# Patient Record
Sex: FEMALE | Race: BLACK OR AFRICAN AMERICAN | NOT HISPANIC OR LATINO | Employment: UNEMPLOYED | URBAN - METROPOLITAN AREA
[De-identification: names, ages, dates, MRNs, and addresses within clinical notes are randomized per-mention and may not be internally consistent; named-entity substitution may affect disease eponyms.]

---

## 2018-07-14 ENCOUNTER — HOSPITAL ENCOUNTER (EMERGENCY)
Facility: HOSPITAL | Age: 58
Discharge: HOME | End: 2018-07-14
Attending: EMERGENCY MEDICINE
Payer: COMMERCIAL

## 2018-07-14 VITALS
SYSTOLIC BLOOD PRESSURE: 108 MMHG | BODY MASS INDEX: 36.8 KG/M2 | RESPIRATION RATE: 18 BRPM | HEART RATE: 61 BPM | OXYGEN SATURATION: 100 % | WEIGHT: 200 LBS | DIASTOLIC BLOOD PRESSURE: 55 MMHG | HEIGHT: 62 IN | TEMPERATURE: 99 F

## 2018-07-14 DIAGNOSIS — B35.4 TINEA CORPORIS: Primary | ICD-10-CM

## 2018-07-14 PROCEDURE — 99283 EMERGENCY DEPT VISIT LOW MDM: CPT | Mod: 25

## 2018-07-14 PROCEDURE — 63600175 PHARM REV CODE 636 W HCPCS: Performed by: NURSE PRACTITIONER

## 2018-07-14 PROCEDURE — 96372 THER/PROPH/DIAG INJ SC/IM: CPT

## 2018-07-14 RX ORDER — DEXAMETHASONE SODIUM PHOSPHATE 4 MG/ML
8 INJECTION, SOLUTION INTRA-ARTICULAR; INTRALESIONAL; INTRAMUSCULAR; INTRAVENOUS; SOFT TISSUE
Status: COMPLETED | OUTPATIENT
Start: 2018-07-14 | End: 2018-07-14

## 2018-07-14 RX ORDER — CLOTRIMAZOLE 1 %
CREAM (GRAM) TOPICAL
Qty: 30 G | Refills: 0 | Status: SHIPPED | OUTPATIENT
Start: 2018-07-14 | End: 2022-08-01 | Stop reason: ALTCHOICE

## 2018-07-14 RX ADMIN — DEXAMETHASONE SODIUM PHOSPHATE 8 MG: 4 INJECTION, SOLUTION INTRAMUSCULAR; INTRAVENOUS at 08:07

## 2018-07-15 NOTE — ED PROVIDER NOTES
Encounter Date: 7/14/2018    SCRIBE #1 NOTE: I, Oren Guzman, am scribing for, and in the presence of,  Dr. Rai. I have scribed the entire note.       History     Chief Complaint   Patient presents with    Rash     pt c/o rash to upper torso believes to be spider bites x 1 week      Time seen by provider: 8:19 PM    This is a 57 y.o. female who presents due to rashes x 3 weeks. She reports working in the yard about 3 weeks ago and has developed rashes since then. She went to see her doctor who gave her steroids and medicine for the itching. Pt states the itching medicine works for about 2 hours and then stops working. The steroids however do not work at all. No other complaints at this time.      The history is provided by the patient.     Review of patient's allergies indicates:  No Known Allergies  History reviewed. No pertinent past medical history.  History reviewed. No pertinent surgical history.  History reviewed. No pertinent family history.  Social History   Substance Use Topics    Smoking status: Never Smoker    Smokeless tobacco: Never Used    Alcohol use Not on file     Review of Systems   Constitutional: Negative for chills, fatigue and fever.   HENT: Negative for congestion, rhinorrhea and sore throat.    Respiratory: Negative for shortness of breath and wheezing.    Cardiovascular: Negative for chest pain.   Gastrointestinal: Negative for abdominal pain, diarrhea, nausea and vomiting.   Genitourinary: Negative for dysuria and hematuria.   Musculoskeletal: Negative for back pain and neck pain.   Skin: Positive for rash.        Rashes to right side of face, neck, and right arm.   Neurological: Negative for dizziness, weakness and headaches.   All other systems reviewed and are negative.      Physical Exam     Initial Vitals [07/14/18 1814]   BP Pulse Resp Temp SpO2   129/61 68 17 98.6 °F (37 °C) 97 %      MAP       --         Physical Exam    Nursing note and vitals reviewed.  Constitutional: She  appears well-developed and well-nourished. She is not diaphoretic. No distress.   HENT:   Head: Normocephalic and atraumatic.   Mouth/Throat: Oropharynx is clear and moist.   Eyes: Conjunctivae and EOM are normal. Pupils are equal, round, and reactive to light.   Neck: Normal range of motion. Neck supple.   Cardiovascular: Normal rate.   Pulmonary/Chest: No respiratory distress.   Abdominal: She exhibits no distension.   Musculoskeletal: Normal range of motion. She exhibits no edema or tenderness.   Neurological: She is alert and oriented to person, place, and time.   Skin: Skin is warm and dry. Capillary refill takes less than 2 seconds.   Mutiple sites of papular ring shaped and circular lesions with central clearing to her bilateral forearms, right antecubital fossa, right neck, and right side of her face.   Psychiatric: Thought content normal.         ED Course   Procedures  Labs Reviewed - No data to display       Imaging Results    None          Medical Decision Making:   ED Management:  Rash is most consistent with tinea corporis and the patient will be discharged with a Decadron injection in the emergency department and a prescription for clotrimazole.                      Clinical Impression:     1. Tinea corporis                  I, Jami Rai, personally performed the services described in this documentation. All medical record entries made by the scribe were at my direction and in my presence.  I have reviewed the chart and agree that the record reflects my personal performance and is accurate and complete. Jami Rai M.D. 9:52 PM07/14/2018                 aJmi Rai MD  07/14/18 2157

## 2018-07-15 NOTE — ED TRIAGE NOTES
Pt presents to ED with C/O rash that started to RUE to crease area and has spread to opposite arm, torso, back neck and face. Pt suspects its spider bites. Some are noted as pustules and some has scabbed over, some swelling noted with redness   Pt states she did go  to urgent care this tuesday and was given a steroid injection and cortisone cream with no relief. Comfort and safety measures provided, will continue to monitor.

## 2018-07-15 NOTE — ED PROVIDER NOTES
"Encounter Date: 7/14/2018       History     Chief Complaint   Patient presents with    Rash     pt c/o rash to upper torso believes to be spider bites x 1 week      Pt presents for rash on arms, back, abd and groin x 2 weeks. Rash is intensly itching, pt went to urgent care Tuesday where they gave her a steriod shot, some cream and "itchy" medicine, pt has gotten no relief from meds. Pt has also tried OTC calamine lotion with no relief. Pt reports day before rash started she was cutting branches and ricky to clear way for a picnic at her resident. Rash started to show up the next day on arms, then has spread everywhere else since.       The history is provided by the patient.     Review of patient's allergies indicates:  No Known Allergies  History reviewed. No pertinent past medical history.  History reviewed. No pertinent surgical history.  History reviewed. No pertinent family history.  Social History   Substance Use Topics    Smoking status: Never Smoker    Smokeless tobacco: Never Used    Alcohol use Not on file     Review of Systems   Constitutional: Negative for activity change, chills and fever.   HENT: Negative for congestion, sore throat and trouble swallowing.    Respiratory: Negative for cough, shortness of breath and wheezing.    Cardiovascular: Negative for chest pain.   Gastrointestinal: Negative for constipation, diarrhea, nausea and vomiting.   Genitourinary: Negative for difficulty urinating.   Musculoskeletal: Negative for back pain and neck pain.   Skin: Positive for rash and wound.   Neurological: Negative for weakness and headaches.   Hematological: Does not bruise/bleed easily.   Psychiatric/Behavioral: Negative for confusion.       Physical Exam     Initial Vitals [07/14/18 1814]   BP Pulse Resp Temp SpO2   129/61 68 17 98.6 °F (37 °C) 97 %      MAP       --         Physical Exam    Nursing note and vitals reviewed.  Constitutional: Vital signs are normal. She appears well-developed.  " Non-toxic appearance. She does not appear ill.   HENT:   Head: Normocephalic and atraumatic.   Eyes: Conjunctivae, EOM and lids are normal.   Neck: Trachea normal and full passive range of motion without pain.   Cardiovascular: Normal rate, regular rhythm and normal heart sounds.   Pulmonary/Chest: Effort normal and breath sounds normal.   Abdominal: Soft. Normal appearance and bowel sounds are normal. There is no tenderness.   Musculoskeletal: Normal range of motion.   Neurological: She is alert and oriented to person, place, and time. She has normal strength. No cranial nerve deficit or sensory deficit.   Skin: Skin is warm and dry. Capillary refill takes less than 2 seconds. Abrasion (R arm from scratching) and rash noted. No abscess noted. Rash is papular and vesicular (on L forearm rash).   Psychiatric: She has a normal mood and affect. Her speech is normal and behavior is normal.         ED Course   Procedures  Labs Reviewed - No data to display       Imaging Results    None          Medical Decision Making:   Differential Diagnosis:   Tinea, cellullitis                      Clinical Impression:   {Add your Clinical Impression here. If you haven't documented one yet, please pend the note, finalize a Clinical Impression, and refresh your note before signing.:[id number]}

## 2022-08-01 ENCOUNTER — LAB VISIT (OUTPATIENT)
Dept: LAB | Facility: HOSPITAL | Age: 62
End: 2022-08-01
Attending: FAMILY MEDICINE
Payer: MEDICARE

## 2022-08-01 ENCOUNTER — TELEPHONE (OUTPATIENT)
Dept: PRIMARY CARE CLINIC | Facility: CLINIC | Age: 62
End: 2022-08-01

## 2022-08-01 ENCOUNTER — OFFICE VISIT (OUTPATIENT)
Dept: PRIMARY CARE CLINIC | Facility: CLINIC | Age: 62
End: 2022-08-01
Payer: MEDICARE

## 2022-08-01 VITALS
BODY MASS INDEX: 36.73 KG/M2 | TEMPERATURE: 98 F | SYSTOLIC BLOOD PRESSURE: 124 MMHG | DIASTOLIC BLOOD PRESSURE: 78 MMHG | WEIGHT: 200.81 LBS | HEART RATE: 60 BPM

## 2022-08-01 DIAGNOSIS — G89.29 OTHER CHRONIC PAIN: ICD-10-CM

## 2022-08-01 DIAGNOSIS — Z79.899 LONG-TERM USE OF HIGH-RISK MEDICATION: ICD-10-CM

## 2022-08-01 DIAGNOSIS — M19.90 OSTEOARTHRITIS, UNSPECIFIED OSTEOARTHRITIS TYPE, UNSPECIFIED SITE: ICD-10-CM

## 2022-08-01 DIAGNOSIS — E11.69 TYPE 2 DIABETES MELLITUS WITH OTHER SPECIFIED COMPLICATION, WITHOUT LONG-TERM CURRENT USE OF INSULIN: ICD-10-CM

## 2022-08-01 DIAGNOSIS — E66.01 SEVERE OBESITY (BMI 35.0-39.9) WITH COMORBIDITY: ICD-10-CM

## 2022-08-01 DIAGNOSIS — E78.5 HYPERLIPIDEMIA, UNSPECIFIED HYPERLIPIDEMIA TYPE: ICD-10-CM

## 2022-08-01 DIAGNOSIS — M06.9 RHEUMATOID ARTHRITIS, INVOLVING UNSPECIFIED SITE, UNSPECIFIED WHETHER RHEUMATOID FACTOR PRESENT: ICD-10-CM

## 2022-08-01 DIAGNOSIS — Z76.89 ENCOUNTER TO ESTABLISH CARE: ICD-10-CM

## 2022-08-01 DIAGNOSIS — Z76.89 ENCOUNTER TO ESTABLISH CARE: Primary | ICD-10-CM

## 2022-08-01 LAB
ESTIMATED AVG GLUCOSE: 166 MG/DL (ref 68–131)
HBA1C MFR BLD: 7.4 % (ref 4–5.6)

## 2022-08-01 PROCEDURE — 84443 ASSAY THYROID STIM HORMONE: CPT | Performed by: FAMILY MEDICINE

## 2022-08-01 PROCEDURE — 80053 COMPREHEN METABOLIC PANEL: CPT | Performed by: FAMILY MEDICINE

## 2022-08-01 PROCEDURE — 96372 THER/PROPH/DIAG INJ SC/IM: CPT | Mod: S$GLB,,, | Performed by: FAMILY MEDICINE

## 2022-08-01 PROCEDURE — 83036 HEMOGLOBIN GLYCOSYLATED A1C: CPT | Performed by: FAMILY MEDICINE

## 2022-08-01 PROCEDURE — 3078F DIAST BP <80 MM HG: CPT | Mod: CPTII,S$GLB,, | Performed by: FAMILY MEDICINE

## 2022-08-01 PROCEDURE — 96372 PR INJECTION,THERAP/PROPH/DIAG2ST, IM OR SUBCUT: ICD-10-PCS | Mod: S$GLB,,, | Performed by: FAMILY MEDICINE

## 2022-08-01 PROCEDURE — 3074F PR MOST RECENT SYSTOLIC BLOOD PRESSURE < 130 MM HG: ICD-10-PCS | Mod: CPTII,S$GLB,, | Performed by: FAMILY MEDICINE

## 2022-08-01 PROCEDURE — 99999 PR PBB SHADOW E&M-NEW PATIENT-LVL IV: CPT | Mod: PBBFAC,,, | Performed by: FAMILY MEDICINE

## 2022-08-01 PROCEDURE — 1159F PR MEDICATION LIST DOCUMENTED IN MEDICAL RECORD: ICD-10-PCS | Mod: CPTII,S$GLB,, | Performed by: FAMILY MEDICINE

## 2022-08-01 PROCEDURE — 1159F MED LIST DOCD IN RCRD: CPT | Mod: CPTII,S$GLB,, | Performed by: FAMILY MEDICINE

## 2022-08-01 PROCEDURE — 3008F BODY MASS INDEX DOCD: CPT | Mod: CPTII,S$GLB,, | Performed by: FAMILY MEDICINE

## 2022-08-01 PROCEDURE — 99999 PR PBB SHADOW E&M-NEW PATIENT-LVL IV: ICD-10-PCS | Mod: PBBFAC,,, | Performed by: FAMILY MEDICINE

## 2022-08-01 PROCEDURE — 1160F RVW MEDS BY RX/DR IN RCRD: CPT | Mod: CPTII,S$GLB,, | Performed by: FAMILY MEDICINE

## 2022-08-01 PROCEDURE — 3008F PR BODY MASS INDEX (BMI) DOCUMENTED: ICD-10-PCS | Mod: CPTII,S$GLB,, | Performed by: FAMILY MEDICINE

## 2022-08-01 PROCEDURE — 36415 COLL VENOUS BLD VENIPUNCTURE: CPT | Mod: PN | Performed by: FAMILY MEDICINE

## 2022-08-01 PROCEDURE — 1160F PR REVIEW ALL MEDS BY PRESCRIBER/CLIN PHARMACIST DOCUMENTED: ICD-10-PCS | Mod: CPTII,S$GLB,, | Performed by: FAMILY MEDICINE

## 2022-08-01 PROCEDURE — 80061 LIPID PANEL: CPT | Performed by: FAMILY MEDICINE

## 2022-08-01 PROCEDURE — 85025 COMPLETE CBC W/AUTO DIFF WBC: CPT | Performed by: FAMILY MEDICINE

## 2022-08-01 PROCEDURE — 3074F SYST BP LT 130 MM HG: CPT | Mod: CPTII,S$GLB,, | Performed by: FAMILY MEDICINE

## 2022-08-01 PROCEDURE — 99204 OFFICE O/P NEW MOD 45 MIN: CPT | Mod: 25,S$GLB,, | Performed by: FAMILY MEDICINE

## 2022-08-01 PROCEDURE — 3078F PR MOST RECENT DIASTOLIC BLOOD PRESSURE < 80 MM HG: ICD-10-PCS | Mod: CPTII,S$GLB,, | Performed by: FAMILY MEDICINE

## 2022-08-01 PROCEDURE — 99204 PR OFFICE/OUTPT VISIT, NEW, LEVL IV, 45-59 MIN: ICD-10-PCS | Mod: 25,S$GLB,, | Performed by: FAMILY MEDICINE

## 2022-08-01 RX ORDER — HYDROXYCHLOROQUINE SULFATE 200 MG/1
400 TABLET, FILM COATED ORAL
COMMUNITY
Start: 2022-04-18 | End: 2022-09-27 | Stop reason: SDUPTHER

## 2022-08-01 RX ORDER — KETOROLAC TROMETHAMINE 30 MG/ML
60 INJECTION, SOLUTION INTRAMUSCULAR; INTRAVENOUS
Status: COMPLETED | OUTPATIENT
Start: 2022-08-01 | End: 2022-08-01

## 2022-08-01 RX ORDER — ERGOCALCIFEROL 1.25 MG/1
50000 CAPSULE ORAL
COMMUNITY
Start: 2021-10-06 | End: 2022-08-01 | Stop reason: SDUPTHER

## 2022-08-01 RX ORDER — ATORVASTATIN CALCIUM 80 MG/1
80 TABLET, FILM COATED ORAL DAILY
COMMUNITY
Start: 2022-03-31 | End: 2022-08-01 | Stop reason: SDUPTHER

## 2022-08-01 RX ORDER — METFORMIN HYDROCHLORIDE 500 MG/1
1000 TABLET, EXTENDED RELEASE ORAL
Qty: 180 TABLET | Refills: 3 | Status: SHIPPED | OUTPATIENT
Start: 2022-08-01 | End: 2022-11-18 | Stop reason: SDUPTHER

## 2022-08-01 RX ORDER — GABAPENTIN 400 MG/1
800 CAPSULE ORAL
COMMUNITY
Start: 2021-10-06 | End: 2022-08-01 | Stop reason: SDUPTHER

## 2022-08-01 RX ORDER — ERGOCALCIFEROL 1.25 MG/1
50000 CAPSULE ORAL
Qty: 13 CAPSULE | Refills: 3 | Status: SHIPPED | OUTPATIENT
Start: 2022-08-01 | End: 2022-09-26 | Stop reason: SDUPTHER

## 2022-08-01 RX ORDER — GABAPENTIN 100 MG/1
100 CAPSULE ORAL
COMMUNITY
Start: 2021-10-06 | End: 2022-08-01 | Stop reason: SDUPTHER

## 2022-08-01 RX ORDER — NAPROXEN SODIUM 220 MG/1
81 TABLET, FILM COATED ORAL
COMMUNITY
Start: 2021-10-06

## 2022-08-01 RX ORDER — ATORVASTATIN CALCIUM 80 MG/1
80 TABLET, FILM COATED ORAL DAILY
Qty: 90 TABLET | Refills: 3 | Status: SHIPPED | OUTPATIENT
Start: 2022-08-01 | End: 2023-09-01

## 2022-08-01 RX ORDER — GABAPENTIN 100 MG/1
100 CAPSULE ORAL EVERY MORNING
Qty: 90 CAPSULE | Refills: 3 | Status: SHIPPED | OUTPATIENT
Start: 2022-08-01 | End: 2022-09-06

## 2022-08-01 RX ORDER — GABAPENTIN 400 MG/1
800 CAPSULE ORAL NIGHTLY
Qty: 180 CAPSULE | Refills: 3 | Status: SHIPPED | OUTPATIENT
Start: 2022-08-01 | End: 2022-09-06

## 2022-08-01 RX ORDER — METFORMIN HYDROCHLORIDE 500 MG/1
1000 TABLET, EXTENDED RELEASE ORAL
COMMUNITY
Start: 2021-10-06 | End: 2022-08-01 | Stop reason: SDUPTHER

## 2022-08-01 RX ADMIN — KETOROLAC TROMETHAMINE 60 MG: 30 INJECTION, SOLUTION INTRAMUSCULAR; INTRAVENOUS at 03:08

## 2022-08-01 NOTE — PROGRESS NOTES
After obtaining consent, and per orders of Dr. Huerta , Toradol 60 mg injection given by Park Choi LPN. Patient instructed to remain in room for 15 minutes afterwards, and to report any adverse reactions to me immediately.          Park Choi LPN

## 2022-08-01 NOTE — Clinical Note
Hey there!  Ms. Moraes was seeing neuro in [facility]. She's having a right sided cervial radiculopathy and they ordered an EMG to look into it. I thought that if she sees you, you could decide if that's the best test for her and, if so, have it done here instead? She also has a bunch of chronic back, and other joint pains you could address.  She's complicated by a history of RA on top of this, so I've already referred her to rheum.  Thanks! Pito

## 2022-08-01 NOTE — PROGRESS NOTES
Patient, Luciana Moraes (MRN #[id number]), presented with a recorded BMI of 36.73 kg/m^2 and a documented comorbidity(s):  - Diabetes Mellitus Type 2  - Hyperlipidemia  to which the severe obesity is a contributing factor. This is consistent with the definition of severe obesity (BMI 35.0-39.9) with comorbidity (ICD-10 E66.01, Z68.35). The patient's severe obesity was monitored, evaluated, addressed and/or treated. This addendum to the medical record is made on 08/01/2022.

## 2022-08-01 NOTE — PROGRESS NOTES
"Cedric Huerta MD    [facility]      [address] [place] [address]      Phone: [phone]      Fax: [phone]                  Office Visit  08/01/2022        Subjective      HPI:  Luciana Moraes is a 61 y.o. female presents today in clinic for "Establish Care  ."     61-year-old female presents today to establish care, checkup.    Patient states she moved here from [facility] about 8 months ago and is tired of driving back and forth for doctor's appointments.  Would like to get established locally.    Patient states she has a history of diabetes.  Last A1c on record was 7.4%, back in February, 2022. States that she takes metformin daily for this.  No issues with the medication.  Feels like she has gained some weight over the last few years.  Would also like to be able to lose some weight.  Gets regular eye exams in [place].  Would like to get established with Ophthalmology here.    Also has history of rheumatoid arthritis.  Was following with Rheumatology in [place].  From notes, it appears to be seropositive RA.  Has been taking Plaquenil 400 mg daily.  States she has been out of this medication for several weeks.  Thinks her rheumatologist was in the process of either discontinuing it or changing it to something else, she is not quite sure.    Additionally, has history of osteoarthritis and other joint pains.  Recently saw a neurologist in [place] for some right neck/shoulder pain.  The pain radiates down her right arm.  Feels numb, tingly.  Has to move it to make it feel better.  Has decreased strength in the arm.  She has an EMG scheduled for 9/16.  Is inquiring about having that done locally, rather than back in [place].  Currently, she takes gabapentin for her pains.    PMH:  Dm, HLD, RA, OA, chronic pains.  PSH:  Left shoulder.  Left hip labrum.  Hysterectomy.  One ovary.  FNH:  DM, CVA  Allergies:  NKDA  Social:  On disability.  Previously worked for " [employer].  T:  Denies.  Quit   A:  Denies  D:  Denies    Exercise:  Does chair exercises/stretches.    Pap:  Had hysterectomy.  MM2022    Colon:  In [place], approximately 2016.  Repeat 10 years ()      The following were updated and reviewed by myself in the chart: medications, past medical history, past surgical history, family history, social history, and allergies.     Medications:  Current Outpatient Medications on File Prior to Visit   Medication Sig Dispense Refill    aspirin 81 MG Chew Take 81 mg by mouth.      hydrOXYchloroQUINE (PLAQUENIL) 200 mg tablet Take 400 mg by mouth.      [DISCONTINUED] atorvastatin (LIPITOR) 80 MG tablet Take 80 mg by mouth once daily.      [DISCONTINUED] clotrimazole (LOTRIMIN) 1 % cream Apply to affected area 2 times daily 30 g 0    [DISCONTINUED] ergocalciferol (ERGOCALCIFEROL) 50,000 unit Cap Take 50,000 Units by mouth.      [DISCONTINUED] gabapentin (NEURONTIN) 100 MG capsule Take 100 mg by mouth.      [DISCONTINUED] gabapentin (NEURONTIN) 400 MG capsule Take 800 mg by mouth.      [DISCONTINUED] metFORMIN (GLUCOPHAGE-XR) 500 MG ER 24hr tablet Take 1,000 mg by mouth.       No current facility-administered medications on file prior to visit.        PMHx:  Past Medical History:   Diagnosis Date    Diabetes mellitus, type 2     Hyperlipidemia     Osteoarthritis     Polymyositis     Rheumatoid arthritis       There are no problems to display for this patient.       PSHx:  Past Surgical History:   Procedure Laterality Date    CARPAL TUNNEL RELEASE Bilateral     HIP SURGERY      HYSTERECTOMY          FHx:  History reviewed. No pertinent family history.     Social:  Social History     Socioeconomic History    Marital status:    Tobacco Use    Smoking status: Never Smoker    Smokeless tobacco: Never Used        Allergies:  Review of patient's allergies indicates:  No Known Allergies     ROS:  Review of Systems   Constitutional:  "Negative for activity change, appetite change, chills and fever.   HENT: Negative for congestion, postnasal drip, rhinorrhea, sore throat and trouble swallowing.    Respiratory: Negative for cough, shortness of breath and wheezing.    Cardiovascular: Negative for chest pain and palpitations.   Gastrointestinal: Negative for abdominal pain, constipation, diarrhea, nausea and vomiting.   Genitourinary: Negative for difficulty urinating.   Musculoskeletal: Positive for arthralgias, myalgias and neck pain.   Skin: Negative for color change and rash.   Neurological: Negative for speech difficulty and headaches.   All other systems reviewed and are negative.         Objective      /78   Pulse 60   Temp 97.8 °F (36.6 °C)   Wt 91.1 kg (200 lb 13.4 oz)   BMI 36.73 kg/m²   Ht Readings from Last 3 Encounters:   07/14/18 5' 2" (1.575 m)     Wt Readings from Last 3 Encounters:   08/01/22 91.1 kg (200 lb 13.4 oz)   07/14/18 90.7 kg (200 lb)       PHYSICAL EXAM:  Physical Exam  Vitals and nursing note reviewed.   Constitutional:       General: She is not in acute distress.     Appearance: Normal appearance.   HENT:      Head: Normocephalic and atraumatic.      Right Ear: Tympanic membrane, ear canal and external ear normal.      Left Ear: Tympanic membrane, ear canal and external ear normal.      Nose: Nose normal. No congestion or rhinorrhea.      Mouth/Throat:      Mouth: Mucous membranes are moist.      Pharynx: Oropharynx is clear. No oropharyngeal exudate or posterior oropharyngeal erythema.   Eyes:      Extraocular Movements: Extraocular movements intact.      Conjunctiva/sclera: Conjunctivae normal.      Pupils: Pupils are equal, round, and reactive to light.   Cardiovascular:      Rate and Rhythm: Normal rate and regular rhythm.   Pulmonary:      Effort: Pulmonary effort is normal. No respiratory distress.      Breath sounds: No wheezing, rhonchi or rales.   Musculoskeletal:         General: Normal range of " motion.      Cervical back: Normal range of motion.   Lymphadenopathy:      Cervical: No cervical adenopathy.   Skin:     General: Skin is warm and dry.      Findings: No rash.   Neurological:      Mental Status: She is alert.              LABS / IMAGING:  Recent Results (from the past 4368 hour(s))   HEMOGLOBIN A1C    Collection Time: 02/03/22  9:41 AM   Result Value Ref Range    Hemoglobin A1C 7.4 (H) 4.7 - 5.6 %    Estimated Average Glucose 166 (H) <115 mg/dL         Assessment    1. Encounter to establish care    2. Type 2 diabetes mellitus with other specified complication, without long-term current use of insulin    3. Hyperlipidemia, unspecified hyperlipidemia type    4. Rheumatoid arthritis, involving unspecified site, unspecified whether rheumatoid factor present    5. Osteoarthritis, unspecified osteoarthritis type, unspecified site    6. Other chronic pain    7. Long-term use of high-risk medication          Plan    Luciana was seen today for establish care.    Diagnoses and all orders for this visit:    Encounter to establish care  -     Ambulatory referral/consult to Obstetrics / Gynecology; Future  -     CBC Auto Differential; Future  -     Comprehensive Metabolic Panel; Future  -     TSH; Future  -     Lipid Panel; Future  -     Hemoglobin A1C; Future  -     Microalbumin/creatinine urine ratio; Future    Type 2 diabetes mellitus with other specified complication, without long-term current use of insulin  -     Ambulatory referral/consult to Ophthalmology; Future  -     metFORMIN (GLUCOPHAGE-XR) 500 MG ER 24hr tablet; Take 2 tablets (1,000 mg total) by mouth daily with breakfast.  -     Hemoglobin A1C; Future  -     Microalbumin/creatinine urine ratio; Future    Hyperlipidemia, unspecified hyperlipidemia type  -     atorvastatin (LIPITOR) 80 MG tablet; Take 1 tablet (80 mg total) by mouth once daily.  -     Lipid Panel; Future    Rheumatoid arthritis, involving unspecified site, unspecified whether  rheumatoid factor present  -     Ambulatory referral/consult to Pain Clinic; Future  -     Ambulatory referral/consult to Rheumatology; Future    Osteoarthritis, unspecified osteoarthritis type, unspecified site  -     Ambulatory referral/consult to Pain Clinic; Future  -     ketorolac injection 60 mg  -     gabapentin (NEURONTIN) 100 MG capsule; Take 1 capsule (100 mg total) by mouth every morning.  -     gabapentin (NEURONTIN) 400 MG capsule; Take 2 capsules (800 mg total) by mouth every evening.    Other chronic pain  -     Ambulatory referral/consult to Pain Clinic; Future  -     ketorolac injection 60 mg  -     gabapentin (NEURONTIN) 100 MG capsule; Take 1 capsule (100 mg total) by mouth every morning.  -     gabapentin (NEURONTIN) 400 MG capsule; Take 2 capsules (800 mg total) by mouth every evening.  -     CBC Auto Differential; Future  -     Comprehensive Metabolic Panel; Future  -     TSH; Future    Long-term use of high-risk medication  -     CBC Auto Differential; Future  -     Comprehensive Metabolic Panel; Future  -     TSH; Future  -     Lipid Panel; Future  -     Hemoglobin A1C; Future  -     Microalbumin/creatinine urine ratio; Future    Other orders  -     ergocalciferol (ERGOCALCIFEROL) 50,000 unit Cap; Take 1 capsule (50,000 Units total) by mouth every 7 days.    Physically, everything looks fine today.    Screening labs, as above.    Continue current medications, for now.  Once we get the results from the lab work back, can make adjustments to diabetes medications.    For her chronic pains, will give Toradol injection today.  Will have her see our pain specialist tomorrow.  He can decide if ordering an EMG is appropriate.  He may be able to offer 2nd opinion on other possible workup.    From her chart notes, it appears she does have seropositive rheumatoid arthritis.  Will also have her see Rheumatology.    Overdue for well-woman exam.  Referral placed to gyn.      FOLLOW-UP:  Follow up in about  2 weeks (around 8/15/2022) for check up, discuss labs, medication recs, f/u on referrals..    I spent a total of 45 minutes face to face and non-face to face on the date of this visit.This includes time preparing to see the patient (eg, review of tests, notes), obtaining and/or reviewing additional history from an independent historian and/or outside medical records, documenting clinical information in the electronic health record, independently interpreting results and/or communicating results to the patient/family/caregiver, or care coordinator.    Signed by:  Cedric Huerta MD

## 2022-08-01 NOTE — PATIENT INSTRUCTIONS
Physically, everything looks pretty good today.    Let us get some blood work today to see where things stand on the inside.  Results will be posted my chart is his hair available.    Once we get those results, we can discuss your diabetes, weight loss, other treatments.    Toradol injection today to help with pain.  This should give you some temporary relief until you can see Dr. Pereyra tomorrow.    Keep your follow-up appointment with him tomorrow.  He can discuss ordering the EMG study and can talk with you about results.    Referral placed today to have you see Rheumatology.  Our rheumatologist is located in our [place] clinic.    Referrals also placed for ophthalmology and Gynecology.  Our gynecologist is located here, in this building.    For Ophthalmology, recommend that you see the [facility], next door, at [address] [place] [address].  Feel free to call them at [phone] to schedule an appointment.    Continue to eat a healthy diet.  Be careful with portion sizes.  Includes lots of fresh fruits, vegetables, whole grains, lean proteins.  See info below.    Keep hydrated.  Be sure to drink at least 8-10, 8 oz, glasses of water every day.    Stay active.  Try to do some sort of physical activity every day.  Nothing outrageous, just try walking for 10-15 minutes each day.

## 2022-08-02 ENCOUNTER — OFFICE VISIT (OUTPATIENT)
Dept: PAIN MEDICINE | Facility: CLINIC | Age: 62
End: 2022-08-02
Payer: MEDICARE

## 2022-08-02 ENCOUNTER — HOSPITAL ENCOUNTER (OUTPATIENT)
Dept: RADIOLOGY | Facility: HOSPITAL | Age: 62
Discharge: HOME | End: 2022-08-02
Attending: ANESTHESIOLOGY
Payer: MEDICARE

## 2022-08-02 VITALS
WEIGHT: 201.06 LBS | SYSTOLIC BLOOD PRESSURE: 134 MMHG | HEIGHT: 62 IN | HEART RATE: 60 BPM | DIASTOLIC BLOOD PRESSURE: 77 MMHG | BODY MASS INDEX: 37 KG/M2

## 2022-08-02 DIAGNOSIS — M54.12 CERVICAL RADICULOPATHY: Primary | ICD-10-CM

## 2022-08-02 DIAGNOSIS — M47.816 LUMBAR SPONDYLOSIS: ICD-10-CM

## 2022-08-02 DIAGNOSIS — M50.30 DDD (DEGENERATIVE DISC DISEASE), CERVICAL: ICD-10-CM

## 2022-08-02 DIAGNOSIS — M54.12 CERVICAL RADICULOPATHY: ICD-10-CM

## 2022-08-02 DIAGNOSIS — M06.9 RHEUMATOID ARTHRITIS, INVOLVING UNSPECIFIED SITE, UNSPECIFIED WHETHER RHEUMATOID FACTOR PRESENT: ICD-10-CM

## 2022-08-02 DIAGNOSIS — M47.812 CERVICAL SPONDYLOSIS: ICD-10-CM

## 2022-08-02 DIAGNOSIS — G89.29 OTHER CHRONIC PAIN: ICD-10-CM

## 2022-08-02 DIAGNOSIS — M19.90 OSTEOARTHRITIS, UNSPECIFIED OSTEOARTHRITIS TYPE, UNSPECIFIED SITE: ICD-10-CM

## 2022-08-02 LAB
ALBUMIN SERPL BCP-MCNC: 3.5 G/DL (ref 3.5–5.2)
ALP SERPL-CCNC: 98 U/L (ref 55–135)
ALT SERPL W/O P-5'-P-CCNC: 21 U/L (ref 10–44)
ANION GAP SERPL CALC-SCNC: 11 MMOL/L (ref 8–16)
AST SERPL-CCNC: 15 U/L (ref 10–40)
BASOPHILS # BLD AUTO: 0.03 K/UL (ref 0–0.2)
BASOPHILS NFR BLD: 0.4 % (ref 0–1.9)
BILIRUB SERPL-MCNC: 0.4 MG/DL (ref 0.1–1)
BUN SERPL-MCNC: 15 MG/DL (ref 6–20)
CALCIUM SERPL-MCNC: 9.1 MG/DL (ref 8.7–10.5)
CHLORIDE SERPL-SCNC: 105 MMOL/L (ref 95–110)
CHOLEST SERPL-MCNC: 218 MG/DL (ref 120–199)
CHOLEST/HDLC SERPL: 4.8 {RATIO} (ref 2–5)
CO2 SERPL-SCNC: 23 MMOL/L (ref 23–29)
CREAT SERPL-MCNC: 0.9 MG/DL (ref 0.5–1.4)
DIFFERENTIAL METHOD: ABNORMAL
EOSINOPHIL # BLD AUTO: 0.3 K/UL (ref 0–0.5)
EOSINOPHIL NFR BLD: 3.4 % (ref 0–8)
ERYTHROCYTE [DISTWIDTH] IN BLOOD BY AUTOMATED COUNT: 15.1 % (ref 11.5–14.5)
EST. GFR  (NO RACE VARIABLE): >60 ML/MIN/1.73 M^2
GLUCOSE SERPL-MCNC: 162 MG/DL (ref 70–110)
HCT VFR BLD AUTO: 42.5 % (ref 37–48.5)
HDLC SERPL-MCNC: 45 MG/DL (ref 40–75)
HDLC SERPL: 20.6 % (ref 20–50)
HGB BLD-MCNC: 12.8 G/DL (ref 12–16)
IMM GRANULOCYTES # BLD AUTO: 0.02 K/UL (ref 0–0.04)
IMM GRANULOCYTES NFR BLD AUTO: 0.2 % (ref 0–0.5)
LDLC SERPL CALC-MCNC: 126.2 MG/DL (ref 63–159)
LYMPHOCYTES # BLD AUTO: 3.5 K/UL (ref 1–4.8)
LYMPHOCYTES NFR BLD: 42.8 % (ref 18–48)
MCH RBC QN AUTO: 26.9 PG (ref 27–31)
MCHC RBC AUTO-ENTMCNC: 30.1 G/DL (ref 32–36)
MCV RBC AUTO: 90 FL (ref 82–98)
MONOCYTES # BLD AUTO: 0.7 K/UL (ref 0.3–1)
MONOCYTES NFR BLD: 8.8 % (ref 4–15)
NEUTROPHILS # BLD AUTO: 3.7 K/UL (ref 1.8–7.7)
NEUTROPHILS NFR BLD: 44.4 % (ref 38–73)
NONHDLC SERPL-MCNC: 173 MG/DL
NRBC BLD-RTO: 0 /100 WBC
PLATELET # BLD AUTO: 231 K/UL (ref 150–450)
PMV BLD AUTO: 11.7 FL (ref 9.2–12.9)
POTASSIUM SERPL-SCNC: 3.8 MMOL/L (ref 3.5–5.1)
PROT SERPL-MCNC: 7.1 G/DL (ref 6–8.4)
RBC # BLD AUTO: 4.75 M/UL (ref 4–5.4)
SODIUM SERPL-SCNC: 139 MMOL/L (ref 136–145)
TRIGL SERPL-MCNC: 234 MG/DL (ref 30–150)
TSH SERPL DL<=0.005 MIU/L-ACNC: 0.79 UIU/ML (ref 0.4–4)
WBC # BLD AUTO: 8.25 K/UL (ref 3.9–12.7)

## 2022-08-02 PROCEDURE — 3061F NEG MICROALBUMINURIA REV: CPT | Mod: CPTII,S$GLB,, | Performed by: ANESTHESIOLOGY

## 2022-08-02 PROCEDURE — 3066F PR DOCUMENTATION OF TREATMENT FOR NEPHROPATHY: ICD-10-PCS | Mod: CPTII,S$GLB,, | Performed by: ANESTHESIOLOGY

## 2022-08-02 PROCEDURE — 99203 OFFICE O/P NEW LOW 30 MIN: CPT | Mod: S$GLB,,, | Performed by: ANESTHESIOLOGY

## 2022-08-02 PROCEDURE — 72040 X-RAY EXAM NECK SPINE 2-3 VW: CPT | Mod: TC,PN

## 2022-08-02 PROCEDURE — 1159F PR MEDICATION LIST DOCUMENTED IN MEDICAL RECORD: ICD-10-PCS | Mod: CPTII,S$GLB,, | Performed by: ANESTHESIOLOGY

## 2022-08-02 PROCEDURE — 1160F RVW MEDS BY RX/DR IN RCRD: CPT | Mod: CPTII,S$GLB,, | Performed by: ANESTHESIOLOGY

## 2022-08-02 PROCEDURE — 1160F PR REVIEW ALL MEDS BY PRESCRIBER/CLIN PHARMACIST DOCUMENTED: ICD-10-PCS | Mod: CPTII,S$GLB,, | Performed by: ANESTHESIOLOGY

## 2022-08-02 PROCEDURE — 3051F PR MOST RECENT HEMOGLOBIN A1C LEVEL 7.0 - < 8.0%: ICD-10-PCS | Mod: CPTII,S$GLB,, | Performed by: ANESTHESIOLOGY

## 2022-08-02 PROCEDURE — 3078F DIAST BP <80 MM HG: CPT | Mod: CPTII,S$GLB,, | Performed by: ANESTHESIOLOGY

## 2022-08-02 PROCEDURE — 3051F HG A1C>EQUAL 7.0%<8.0%: CPT | Mod: CPTII,S$GLB,, | Performed by: ANESTHESIOLOGY

## 2022-08-02 PROCEDURE — 3061F PR NEG MICROALBUMINURIA RESULT DOCUMENTED/REVIEW: ICD-10-PCS | Mod: CPTII,S$GLB,, | Performed by: ANESTHESIOLOGY

## 2022-08-02 PROCEDURE — 3075F PR MOST RECENT SYSTOLIC BLOOD PRESS GE 130-139MM HG: ICD-10-PCS | Mod: CPTII,S$GLB,, | Performed by: ANESTHESIOLOGY

## 2022-08-02 PROCEDURE — 99203 PR OFFICE/OUTPT VISIT, NEW, LEVL III, 30-44 MIN: ICD-10-PCS | Mod: S$GLB,,, | Performed by: ANESTHESIOLOGY

## 2022-08-02 PROCEDURE — 3075F SYST BP GE 130 - 139MM HG: CPT | Mod: CPTII,S$GLB,, | Performed by: ANESTHESIOLOGY

## 2022-08-02 PROCEDURE — 1159F MED LIST DOCD IN RCRD: CPT | Mod: CPTII,S$GLB,, | Performed by: ANESTHESIOLOGY

## 2022-08-02 PROCEDURE — 99999 PR PBB SHADOW E&M-EST. PATIENT-LVL V: CPT | Mod: PBBFAC,,, | Performed by: ANESTHESIOLOGY

## 2022-08-02 PROCEDURE — 99999 PR PBB SHADOW E&M-EST. PATIENT-LVL V: ICD-10-PCS | Mod: PBBFAC,,, | Performed by: ANESTHESIOLOGY

## 2022-08-02 PROCEDURE — 3008F PR BODY MASS INDEX (BMI) DOCUMENTED: ICD-10-PCS | Mod: CPTII,S$GLB,, | Performed by: ANESTHESIOLOGY

## 2022-08-02 PROCEDURE — 3078F PR MOST RECENT DIASTOLIC BLOOD PRESSURE < 80 MM HG: ICD-10-PCS | Mod: CPTII,S$GLB,, | Performed by: ANESTHESIOLOGY

## 2022-08-02 PROCEDURE — 3066F NEPHROPATHY DOC TX: CPT | Mod: CPTII,S$GLB,, | Performed by: ANESTHESIOLOGY

## 2022-08-02 PROCEDURE — 3008F BODY MASS INDEX DOCD: CPT | Mod: CPTII,S$GLB,, | Performed by: ANESTHESIOLOGY

## 2022-08-02 RX ORDER — METHYLPREDNISOLONE 4 MG/1
TABLET ORAL
Qty: 21 EACH | Refills: 0 | Status: SHIPPED | OUTPATIENT
Start: 2022-08-02 | End: 2022-08-23

## 2022-08-02 RX ORDER — TIZANIDINE 4 MG/1
4 TABLET ORAL 2 TIMES DAILY PRN
Qty: 60 TABLET | Refills: 1 | Status: SHIPPED | OUTPATIENT
Start: 2022-08-02 | End: 2022-09-06 | Stop reason: SDUPTHER

## 2022-08-02 RX ORDER — NABUMETONE 750 MG/1
750 TABLET, FILM COATED ORAL 2 TIMES DAILY PRN
Qty: 60 TABLET | Refills: 1 | Status: SHIPPED | OUTPATIENT
Start: 2022-08-02 | End: 2022-09-06 | Stop reason: SDUPTHER

## 2022-08-02 NOTE — PROGRESS NOTES
MsRudolph Moraes,    Attached are the results of your recent blood work.  You should be able to see them in MyChart.    Blood counts all look fine.  Electrolytes, kidney function, liver function, and thyroid function are all normal.    Sugar was a little elevated, but your A1c level held steady at 7.4%.  This is not horrible, but still above our goal.  Would like to get you below 7%, if possible.    Also, some of your cholesterol numbers were slightly elevated.  Were you fasting when we did the blood work?  If not, that would certainly account for some of the numbers.    We will discuss all of this in more detail at your visit in 2 weeks.

## 2022-08-02 NOTE — PROGRESS NOTES
New Patient Interventional Pain Note (Initial Visit)    Referring Physician: Cedric Huerta MD    PCP: Cedric Huerta MD    Chief Complaint:   Chief Complaint   Patient presents with    Neck Pain     Radiating to right shoulder        SUBJECTIVE:    Luciana Moraes is a 61 y.o. female who presents to the clinic for the evaluation of neck and lower pain. The pain started over 10 years ago and symptoms have been worsening.  Neck pain is described as a throbbing pain that starts diffusely over her neck.  She reports that in the last 2 months she has had increased in right upper extremity radicular pain that goes to her fingertips.  She denies any traumas to her neck or any previous surgeries on her spine her major joints.  Pain is worse with neck rotation and lifting objects, better with flexion and heat.  Back pain is described as a throbbing axial pain in a band across her lower back.  She reports minimal radiation to her bilateral lower extremities.  Pain is worse with extension, better with flexion.  The pain is described as numbing, sharp and tingling and is rated as  10 out of 10.   The pain is rated with a score of  6/10 on the BEST day and a score of 10/10 on the WORST day.  Symptoms interfere with daily activity and work. The pain is exacerbated by Walking, Extension, Lifting and Getting out of bed/chair.  The pain is mitigated by nothing.     Patient denies night fever/night sweats, urinary incontinence, bowel incontinence, significant weight loss, significant motor weakness and loss of sensations.      Non-Pharmacologic Treatments:  Physical Therapy/Home Exercise: yes  Ice/Heat:yes  TENS: no  Acupuncture: no  Massage: yes  Chiropractic: no        Previous Pain Medications:  Tylenol, NSAIDs, gabapentin, muscle relaxers, opioids, topicals     report:  Reviewed and consistent with medication use as prescribed.    Pain Procedures:   none      Imaging:   X-Ray Cervical Spine 2 or 3  Views  Narrative: EXAMINATION:  XR CERVICAL SPINE 2 OR 3 VIEWS    CLINICAL HISTORY:  Radiculopathy, cervical region    COMPARISON:  No comparison studies are available.    FINDINGS:  There is normal alignment of the 7 cervical vertebra. Multilevel marginal spondylosis and anterior annular calcifications especially from C4/C5 through C6/C7. The vertebral body heights and intervertebral disc heights are   well maintained. The posterior elements are intact and the prevertebral soft tissues are normal thickness. The orientation of the dens and the lateral masses are normal.    The lung apices are clear. Mildly tortuous aorta.  Impression: 1.  Negative for acute process involving the cervical spine.    2.  Multilevel degenerative disc changes manifesting mainly is marginal spondylosis and anterior annular calcifications in the mid to lower cervical region.    Electronically signed by: Keith Mann MD  Date:    08/02/2022  Time:    10:15      MRI Lumbar Spine w/o Contast 7/8/19  There is lumbar facet arthropathy at L5-S1 and L4-L5. No stenosis or disc herniation is evident. Patient appears obese. Patient may be a candidate for an image guided steroid injection treatment trial.   .       Past Medical History:   Diagnosis Date    Diabetes mellitus, type 2     Hyperlipidemia     Osteoarthritis     Polymyositis     Rheumatoid arthritis      Past Surgical History:   Procedure Laterality Date    CARPAL TUNNEL RELEASE Bilateral     HIP SURGERY      HYSTERECTOMY       Social History     Socioeconomic History    Marital status:    Tobacco Use    Smoking status: Never Smoker    Smokeless tobacco: Never Used     History reviewed. No pertinent family history.    Review of patient's allergies indicates:  No Known Allergies    Current Outpatient Medications   Medication Sig    aspirin 81 MG Chew Take 81 mg by mouth.    atorvastatin (LIPITOR) 80 MG tablet Take 1 tablet (80 mg total) by mouth once daily.     ergocalciferol (ERGOCALCIFEROL) 50,000 unit Cap Take 1 capsule (50,000 Units total) by mouth every 7 days.    gabapentin (NEURONTIN) 100 MG capsule Take 1 capsule (100 mg total) by mouth every morning.    gabapentin (NEURONTIN) 400 MG capsule Take 2 capsules (800 mg total) by mouth every evening.    hydrOXYchloroQUINE (PLAQUENIL) 200 mg tablet Take 400 mg by mouth.    metFORMIN (GLUCOPHAGE-XR) 500 MG ER 24hr tablet Take 2 tablets (1,000 mg total) by mouth daily with breakfast.    methylPREDNISolone (MEDROL DOSEPACK) 4 mg tablet use as directed    nabumetone (RELAFEN) 750 MG tablet Take 1 tablet (750 mg total) by mouth 2 (two) times daily as needed for Pain.    tiZANidine (ZANAFLEX) 4 MG tablet Take 1 tablet (4 mg total) by mouth 2 (two) times daily as needed.     No current facility-administered medications for this visit.         ROS  Review of Systems   Constitutional: Negative for chills, diaphoresis, fatigue and fever.   HENT: Negative for ear discharge, ear pain, rhinorrhea, trouble swallowing and voice change.    Eyes: Negative for pain and redness.   Respiratory: Negative for chest tightness, shortness of breath, wheezing and stridor.    Cardiovascular: Negative for chest pain and leg swelling.   Gastrointestinal: Negative for blood in stool, diarrhea, nausea and vomiting.   Endocrine: Negative for cold intolerance and heat intolerance.   Genitourinary: Positive for urgency. Negative for dysuria and hematuria.   Musculoskeletal: Positive for arthralgias, back pain, gait problem, joint swelling, myalgias, neck pain and neck stiffness.   Skin: Negative for rash.   Neurological: Positive for weakness and numbness. Negative for tremors, seizures, speech difficulty, light-headedness and headaches.   Hematological: Does not bruise/bleed easily.   Psychiatric/Behavioral: Negative for agitation, confusion and suicidal ideas. The patient is not nervous/anxious.             OBJECTIVE:  /77   Pulse 60    "Ht 5' 2" (1.575 m)   Wt 91.2 kg (201 lb 1 oz)   BMI 36.77 kg/m²         Physical Exam  Constitutional:       General: She is not in acute distress.     Appearance: Normal appearance. She is not ill-appearing.   HENT:      Head: Normocephalic and atraumatic.      Nose: No congestion or rhinorrhea.      Mouth/Throat:      Mouth: Mucous membranes are moist.   Eyes:      Extraocular Movements: Extraocular movements intact.      Pupils: Pupils are equal, round, and reactive to light.   Cardiovascular:      Pulses: Normal pulses.   Pulmonary:      Effort: Pulmonary effort is normal.   Abdominal:      General: Abdomen is flat.      Palpations: Abdomen is soft.   Skin:     General: Skin is warm and dry.      Capillary Refill: Capillary refill takes less than 2 seconds.   Neurological:      Mental Status: She is alert and oriented to person, place, and time.      Cranial Nerves: No cranial nerve deficit.      Sensory: Sensory deficit present.      Motor: Weakness present. No abnormal muscle tone.      Gait: Gait abnormal.      Deep Tendon Reflexes:      Reflex Scores:       Tricep reflexes are 2+ on the right side and 2+ on the left side.       Bicep reflexes are 2+ on the right side and 2+ on the left side.       Brachioradialis reflexes are 1+ on the right side and 1+ on the left side.       Patellar reflexes are 1+ on the right side and 1+ on the left side.       Achilles reflexes are 1+ on the right side and 1+ on the left side.     Comments: Antalgic gait  Decreased right-handed  as well as 4/5 strength in wrist extension, wrist flexion, and shoulder abduction.  Muscle groups of the left upper extremity are 5/5  Decreased light touch sensation over the extensor surfaces of the right upper extremity   Psychiatric:         Mood and Affect: Mood normal.         Behavior: Behavior normal.         Thought Content: Thought content normal.           Musculoskeletal:    Cervical Exam  Incision: no  Pain with Flexion: " no  Pain with Extension: yes  Paraspinous TTP:  Most tender over the right trapezius area  Facet TTP:  C5-6, right greater than left  Spurling:  Positive on the right  ROM:  Decreased secondary to pain      Lumbar Exam  Incision: no  Pain with Flexion: no  Pain with Extension: yes  ROM:  Decreased secondary to pain  Paraspinous TTP:  Left greater than right  Facet TTP:  L4-5  Facet Loading:  Positive bilaterally        LABS:  Lab Results   Component Value Date    WBC 8.25 08/01/2022    HGB 12.8 08/01/2022    HCT 42.5 08/01/2022    MCV 90 08/01/2022     08/01/2022       CMP  Sodium   Date Value Ref Range Status   08/01/2022 139 136 - 145 mmol/L Final     Potassium   Date Value Ref Range Status   08/01/2022 3.8 3.5 - 5.1 mmol/L Final     Chloride   Date Value Ref Range Status   08/01/2022 105 95 - 110 mmol/L Final     CO2   Date Value Ref Range Status   08/01/2022 23 23 - 29 mmol/L Final     Glucose   Date Value Ref Range Status   08/01/2022 162 (H) 70 - 110 mg/dL Final     BUN   Date Value Ref Range Status   08/01/2022 15 6 - 20 mg/dL Final     Creatinine   Date Value Ref Range Status   08/01/2022 0.9 0.5 - 1.4 mg/dL Final     Calcium   Date Value Ref Range Status   08/01/2022 9.1 8.7 - 10.5 mg/dL Final     Total Protein   Date Value Ref Range Status   08/01/2022 7.1 6.0 - 8.4 g/dL Final     Albumin   Date Value Ref Range Status   08/01/2022 3.5 3.5 - 5.2 g/dL Final     Total Bilirubin   Date Value Ref Range Status   08/01/2022 0.4 0.1 - 1.0 mg/dL Final     Comment:     For infants and newborns, interpretation of results should be based  on gestational age, weight and in agreement with clinical  observations.    Premature Infant recommended reference ranges:  Up to 24 hours.............<8.0 mg/dL  Up to 48 hours............<12.0 mg/dL  3-5 days..................<15.0 mg/dL  6-29 days.................<15.0 mg/dL       Alkaline Phosphatase   Date Value Ref Range Status   08/01/2022 98 55 - 135 U/L Final     AST    Date Value Ref Range Status   08/01/2022 15 10 - 40 U/L Final     ALT   Date Value Ref Range Status   08/01/2022 21 10 - 44 U/L Final     Anion Gap   Date Value Ref Range Status   08/01/2022 11 8 - 16 mmol/L Final       Lab Results   Component Value Date    HGBA1C 7.4 (H) 08/01/2022             ASSESSMENT:       61 y.o. year old female with neck and lower back pain, consistent with     1. Cervical radiculopathy  Ambulatory referral/consult to Physical/Occupational Therapy    X-Ray Cervical Spine 2 or 3 Views    MRI Cervical Spine Without Contrast    nabumetone (RELAFEN) 750 MG tablet    tiZANidine (ZANAFLEX) 4 MG tablet    methylPREDNISolone (MEDROL DOSEPACK) 4 mg tablet   2. Rheumatoid arthritis, involving unspecified site, unspecified whether rheumatoid factor present  Ambulatory referral/consult to Pain Clinic   3. Osteoarthritis, unspecified osteoarthritis type, unspecified site  Ambulatory referral/consult to Pain Clinic   4. Other chronic pain  Ambulatory referral/consult to Pain Clinic   5. DDD (degenerative disc disease), cervical     6. Cervical spondylosis     7. Lumbar spondylosis       Cervical radiculopathy  -     Ambulatory referral/consult to Physical/Occupational Therapy; Future; Expected date: 08/09/2022  -     X-Ray Cervical Spine 2 or 3 Views; Future; Expected date: 08/02/2022  -     MRI Cervical Spine Without Contrast; Future; Expected date: 08/02/2022  -     nabumetone (RELAFEN) 750 MG tablet; Take 1 tablet (750 mg total) by mouth 2 (two) times daily as needed for Pain.  Dispense: 60 tablet; Refill: 1  -     tiZANidine (ZANAFLEX) 4 MG tablet; Take 1 tablet (4 mg total) by mouth 2 (two) times daily as needed.  Dispense: 60 tablet; Refill: 1  -     methylPREDNISolone (MEDROL DOSEPACK) 4 mg tablet; use as directed  Dispense: 21 each; Refill: 0    Rheumatoid arthritis, involving unspecified site, unspecified whether rheumatoid factor present  -     Ambulatory referral/consult to Pain  Clinic    Osteoarthritis, unspecified osteoarthritis type, unspecified site  -     Ambulatory referral/consult to Pain Clinic    Other chronic pain  -     Ambulatory referral/consult to Pain Clinic    DDD (degenerative disc disease), cervical    Cervical spondylosis    Lumbar spondylosis             PLAN:   - Interventions:   None at this time.  Can consider cervical [facility] depending on results  - Anticoagulation use:   yes aspirin    - Medications:   Start Relafen 750 mg twice a day as needed   Start tizanidine 4 mg twice a day as needed   Start Medrol Dosepak for inflammation   Continue Plaquenil as prescribed by rheumatology    - Therapy:    Patient was performing physical therapy in [place] with moderate relief.  Will send new referral for physical therapy in [place] for cervical radiculopathy.    - Imaging:   X-rays of cervical spine obtained today reviewed.  Significant for spondylosis and anterior annular calcifications noted at C4-5, C5-6, and C6-C7    - Consults:   Continue follow-up with Rheumatology    - Counseled patient regarding the importance of activity modification and physical therapy    - Patient Questions: Answered all of the patient's questions regarding diagnosis, therapy, and treatment    - Follow up visit: return to clinic in 4 to 5 weeks to review MRI        The above plan and management options were discussed at length with patient. Patient is in agreement with the above and verbalized understanding.    I discussed the goals of interventional chronic pain management with the patient on today's visit.  I explained the utility of injections for diagnostic and therapeutic purposes.  We discussed a multimodal approach to pain including treating the patient's given worst pain at any given time.  We will use a systematic approach to addressing pain.  We will also adopt a multimodal approach that includes injections, adjuvant medications, physical therapy, at times psychiatry.  There may be a  limited role for opioid use intermittently in the treatment of pain, more particularly for acute pain although no one approach can be used as a sole treatment modality.    I emphasized the importance of regular exercise, core strengthening and stretching, diet and weight loss as a cornerstone of long-term pain management.      Luis M Pereyra MD  Interventional Pain Management  [facility]    Disclaimer:  This note was prepared using voice recognition system and is likely to have sound alike errors that may have been overlooked even after proof reading.  Please call me with any questions

## 2022-08-03 ENCOUNTER — CLINICAL SUPPORT (OUTPATIENT)
Dept: REHABILITATION | Facility: HOSPITAL | Age: 62
End: 2022-08-03
Attending: ANESTHESIOLOGY
Payer: MEDICARE

## 2022-08-03 DIAGNOSIS — M62.838 MUSCLE SPASMS OF NECK: ICD-10-CM

## 2022-08-03 DIAGNOSIS — M54.2 NECK PAIN: ICD-10-CM

## 2022-08-03 DIAGNOSIS — M54.12 CERVICAL RADICULOPATHY: ICD-10-CM

## 2022-08-03 PROCEDURE — 97161 PT EVAL LOW COMPLEX 20 MIN: CPT | Mod: PN

## 2022-08-03 NOTE — PLAN OF CARE
"[facility]  Physical Therapy Initial Evaluation    Date: 8/3/2022   Name: Luciana Moraes  Clinic Number: [id number]    Therapy Diagnosis:   Encounter Diagnoses   Name Primary?    Cervical radiculopathy     Neck pain     Muscle spasms of neck      Physician: Luis M Pereyra MD    Physician Orders: PT Eval and Treat   Medical Diagnosis from Referral: M54.12 (ICD-10-CM) - Cervical radiculopathy     Evaluation Date: 8/3/2022  Authorization Period Expiration: 8/31/2022  Plan of Care Expiration: 11/3/2022  Visit # / Visits authorized: 1/ 1    PN DUE: 9/3/2022    Time In: 8:00  Time Out: 8:45  Total Appointment Time (timed & untimed codes): 45 minutes    Precautions: Standard    Subjective   Date of onset: one month ago  History of current condition - Luciana reports: Has c/o right sided neck pain with pain into right arm, with a sensation of pressure with numbness and tingling into arm forearm and hand. Started a month ago with some pain in right shoulder with gradual increase in Sx's. Pain in right side of neck described as a "squeezing sensation right side of c-spine. Agg by looking up and turning head to the right, eased with looking down.     Medical History:   Past Medical History:   Diagnosis Date    Diabetes mellitus, type 2     Hyperlipidemia     Osteoarthritis     Polymyositis     Rheumatoid arthritis        Surgical History:   Luciana Moraes  has a past surgical history that includes Hysterectomy; Carpal tunnel release (Bilateral); and Hip surgery.    Medications:   Luciana has a current medication list which includes the following prescription(s): aspirin, atorvastatin, ergocalciferol, gabapentin, gabapentin, hydroxychloroquine, metformin, methylprednisolone, nabumetone, and tizanidine.    Allergies:   Review of patient's allergies indicates:  No Known Allergies     Imaging, x-rays: There is normal alignment of the 7 cervical vertebra. Multilevel marginal " spondylosis and anterior annular calcifications especially from C4/C5 through C6/C7. The vertebral body heights and intervertebral disc heights are   well maintained. The posterior elements are intact and the prevertebral soft tissues are normal thickness. The orientation of the dens and the lateral masses are normal.       Prior Therapy: no  Social History:  lives alone  Occupation: disabled  Prior Level of Function: unrestricted in ADL's or position of neck  Current Level of Function: Painfull with ADL's, turning head, looking up    Pain:  Current 10/10, worst 10/10, best 7/10   Location: right neck  right arm(s)  Description: Tight, Tingling and Numb  Aggravating Factors: Extension and Lifting  Easing Factors: rest    Patients goals: alleviate pain    Objective       Observation: pt pleasant and appropriate throughout evaluation; A&O x 3       Posture: forward head    Cervical Range of Motion:    Degrees Pain   Flexion (50) 40 Yes mild     Extension (75) 20 Yes into arm     Right Rotation (75) 45 yes     Left Rotation (75) 55 Tight on right     Right Side Bending (45) 15 yes   Left Side Bending (45) 30 Tight on right      Increased symptoms with cervical retraction    Shoulder Range of Motion:   Shoulder Left Right   Flexion 135 125   Abduction 140 130   ER WNL WNL   IR WNL WNL       Upper Extremity Strength  (R) UE  (L) UE    Shoulder flexion: 4+/5 Shoulder flexion: 5/5   Shoulder Abduction: 4+/5 Shoulder abduction: 5/5   Shoulder ER 4-/5 Shoulder ER 5/5   Shoulder IR 4+/5 Shoulder IR 5/5   Elbow flexion: 4+/5 Elbow flexion: 5/5   Elbow extension: 4+/5 Elbow extension: 5/5   Thumb extension 3+/5 Thumb extesnion  5/5   Intrinsics adduction 4-/5 Intrinsics adduction 4/5         Special Tests:  Distraction neg   Compression neg   Spurlings Positive right   Sharp-Trevor neg   VA test neg   Lateral Flexion Alar Ligament neg   DNF test neg       Joint Mobility: hypomobile C-spine marcie right C5,6,7,        Palpation:  Tight and TTP cervical paraspinals,   Right UT    Sensation: intact    Flexibility: tight UT        Limitation/Restriction for FOTO neck Survey    Therapist reviewed FOTO scores for Luciana Moraes on 8/3/2022.   FOTO documents entered into [employer] - see Media section.    Limitation Score: 78%         TREATMENT   Treatment Time In: 8:40  Treatment Time Out: 8:45  Total Treatment time (time-based codes) separate from Evaluation: 5 minutes    Luciana received therapeutic exercises to develop ROM, flexibility and posture for 2 minutes including:    UT stretch right only  LS stretch Right only      Luciana received the following manual therapy techniques: Joint mobilizations, Myofacial release and Soft tissue Mobilization were applied to the: right neck for 5 minutes, including:    UPa's and transverse glides C4,5,6,7  STM cervical parapsinals right      Home Exercises and Patient Education Provided    Education provided:   - - PT POC  - HEP  - PT prognosis and diagnosis  - all questions and concerns answered       Written Home Exercises Provided: yes.  Exercises were reviewed and Luciana was able to demonstrate them prior to the end of the session.  Luciana demonstrated good  understanding of the education provided.     See EMR under Patient Instructions for exercises provided today.    Assessment   Luciana is a 61 y.o. female referred to outpatient Physical Therapy with a medical diagnosis of M54.12 (ICD-10-CM) - Cervical radiculopathy   .  The patient presents with impairments which include decreased ROM, decreased strength, decreased joint mobility, decreased muscle length and postural abnormalities.  These impairments are limiting patient's ability to perform ADL's painfree, turn head. Pt prognosis is Good due to personal factors and co-morbidities listed below. Pt will benefit from skilled outpatient Physical Therapy to address the deficits stated above and in the chart below, provide pt/family education, and to  maximize pt's level of independence.   Pt presents with SIGNS AND SYMPTOMS of right C6,7 facet impingement with nerve root irritation  Patient prognosis is Good.   Patientt will benefit from skilled outpatient Physical Therapy to address the deficits stated above and in the chart below, provide patient /family education, and to maximize patientt's level of independence.     Plan of care discussed with patient: Yes  Patient's spiritual, cultural and educational needs considered and patient is agreeable to the plan of care and goals as stated below:     Anticipated Barriers for therapy: age    Medical Necessity is demonstrated by the following  History  Co-morbidities and personal factors that may impact the plan of care Co-morbidities:   See above    Personal Factors:   age     low   Examination  Body Structures and Functions, activity limitations and participation restrictions that may impact the plan of care Body Regions:   neck  back  lower extremities  upper extremities    Body Systems:    ROM  strength    Participation Restrictions:   none    Activity limitations:   Learning and applying knowledge  no deficits    General Tasks and Commands  no deficits    Communication  no deficits    Mobility  lifting and carrying objects    Self care  no deficits    Domestic Life  doing house work (cleaning house, washing dishes, laundry)    Interactions/Relationships  no deficits    Life Areas  no deficits    Community and Social Life  no deficits         low   Clinical Presentation stable and uncomplicated low   Decision Making/ Complexity Score: low     Goals:  Short Term Goals: 5 weeks   1.Pt will be independent with HEP performance in order to continue PT progress at home.   2. Pt will improve Cervical ROM motion  at least 10%   3. Pt will report pain at worst of 5/10 or less  4. Pt will improve B shoulder ROM to 140 deg flex, abd    Long Term Goals: 10 weeks   1. Pt will improve FOTO disability score to 51% disability or  less in order to improve overall QOL and return to PLOF.   2. Pt will report pain at worst of 2/10 or less  3 .Pt will improve Cervical ROM motion  To WNL  4. Pt will improve B shoulder ROM to 160 deg flex, abd    Plan   Plan of care Certification: 8/3/2022 to 11/3/2022.    Outpatient Physical Therapy 2 times weekly for 10 weeks to include the following interventions: Electrical Stimulation IFC, Manual Therapy, Moist Heat/ Ice, Patient Education, Self Care and Therapeutic Exercise. And any other therapies and modalities as clinically indicated and appropriate, including but not limited to FDN and telehealth. Pt may be seen by PTA as a part of pt's care team.       Taylor Jama, PT, DPT  8/3/2022

## 2022-08-08 ENCOUNTER — HOSPITAL ENCOUNTER (OUTPATIENT)
Dept: RADIOLOGY | Facility: HOSPITAL | Age: 62
Discharge: HOME | End: 2022-08-08
Attending: ANESTHESIOLOGY
Payer: MEDICARE

## 2022-08-08 ENCOUNTER — PATIENT MESSAGE (OUTPATIENT)
Dept: ADMINISTRATIVE | Facility: HOSPITAL | Age: 62
End: 2022-08-08
Payer: MEDICARE

## 2022-08-08 DIAGNOSIS — M54.12 CERVICAL RADICULOPATHY: ICD-10-CM

## 2022-08-08 PROCEDURE — 72141 MRI NECK SPINE W/O DYE: CPT | Mod: TC,PN

## 2022-08-09 NOTE — PROGRESS NOTES
[facility]   Physical Therapy Treatment Note     Name: Luciana Moraes  Clinic Number: [id number]    Therapy Diagnosis:   Encounter Diagnoses   Name Primary?    Neck pain Yes    Muscle spasms of neck      Physician: Luis M Pereyra MD    Visit Date: 8/16/2022    Physician Orders: PT Eval and Treat   Medical Diagnosis from Referral: M54.12 (ICD-10-CM) - Cervical radiculopathy      Evaluation Date: 8/3/2022  Authorization Period Expiration: 8/31/2022  Plan of Care Expiration: 11/3/2022  Visit # / Visits authorized: 1/ 11 (+ eval)     PN DUE: 9/3/2022       PTA Visit #: 0/5     Time In: 9:45  Time Out: 10:30  Total Billable Time: 45 minutes    Imaging, x-rays: There is normal alignment of the 7 cervical vertebra. Multilevel marginal spondylosis and anterior annular calcifications especially from C4/C5 through C6/C7. The vertebral body heights and intervertebral disc heights are   well maintained.     MRI  C1-C2: Atlanto odontoid osteophytosis noted without significant dorsal pannus formation.  No significant spinal canal stenosis.     C2-C3: No significant spinal canal or neural foraminal stenosis.     C3-C4: Right greater than left uncovertebral hypertrophy and mild bilateral facet arthropathy.  No significant spinal canal or neural foraminal stenosis.     C4-C5: Bilateral uncovertebral hypertrophy/disc osteophyte complex and mild bilateral facet arthropathy.  Minimal spinal canal stenosis.  Mild-to-moderate right and mild left neural foraminal stenosis.     C5-C6: Right greater than left uncovertebral hypertrophy/disc osteophyte complex and mild bilateral facet arthropathy.  Minimal spinal canal stenosis with particular narrowing of the right spinal canal.  Moderate to severe right and mild left neural foraminal stenosis.     C6-C7: Bilateral uncovertebral hypertrophy/disc osteophyte complex and mild bilateral facet arthropathy.  No significant spinal canal stenosis.  Mild right  "neural foraminal stenosis.  No significant left neural foraminal stenosis.     C7-T1: Mild bilateral facet arthropathy.  No significant spinal canal stenosis.  Mild bilateral neural foraminal stenosis.    SUBJECTIVE   History of current condition - Luciana reports: Has c/o right sided neck pain with pain into right arm, with a sensation of pressure with numbness and tingling into arm forearm and hand. Started a month ago with some pain in right shoulder with gradual increase in Sx's. Pain in right side of neck described as a "squeezing sensation right side of c-spine. Agg by looking up and turning head to the right, eased with looking down.     Pt reports: status quo. Still intermittent pain in right arm    She was compliant with home exercise program.  Response to previous treatment: eval only  Functional change: none reported.     Pain: 9/10  Location: right neck and primarily right arm      OBJECTIVE     Objective Measures updated at progress report unless specified.   AROM R Rotation. 50 deg     Treatment     Luciana received the treatments listed below:    (Exercises and Techniques performed today are bolded)    Luciana received therapeutic exercises to develop ROM, flexibility and posture for 35 minutes including:     UBE 2 min fw/bw ea  Seated rows 13# 3 X 15  Tricep rope pulls   Bicep curls 4# 3 X 15  Hamnmer curls 2 X 15 4#  UT stretch right only 30 sec X 2  LS stretch Right only 30 sec X 2   Cervical retractions   SNAGS to right 3 levels X 15 ea     Luciana received the following manual therapy techniques: Joint mobilizations, Myofacial release and Soft tissue Mobilization were applied to the: right neck for 10 minutes, including:     UPa's and transverse glides C4,5,6,7 Right  STM cervical parapsinals right  Right UT TP release  Consider FDN  Cupping Right UT, Rhomboids            Patient Education and Home Exercises     Home Exercises Provided and Patient Education Provided     Education provided:   - " HEP    Written Home Exercises Provided: Patient instructed to cont prior HEP. Exercises were reviewed and Luciana was able to demonstrate them prior to the end of the session.  Adinas demonstrated good  understanding of the education provided. See EMR under Patient Instructions for exercises provided during therapy sessions    ASSESSMENT   Pt presents with SIGNS AND SYMPTOMS of right C6,7 facet impingement with nerve root irritation . Severe and irritable.        Adinas Is progressing well towards her goals.   Pt prognosis is Excellent.     Pt will continue to benefit from skilled outpatient physical therapy to address the deficits listed in the problem list box on initial evaluation, provide pt/family education and to maximize pt's level of independence in the home and community environment.     Pt's spiritual, cultural and educational needs considered and pt agreeable to plan of care and goals.     Anticipated barriers to physical therapy: none    Goals:  Short Term Goals: 5 weeks   1.Pt will be independent with HEP performance in order to continue PT progress at home.   2. Pt will improve Cervical ROM motion  at least 10%   3. Pt will report pain at worst of 5/10 or less  4. Pt will improve B shoulder ROM to 140 deg flex, abd     Long Term Goals: 10 weeks   1. Pt will improve FOTO disability score to 51% disability or less in order to improve overall QOL and return to PLOF.   2. Pt will report pain at worst of 2/10 or less  3 .Pt will improve Cervical ROM motion  To WNL  4. Pt will improve B shoulder ROM to 160 deg flex, abd         PLAN   Plan of care Certification: 8/3/2022 to 11/3/2022.     Continue with PT per POC there ex for posture corrective exercises, UE strengthening, soft tissue and joint mobilization. Assess affects of FDN    Taylor Jama, PT

## 2022-08-11 ENCOUNTER — HOSPITAL ENCOUNTER (OUTPATIENT)
Dept: RADIOLOGY | Facility: HOSPITAL | Age: 62
Discharge: HOME | End: 2022-08-11
Attending: NURSE PRACTITIONER
Payer: MEDICARE

## 2022-08-11 ENCOUNTER — OFFICE VISIT (OUTPATIENT)
Dept: OBSTETRICS AND GYNECOLOGY | Facility: CLINIC | Age: 62
End: 2022-08-11
Payer: MEDICARE

## 2022-08-11 VITALS
BODY MASS INDEX: 36.44 KG/M2 | WEIGHT: 198 LBS | DIASTOLIC BLOOD PRESSURE: 82 MMHG | HEIGHT: 62 IN | SYSTOLIC BLOOD PRESSURE: 128 MMHG

## 2022-08-11 DIAGNOSIS — R10.2 PELVIC PAIN IN FEMALE: ICD-10-CM

## 2022-08-11 DIAGNOSIS — Z01.419 ENCOUNTER FOR GYNECOLOGICAL EXAMINATION WITHOUT ABNORMAL FINDING: Primary | ICD-10-CM

## 2022-08-11 DIAGNOSIS — Z76.89 ENCOUNTER TO ESTABLISH CARE: ICD-10-CM

## 2022-08-11 DIAGNOSIS — K59.00 CONSTIPATION, UNSPECIFIED CONSTIPATION TYPE: ICD-10-CM

## 2022-08-11 DIAGNOSIS — Z12.31 ENCOUNTER FOR SCREENING MAMMOGRAM FOR BREAST CANCER: ICD-10-CM

## 2022-08-11 PROCEDURE — 3008F PR BODY MASS INDEX (BMI) DOCUMENTED: ICD-10-PCS | Mod: CPTII,S$GLB,, | Performed by: NURSE PRACTITIONER

## 2022-08-11 PROCEDURE — 76830 TRANSVAGINAL US NON-OB: CPT | Mod: TC,PN

## 2022-08-11 PROCEDURE — 3074F SYST BP LT 130 MM HG: CPT | Mod: CPTII,S$GLB,, | Performed by: NURSE PRACTITIONER

## 2022-08-11 PROCEDURE — 99999 PR PBB SHADOW E&M-EST. PATIENT-LVL V: ICD-10-PCS | Mod: PBBFAC,,, | Performed by: NURSE PRACTITIONER

## 2022-08-11 PROCEDURE — 3061F NEG MICROALBUMINURIA REV: CPT | Mod: CPTII,S$GLB,, | Performed by: NURSE PRACTITIONER

## 2022-08-11 PROCEDURE — 1159F MED LIST DOCD IN RCRD: CPT | Mod: CPTII,S$GLB,, | Performed by: NURSE PRACTITIONER

## 2022-08-11 PROCEDURE — 3051F HG A1C>EQUAL 7.0%<8.0%: CPT | Mod: CPTII,S$GLB,, | Performed by: NURSE PRACTITIONER

## 2022-08-11 PROCEDURE — 3061F PR NEG MICROALBUMINURIA RESULT DOCUMENTED/REVIEW: ICD-10-PCS | Mod: CPTII,S$GLB,, | Performed by: NURSE PRACTITIONER

## 2022-08-11 PROCEDURE — 3066F PR DOCUMENTATION OF TREATMENT FOR NEPHROPATHY: ICD-10-PCS | Mod: CPTII,S$GLB,, | Performed by: NURSE PRACTITIONER

## 2022-08-11 PROCEDURE — 1159F PR MEDICATION LIST DOCUMENTED IN MEDICAL RECORD: ICD-10-PCS | Mod: CPTII,S$GLB,, | Performed by: NURSE PRACTITIONER

## 2022-08-11 PROCEDURE — 3079F DIAST BP 80-89 MM HG: CPT | Mod: CPTII,S$GLB,, | Performed by: NURSE PRACTITIONER

## 2022-08-11 PROCEDURE — 3079F PR MOST RECENT DIASTOLIC BLOOD PRESSURE 80-89 MM HG: ICD-10-PCS | Mod: CPTII,S$GLB,, | Performed by: NURSE PRACTITIONER

## 2022-08-11 PROCEDURE — 3066F NEPHROPATHY DOC TX: CPT | Mod: CPTII,S$GLB,, | Performed by: NURSE PRACTITIONER

## 2022-08-11 PROCEDURE — 1160F PR REVIEW ALL MEDS BY PRESCRIBER/CLIN PHARMACIST DOCUMENTED: ICD-10-PCS | Mod: CPTII,S$GLB,, | Performed by: NURSE PRACTITIONER

## 2022-08-11 PROCEDURE — 3008F BODY MASS INDEX DOCD: CPT | Mod: CPTII,S$GLB,, | Performed by: NURSE PRACTITIONER

## 2022-08-11 PROCEDURE — 1160F RVW MEDS BY RX/DR IN RCRD: CPT | Mod: CPTII,S$GLB,, | Performed by: NURSE PRACTITIONER

## 2022-08-11 PROCEDURE — G0101 PR CA SCREEN;PELVIC/BREAST EXAM: ICD-10-PCS | Mod: S$GLB,,, | Performed by: NURSE PRACTITIONER

## 2022-08-11 PROCEDURE — 3051F PR MOST RECENT HEMOGLOBIN A1C LEVEL 7.0 - < 8.0%: ICD-10-PCS | Mod: CPTII,S$GLB,, | Performed by: NURSE PRACTITIONER

## 2022-08-11 PROCEDURE — G0101 CA SCREEN;PELVIC/BREAST EXAM: HCPCS | Mod: S$GLB,,, | Performed by: NURSE PRACTITIONER

## 2022-08-11 PROCEDURE — 99999 PR PBB SHADOW E&M-EST. PATIENT-LVL V: CPT | Mod: PBBFAC,,, | Performed by: NURSE PRACTITIONER

## 2022-08-11 PROCEDURE — 3074F PR MOST RECENT SYSTOLIC BLOOD PRESSURE < 130 MM HG: ICD-10-PCS | Mod: CPTII,S$GLB,, | Performed by: NURSE PRACTITIONER

## 2022-08-11 NOTE — PROGRESS NOTES
"CC: Well woman exam    Lucaina Moraes is a 61 y.o. female  presents for a well woman exam.    Chronic pain to right side, states has had since before her hysterectomy.  Pt states retains on ovary not sure which one but is concerned about the pain that occurs on right side of pelvis.  Has hx of constipation - pt states does cause similar pelvic pain with BM  States constipation has improved with water, fruit and took liness in past but off at present.    Past Medical History:   Diagnosis Date    Diabetes mellitus, type 2     Hyperlipidemia     Osteoarthritis     Polymyositis     Rheumatoid arthritis      Past Surgical History:   Procedure Laterality Date    CARPAL TUNNEL RELEASE Bilateral     HIP SURGERY      HYSTERECTOMY      due to bleeding, pain from fibroids, retains one ovary     History reviewed. No pertinent family history.  Social History     Tobacco Use    Smoking status: Never Smoker    Smokeless tobacco: Never Used   Substance Use Topics    Alcohol use: Not Currently    Drug use: Never     OB History        3    Para        Term                AB   1    Living   2       SAB        IAB        Ectopic        Multiple        Live Births                     /82   Ht 5' 2" (1.575 m)   Wt 89.8 kg (197 lb 15.6 oz)   BMI 36.21 kg/m²     ROS:  GENERAL: Denies weight gain or weight loss. Feeling well overall.   SKIN: Denies rash or lesions.   HEAD: Denies head injury or headache.   NODES: Denies enlarged lymph nodes.   CHEST: Denies chest pain or shortness of breath.   CARDIOVASCULAR: Denies palpitations or left sided chest pain.   ABDOMEN: No abdominal pain, constipation, diarrhea, nausea, vomiting or rectal bleeding.   URINARY: No frequency, dysuria, hematuria, or burning on urination.  REPRODUCTIVE: See HPI.   BREASTS: The patient performs breast self-examination and denies pain, lumps, or nipple discharge.   HEMATOLOGIC: No easy bruisability or excessive " bleeding.   MUSCULOSKELETAL: Denies joint pain or swelling.   NEUROLOGIC: Denies syncope or weakness.   PSYCHIATRIC: Denies depression, anxiety or mood swings.    PE:   APPEARANCE: Well nourished, well developed, in no acute distress.  AFFECT: WNL, alert and oriented x 3.  SKIN: No acne or hirsutism.  NECK: Neck symmetric without masses or thyromegaly.  NODES: No inguinal, cervical, axillary or femoral lymph node enlargement.  CHEST: Good respiratory effort.   ABDOMEN: Soft. No tenderness or masses. No hepatosplenomegaly. No hernias.  BREASTS: Symmetrical, no skin changes or visible lesions. No palpable masses, nipple discharge bilaterally.  PELVIC: Normal external female genitalia without lesions. Normal hair distribution. Adequate perineal body, normal urethral meatus. Vagina atrophic without lesions or discharge. Grade 1 cystocele or rectocele. Bimanual exam shows uterus and cervix to be surgically absent. Adnexa without masses tender to both lower quadrants right greater than left       1. Encounter for gynecological examination without abnormal finding     2. Encounter to establish care  Ambulatory referral/consult to Obstetrics / Gynecology   3. Encounter for screening mammogram for breast cancer  Mammo Digital Screening Bilat w/ [facility]   4. Pelvic pain in female  US Pelvis Comp with Transvag NON-OB (xpd    and PLAN:    Patient was counseled today on A.C.S. Pap guidelines and recommendations for yearly pelvic exams, mammograms and monthly self breast exams; to see her PCP for other health maintenance.     Check u/s due to pain- pending may need to see GI  Otherwise will refer to gyn MD

## 2022-08-15 ENCOUNTER — OFFICE VISIT (OUTPATIENT)
Dept: PRIMARY CARE CLINIC | Facility: CLINIC | Age: 62
End: 2022-08-15
Payer: MEDICARE

## 2022-08-15 ENCOUNTER — OFFICE VISIT (OUTPATIENT)
Dept: OPHTHALMOLOGY | Facility: CLINIC | Age: 62
End: 2022-08-15
Payer: MEDICARE

## 2022-08-15 VITALS
SYSTOLIC BLOOD PRESSURE: 122 MMHG | HEART RATE: 57 BPM | DIASTOLIC BLOOD PRESSURE: 78 MMHG | TEMPERATURE: 98 F | WEIGHT: 198.19 LBS | HEIGHT: 62 IN | BODY MASS INDEX: 36.47 KG/M2

## 2022-08-15 DIAGNOSIS — E11.69 TYPE 2 DIABETES MELLITUS WITH OTHER SPECIFIED COMPLICATION, WITHOUT LONG-TERM CURRENT USE OF INSULIN: Primary | ICD-10-CM

## 2022-08-15 DIAGNOSIS — E78.5 HYPERLIPIDEMIA, UNSPECIFIED HYPERLIPIDEMIA TYPE: ICD-10-CM

## 2022-08-15 DIAGNOSIS — H25.11 NUCLEAR SCLEROSIS OF RIGHT EYE: ICD-10-CM

## 2022-08-15 DIAGNOSIS — H25.12 NUCLEAR SCLEROSIS OF LEFT EYE: ICD-10-CM

## 2022-08-15 DIAGNOSIS — H52.7 REFRACTIVE DISORDER: ICD-10-CM

## 2022-08-15 DIAGNOSIS — G89.29 OTHER CHRONIC PAIN: ICD-10-CM

## 2022-08-15 DIAGNOSIS — H40.013 OPEN ANGLE WITH BORDERLINE FINDINGS AND LOW GLAUCOMA RISK IN BOTH EYES: ICD-10-CM

## 2022-08-15 DIAGNOSIS — M06.9 RHEUMATOID ARTHRITIS, INVOLVING UNSPECIFIED SITE, UNSPECIFIED WHETHER RHEUMATOID FACTOR PRESENT: ICD-10-CM

## 2022-08-15 PROCEDURE — 99204 PR OFFICE/OUTPT VISIT, NEW, LEVL IV, 45-59 MIN: ICD-10-PCS | Mod: S$GLB,,, | Performed by: STUDENT IN AN ORGANIZED HEALTH CARE EDUCATION/TRAINING PROGRAM

## 2022-08-15 PROCEDURE — 92015 DETERMINE REFRACTIVE STATE: CPT | Mod: S$GLB,,, | Performed by: STUDENT IN AN ORGANIZED HEALTH CARE EDUCATION/TRAINING PROGRAM

## 2022-08-15 PROCEDURE — 1160F PR REVIEW ALL MEDS BY PRESCRIBER/CLIN PHARMACIST DOCUMENTED: ICD-10-PCS | Mod: CPTII,S$GLB,, | Performed by: FAMILY MEDICINE

## 2022-08-15 PROCEDURE — 99999 PR PBB SHADOW E&M-EST. PATIENT-LVL IV: ICD-10-PCS | Mod: PBBFAC,,, | Performed by: FAMILY MEDICINE

## 2022-08-15 PROCEDURE — 3051F PR MOST RECENT HEMOGLOBIN A1C LEVEL 7.0 - < 8.0%: ICD-10-PCS | Mod: CPTII,S$GLB,, | Performed by: FAMILY MEDICINE

## 2022-08-15 PROCEDURE — 99204 OFFICE O/P NEW MOD 45 MIN: CPT | Mod: S$GLB,,, | Performed by: STUDENT IN AN ORGANIZED HEALTH CARE EDUCATION/TRAINING PROGRAM

## 2022-08-15 PROCEDURE — 92133 CPTRZD OPH DX IMG PST SGM ON: CPT | Mod: S$GLB,,, | Performed by: STUDENT IN AN ORGANIZED HEALTH CARE EDUCATION/TRAINING PROGRAM

## 2022-08-15 PROCEDURE — 3061F NEG MICROALBUMINURIA REV: CPT | Mod: CPTII,S$GLB,, | Performed by: STUDENT IN AN ORGANIZED HEALTH CARE EDUCATION/TRAINING PROGRAM

## 2022-08-15 PROCEDURE — 3051F HG A1C>EQUAL 7.0%<8.0%: CPT | Mod: CPTII,S$GLB,, | Performed by: FAMILY MEDICINE

## 2022-08-15 PROCEDURE — 3008F PR BODY MASS INDEX (BMI) DOCUMENTED: ICD-10-PCS | Mod: CPTII,S$GLB,, | Performed by: FAMILY MEDICINE

## 2022-08-15 PROCEDURE — 3051F PR MOST RECENT HEMOGLOBIN A1C LEVEL 7.0 - < 8.0%: ICD-10-PCS | Mod: CPTII,S$GLB,, | Performed by: STUDENT IN AN ORGANIZED HEALTH CARE EDUCATION/TRAINING PROGRAM

## 2022-08-15 PROCEDURE — 1159F MED LIST DOCD IN RCRD: CPT | Mod: CPTII,S$GLB,, | Performed by: FAMILY MEDICINE

## 2022-08-15 PROCEDURE — 92133 POSTERIOR SEGMENT OCT OPTIC NERVE(OCULAR COHERENCE TOMOGRAPHY) - OU - BOTH EYES: ICD-10-PCS | Mod: S$GLB,,, | Performed by: STUDENT IN AN ORGANIZED HEALTH CARE EDUCATION/TRAINING PROGRAM

## 2022-08-15 PROCEDURE — 92015 PR REFRACTION: ICD-10-PCS | Mod: S$GLB,,, | Performed by: STUDENT IN AN ORGANIZED HEALTH CARE EDUCATION/TRAINING PROGRAM

## 2022-08-15 PROCEDURE — 3066F NEPHROPATHY DOC TX: CPT | Mod: CPTII,S$GLB,, | Performed by: STUDENT IN AN ORGANIZED HEALTH CARE EDUCATION/TRAINING PROGRAM

## 2022-08-15 PROCEDURE — 99999 PR PBB SHADOW E&M-EST. PATIENT-LVL IV: CPT | Mod: PBBFAC,,, | Performed by: FAMILY MEDICINE

## 2022-08-15 PROCEDURE — 3066F PR DOCUMENTATION OF TREATMENT FOR NEPHROPATHY: ICD-10-PCS | Mod: CPTII,S$GLB,, | Performed by: FAMILY MEDICINE

## 2022-08-15 PROCEDURE — 3061F NEG MICROALBUMINURIA REV: CPT | Mod: CPTII,S$GLB,, | Performed by: FAMILY MEDICINE

## 2022-08-15 PROCEDURE — 3061F PR NEG MICROALBUMINURIA RESULT DOCUMENTED/REVIEW: ICD-10-PCS | Mod: CPTII,S$GLB,, | Performed by: FAMILY MEDICINE

## 2022-08-15 PROCEDURE — 99215 PR OFFICE/OUTPT VISIT, EST, LEVL V, 40-54 MIN: ICD-10-PCS | Mod: S$GLB,,, | Performed by: FAMILY MEDICINE

## 2022-08-15 PROCEDURE — 1160F PR REVIEW ALL MEDS BY PRESCRIBER/CLIN PHARMACIST DOCUMENTED: ICD-10-PCS | Mod: CPTII,S$GLB,, | Performed by: STUDENT IN AN ORGANIZED HEALTH CARE EDUCATION/TRAINING PROGRAM

## 2022-08-15 PROCEDURE — 1159F MED LIST DOCD IN RCRD: CPT | Mod: CPTII,S$GLB,, | Performed by: STUDENT IN AN ORGANIZED HEALTH CARE EDUCATION/TRAINING PROGRAM

## 2022-08-15 PROCEDURE — 3066F NEPHROPATHY DOC TX: CPT | Mod: CPTII,S$GLB,, | Performed by: FAMILY MEDICINE

## 2022-08-15 PROCEDURE — 99999 PR PBB SHADOW E&M-EST. PATIENT-LVL III: CPT | Mod: PBBFAC,,, | Performed by: STUDENT IN AN ORGANIZED HEALTH CARE EDUCATION/TRAINING PROGRAM

## 2022-08-15 PROCEDURE — 3066F PR DOCUMENTATION OF TREATMENT FOR NEPHROPATHY: ICD-10-PCS | Mod: CPTII,S$GLB,, | Performed by: STUDENT IN AN ORGANIZED HEALTH CARE EDUCATION/TRAINING PROGRAM

## 2022-08-15 PROCEDURE — 3078F DIAST BP <80 MM HG: CPT | Mod: CPTII,S$GLB,, | Performed by: FAMILY MEDICINE

## 2022-08-15 PROCEDURE — 99215 OFFICE O/P EST HI 40 MIN: CPT | Mod: S$GLB,,, | Performed by: FAMILY MEDICINE

## 2022-08-15 PROCEDURE — 1159F PR MEDICATION LIST DOCUMENTED IN MEDICAL RECORD: ICD-10-PCS | Mod: CPTII,S$GLB,, | Performed by: FAMILY MEDICINE

## 2022-08-15 PROCEDURE — 3008F BODY MASS INDEX DOCD: CPT | Mod: CPTII,S$GLB,, | Performed by: FAMILY MEDICINE

## 2022-08-15 PROCEDURE — 3061F PR NEG MICROALBUMINURIA RESULT DOCUMENTED/REVIEW: ICD-10-PCS | Mod: CPTII,S$GLB,, | Performed by: STUDENT IN AN ORGANIZED HEALTH CARE EDUCATION/TRAINING PROGRAM

## 2022-08-15 PROCEDURE — 3078F PR MOST RECENT DIASTOLIC BLOOD PRESSURE < 80 MM HG: ICD-10-PCS | Mod: CPTII,S$GLB,, | Performed by: FAMILY MEDICINE

## 2022-08-15 PROCEDURE — 3051F HG A1C>EQUAL 7.0%<8.0%: CPT | Mod: CPTII,S$GLB,, | Performed by: STUDENT IN AN ORGANIZED HEALTH CARE EDUCATION/TRAINING PROGRAM

## 2022-08-15 PROCEDURE — 1159F PR MEDICATION LIST DOCUMENTED IN MEDICAL RECORD: ICD-10-PCS | Mod: CPTII,S$GLB,, | Performed by: STUDENT IN AN ORGANIZED HEALTH CARE EDUCATION/TRAINING PROGRAM

## 2022-08-15 PROCEDURE — 1160F RVW MEDS BY RX/DR IN RCRD: CPT | Mod: CPTII,S$GLB,, | Performed by: STUDENT IN AN ORGANIZED HEALTH CARE EDUCATION/TRAINING PROGRAM

## 2022-08-15 PROCEDURE — 3074F PR MOST RECENT SYSTOLIC BLOOD PRESSURE < 130 MM HG: ICD-10-PCS | Mod: CPTII,S$GLB,, | Performed by: FAMILY MEDICINE

## 2022-08-15 PROCEDURE — 1160F RVW MEDS BY RX/DR IN RCRD: CPT | Mod: CPTII,S$GLB,, | Performed by: FAMILY MEDICINE

## 2022-08-15 PROCEDURE — 99999 PR PBB SHADOW E&M-EST. PATIENT-LVL III: ICD-10-PCS | Mod: PBBFAC,,, | Performed by: STUDENT IN AN ORGANIZED HEALTH CARE EDUCATION/TRAINING PROGRAM

## 2022-08-15 PROCEDURE — 3074F SYST BP LT 130 MM HG: CPT | Mod: CPTII,S$GLB,, | Performed by: FAMILY MEDICINE

## 2022-08-15 RX ORDER — SEMAGLUTIDE 1.34 MG/ML
INJECTION, SOLUTION SUBCUTANEOUS
Qty: 1 PEN | Refills: 0 | Status: SHIPPED | OUTPATIENT
Start: 2022-08-15 | End: 2022-09-19 | Stop reason: DRUGHIGH

## 2022-08-15 NOTE — PROGRESS NOTES
HPI     Diabetic Eye Exam      Additional comments:   Lab Results       Component                Value               Date                       HGBA1C                   7.4 (H)             08/01/2022                                 Comments     Pt in today for a diabetic eye exam. Pt states her vision has been stable   over the last year. Pt denies any ocular pain or discomfort. Pt states she   does use otc readers for small print.    1. Dm          Last edited by Bina De La Vega on 8/15/2022 12:39 PM. (History)            Assessment /Plan     For exam results, see Encounter Report.    Type 2 diabetes mellitus with other specified complication, without long-term current use of insulin  -   last A1c 7.4   Strict BG control, f/u w/ PCP, and annual DFE  Stressed importance of DM control to preserve vision      Nuclear sclerosis of right eye- - NVS, monitor  Nuclear sclerosis of left eye    Refractive disorder- MRx Dispensed     Glaucoma Suspect, low risk, Bilateral- Risk factors: Enlarged C/D Ratio, AA race and Borderline IOP  GOCT WNL, Will monitor annually    Explained that the diagnosis is uncertain and further testing is indicated. Discussed importance of follow up and completion of indicated studies as well as the risk of blindness from untreated/undiagnosed glaucoma.      Return to clinic in 1 year w/ GOCT or PRN

## 2022-08-15 NOTE — PATIENT INSTRUCTIONS
Recent blood work all looked pretty good.      Last A1c was 7.4%.  This is higher than our goal of 7.0%.      Let us see if we can help your blood sugar and help lose weight at the same time.      New prescription for Ozempic sent to pharmacy today.    For the 1st four weeks, inject 0.25 mg, once a week.  Then, increase to 0.5 mg, once a week.    Continue to eat a healthy diet.  Be careful with portion sizes.  Includes lots of fresh fruits, vegetables, whole grains, lean proteins.  See info below.    Keep hydrated.  Be sure to drink at least 8-10, 8 oz, glasses of water every day.    Stay active.  Try to do some sort of physical activity every day.  Nothing outrageous, just try walking for 10-15 minutes each day.     Keep all of your follow-up appointments, as scheduled.  Remember, you have an appointment with the ophthalmologist today, at noon, at our [place] location.

## 2022-08-15 NOTE — PROGRESS NOTES
"    [facility]       [address] [place] [address]      Phone: [phone]      Fax: [phone]    Cedric Huerta MD                Office Visit  08/15/2022        Subjective      HPI:  Luciana Moraes is a 61 y.o. female presents today in clinic for "Follow-up  ."     61-year-old female presents today to follow-up on labs, diabetes, referrals.    Patient has a history of diabetes.  Last A1c on record was 7.4%, back in February, 2022. States that she takes metformin (two, 500 mg XR tablets) daily for this.  Occasionally gets some diarrhea, but nothing too bothersome.  States that she has gained some weight over the last few years.  Would also like to be able to lose some weight.  Was getting regular eye exams in [place].  Has an appointment this afternoon to get established with Ophthalmology here.    She states that her weight is not related to her eating.  Her stomach feels larger now, than it was before her hysterectomy.  Tries to eat fruits, veggies, grilled chicken/pork.  Occasionally will cheat in have chips or cold drinks.  She also likes homemade French fries, but only consumes these maybe twice a month.  She feels her weight is contributing to her joint pains.  She used to be skinny when she was younger.  She has tried Ozempic in the past.  Completed the for six weeks, but never went back for refill.  She does remember that at some point during the Ozempic, she developed some low back pains.  Was not sure if this was from the medication, or from her other chronic back issues.  She is willing to try it again.    Also has history of rheumatoid arthritis.  Was following with Rheumatology in [place].  From notes, it appears to be seropositive RA.  Has been taking Plaquenil 400 mg daily.  States she has been out of this medication for several weeks.  Thinks her rheumatologist was in the process of either discontinuing it or changing it to " something else, she is not quite sure.  Has an appointment with Rheumatology on 2022 to get established.    Additionally, has history of osteoarthritis and other joint pains.  Was able to see Dr. Pereyra for this.  He has her in physical therapy.  Will follow-up with him on .      At last visit, she was having some right flank pain.  She was referred to gyn for well-woman exam, also to see if this could be coming from her right ovary.  She had pelvic ultrasound done.  This showed right ovary present, hysterectomy, but otherwise WNL.  They referred her to GI to discuss the ongoing pains.    PMH:  Dm, HLD, RA, OA, chronic pains.  PSH:  Left shoulder.  Left hip labrum.  Hysterectomy.  One ovary.  FNH:  DM, CVA  Allergies:  NKDA  Social:  On disability.  Previously worked for [employer].  T:  Denies.  Quit   A:  Denies  D:  Denies    Exercise:  Does chair exercises/stretches.    Pap:  Had hysterectomy.  MM2022    Colon:  In [place], approximately 2016.  Repeat 10 years ()      The following were updated and reviewed by myself in the chart: medications, past medical history, past surgical history, family history, social history, and allergies.     Medications:  Current Outpatient Medications on File Prior to Visit   Medication Sig Dispense Refill    aspirin 81 MG Chew Take 81 mg by mouth.      atorvastatin (LIPITOR) 80 MG tablet Take 1 tablet (80 mg total) by mouth once daily. 90 tablet 3    ergocalciferol (ERGOCALCIFEROL) 50,000 unit Cap Take 1 capsule (50,000 Units total) by mouth every 7 days. 13 capsule 3    gabapentin (NEURONTIN) 100 MG capsule Take 1 capsule (100 mg total) by mouth every morning. 90 capsule 3    gabapentin (NEURONTIN) 400 MG capsule Take 2 capsules (800 mg total) by mouth every evening. 180 capsule 3    hydrOXYchloroQUINE (PLAQUENIL) 200 mg tablet Take 400 mg by mouth.      metFORMIN (GLUCOPHAGE-XR) 500 MG ER 24hr tablet Take 2 tablets (1,000 mg total)  by mouth daily with breakfast. 180 tablet 3    methylPREDNISolone (MEDROL DOSEPACK) 4 mg tablet use as directed 21 each 0    nabumetone (RELAFEN) 750 MG tablet Take 1 tablet (750 mg total) by mouth 2 (two) times daily as needed for Pain. 60 tablet 1    tiZANidine (ZANAFLEX) 4 MG tablet Take 1 tablet (4 mg total) by mouth 2 (two) times daily as needed. 60 tablet 1     No current facility-administered medications on file prior to visit.        PMHx:  Past Medical History:   Diagnosis Date    Diabetes mellitus, type 2     Hyperlipidemia     Osteoarthritis     Polymyositis     Rheumatoid arthritis       Patient Active Problem List    Diagnosis Date Noted    Neck pain 08/03/2022    Muscle spasms of neck 08/03/2022    Severe obesity (BMI 35.0-39.9) with comorbidity 08/01/2022        PSHx:  Past Surgical History:   Procedure Laterality Date    CARPAL TUNNEL RELEASE Bilateral     HIP SURGERY      HYSTERECTOMY  2008    due to bleeding, pain from fibroids, retains one ovary        FHx:  History reviewed. No pertinent family history.     Social:  Social History     Socioeconomic History    Marital status:    Tobacco Use    Smoking status: Never Smoker    Smokeless tobacco: Never Used   Substance and Sexual Activity    Alcohol use: Not Currently    Drug use: Never    Sexual activity: Yes     Partners: Male        Allergies:  Review of patient's allergies indicates:  No Known Allergies     ROS:  Review of Systems   Constitutional: Negative for activity change, appetite change, chills and fever.   HENT: Negative for congestion, postnasal drip, rhinorrhea, sore throat and trouble swallowing.    Respiratory: Negative for cough, shortness of breath and wheezing.    Cardiovascular: Negative for chest pain and palpitations.   Gastrointestinal: Negative for abdominal pain, constipation, diarrhea, nausea and vomiting.   Genitourinary: Negative for difficulty urinating.   Musculoskeletal: Positive for  "arthralgias and myalgias.   Skin: Negative for color change and rash.   Neurological: Negative for speech difficulty and headaches.   All other systems reviewed and are negative.         Objective      /78   Pulse (!) 57   Temp 97.7 °F (36.5 °C)   Ht 5' 2" (1.575 m)   Wt 89.9 kg (198 lb 3.1 oz)   BMI 36.25 kg/m²   Ht Readings from Last 3 Encounters:   08/15/22 5' 2" (1.575 m)   08/11/22 5' 2" (1.575 m)   08/02/22 5' 2" (1.575 m)     Wt Readings from Last 3 Encounters:   08/15/22 89.9 kg (198 lb 3.1 oz)   08/11/22 89.8 kg (197 lb 15.6 oz)   08/02/22 91.2 kg (201 lb 1 oz)       PHYSICAL EXAM:  Physical Exam  Vitals and nursing note reviewed.   Constitutional:       General: She is not in acute distress.     Appearance: Normal appearance.   HENT:      Head: Normocephalic and atraumatic.      Right Ear: Tympanic membrane, ear canal and external ear normal.      Left Ear: Tympanic membrane, ear canal and external ear normal.      Nose: Nose normal. No congestion or rhinorrhea.      Mouth/Throat:      Mouth: Mucous membranes are moist.      Pharynx: Oropharynx is clear. No oropharyngeal exudate or posterior oropharyngeal erythema.   Eyes:      Extraocular Movements: Extraocular movements intact.      Conjunctiva/sclera: Conjunctivae normal.      Pupils: Pupils are equal, round, and reactive to light.   Cardiovascular:      Rate and Rhythm: Normal rate and regular rhythm.   Pulmonary:      Effort: Pulmonary effort is normal. No respiratory distress.      Breath sounds: No wheezing, rhonchi or rales.   Musculoskeletal:         General: Normal range of motion.      Cervical back: Normal range of motion.   Lymphadenopathy:      Cervical: No cervical adenopathy.   Skin:     General: Skin is warm and dry.      Findings: No rash.   Neurological:      Mental Status: She is alert.            Diabetic foot exam:   Left: Filament test present  Right: Filament test present      LABS / IMAGING:  Recent Results (from the " past 4368 hour(s))   CBC Auto Differential    Collection Time: 08/01/22  2:55 PM   Result Value Ref Range    WBC 8.25 3.90 - 12.70 K/uL    RBC 4.75 4.00 - 5.40 M/uL    Hemoglobin 12.8 12.0 - 16.0 g/dL    Hematocrit 42.5 37.0 - 48.5 %    MCV 90 82 - 98 fL    MCH 26.9 (L) 27.0 - 31.0 pg    MCHC 30.1 (L) 32.0 - 36.0 g/dL    RDW 15.1 (H) 11.5 - 14.5 %    Platelets 231 150 - 450 K/uL    MPV 11.7 9.2 - 12.9 fL    Immature Granulocytes 0.2 0.0 - 0.5 %    Gran # (ANC) 3.7 1.8 - 7.7 K/uL    Immature Grans (Abs) 0.02 0.00 - 0.04 K/uL    Lymph # 3.5 1.0 - 4.8 K/uL    Mono # 0.7 0.3 - 1.0 K/uL    Eos # 0.3 0.0 - 0.5 K/uL    Baso # 0.03 0.00 - 0.20 K/uL    nRBC 0 0 /100 WBC    Gran % 44.4 38.0 - 73.0 %    Lymph % 42.8 18.0 - 48.0 %    Mono % 8.8 4.0 - 15.0 %    Eosinophil % 3.4 0.0 - 8.0 %    Basophil % 0.4 0.0 - 1.9 %    Differential Method Automated    Comprehensive Metabolic Panel    Collection Time: 08/01/22  2:55 PM   Result Value Ref Range    Sodium 139 136 - 145 mmol/L    Potassium 3.8 3.5 - 5.1 mmol/L    Chloride 105 95 - 110 mmol/L    CO2 23 23 - 29 mmol/L    Glucose 162 (H) 70 - 110 mg/dL    BUN 15 6 - 20 mg/dL    Creatinine 0.9 0.5 - 1.4 mg/dL    Calcium 9.1 8.7 - 10.5 mg/dL    Total Protein 7.1 6.0 - 8.4 g/dL    Albumin 3.5 3.5 - 5.2 g/dL    Total Bilirubin 0.4 0.1 - 1.0 mg/dL    Alkaline Phosphatase 98 55 - 135 U/L    AST 15 10 - 40 U/L    ALT 21 10 - 44 U/L    Anion Gap 11 8 - 16 mmol/L    eGFR >60.0 >60 mL/min/1.73 m^2   TSH    Collection Time: 08/01/22  2:55 PM   Result Value Ref Range    TSH 0.793 0.400 - 4.000 uIU/mL   Lipid Panel    Collection Time: 08/01/22  2:55 PM   Result Value Ref Range    Cholesterol 218 (H) 120 - 199 mg/dL    Triglycerides 234 (H) 30 - 150 mg/dL    HDL 45 40 - 75 mg/dL    LDL Cholesterol 126.2 63.0 - 159.0 mg/dL    HDL/Cholesterol Ratio 20.6 20.0 - 50.0 %    Total Cholesterol/HDL Ratio 4.8 2.0 - 5.0    Non-HDL Cholesterol 173 mg/dL   Hemoglobin A1C    Collection Time: 08/01/22  2:55 PM    Result Value Ref Range    Hemoglobin A1C 7.4 (H) 4.0 - 5.6 %    Estimated Avg Glucose 166 (H) 68 - 131 mg/dL   Microalbumin/creatinine urine ratio    Collection Time: 08/01/22  3:01 PM   Result Value Ref Range    Microalbumin, Urine 11.0 ug/mL    Creatinine, Urine 310.0 15.0 - 325.0 mg/dL    Microalb/Creat Ratio 3.5 0.0 - 30.0 ug/mg         Assessment    1. Type 2 diabetes mellitus with other specified complication, without long-term current use of insulin    2. Hyperlipidemia, unspecified hyperlipidemia type    3. Rheumatoid arthritis, involving unspecified site, unspecified whether rheumatoid factor present    4. Other chronic pain          Plan    Luciana was seen today for follow-up.    Diagnoses and all orders for this visit:    Type 2 diabetes mellitus with other specified complication, without long-term current use of insulin  -     semaglutide (OZEMPIC) 0.25 mg or 0.5 mg(2 mg/1.5 mL) pen injector; Inject 0.25 mg into the skin every 7 days for 28 days, THEN 0.5 mg every 7 days for 14 days.    A1c holding steady at 7.4%.  Continue the metformin.  Will restart Ozempic.  0.25 mg for four weeks, then increase to 0.5 mg.  Will see her back at that time.  If tolerating, will continue 0.5 mg weekly, for now.    Hyperlipidemia, unspecified hyperlipidemia type  Reviewed recent labs with patient.  Continue Lipitor 80 mg daily.      Rheumatoid arthritis, involving unspecified site, unspecified whether rheumatoid factor present  Continue Plaquenil for now.  Keep follow-up appointment with Rheumatology, as scheduled.      Other chronic pain  Continue to follow with pain management, physical therapy.        FOLLOW-UP:  Follow up in about 5 weeks (around 9/19/2022) for check up, med refill.    I spent a total of 45 minutes face to face and non-face to face on the date of this visit.This includes time preparing to see the patient (eg, review of tests, notes), obtaining and/or reviewing additional history from an independent  historian and/or outside medical records, documenting clinical information in the electronic health record, independently interpreting results and/or communicating results to the patient/family/caregiver, or care coordinator.    Signed by:  Cedric Huerta MD

## 2022-08-16 ENCOUNTER — CLINICAL SUPPORT (OUTPATIENT)
Dept: REHABILITATION | Facility: HOSPITAL | Age: 62
End: 2022-08-16
Payer: MEDICARE

## 2022-08-16 DIAGNOSIS — M62.838 MUSCLE SPASMS OF NECK: ICD-10-CM

## 2022-08-16 DIAGNOSIS — M54.2 NECK PAIN: Primary | ICD-10-CM

## 2022-08-16 PROCEDURE — 97110 THERAPEUTIC EXERCISES: CPT | Mod: PN

## 2022-08-16 PROCEDURE — 97140 MANUAL THERAPY 1/> REGIONS: CPT | Mod: PN

## 2022-08-19 NOTE — PROGRESS NOTES
[facility]   Physical Therapy Treatment Note     Name: Luciana Moraes  Clinic Number: [id number]    Therapy Diagnosis:   Encounter Diagnoses   Name Primary?    Neck pain Yes    Muscle spasms of neck      Physician: Luis M Pereyra MD    Visit Date: 8/23/2022    Physician Orders: PT Eval and Treat   Medical Diagnosis from Referral: M54.12 (ICD-10-CM) - Cervical radiculopathy      Evaluation Date: 8/3/2022  Authorization Period Expiration: 8/31/2022  Plan of Care Expiration: 11/3/2022  Visit # / Visits authorized: 1/ 11 (+ eval)     PN DUE: 9/3/2022       PTA Visit #: 0/5     Time In: 7:30  Time Out: 8:15  Total Billable Time: 45 minutes    Imaging, x-rays: There is normal alignment of the 7 cervical vertebra. Multilevel marginal spondylosis and anterior annular calcifications especially from C4/C5 through C6/C7. The vertebral body heights and intervertebral disc heights are   well maintained.     MRI  C1-C2: Atlanto odontoid osteophytosis noted without significant dorsal pannus formation.  No significant spinal canal stenosis.     C2-C3: No significant spinal canal or neural foraminal stenosis.     C3-C4: Right greater than left uncovertebral hypertrophy and mild bilateral facet arthropathy.  No significant spinal canal or neural foraminal stenosis.     C4-C5: Bilateral uncovertebral hypertrophy/disc osteophyte complex and mild bilateral facet arthropathy.  Minimal spinal canal stenosis.  Mild-to-moderate right and mild left neural foraminal stenosis.     C5-C6: Right greater than left uncovertebral hypertrophy/disc osteophyte complex and mild bilateral facet arthropathy.  Minimal spinal canal stenosis with particular narrowing of the right spinal canal.  Moderate to severe right and mild left neural foraminal stenosis.     C6-C7: Bilateral uncovertebral hypertrophy/disc osteophyte complex and mild bilateral facet arthropathy.  No significant spinal canal stenosis.  Mild right  "neural foraminal stenosis.  No significant left neural foraminal stenosis.     C7-T1: Mild bilateral facet arthropathy.  No significant spinal canal stenosis.  Mild bilateral neural foraminal stenosis.    SUBJECTIVE   History of current condition - Luciana reports: Has c/o right sided neck pain with pain into right arm, with a sensation of pressure with numbness and tingling into arm forearm and hand. Started a month ago with some pain in right shoulder with gradual increase in Sx's. Pain in right side of neck described as a "squeezing sensation right side of c-spine. Agg by looking up and turning head to the right, eased with looking down.     Pt reports: status quo. Still intermittent pain in right arm. Feels heaviness in using her right arm. Is positional.    She was compliant with home exercise program.  Response to previous treatment: did ok, no change in "balloon affect"  Functional change: none reported.     Pain: 9/10  Location: right neck and primarily right arm      OBJECTIVE     Objective Measures updated at progress report unless specified.   AROM R Rotation. 50 deg     Treatment     Luciana received the treatments listed below:    (Exercises and Techniques performed today are bolded)    Luciana received therapeutic exercises to develop ROM, flexibility and posture for 35 minutes including:     UBE 3 min fw/bw ea  pullies 2 min fw, scaption  Seated rows 10# 3 X 15   Tricep rope pulls 10# 3 X 15  ER with scap pinches red band 2 X 15  Bicep curls 4# 3 X 15  Hamnmer curls 2 X 15 4#  UT stretch right only 30 sec X 2  LS stretch Right only 30 sec X 2   Cervical retractions X10 X 2  SNAGS to right 3 levels X 15 ea     Luciana received the following manual therapy techniques: Joint mobilizations, Myofacial release and Soft tissue Mobilization were applied to the: right neck for 10 minutes, including:     UPa's and transverse glides C5,6,7 Right  STM cervical parapsinals right  Right UT TP release   FDN Right UT, " Multifidi C6, Right (poor tolerance by Pt)              Patient Education and Home Exercises     Home Exercises Provided and Patient Education Provided     Education provided:   - HEP    Written Home Exercises Provided: Patient instructed to cont prior HEP. Exercises were reviewed and Luciana was able to demonstrate them prior to the end of the session.  Margaritas demonstrated good  understanding of the education provided. See EMR under Patient Instructions for exercises provided during therapy sessions    ASSESSMENT   Pt presents with SIGNS AND SYMPTOMS of right C6,7 facet impingement with nerve root irritation . Severe and irritable. Good release of Right C6 facet with UPa's following FDN        Adinas Is progressing well towards her goals.   Pt prognosis is Excellent.     Pt will continue to benefit from skilled outpatient physical therapy to address the deficits listed in the problem list box on initial evaluation, provide pt/family education and to maximize pt's level of independence in the home and community environment.     Pt's spiritual, cultural and educational needs considered and pt agreeable to plan of care and goals.     Anticipated barriers to physical therapy: none    Goals:  Short Term Goals: 5 weeks   1.Pt will be independent with HEP performance in order to continue PT progress at home.   2. Pt will improve Cervical ROM motion  at least 10%   3. Pt will report pain at worst of 5/10 or less  4. Pt will improve B shoulder ROM to 140 deg flex, abd     Long Term Goals: 10 weeks   1. Pt will improve FOTO disability score to 51% disability or less in order to improve overall QOL and return to PLOF.   2. Pt will report pain at worst of 2/10 or less  3 .Pt will improve Cervical ROM motion  To WNL  4. Pt will improve B shoulder ROM to 160 deg flex, abd         PLAN   Plan of care Certification: 8/3/2022 to 11/3/2022.     Continue with PT per POC there ex for posture corrective exercises, UE strengthening, soft  tissue and joint mobilization. Assess affects of FDN    Taylor Jama, PT

## 2022-08-23 ENCOUNTER — CLINICAL SUPPORT (OUTPATIENT)
Dept: REHABILITATION | Facility: HOSPITAL | Age: 62
End: 2022-08-23
Payer: MEDICARE

## 2022-08-23 DIAGNOSIS — M54.2 NECK PAIN: Primary | ICD-10-CM

## 2022-08-23 DIAGNOSIS — M62.838 MUSCLE SPASMS OF NECK: ICD-10-CM

## 2022-08-23 PROCEDURE — 97110 THERAPEUTIC EXERCISES: CPT | Mod: PN

## 2022-08-23 PROCEDURE — 97140 MANUAL THERAPY 1/> REGIONS: CPT | Mod: PN

## 2022-08-23 NOTE — PROGRESS NOTES
[facility]   Physical Therapy Treatment Note     Name: Luciana Moraes  Clinic Number: [id number]    Therapy Diagnosis:   Encounter Diagnoses   Name Primary?    Neck pain Yes    Muscle spasms of neck      Physician: Luis M Pereyra MD    Visit Date: 8/25/2022    Physician Orders: PT Eval and Treat   Medical Diagnosis from Referral: M54.12 (ICD-10-CM) - Cervical radiculopathy      Evaluation Date: 8/3/2022  Authorization Period Expiration: 8/31/2022  Plan of Care Expiration: 11/3/2022  Visit # / Visits authorized: 1/ 11 (+ eval)     PN DUE: 9/3/2022       PTA Visit #: 0/5     Time In: 7:30  Time Out: 8:05  Total Billable Time: 35 minutes    Imaging, x-rays: There is normal alignment of the 7 cervical vertebra. Multilevel marginal spondylosis and anterior annular calcifications especially from C4/C5 through C6/C7. The vertebral body heights and intervertebral disc heights are   well maintained.     MRI  C1-C2: Atlanto odontoid osteophytosis noted without significant dorsal pannus formation.  No significant spinal canal stenosis.     C2-C3: No significant spinal canal or neural foraminal stenosis.     C3-C4: Right greater than left uncovertebral hypertrophy and mild bilateral facet arthropathy.  No significant spinal canal or neural foraminal stenosis.     C4-C5: Bilateral uncovertebral hypertrophy/disc osteophyte complex and mild bilateral facet arthropathy.  Minimal spinal canal stenosis.  Mild-to-moderate right and mild left neural foraminal stenosis.     C5-C6: Right greater than left uncovertebral hypertrophy/disc osteophyte complex and mild bilateral facet arthropathy.  Minimal spinal canal stenosis with particular narrowing of the right spinal canal.  Moderate to severe right and mild left neural foraminal stenosis.     C6-C7: Bilateral uncovertebral hypertrophy/disc osteophyte complex and mild bilateral facet arthropathy.  No significant spinal canal stenosis.  Mild right  "neural foraminal stenosis.  No significant left neural foraminal stenosis.     C7-T1: Mild bilateral facet arthropathy.  No significant spinal canal stenosis.  Mild bilateral neural foraminal stenosis.    SUBJECTIVE   History of current condition - Luciana reports: Has c/o right sided neck pain with pain into right arm, with a sensation of pressure with numbness and tingling into arm forearm and hand. Started a month ago with some pain in right shoulder with gradual increase in Sx's. Pain in right side of neck described as a "squeezing sensation right side of c-spine. Agg by looking up and turning head to the right, eased with looking down.     Pt reports: status quo. Still intermittent pain in right arm. Feels heaviness in using her right arm. Is positional. Has to leave early today. Feels FDN helps release muscle but no change in right arm Sx's    She was compliant with home exercise program.  Response to previous treatment: did ok, no change in "balloon affect"  Functional change: none reported.     Pain: 9/10  Location: right neck and primarily right arm      OBJECTIVE     Objective Measures updated at progress report unless specified.   AROM R Rotation. 50 deg     Treatment     Luciana received the treatments listed below:    (Exercises and Techniques performed today are bolded)    Luciana received therapeutic exercises to develop ROM, flexibility and posture for 20 minutes including:     UBE 3 min fw/bw ea  pullies 2 min fw, scaption  Seated rows 10# 3 X 15   Tricep rope pulls 10# 3 X 15  ER with scap pinches red band 2 X 15  Bicep curls 4# 3 X 15  Hamnmer curls 2 X 15 4#  UT stretch right only 30 sec X 2  LS stretch Right only 30 sec X 2   Cervical retractions X10 X 2  SNAGS to right 3 levels X 15 ea     Luciana received the following manual therapy techniques: Joint mobilizations, Myofacial release and Soft tissue Mobilization were applied to the: right neck for 15 minutes, including:     UPa's and transverse " glides C5,6,7 Right  STM cervical parapsinals right  Right UT TP release   FDN Right UT, Multifidi C6, Right               Patient Education and Home Exercises     Home Exercises Provided and Patient Education Provided     Education provided:   - HEP    Written Home Exercises Provided: Patient instructed to cont prior HEP. Exercises were reviewed and Luciana was able to demonstrate them prior to the end of the session.  Adinas demonstrated good  understanding of the education provided. See EMR under Patient Instructions for exercises provided during therapy sessions    ASSESSMENT   Pt presents with SIGNS AND SYMPTOMS of right C6,7 facet impingement with nerve root irritation . Severe and irritable. Good release of Right C6 facet with UPa's following FDN        Adinas Is progressing well towards her goals.   Pt prognosis is Excellent.     Pt will continue to benefit from skilled outpatient physical therapy to address the deficits listed in the problem list box on initial evaluation, provide pt/family education and to maximize pt's level of independence in the home and community environment.     Pt's spiritual, cultural and educational needs considered and pt agreeable to plan of care and goals.     Anticipated barriers to physical therapy: none    Goals:  Short Term Goals: 5 weeks   1.Pt will be independent with HEP performance in order to continue PT progress at home.   2. Pt will improve Cervical ROM motion  at least 10%   3. Pt will report pain at worst of 5/10 or less  4. Pt will improve B shoulder ROM to 140 deg flex, abd     Long Term Goals: 10 weeks   1. Pt will improve FOTO disability score to 51% disability or less in order to improve overall QOL and return to PLOF.   2. Pt will report pain at worst of 2/10 or less  3 .Pt will improve Cervical ROM motion  To WNL  4. Pt will improve B shoulder ROM to 160 deg flex, abd         PLAN   Plan of care Certification: 8/3/2022 to 11/3/2022.     Continue with PT per  POC there ex for posture corrective exercises, UE strengthening, soft tissue and joint mobilization. Assess affects of FDN    Taylor Jama, PT

## 2022-08-25 ENCOUNTER — CLINICAL SUPPORT (OUTPATIENT)
Dept: REHABILITATION | Facility: HOSPITAL | Age: 62
End: 2022-08-25
Payer: MEDICARE

## 2022-08-25 DIAGNOSIS — M54.2 NECK PAIN: Primary | ICD-10-CM

## 2022-08-25 DIAGNOSIS — M62.838 MUSCLE SPASMS OF NECK: ICD-10-CM

## 2022-08-25 PROCEDURE — 97110 THERAPEUTIC EXERCISES: CPT | Mod: PN

## 2022-08-25 PROCEDURE — 97140 MANUAL THERAPY 1/> REGIONS: CPT | Mod: PN

## 2022-08-25 NOTE — PROGRESS NOTES
[facility]   Physical Therapy Treatment Note     Name: Luciana Moraes  Clinic Number: [id number]    Therapy Diagnosis:   Encounter Diagnoses   Name Primary?    Neck pain Yes    Muscle spasms of neck      Physician: Luis M Pereyra MD    Visit Date: 8/30/2022    Physician Orders: PT Eval and Treat   Medical Diagnosis from Referral: M54.12 (ICD-10-CM) - Cervical radiculopathy      Evaluation Date: 8/3/2022  Authorization Period Expiration: 8/31/2022  Plan of Care Expiration: 11/3/2022  Visit # / Visits authorized: 5/ 11 (+ eval)     PN DUE: 9/3/2022       PTA Visit #: 0/5     Time In: 7:30  Time Out: 8:15  Total Billable Time: 45 minutes    Imaging, x-rays: There is normal alignment of the 7 cervical vertebra. Multilevel marginal spondylosis and anterior annular calcifications especially from C4/C5 through C6/C7. The vertebral body heights and intervertebral disc heights are   well maintained.     MRI  C1-C2: Atlanto odontoid osteophytosis noted without significant dorsal pannus formation.  No significant spinal canal stenosis.     C2-C3: No significant spinal canal or neural foraminal stenosis.     C3-C4: Right greater than left uncovertebral hypertrophy and mild bilateral facet arthropathy.  No significant spinal canal or neural foraminal stenosis.     C4-C5: Bilateral uncovertebral hypertrophy/disc osteophyte complex and mild bilateral facet arthropathy.  Minimal spinal canal stenosis.  Mild-to-moderate right and mild left neural foraminal stenosis.     C5-C6: Right greater than left uncovertebral hypertrophy/disc osteophyte complex and mild bilateral facet arthropathy.  Minimal spinal canal stenosis with particular narrowing of the right spinal canal.  Moderate to severe right and mild left neural foraminal stenosis.     C6-C7: Bilateral uncovertebral hypertrophy/disc osteophyte complex and mild bilateral facet arthropathy.  No significant spinal canal stenosis.  Mild right  "neural foraminal stenosis.  No significant left neural foraminal stenosis.     C7-T1: Mild bilateral facet arthropathy.  No significant spinal canal stenosis.  Mild bilateral neural foraminal stenosis.    SUBJECTIVE   History of current condition - Luciana reports: Has c/o right sided neck pain with pain into right arm, with a sensation of pressure with numbness and tingling into arm forearm and hand. Started a month ago with some pain in right shoulder with gradual increase in Sx's. Pain in right side of neck described as a "squeezing sensation right side of c-spine. Agg by looking up and turning head to the right, eased with looking down.     Pt reports: status quo. Still intermittent pain in right arm. Feels heaviness in using her right arm. Is positional. . Feels FDN helps release muscle but no change in right arm Sx's. Cant use Right arm to wipe down counter tops hurts arm.     She was compliant with home exercise program.  Response to previous treatment: did ok, no change in "balloon affect"  Functional change: none reported.     Pain: 9/10  Location: right neck and primarily right arm      OBJECTIVE     Objective Measures updated at progress report unless specified.   AROM R Rotation. 50 deg     Treatment     Luciana received the treatments listed below:    (Exercises and Techniques performed today are bolded)    Luciana received therapeutic exercises to develop ROM, flexibility and posture for 20 minutes including:     UBE 3 min fw/bw ea  pullies 2 min fw, scaption  Seated rows 10# 3 X 15   Tricep rope pulls 10# 3 X 15  ER with scap pinches red band 2 X 15  Bicep curls 4# 3 X 15  Hamnmer curls 2 X 15 4#  UT stretch right only 30 sec X 2  Cervical retractions X10 X 2  SNAGS to right 3 levels X 15 ea     Luciana received the following manual therapy techniques: Joint mobilizations, Myofacial release and Soft tissue Mobilization were applied to the: right neck for 15 minutes, including:     UPa's and transverse " glides C5,6,7 Right  1st rib mob  UT stretch with 1st rib mob prone and prone with right cervical rotation  STM cervical parapsinals right  Right UT TP release  FDN Right UT, Multifidi C6, Right (declined today)        Patient Education and Home Exercises     Home Exercises Provided and Patient Education Provided     Education provided:   - HEP    Written Home Exercises Provided: Patient instructed to cont prior HEP. Exercises were reviewed and Luciana was able to demonstrate them prior to the end of the session.  Adinas demonstrated good  understanding of the education provided. See EMR under Patient Instructions for exercises provided during therapy sessions    ASSESSMENT   Pt presents with SIGNS AND SYMPTOMS of right C6,7 facet impingement with nerve root irritation . Severe and irritable. Good release of Right C6 facet with UPa's following FDN        Adinas Is progressing well towards her goals.   Pt prognosis is Excellent.     Pt will continue to benefit from skilled outpatient physical therapy to address the deficits listed in the problem list box on initial evaluation, provide pt/family education and to maximize pt's level of independence in the home and community environment.     Pt's spiritual, cultural and educational needs considered and pt agreeable to plan of care and goals.     Anticipated barriers to physical therapy: none    Goals:  Short Term Goals: 5 weeks   1.Pt will be independent with HEP performance in order to continue PT progress at home.   2. Pt will improve Cervical ROM motion  at least 10%   3. Pt will report pain at worst of 5/10 or less  4. Pt will improve B shoulder ROM to 140 deg flex, abd     Long Term Goals: 10 weeks   1. Pt will improve FOTO disability score to 51% disability or less in order to improve overall QOL and return to PLOF.   2. Pt will report pain at worst of 2/10 or less  3 .Pt will improve Cervical ROM motion  To WNL  4. Pt will improve B shoulder ROM to 160 deg flex,  abd         PLAN   Plan of care Certification: 8/3/2022 to 11/3/2022.     Continue with PT per POC there ex for posture corrective exercises, UE strengthening, soft tissue and joint mobilization. Assess affects of FDN    Taylor Jama, PT

## 2022-08-30 ENCOUNTER — CLINICAL SUPPORT (OUTPATIENT)
Dept: REHABILITATION | Facility: HOSPITAL | Age: 62
End: 2022-08-30
Payer: MEDICARE

## 2022-08-30 DIAGNOSIS — M62.838 MUSCLE SPASMS OF NECK: ICD-10-CM

## 2022-08-30 DIAGNOSIS — M54.2 NECK PAIN: Primary | ICD-10-CM

## 2022-08-30 PROCEDURE — 97140 MANUAL THERAPY 1/> REGIONS: CPT | Mod: PN

## 2022-08-30 PROCEDURE — 97110 THERAPEUTIC EXERCISES: CPT | Mod: PN

## 2022-09-01 NOTE — PROGRESS NOTES
[facility]   Physical Therapy Treatment Note     Name: Luciana Moraes  Clinic Number: [id number]    Therapy Diagnosis:   Encounter Diagnoses   Name Primary?    Neck pain Yes    Muscle spasms of neck        Physician: Salome Bansal MD    Visit Date: 9/2/2022    Physician Orders: PT Eval and Treat   Medical Diagnosis from Referral: M54.12 (ICD-10-CM) - Cervical radiculopathy      Evaluation Date: 8/3/2022  Authorization Period Expiration: 8/31/2022  Plan of Care Expiration: 11/3/2022  Visit # / Visits authorized: 6/ 11 (+ eval)   PN DUE: 9/3/2022    PTA Visit #: 0/5     Time In: 10:30 AM  Time Out: 11:15 AM  Total Billable Time: 45 minutes    Imaging, x-rays: There is normal alignment of the 7 cervical vertebra. Multilevel marginal spondylosis and anterior annular calcifications especially from C4/C5 through C6/C7. The vertebral body heights and intervertebral disc heights are   well maintained.     MRI  C1-C2: Atlanto odontoid osteophytosis noted without significant dorsal pannus formation.  No significant spinal canal stenosis.     C2-C3: No significant spinal canal or neural foraminal stenosis.     C3-C4: Right greater than left uncovertebral hypertrophy and mild bilateral facet arthropathy.  No significant spinal canal or neural foraminal stenosis.     C4-C5: Bilateral uncovertebral hypertrophy/disc osteophyte complex and mild bilateral facet arthropathy.  Minimal spinal canal stenosis.  Mild-to-moderate right and mild left neural foraminal stenosis.     C5-C6: Right greater than left uncovertebral hypertrophy/disc osteophyte complex and mild bilateral facet arthropathy.  Minimal spinal canal stenosis with particular narrowing of the right spinal canal.  Moderate to severe right and mild left neural foraminal stenosis.     C6-C7: Bilateral uncovertebral hypertrophy/disc osteophyte complex and mild bilateral facet arthropathy.  No significant spinal canal stenosis.  Mild right  "neural foraminal stenosis.  No significant left neural foraminal stenosis.     C7-T1: Mild bilateral facet arthropathy.  No significant spinal canal stenosis.  Mild bilateral neural foraminal stenosis.    SUBJECTIVE   History of current condition - Luciana reports: Has c/o right sided neck pain with pain into right arm, with a sensation of pressure with numbness and tingling into arm forearm and hand. Started a month ago with some pain in right shoulder with gradual increase in Sx's. Pain in right side of neck described as a "squeezing sensation right side of c-spine. Agg by looking up and turning head to the right, eased with looking down.     Pt reports: no change, continued right arm pain that feel like a "balloon". Reports that she is continuing to have lots of trouble sleeping due to the pain.     She was compliant with home exercise program.  Response to previous treatment: did ok, no change in "balloon affect"  Functional change: none reported.     Pain: 9/10  Location: right neck and primarily right arm      OBJECTIVE     Objective Measures updated at progress report unless specified.     Treatment     Luciana received the treatments listed below:    (Exercises and Techniques performed today are bolded)    Luciana received therapeutic exercises to develop ROM, flexibility and posture for 35 minutes including:  UBE 3 min fw/bw ea  pullies 2 min fw, scaption  Seated rows 10# 3 X 15   Tricep rope pulls 10# 3 X 15  ER with scap pinches red band 2 X 15 (back against wall)  Bicep curls 4# 3 X 15  Hamnmer curls 2 X 15 4#  UT stretch right only 30 sec X 2  Cervical retractions X10 X 3"  SNAGS to right 3 levels X 15 ea  +Cervical isometrics 10" x 5  +posterior shoulder rolls 2 x 10  +seated table wipes with shoulder at 90 degrees x 1min     Luciana received the following manual therapy techniques: Joint mobilizations, Myofacial release and Soft tissue Mobilization were applied to the: right neck for 15 minutes, " "including:  UPa's and transverse glides C5,6,7 Right  1st rib mob  UT stretch with 1st rib mob (supine) prone and prone with right cervical rotation  STM cervical parapsinals right  Right UT TP release with upper trap stretch   FDN Right UT, Multifidi C6, Right (declined today)  + Suboccipital release 3 x 30"  +Shoulder distraction with oscillation in scaption plane        Patient Education and Home Exercises     Home Exercises Provided and Patient Education Provided     Education provided:   - HEP    Written Home Exercises Provided: Patient instructed to cont prior HEP. Exercises were reviewed and Luciana was able to demonstrate them prior to the end of the session.  Luciana demonstrated good  understanding of the education provided. See EMR under Patient Instructions for exercises provided during therapy sessions    ASSESSMENT   Pt tolerated therapy session well today. She continues to present with sign and symptoms that are consistent with right C6-7 facet impingement with nerve root irritation. She requires constant rest breaks with shaking of right arm to alleviate symptoms. Placed small ball behind lumbar back in sitting to help increase postural awareness and decreased slouched position, with good understanding and slight relief in symptoms. Performed table wipe activity to increase ability to clean counters at home, pt exhibits fatigue and increase in radicular symptoms at around 45 seconds of the 1 minute activity. Ended session with manual therapy with patient reporting relief of symptoms during shoulder distraction with oscillations, suboccipital release and UPA's to cervical spine.     Luciana Is progressing well towards her goals.   Pt prognosis is Excellent.     Pt will continue to benefit from skilled outpatient physical therapy to address the deficits listed in the problem list box on initial evaluation, provide pt/family education and to maximize pt's level of independence in the home and community " environment.     Pt's spiritual, cultural and educational needs considered and pt agreeable to plan of care and goals.     Anticipated barriers to physical therapy: none    Goals:  Short Term Goals: 5 weeks   1.Pt will be independent with HEP performance in order to continue PT progress at home.   2. Pt will improve Cervical ROM motion  at least 10%   3. Pt will report pain at worst of 5/10 or less  4. Pt will improve B shoulder ROM to 140 deg flex, abd     Long Term Goals: 10 weeks   1. Pt will improve FOTO disability score to 51% disability or less in order to improve overall QOL and return to PLOF.   2. Pt will report pain at worst of 2/10 or less  3 .Pt will improve Cervical ROM motion  To WNL  4. Pt will improve B shoulder ROM to 160 deg flex, abd         PLAN   Plan of care Certification: 8/3/2022 to 11/3/2022.     Continue with PT per POC there ex for posture corrective exercises, UE strengthening, soft tissue and joint mobilization. Assess affects of FDN    Ana Alonzo, PT, DPT

## 2022-09-02 ENCOUNTER — CLINICAL SUPPORT (OUTPATIENT)
Dept: REHABILITATION | Facility: HOSPITAL | Age: 62
End: 2022-09-02
Payer: MEDICARE

## 2022-09-02 DIAGNOSIS — M62.838 MUSCLE SPASMS OF NECK: ICD-10-CM

## 2022-09-02 DIAGNOSIS — M54.2 NECK PAIN: Primary | ICD-10-CM

## 2022-09-02 PROCEDURE — 97140 MANUAL THERAPY 1/> REGIONS: CPT | Mod: PN

## 2022-09-02 PROCEDURE — 97110 THERAPEUTIC EXERCISES: CPT | Mod: PN

## 2022-09-06 ENCOUNTER — OFFICE VISIT (OUTPATIENT)
Dept: PAIN MEDICINE | Facility: CLINIC | Age: 62
End: 2022-09-06
Payer: MEDICARE

## 2022-09-06 ENCOUNTER — TELEPHONE (OUTPATIENT)
Dept: NEUROSURGERY | Facility: CLINIC | Age: 62
End: 2022-09-06
Payer: MEDICARE

## 2022-09-06 VITALS
DIASTOLIC BLOOD PRESSURE: 65 MMHG | WEIGHT: 196.19 LBS | SYSTOLIC BLOOD PRESSURE: 124 MMHG | HEART RATE: 57 BPM | BODY MASS INDEX: 35.89 KG/M2

## 2022-09-06 DIAGNOSIS — M48.02 CERVICAL STENOSIS OF SPINAL CANAL: ICD-10-CM

## 2022-09-06 DIAGNOSIS — M47.812 CERVICAL SPONDYLOSIS: ICD-10-CM

## 2022-09-06 DIAGNOSIS — M50.30 DDD (DEGENERATIVE DISC DISEASE), CERVICAL: ICD-10-CM

## 2022-09-06 DIAGNOSIS — M54.12 CERVICAL RADICULOPATHY: Primary | ICD-10-CM

## 2022-09-06 PROCEDURE — 99213 OFFICE O/P EST LOW 20 MIN: CPT | Mod: S$GLB,,, | Performed by: ANESTHESIOLOGY

## 2022-09-06 PROCEDURE — 1159F MED LIST DOCD IN RCRD: CPT | Mod: CPTII,S$GLB,, | Performed by: ANESTHESIOLOGY

## 2022-09-06 PROCEDURE — 1159F PR MEDICATION LIST DOCUMENTED IN MEDICAL RECORD: ICD-10-PCS | Mod: CPTII,S$GLB,, | Performed by: ANESTHESIOLOGY

## 2022-09-06 PROCEDURE — 1160F PR REVIEW ALL MEDS BY PRESCRIBER/CLIN PHARMACIST DOCUMENTED: ICD-10-PCS | Mod: CPTII,S$GLB,, | Performed by: ANESTHESIOLOGY

## 2022-09-06 PROCEDURE — 3008F PR BODY MASS INDEX (BMI) DOCUMENTED: ICD-10-PCS | Mod: CPTII,S$GLB,, | Performed by: ANESTHESIOLOGY

## 2022-09-06 PROCEDURE — 3008F BODY MASS INDEX DOCD: CPT | Mod: CPTII,S$GLB,, | Performed by: ANESTHESIOLOGY

## 2022-09-06 PROCEDURE — 99213 PR OFFICE/OUTPT VISIT, EST, LEVL III, 20-29 MIN: ICD-10-PCS | Mod: S$GLB,,, | Performed by: ANESTHESIOLOGY

## 2022-09-06 PROCEDURE — 3061F PR NEG MICROALBUMINURIA RESULT DOCUMENTED/REVIEW: ICD-10-PCS | Mod: CPTII,S$GLB,, | Performed by: ANESTHESIOLOGY

## 2022-09-06 PROCEDURE — 99999 PR PBB SHADOW E&M-EST. PATIENT-LVL III: ICD-10-PCS | Mod: PBBFAC,,, | Performed by: ANESTHESIOLOGY

## 2022-09-06 PROCEDURE — 3066F PR DOCUMENTATION OF TREATMENT FOR NEPHROPATHY: ICD-10-PCS | Mod: CPTII,S$GLB,, | Performed by: ANESTHESIOLOGY

## 2022-09-06 PROCEDURE — 3051F PR MOST RECENT HEMOGLOBIN A1C LEVEL 7.0 - < 8.0%: ICD-10-PCS | Mod: CPTII,S$GLB,, | Performed by: ANESTHESIOLOGY

## 2022-09-06 PROCEDURE — 3078F DIAST BP <80 MM HG: CPT | Mod: CPTII,S$GLB,, | Performed by: ANESTHESIOLOGY

## 2022-09-06 PROCEDURE — 3051F HG A1C>EQUAL 7.0%<8.0%: CPT | Mod: CPTII,S$GLB,, | Performed by: ANESTHESIOLOGY

## 2022-09-06 PROCEDURE — 3078F PR MOST RECENT DIASTOLIC BLOOD PRESSURE < 80 MM HG: ICD-10-PCS | Mod: CPTII,S$GLB,, | Performed by: ANESTHESIOLOGY

## 2022-09-06 PROCEDURE — 3074F PR MOST RECENT SYSTOLIC BLOOD PRESSURE < 130 MM HG: ICD-10-PCS | Mod: CPTII,S$GLB,, | Performed by: ANESTHESIOLOGY

## 2022-09-06 PROCEDURE — 3074F SYST BP LT 130 MM HG: CPT | Mod: CPTII,S$GLB,, | Performed by: ANESTHESIOLOGY

## 2022-09-06 PROCEDURE — 99999 PR PBB SHADOW E&M-EST. PATIENT-LVL III: CPT | Mod: PBBFAC,,, | Performed by: ANESTHESIOLOGY

## 2022-09-06 PROCEDURE — 3066F NEPHROPATHY DOC TX: CPT | Mod: CPTII,S$GLB,, | Performed by: ANESTHESIOLOGY

## 2022-09-06 PROCEDURE — 3061F NEG MICROALBUMINURIA REV: CPT | Mod: CPTII,S$GLB,, | Performed by: ANESTHESIOLOGY

## 2022-09-06 PROCEDURE — 1160F RVW MEDS BY RX/DR IN RCRD: CPT | Mod: CPTII,S$GLB,, | Performed by: ANESTHESIOLOGY

## 2022-09-06 RX ORDER — TIZANIDINE 4 MG/1
4 TABLET ORAL 2 TIMES DAILY PRN
Qty: 60 TABLET | Refills: 1 | Status: SHIPPED | OUTPATIENT
Start: 2022-09-06 | End: 2023-05-12 | Stop reason: SDUPTHER

## 2022-09-06 RX ORDER — NABUMETONE 750 MG/1
750 TABLET, FILM COATED ORAL 2 TIMES DAILY PRN
Qty: 60 TABLET | Refills: 1 | Status: SHIPPED | OUTPATIENT
Start: 2022-09-06 | End: 2023-05-12 | Stop reason: SDUPTHER

## 2022-09-06 RX ORDER — GABAPENTIN 300 MG/1
300 CAPSULE ORAL 3 TIMES DAILY
Qty: 90 CAPSULE | Refills: 2 | Status: SHIPPED | OUTPATIENT
Start: 2022-09-06 | End: 2022-10-11

## 2022-09-06 NOTE — H&P (VIEW-ONLY)
Interventional Pain Progress Note       Referring Physician: No ref. provider found    PCP: Cedric Huerta MD    Chief Complaint:   Chief Complaint   Patient presents with    Arm Pain    Neck Pain     Right arm & Neck       Interval History (09/06/2022):  Patient returns to clinic for MRI review.  Patient reports continued neck pain primarily on her right side that then radiates to her right upper extremity to her fingertips and numbness and tingling pain.  She reports associated decreased strength in her right upper extremity with his pain.  Pain is worse with neck rotation and lifting objects, better with heat and rest.  Pain is rated a 7/10. Denies any fevers, chills, changes in gait, weakness, or bowel and bladder incontinence        SUBJECTIVE:    Luciana Moraes is a 61 y.o. female who presents to the clinic for the evaluation of neck and lower pain. The pain started over 10 years ago and symptoms have been worsening.  Neck pain is described as a throbbing pain that starts diffusely over her neck.  She reports that in the last 2 months she has had increased in right upper extremity radicular pain that goes to her fingertips.  She denies any traumas to her neck or any previous surgeries on her spine her major joints.  Pain is worse with neck rotation and lifting objects, better with flexion and heat.  Back pain is described as a throbbing axial pain in a band across her lower back.  She reports minimal radiation to her bilateral lower extremities.  Pain is worse with extension, better with flexion.  The pain is described as numbing, sharp and tingling and is rated as  10 out of 10 .   The pain is rated with a score of  6/10 on the BEST day and a score of 10/10 on the WORST day.  Symptoms interfere with daily activity and work. The pain is exacerbated by Walking, Extension, Lifting and Getting out of bed/chair.  The pain is mitigated by nothing.     Patient denies night fever/night sweats, urinary  incontinence, bowel incontinence, significant weight loss, significant motor weakness and loss of sensations.      Non-Pharmacologic Treatments:  Physical Therapy/Home Exercise: yes  Ice/Heat:yes  TENS: no  Acupuncture: no  Massage: yes  Chiropractic: no        Previous Pain Medications:  Tylenol, NSAIDs, gabapentin, muscle relaxers, opioids, topicals     report:  Reviewed and consistent with medication use as prescribed.    Pain Procedures:   none      Imaging:   MRI CERVICAL SPINE WITHOUT CONTRAST 8/2/22     CLINICAL HISTORY:  Neck pain, chronic, degenerative changes on xray;.  Radiculopathy, cervical region     TECHNIQUE:  Multiplanar, multisequence MR images of the cervical spine were acquired without the administration of contrast.     COMPARISON:  Cervical spine x-ray dated 08/02/2022     FINDINGS:  There are 7 non-rib-bearing cervical vertebrae.  Mild cervical straightening noted.  Alignment is otherwise unremarkable with no significant listhesis.  No acute fracture or compression deformity.  No aggressive focal signal abnormality.     Visualized posterior fossa is unremarkable.  Craniocervical junction is well maintained.  Visualized spinal cord is normal in size and signal.     C1-C2: Atlanto odontoid osteophytosis noted without significant dorsal pannus formation.  No significant spinal canal stenosis.     C2-C3: No significant spinal canal or neural foraminal stenosis.     C3-C4: Right greater than left uncovertebral hypertrophy and mild bilateral facet arthropathy.  No significant spinal canal or neural foraminal stenosis.     C4-C5: Bilateral uncovertebral hypertrophy/disc osteophyte complex and mild bilateral facet arthropathy.  Minimal spinal canal stenosis.  Mild-to-moderate right and mild left neural foraminal stenosis.     C5-C6: Right greater than left uncovertebral hypertrophy/disc osteophyte complex and mild bilateral facet arthropathy.  Minimal spinal canal stenosis with particular narrowing  of the right spinal canal.  Moderate to severe right and mild left neural foraminal stenosis.     C6-C7: Bilateral uncovertebral hypertrophy/disc osteophyte complex and mild bilateral facet arthropathy.  No significant spinal canal stenosis.  Mild right neural foraminal stenosis.  No significant left neural foraminal stenosis.     C7-T1: Mild bilateral facet arthropathy.  No significant spinal canal stenosis.  Mild bilateral neural foraminal stenosis.     Visualized soft tissues are unremarkable.     Impression:     Multilevel degenerative changes as detailed above.     Minimal spinal canal stenosis at C4-C5 and C5-C6.     Varying degrees of bilateral neural foraminal stenosis as detailed above, most severe at the right C5-C6 level.      MRI Lumbar Spine w/o Contast 7/8/19  There is lumbar facet arthropathy at L5-S1 and L4-L5. No stenosis or disc herniation is evident. Patient appears obese. Patient may be a candidate for an image guided steroid injection treatment trial.   .       Past Medical History:   Diagnosis Date    Diabetes mellitus, type 2     Hyperlipidemia     Osteoarthritis     Polymyositis     Rheumatoid arthritis      Past Surgical History:   Procedure Laterality Date    CARPAL TUNNEL RELEASE Bilateral     HIP SURGERY      HYSTERECTOMY  2008    due to bleeding, pain from fibroids, retains one ovary     Social History     Socioeconomic History    Marital status:    Tobacco Use    Smoking status: Never    Smokeless tobacco: Never   Substance and Sexual Activity    Alcohol use: Not Currently    Drug use: Never    Sexual activity: Yes     Partners: Male     History reviewed. No pertinent family history.    Review of patient's allergies indicates:  No Known Allergies    Current Outpatient Medications   Medication Sig    aspirin 81 MG Chew Take 81 mg by mouth.    atorvastatin (LIPITOR) 80 MG tablet Take 1 tablet (80 mg total) by mouth once daily.    ergocalciferol (ERGOCALCIFEROL) 50,000 unit Cap Take  1 capsule (50,000 Units total) by mouth every 7 days.    hydrOXYchloroQUINE (PLAQUENIL) 200 mg tablet Take 400 mg by mouth.    metFORMIN (GLUCOPHAGE-XR) 500 MG ER 24hr tablet Take 2 tablets (1,000 mg total) by mouth daily with breakfast.    semaglutide (OZEMPIC) 0.25 mg or 0.5 mg(2 mg/1.5 mL) pen injector Inject 0.25 mg into the skin every 7 days for 28 days, THEN 0.5 mg every 7 days for 14 days.    gabapentin (NEURONTIN) 300 MG capsule Take 1 capsule (300 mg total) by mouth 3 (three) times daily.    nabumetone (RELAFEN) 750 MG tablet Take 1 tablet (750 mg total) by mouth 2 (two) times daily as needed for Pain.    tiZANidine (ZANAFLEX) 4 MG tablet Take 1 tablet (4 mg total) by mouth 2 (two) times daily as needed.     No current facility-administered medications for this visit.         ROS  Review of Systems   Constitutional:  Negative for chills, diaphoresis, fatigue and fever.   Respiratory:  Negative for chest tightness, shortness of breath, wheezing and stridor.    Cardiovascular:  Negative for chest pain and leg swelling.   Gastrointestinal:  Negative for blood in stool, diarrhea, nausea and vomiting.   Endocrine: Negative for cold intolerance and heat intolerance.   Genitourinary:  Positive for urgency. Negative for dysuria and hematuria.   Musculoskeletal:  Positive for arthralgias, back pain, myalgias, neck pain and neck stiffness. Negative for gait problem and joint swelling.   Skin:  Negative for rash.   Neurological:  Positive for weakness and numbness. Negative for tremors, seizures, speech difficulty, light-headedness and headaches.   Hematological:  Does not bruise/bleed easily.   Psychiatric/Behavioral:  Negative for agitation, confusion and suicidal ideas. The patient is not nervous/anxious.           OBJECTIVE:  /65   Pulse (!) 57   Wt 89 kg (196 lb 3.4 oz)   BMI 35.89 kg/m²         Physical Exam  Constitutional:       General: She is not in acute distress.     Appearance: Normal appearance.  She is not ill-appearing.   HENT:      Head: Normocephalic and atraumatic.      Nose: No congestion or rhinorrhea.      Mouth/Throat:      Mouth: Mucous membranes are moist.   Eyes:      Extraocular Movements: Extraocular movements intact.      Pupils: Pupils are equal, round, and reactive to light.   Cardiovascular:      Pulses: Normal pulses.   Pulmonary:      Effort: Pulmonary effort is normal.   Abdominal:      General: Abdomen is flat.      Palpations: Abdomen is soft.   Skin:     General: Skin is warm and dry.      Capillary Refill: Capillary refill takes less than 2 seconds.   Neurological:      Mental Status: She is alert and oriented to person, place, and time.      Cranial Nerves: No cranial nerve deficit.      Sensory: No sensory deficit.      Motor: Weakness present. No abnormal muscle tone.      Gait: Gait abnormal.      Deep Tendon Reflexes:      Reflex Scores:       Tricep reflexes are 2+ on the right side and 2+ on the left side.       Bicep reflexes are 2+ on the right side and 2+ on the left side.       Brachioradialis reflexes are 1+ on the right side and 1+ on the left side.       Patellar reflexes are 1+ on the right side and 1+ on the left side.       Achilles reflexes are 1+ on the right side and 1+ on the left side.     Comments: Antalgic gait  Strength of right upper extremity diffusely decreased secondary to pain.  5/5 strength and muscle groups of left upper extremity     Psychiatric:         Mood and Affect: Mood normal.         Behavior: Behavior normal.         Thought Content: Thought content normal.         Musculoskeletal:    Cervical Exam  Incision: no  Pain with Flexion: yes  Pain with Extension: yes  Paraspinous TTP:  Right Trapezius  Facet TTP:  C5-6, right greater than left  Spurling:  Positive on the right  ROM:  Decreased secondary to pain      Lumbar Exam  Incision: no  Pain with Flexion: no  Pain with Extension: yes  ROM:  Decreased secondary to pain          LABS:  Lab  Results   Component Value Date    WBC 8.25 08/01/2022    HGB 12.8 08/01/2022    HCT 42.5 08/01/2022    MCV 90 08/01/2022     08/01/2022       CMP  Sodium   Date Value Ref Range Status   08/01/2022 139 136 - 145 mmol/L Final     Potassium   Date Value Ref Range Status   08/01/2022 3.8 3.5 - 5.1 mmol/L Final     Chloride   Date Value Ref Range Status   08/01/2022 105 95 - 110 mmol/L Final     CO2   Date Value Ref Range Status   08/01/2022 23 23 - 29 mmol/L Final     Glucose   Date Value Ref Range Status   08/01/2022 162 (H) 70 - 110 mg/dL Final     BUN   Date Value Ref Range Status   08/01/2022 15 6 - 20 mg/dL Final     Creatinine   Date Value Ref Range Status   08/01/2022 0.9 0.5 - 1.4 mg/dL Final     Calcium   Date Value Ref Range Status   08/01/2022 9.1 8.7 - 10.5 mg/dL Final     Total Protein   Date Value Ref Range Status   08/01/2022 7.1 6.0 - 8.4 g/dL Final     Albumin   Date Value Ref Range Status   08/01/2022 3.5 3.5 - 5.2 g/dL Final     Total Bilirubin   Date Value Ref Range Status   08/01/2022 0.4 0.1 - 1.0 mg/dL Final     Comment:     For infants and newborns, interpretation of results should be based  on gestational age, weight and in agreement with clinical  observations.    Premature Infant recommended reference ranges:  Up to 24 hours.............<8.0 mg/dL  Up to 48 hours............<12.0 mg/dL  3-5 days..................<15.0 mg/dL  6-29 days.................<15.0 mg/dL       Alkaline Phosphatase   Date Value Ref Range Status   08/01/2022 98 55 - 135 U/L Final     AST   Date Value Ref Range Status   08/01/2022 15 10 - 40 U/L Final     ALT   Date Value Ref Range Status   08/01/2022 21 10 - 44 U/L Final     Anion Gap   Date Value Ref Range Status   08/01/2022 11 8 - 16 mmol/L Final       Lab Results   Component Value Date    HGBA1C 7.4 (H) 08/01/2022             ASSESSMENT:       61 y.o. year old female with neck pain, consistent with     1. Cervical radiculopathy  nabumetone (RELAFEN) 750 MG  tablet    tiZANidine (ZANAFLEX) 4 MG tablet    [facility] Cervical/THoracic w/ Img    Case Request-RAD/Other Procedure Area: C7/T1 [facility]    gabapentin (NEURONTIN) 300 MG capsule    Ambulatory referral/consult to Neurosurgery      2. DDD (degenerative disc disease), cervical  nabumetone (RELAFEN) 750 MG tablet    tiZANidine (ZANAFLEX) 4 MG tablet    gabapentin (NEURONTIN) 300 MG capsule    Ambulatory referral/consult to Neurosurgery      3. Cervical spondylosis  nabumetone (RELAFEN) 750 MG tablet    tiZANidine (ZANAFLEX) 4 MG tablet        Cervical radiculopathy  -     nabumetone (RELAFEN) 750 MG tablet; Take 1 tablet (750 mg total) by mouth 2 (two) times daily as needed for Pain.  Dispense: 60 tablet; Refill: 1  -     tiZANidine (ZANAFLEX) 4 MG tablet; Take 1 tablet (4 mg total) by mouth 2 (two) times daily as needed.  Dispense: 60 tablet; Refill: 1  -     [facility] Cervical/THoracic w/ Img; Future; Expected date: 09/06/2022  -     Case Request-RAD/Other Procedure Area: C7/T1 [facility]  -     gabapentin (NEURONTIN) 300 MG capsule; Take 1 capsule (300 mg total) by mouth 3 (three) times daily.  Dispense: 90 capsule; Refill: 2  -     Ambulatory referral/consult to Neurosurgery; Future; Expected date: 09/13/2022    DDD (degenerative disc disease), cervical  -     nabumetone (RELAFEN) 750 MG tablet; Take 1 tablet (750 mg total) by mouth 2 (two) times daily as needed for Pain.  Dispense: 60 tablet; Refill: 1  -     tiZANidine (ZANAFLEX) 4 MG tablet; Take 1 tablet (4 mg total) by mouth 2 (two) times daily as needed.  Dispense: 60 tablet; Refill: 1  -     gabapentin (NEURONTIN) 300 MG capsule; Take 1 capsule (300 mg total) by mouth 3 (three) times daily.  Dispense: 90 capsule; Refill: 2  -     Ambulatory referral/consult to Neurosurgery; Future; Expected date: 09/13/2022    Cervical spondylosis  -     nabumetone (RELAFEN) 750 MG tablet; Take 1 tablet (750 mg total) by mouth 2 (two) times daily as needed for Pain.  Dispense: 60  tablet; Refill: 1  -     tiZANidine (ZANAFLEX) 4 MG tablet; Take 1 tablet (4 mg total) by mouth 2 (two) times daily as needed.  Dispense: 60 tablet; Refill: 1           PLAN:   - Interventions:   We will schedule patient for a C7-T1 interlaminar epidural steroid injection for cervical radiculopathy.  Patient may continue aspirin  - Anticoagulation use:   yes aspirin    - Medications:   Refill Relafen 750 mg twice a day as needed   Refill tizanidine 4 mg twice a day as needed   We will increase gabapentin daily dose from 500 mg to 900 mg daily.  600 mg at night and 300 mg in the morning   Continue Plaquenil as prescribed by rheumatology    - Therapy:    Continue formal physical therapy for neck pain    - Imaging:   MRI of cervical spine reviewed and findings discussed with patient.  Significant for eccentric right disc bulge at C5-C6 with associated severe right foraminal stenosis   X-rays of cervical spine obtained today reviewed.  Significant for spondylosis and anterior annular calcifications noted at C4-5, C5-6, and C6-C7    - Consults:    For to Neurosurgery for cervical radiculopathy with findings of canal and foraminal stenosis on cervical MRI  Continue follow-up with Rheumatology    - Counseled patient regarding the importance of activity modification and physical therapy    - Patient Questions: Answered all of the patient's questions regarding diagnosis, therapy, and treatment    - Follow up visit: return to clinic 2-4 weeks after procedure        The above plan and management options were discussed at length with patient. Patient is in agreement with the above and verbalized understanding.    I discussed the goals of interventional chronic pain management with the patient on today's visit.  I explained the utility of injections for diagnostic and therapeutic purposes.  We discussed a multimodal approach to pain including treating the patient's given worst pain at any given time.  We will use a systematic  approach to addressing pain.  We will also adopt a multimodal approach that includes injections, adjuvant medications, physical therapy, at times psychiatry.  There may be a limited role for opioid use intermittently in the treatment of pain, more particularly for acute pain although no one approach can be used as a sole treatment modality.    I emphasized the importance of regular exercise, core strengthening and stretching, diet and weight loss as a cornerstone of long-term pain management.      Luis M Pereyra MD  Interventional Pain Management  [facility]    Disclaimer:  This note was prepared using voice recognition system and is likely to have sound alike errors that may have been overlooked even after proof reading.  Please call me with any questions

## 2022-09-06 NOTE — TELEPHONE ENCOUNTER
Reached out to patient from Neurosurgery [facility].  Scheduled appointment per referral from Dr. Pereyra.  Patient states she is aware that this is a surgical consult and denies any surgery within the last 2 years.    Suad Salazar RN

## 2022-09-19 ENCOUNTER — OFFICE VISIT (OUTPATIENT)
Dept: PRIMARY CARE CLINIC | Facility: CLINIC | Age: 62
End: 2022-09-19
Payer: MEDICARE

## 2022-09-19 VITALS
BODY MASS INDEX: 35.66 KG/M2 | TEMPERATURE: 98 F | DIASTOLIC BLOOD PRESSURE: 78 MMHG | HEIGHT: 62 IN | SYSTOLIC BLOOD PRESSURE: 122 MMHG | HEART RATE: 62 BPM | WEIGHT: 193.81 LBS

## 2022-09-19 DIAGNOSIS — E11.69 TYPE 2 DIABETES MELLITUS WITH OTHER SPECIFIED COMPLICATION, WITHOUT LONG-TERM CURRENT USE OF INSULIN: Primary | ICD-10-CM

## 2022-09-19 PROCEDURE — 99999 PR PBB SHADOW E&M-EST. PATIENT-LVL IV: ICD-10-PCS | Mod: PBBFAC,,, | Performed by: FAMILY MEDICINE

## 2022-09-19 PROCEDURE — 3008F PR BODY MASS INDEX (BMI) DOCUMENTED: ICD-10-PCS | Mod: CPTII,S$GLB,, | Performed by: FAMILY MEDICINE

## 2022-09-19 PROCEDURE — 99214 PR OFFICE/OUTPT VISIT, EST, LEVL IV, 30-39 MIN: ICD-10-PCS | Mod: S$GLB,,, | Performed by: FAMILY MEDICINE

## 2022-09-19 PROCEDURE — 1159F MED LIST DOCD IN RCRD: CPT | Mod: CPTII,S$GLB,, | Performed by: FAMILY MEDICINE

## 2022-09-19 PROCEDURE — 3051F PR MOST RECENT HEMOGLOBIN A1C LEVEL 7.0 - < 8.0%: ICD-10-PCS | Mod: CPTII,S$GLB,, | Performed by: FAMILY MEDICINE

## 2022-09-19 PROCEDURE — 3066F PR DOCUMENTATION OF TREATMENT FOR NEPHROPATHY: ICD-10-PCS | Mod: CPTII,S$GLB,, | Performed by: FAMILY MEDICINE

## 2022-09-19 PROCEDURE — 99999 PR PBB SHADOW E&M-EST. PATIENT-LVL IV: CPT | Mod: PBBFAC,,, | Performed by: FAMILY MEDICINE

## 2022-09-19 PROCEDURE — 3061F NEG MICROALBUMINURIA REV: CPT | Mod: CPTII,S$GLB,, | Performed by: FAMILY MEDICINE

## 2022-09-19 PROCEDURE — 3066F NEPHROPATHY DOC TX: CPT | Mod: CPTII,S$GLB,, | Performed by: FAMILY MEDICINE

## 2022-09-19 PROCEDURE — 1159F PR MEDICATION LIST DOCUMENTED IN MEDICAL RECORD: ICD-10-PCS | Mod: CPTII,S$GLB,, | Performed by: FAMILY MEDICINE

## 2022-09-19 PROCEDURE — 3074F SYST BP LT 130 MM HG: CPT | Mod: CPTII,S$GLB,, | Performed by: FAMILY MEDICINE

## 2022-09-19 PROCEDURE — 3061F PR NEG MICROALBUMINURIA RESULT DOCUMENTED/REVIEW: ICD-10-PCS | Mod: CPTII,S$GLB,, | Performed by: FAMILY MEDICINE

## 2022-09-19 PROCEDURE — 3078F PR MOST RECENT DIASTOLIC BLOOD PRESSURE < 80 MM HG: ICD-10-PCS | Mod: CPTII,S$GLB,, | Performed by: FAMILY MEDICINE

## 2022-09-19 PROCEDURE — 3051F HG A1C>EQUAL 7.0%<8.0%: CPT | Mod: CPTII,S$GLB,, | Performed by: FAMILY MEDICINE

## 2022-09-19 PROCEDURE — 3074F PR MOST RECENT SYSTOLIC BLOOD PRESSURE < 130 MM HG: ICD-10-PCS | Mod: CPTII,S$GLB,, | Performed by: FAMILY MEDICINE

## 2022-09-19 PROCEDURE — 3008F BODY MASS INDEX DOCD: CPT | Mod: CPTII,S$GLB,, | Performed by: FAMILY MEDICINE

## 2022-09-19 PROCEDURE — 99214 OFFICE O/P EST MOD 30 MIN: CPT | Mod: S$GLB,,, | Performed by: FAMILY MEDICINE

## 2022-09-19 PROCEDURE — 3078F DIAST BP <80 MM HG: CPT | Mod: CPTII,S$GLB,, | Performed by: FAMILY MEDICINE

## 2022-09-19 RX ORDER — SEMAGLUTIDE 1.34 MG/ML
0.5 INJECTION, SOLUTION SUBCUTANEOUS
Qty: 1 PEN | Refills: 12 | Status: SHIPPED | OUTPATIENT
Start: 2022-09-19 | End: 2022-11-18 | Stop reason: SDUPTHER

## 2022-09-19 NOTE — PROGRESS NOTES
"    [facility]       [address] [place] [address]      Phone: [phone]      Fax: [phone]    Cedric Huerta MD                Office Visit  09/19/2022        Subjective      HPI:  Luciana Moraes is a 61 y.o. female presents today in clinic for "Follow-up  ."     61-year-old female presents today to follow-up on diabetes.    Patient has diabetes.  Last A1c, in August, was 7.4%.  Was taking metformin (two, 500 mg XR tablets) daily for this.  Tolerating them well.  Last visit, we started her on Ozempic.  She did 0.25 mg for five weeks, then this week increased to 0.5 mg.  Appears to be tolerating it.  States that she does get some pains in the middle of her back.  Have happened 3 times over the last five weeks.  She can just be standing there, then she gets a sharp, debilitating pain.  Lasts for a couple of seconds, then goes away.  Reports this is different than her usual neck/back pains.  Had a similar occurrence in the past when she tried Ozempic.  At that time, she discontinued it because of the pain.  That said, she has lost approximately 5 lb since starting Ozempic (198 -> 193).    Also has rheumatoid arthritis.  Was following with Rheumatology in [place].  From notes, it appears to be seropositive RA.  Was taking Plaquenil 400 mg daily.  Thinks her rheumatologist was going to change it to something else, she is not quite sure.  Has an appointment with Rheumatology in November to get established.    Additionally, has history of osteoarthritis and other joint pains.  Sees Dr. Pereyra for this.  He has her in physical therapy.  Has neck injection later this week.      PMH:  Dm, HLD, RA, OA, chronic pains.  PSH:  Left shoulder.  Left hip labrum.  Hysterectomy.  One ovary.  FNH:  DM, CVA  Allergies:  NKDA  Social:  On disability.  Previously worked for [employer].  T:  Denies.  Quit 2001  A:  Denies  D:  Denies    Exercise:  Does chair " exercises/stretches.    Pap:  Had hysterectomy.  MM2022    Colon:  In [place], approximately 2016.  (MGA) Repeat 10 years ()      The following were updated and reviewed by myself in the chart: medications, past medical history, past surgical history, family history, social history, and allergies.     Medications:  Current Outpatient Medications on File Prior to Visit   Medication Sig Dispense Refill    aspirin 81 MG Chew Take 81 mg by mouth.      atorvastatin (LIPITOR) 80 MG tablet Take 1 tablet (80 mg total) by mouth once daily. 90 tablet 3    ergocalciferol (ERGOCALCIFEROL) 50,000 unit Cap Take 1 capsule (50,000 Units total) by mouth every 7 days. 13 capsule 3    gabapentin (NEURONTIN) 300 MG capsule Take 1 capsule (300 mg total) by mouth 3 (three) times daily. 90 capsule 2    hydrOXYchloroQUINE (PLAQUENIL) 200 mg tablet Take 400 mg by mouth.      metFORMIN (GLUCOPHAGE-XR) 500 MG ER 24hr tablet Take 2 tablets (1,000 mg total) by mouth daily with breakfast. 180 tablet 3    nabumetone (RELAFEN) 750 MG tablet Take 1 tablet (750 mg total) by mouth 2 (two) times daily as needed for Pain. 60 tablet 1    tiZANidine (ZANAFLEX) 4 MG tablet Take 1 tablet (4 mg total) by mouth 2 (two) times daily as needed. 60 tablet 1    [DISCONTINUED] semaglutide (OZEMPIC) 0.25 mg or 0.5 mg(2 mg/1.5 mL) pen injector Inject 0.25 mg into the skin every 7 days for 28 days, THEN 0.5 mg every 7 days for 14 days. 1 pen 0     No current facility-administered medications on file prior to visit.        PMHx:  Past Medical History:   Diagnosis Date    Diabetes mellitus, type 2     Hyperlipidemia     Osteoarthritis     Polymyositis     Rheumatoid arthritis       Patient Active Problem List    Diagnosis Date Noted    Neck pain 2022    Muscle spasms of neck 2022    Severe obesity (BMI 35.0-39.9) with comorbidity 2022        PSHx:  Past Surgical History:   Procedure Laterality Date    CARPAL TUNNEL RELEASE Bilateral   "   HIP SURGERY      HYSTERECTOMY  2008    due to bleeding, pain from fibroids, retains one ovary        FHx:  History reviewed. No pertinent family history.     Social:  Social History     Socioeconomic History    Marital status:    Tobacco Use    Smoking status: Never    Smokeless tobacco: Never   Substance and Sexual Activity    Alcohol use: Not Currently    Drug use: Never    Sexual activity: Yes     Partners: Male        Allergies:  Review of patient's allergies indicates:  No Known Allergies     ROS:  Review of Systems   Constitutional:  Negative for activity change, appetite change, chills and fever.   HENT:  Negative for congestion, postnasal drip, rhinorrhea, sore throat and trouble swallowing.    Respiratory:  Negative for cough, shortness of breath and wheezing.    Cardiovascular:  Negative for chest pain and palpitations.   Gastrointestinal:  Negative for abdominal pain, constipation, diarrhea, nausea and vomiting.   Genitourinary:  Negative for difficulty urinating.   Musculoskeletal:  Positive for arthralgias and back pain.   Skin:  Negative for color change and rash.   Neurological:  Negative for speech difficulty and headaches.   All other systems reviewed and are negative.       Objective      /78   Pulse 62   Temp 97.9 °F (36.6 °C)   Ht 5' 2" (1.575 m)   Wt 87.9 kg (193 lb 12.6 oz)   BMI 35.44 kg/m²   Ht Readings from Last 3 Encounters:   09/19/22 5' 2" (1.575 m)   08/15/22 5' 2" (1.575 m)   08/11/22 5' 2" (1.575 m)     Wt Readings from Last 3 Encounters:   09/19/22 87.9 kg (193 lb 12.6 oz)   09/06/22 89 kg (196 lb 3.4 oz)   08/15/22 89.9 kg (198 lb 3.1 oz)       PHYSICAL EXAM:  Physical Exam  Vitals and nursing note reviewed.   Constitutional:       General: She is not in acute distress.     Appearance: Normal appearance.   HENT:      Head: Normocephalic and atraumatic.      Right Ear: Tympanic membrane, ear canal and external ear normal.      Left Ear: Tympanic membrane, ear " canal and external ear normal.      Nose: Nose normal. No congestion or rhinorrhea.      Mouth/Throat:      Mouth: Mucous membranes are moist.      Pharynx: Oropharynx is clear. No oropharyngeal exudate or posterior oropharyngeal erythema.   Eyes:      Extraocular Movements: Extraocular movements intact.      Conjunctiva/sclera: Conjunctivae normal.      Pupils: Pupils are equal, round, and reactive to light.   Cardiovascular:      Rate and Rhythm: Normal rate and regular rhythm.   Pulmonary:      Effort: Pulmonary effort is normal. No respiratory distress.      Breath sounds: No wheezing, rhonchi or rales.   Musculoskeletal:         General: Normal range of motion.      Cervical back: Normal range of motion.   Lymphadenopathy:      Cervical: No cervical adenopathy.   Skin:     General: Skin is warm and dry.      Findings: No rash.   Neurological:      Mental Status: She is alert.            LABS / IMAGING:  Recent Results (from the past 4368 hour(s))   CBC Auto Differential    Collection Time: 08/01/22  2:55 PM   Result Value Ref Range    WBC 8.25 3.90 - 12.70 K/uL    RBC 4.75 4.00 - 5.40 M/uL    Hemoglobin 12.8 12.0 - 16.0 g/dL    Hematocrit 42.5 37.0 - 48.5 %    MCV 90 82 - 98 fL    MCH 26.9 (L) 27.0 - 31.0 pg    MCHC 30.1 (L) 32.0 - 36.0 g/dL    RDW 15.1 (H) 11.5 - 14.5 %    Platelets 231 150 - 450 K/uL    MPV 11.7 9.2 - 12.9 fL    Immature Granulocytes 0.2 0.0 - 0.5 %    Gran # (ANC) 3.7 1.8 - 7.7 K/uL    Immature Grans (Abs) 0.02 0.00 - 0.04 K/uL    Lymph # 3.5 1.0 - 4.8 K/uL    Mono # 0.7 0.3 - 1.0 K/uL    Eos # 0.3 0.0 - 0.5 K/uL    Baso # 0.03 0.00 - 0.20 K/uL    nRBC 0 0 /100 WBC    Gran % 44.4 38.0 - 73.0 %    Lymph % 42.8 18.0 - 48.0 %    Mono % 8.8 4.0 - 15.0 %    Eosinophil % 3.4 0.0 - 8.0 %    Basophil % 0.4 0.0 - 1.9 %    Differential Method Automated    Comprehensive Metabolic Panel    Collection Time: 08/01/22  2:55 PM   Result Value Ref Range    Sodium 139 136 - 145 mmol/L    Potassium 3.8 3.5 -  5.1 mmol/L    Chloride 105 95 - 110 mmol/L    CO2 23 23 - 29 mmol/L    Glucose 162 (H) 70 - 110 mg/dL    BUN 15 6 - 20 mg/dL    Creatinine 0.9 0.5 - 1.4 mg/dL    Calcium 9.1 8.7 - 10.5 mg/dL    Total Protein 7.1 6.0 - 8.4 g/dL    Albumin 3.5 3.5 - 5.2 g/dL    Total Bilirubin 0.4 0.1 - 1.0 mg/dL    Alkaline Phosphatase 98 55 - 135 U/L    AST 15 10 - 40 U/L    ALT 21 10 - 44 U/L    Anion Gap 11 8 - 16 mmol/L    eGFR >60.0 >60 mL/min/1.73 m^2   TSH    Collection Time: 08/01/22  2:55 PM   Result Value Ref Range    TSH 0.793 0.400 - 4.000 uIU/mL   Lipid Panel    Collection Time: 08/01/22  2:55 PM   Result Value Ref Range    Cholesterol 218 (H) 120 - 199 mg/dL    Triglycerides 234 (H) 30 - 150 mg/dL    HDL 45 40 - 75 mg/dL    LDL Cholesterol 126.2 63.0 - 159.0 mg/dL    HDL/Cholesterol Ratio 20.6 20.0 - 50.0 %    Total Cholesterol/HDL Ratio 4.8 2.0 - 5.0    Non-HDL Cholesterol 173 mg/dL   Hemoglobin A1C    Collection Time: 08/01/22  2:55 PM   Result Value Ref Range    Hemoglobin A1C 7.4 (H) 4.0 - 5.6 %    Estimated Avg Glucose 166 (H) 68 - 131 mg/dL   Microalbumin/creatinine urine ratio    Collection Time: 08/01/22  3:01 PM   Result Value Ref Range    Microalbumin, Urine 11.0 ug/mL    Creatinine, Urine 310.0 15.0 - 325.0 mg/dL    Microalb/Creat Ratio 3.5 0.0 - 30.0 ug/mg         Assessment    1. Type 2 diabetes mellitus with other specified complication, without long-term current use of insulin          Louis Chowdhury was seen today for follow-up.    Diagnoses and all orders for this visit:    Type 2 diabetes mellitus with other specified complication, without long-term current use of insulin  -     semaglutide (OZEMPIC) 0.25 mg or 0.5 mg(2 mg/1.5 mL) pen injector; Inject 0.5 mg into the skin every 7 days.  -     HEMOGLOBIN A1C; Future      Physically, looks okay today.  Seems to be tolerating the Ozempic.  Will have her continue at 0.5 mg for the next two months, then recheck A1c.    FOLLOW-UP:  Follow up in about 2  months (around 11/19/2022).    I spent a total of 35 minutes face to face and non-face to face on the date of this visit.This includes time preparing to see the patient (eg, review of tests, notes), obtaining and/or reviewing additional history from an independent historian and/or outside medical records, documenting clinical information in the electronic health record, independently interpreting results and/or communicating results to the patient/family/caregiver, or care coordinator.    Signed by:  Cedric Huerta MD

## 2022-09-19 NOTE — PATIENT INSTRUCTIONS
Physically, you look good today!     Congratulations on the weight loss!     Let us continue the Ozempic.  Moving forward, stay at 0.5 mg weekly.  New prescription sent to pharmacy today.    When you see Dr. Pereyra, and when you see Ms. Indira, please ask them about these pains in the middle of your back.  I am curious to hear their thoughts on them.    Continue to eat a healthy diet.  Be careful with portion sizes.  Includes lots of fresh fruits, vegetables, whole grains, lean proteins.  See info below.    Keep hydrated.  Be sure to drink at least 8-10, 8 oz, glasses of water every day.    Stay active.  Try to do some sort of physical activity every day.  Nothing outrageous, just try walking for 10-15 minutes each day.     Let us touch base in about two months to see how you are doing.  Please try to stop in a few days before to get your A1c checked.  We can discuss the results at your visit.

## 2022-09-20 NOTE — PRE-PROCEDURE INSTRUCTIONS
Spoke with patient regarding procedure scheduled on 9.22    Arrival time 1020    Has patient been sick with fever or on antibiotics within the last 7 days? No    Does the patient have any open wounds, sores or rashes? No    Does the patient have any recent fractures? no    Has patient received a vaccination within the last 7 days? No    Received the COVID vaccination? yes    Has the patient stopped all medications as directed? Hold dm meds am of procedure. Continue asa per MD.     Does patient have a pacemaker and or defibrillator? no    Does the patient have a ride to and from procedure and someone reliable to remain with patient? Coordinating transportation. Aware of policy    Is the patient diabetic? yes    Does the patient have sleep apnea? Or use O2 at home? No and no     Is the patient receiving sedation? yes    Is the patient instructed to remain NPO beginning at midnight the night before their procedure? yes    Procedure location confirmed with patient? Yes    Covid- Denies signs/symptoms. Instructed to notify PAT/MD if any changes.

## 2022-09-22 ENCOUNTER — HOSPITAL ENCOUNTER (OUTPATIENT)
Facility: HOSPITAL | Age: 62
Discharge: HOME | End: 2022-09-22
Attending: ANESTHESIOLOGY | Admitting: ANESTHESIOLOGY
Payer: MEDICARE

## 2022-09-22 VITALS
RESPIRATION RATE: 18 BRPM | HEART RATE: 53 BPM | BODY MASS INDEX: 30.27 KG/M2 | WEIGHT: 192.88 LBS | TEMPERATURE: 97 F | SYSTOLIC BLOOD PRESSURE: 125 MMHG | HEIGHT: 67 IN | OXYGEN SATURATION: 97 % | DIASTOLIC BLOOD PRESSURE: 59 MMHG

## 2022-09-22 DIAGNOSIS — M54.12 CERVICAL RADICULOPATHY: Primary | ICD-10-CM

## 2022-09-22 LAB — POCT GLUCOSE: 83 MG/DL (ref 70–110)

## 2022-09-22 PROCEDURE — 62321 PR INJ CERV/THORAC, W/GUIDANCE: ICD-10-PCS | Mod: ,,, | Performed by: ANESTHESIOLOGY

## 2022-09-22 PROCEDURE — 25500020 PHARM REV CODE 255: Performed by: ANESTHESIOLOGY

## 2022-09-22 PROCEDURE — 25000003 PHARM REV CODE 250: Performed by: ANESTHESIOLOGY

## 2022-09-22 PROCEDURE — 62321 NJX INTERLAMINAR CRV/THRC: CPT | Performed by: ANESTHESIOLOGY

## 2022-09-22 PROCEDURE — 62321 NJX INTERLAMINAR CRV/THRC: CPT | Mod: ,,, | Performed by: ANESTHESIOLOGY

## 2022-09-22 PROCEDURE — 63600175 PHARM REV CODE 636 W HCPCS: Performed by: ANESTHESIOLOGY

## 2022-09-22 RX ORDER — LIDOCAINE HYDROCHLORIDE 10 MG/ML
INJECTION, SOLUTION EPIDURAL; INFILTRATION; INTRACAUDAL; PERINEURAL
Status: DISCONTINUED | OUTPATIENT
Start: 2022-09-22 | End: 2022-09-22 | Stop reason: HOSPADM

## 2022-09-22 RX ORDER — MIDAZOLAM HYDROCHLORIDE 1 MG/ML
INJECTION, SOLUTION INTRAMUSCULAR; INTRAVENOUS
Status: DISCONTINUED | OUTPATIENT
Start: 2022-09-22 | End: 2022-09-22 | Stop reason: HOSPADM

## 2022-09-22 RX ORDER — FENTANYL CITRATE 50 UG/ML
INJECTION, SOLUTION INTRAMUSCULAR; INTRAVENOUS
Status: DISCONTINUED | OUTPATIENT
Start: 2022-09-22 | End: 2022-09-22 | Stop reason: HOSPADM

## 2022-09-22 RX ORDER — ONDANSETRON 2 MG/ML
4 INJECTION INTRAMUSCULAR; INTRAVENOUS ONCE AS NEEDED
Status: DISCONTINUED | OUTPATIENT
Start: 2022-09-22 | End: 2022-09-22 | Stop reason: HOSPADM

## 2022-09-22 RX ORDER — BETAMETHASONE SODIUM PHOSPHATE AND BETAMETHASONE ACETATE 3; 3 MG/ML; MG/ML
INJECTION, SUSPENSION INTRA-ARTICULAR; INTRALESIONAL; INTRAMUSCULAR; SOFT TISSUE
Status: DISCONTINUED | OUTPATIENT
Start: 2022-09-22 | End: 2022-09-22 | Stop reason: HOSPADM

## 2022-09-22 NOTE — DISCHARGE SUMMARY
Discharge Note  Short Stay      SUMMARY     Admit Date: 9/22/2022    Attending Physician: Luis M Pereyra MD        Discharge Physician: Luis M Pereyra MD        Discharge Date: 9/22/2022 11:55 AM    Procedure(s) (LRB):  C7/T1 [facility] (N/A)    Final Diagnosis: Cervical radiculopathy [M54.12]    Disposition: Home or self care    Patient Instructions:   Current Discharge Medication List        CONTINUE these medications which have NOT CHANGED    Details   aspirin 81 MG Chew Take 81 mg by mouth.      atorvastatin (LIPITOR) 80 MG tablet Take 1 tablet (80 mg total) by mouth once daily.  Qty: 90 tablet, Refills: 3    Associated Diagnoses: Hyperlipidemia, unspecified hyperlipidemia type      ergocalciferol (ERGOCALCIFEROL) 50,000 unit Cap Take 1 capsule (50,000 Units total) by mouth every 7 days.  Qty: 13 capsule, Refills: 3      gabapentin (NEURONTIN) 300 MG capsule Take 1 capsule (300 mg total) by mouth 3 (three) times daily.  Qty: 90 capsule, Refills: 2    Comments: Please Take 2 pills at night, and one pill in the morning  Associated Diagnoses: Cervical radiculopathy; DDD (degenerative disc disease), cervical      hydrOXYchloroQUINE (PLAQUENIL) 200 mg tablet Take 400 mg by mouth.      metFORMIN (GLUCOPHAGE-XR) 500 MG ER 24hr tablet Take 2 tablets (1,000 mg total) by mouth daily with breakfast.  Qty: 180 tablet, Refills: 3    Associated Diagnoses: Type 2 diabetes mellitus with other specified complication, without long-term current use of insulin      semaglutide (OZEMPIC) 0.25 mg or 0.5 mg(2 mg/1.5 mL) pen injector Inject 0.5 mg into the skin every 7 days.  Qty: 1 pen, Refills: 12    Associated Diagnoses: Type 2 diabetes mellitus with other specified complication, without long-term current use of insulin      nabumetone (RELAFEN) 750 MG tablet Take 1 tablet (750 mg total) by mouth 2 (two) times daily as needed for Pain.  Qty: 60 tablet, Refills: 1    Associated Diagnoses: Cervical radiculopathy; DDD (degenerative  disc disease), cervical; Cervical spondylosis      tiZANidine (ZANAFLEX) 4 MG tablet Take 1 tablet (4 mg total) by mouth 2 (two) times daily as needed.  Qty: 60 tablet, Refills: 1    Associated Diagnoses: Cervical radiculopathy; DDD (degenerative disc disease), cervical; Cervical spondylosis                 Discharge Diagnosis: Cervical radiculopathy [M54.12]  Condition on Discharge: Stable with no complications to procedure   Diet on Discharge: Same as before.  Activity: as per instruction sheet.  Discharge to: Home with a responsible adult.  Follow up: 2-4 weeks       Please call the office at [phone] if you experience any weakness or loss of sensation, fever > 101.5, pain uncontrolled with oral medications, persistent nausea/vomiting/or diarrhea, redness or drainage from the incisions, or any other worrisome concerns. If physician on call was not reached or could not communicate with our office for any reason please go to the nearest emergency department

## 2022-09-22 NOTE — OP NOTE
Cervical Interlaminar Epidural Steroid Injection under Fluoroscopic Guidance.     INFORMED CONSENT: The procedure, risks, benefits and options were discussed with patient. There are no contraindications to the procedure. The patient expressed understanding and agreed to proceed. The personnel performing the procedure was discussed.    Date of procedure 09/22/2022    Time-out taken to identify patient and procedure side prior to starting the procedure.                     PROCEDURE: Cervical Interlaminar epidural steroid injection C7/T1 under fluoroscopic guidance.     Pre Procedure diagnosis:    Cervical radiculopathy [M54.12]  1. Cervical radiculopathy        Post-Procedure diagnosis:   same      PHYSICIAN: Luis M Pereyra MD    ASSISTANTS: None     MEDICATIONS INJECTED: 2ml Betamethasone PF 6mg/ml and 1ml  Lidocaine 1%    LOCAL ANESTHETIC INJECTED: 3ml  Lidocaine 1%     ESTIMATED BLOOD LOSS: none.     COMPLICATIONS: none.     Sedation: Conscious sedation provided by M.D    SEDATION MEDICATIONS: local/IV sedation: Versed 2 mg and fentanyl 100 mcg IV.  Conscious sedation ordered by MD.  Patient reevaluated and sedation administered by MD and monitored by RN.  Total sedation time was less than 10 min.      Total sedation time was <10 min      TECHNIQUE: With the patient laying in a prone position, the area was prepped and draped in the usual sterile fashion using ChloraPrep and a fenestrated drape. Local anesthetic was given using a 27-gauge needle by raising a wheal and going down to the hub of the needle over the C7/T1 interlaminar space.  The interlaminar space was then approached with a 3.5 inch 18-gauge Touhy needle was introduced under fluoroscopic guidance in the AP and Lateral view. Once the Ligamentum flavum was encountered loss of resistance to saline was used to enter the epidural space. With positive loss of resistance and negative CSF or Blood, 2mL contrast dye Omnipaque (300mg/ml) was injected to  confirm placement and there was no vascular runoff. The medication was then injected slowly. The patient tolerated the procedure well.         The patient was monitored for approximately 30 minutes after the procedure.  Patient was given post procedure and discharge instructions to follow at home.  The patient was discharged in a stable condition

## 2022-09-22 NOTE — DISCHARGE INSTRUCTIONS

## 2022-09-26 ENCOUNTER — TELEPHONE (OUTPATIENT)
Dept: PRIMARY CARE CLINIC | Facility: CLINIC | Age: 62
End: 2022-09-26
Payer: MEDICARE

## 2022-09-26 ENCOUNTER — PATIENT MESSAGE (OUTPATIENT)
Dept: RHEUMATOLOGY | Facility: CLINIC | Age: 62
End: 2022-09-26

## 2022-09-26 ENCOUNTER — HOSPITAL ENCOUNTER (OUTPATIENT)
Dept: RADIOLOGY | Facility: HOSPITAL | Age: 62
Discharge: HOME | End: 2022-09-26
Payer: MEDICARE

## 2022-09-26 ENCOUNTER — OFFICE VISIT (OUTPATIENT)
Dept: RHEUMATOLOGY | Facility: CLINIC | Age: 62
End: 2022-09-26
Payer: MEDICARE

## 2022-09-26 VITALS
DIASTOLIC BLOOD PRESSURE: 70 MMHG | HEIGHT: 67 IN | HEART RATE: 53 BPM | WEIGHT: 194 LBS | SYSTOLIC BLOOD PRESSURE: 112 MMHG | BODY MASS INDEX: 30.45 KG/M2

## 2022-09-26 DIAGNOSIS — M06.9 RHEUMATOID ARTHRITIS, INVOLVING UNSPECIFIED SITE, UNSPECIFIED WHETHER RHEUMATOID FACTOR PRESENT: ICD-10-CM

## 2022-09-26 DIAGNOSIS — Z71.89 COUNSELING ON HEALTH PROMOTION AND DISEASE PREVENTION: ICD-10-CM

## 2022-09-26 DIAGNOSIS — R76.8 POSITIVE ANA (ANTINUCLEAR ANTIBODY): ICD-10-CM

## 2022-09-26 DIAGNOSIS — E55.9 VITAMIN D DEFICIENCY: ICD-10-CM

## 2022-09-26 DIAGNOSIS — M06.9 RHEUMATOID ARTHRITIS, INVOLVING UNSPECIFIED SITE, UNSPECIFIED WHETHER RHEUMATOID FACTOR PRESENT: Primary | ICD-10-CM

## 2022-09-26 PROCEDURE — 3061F PR NEG MICROALBUMINURIA RESULT DOCUMENTED/REVIEW: ICD-10-PCS | Mod: CPTII,S$GLB,,

## 2022-09-26 PROCEDURE — 99205 OFFICE O/P NEW HI 60 MIN: CPT | Mod: S$GLB,,,

## 2022-09-26 PROCEDURE — 73130 X-RAY EXAM OF HAND: CPT | Mod: 26,50,, | Performed by: RADIOLOGY

## 2022-09-26 PROCEDURE — 3008F PR BODY MASS INDEX (BMI) DOCUMENTED: ICD-10-PCS | Mod: CPTII,S$GLB,,

## 2022-09-26 PROCEDURE — 73130 XR HAND COMPLETE 3 VIEWS BILATERAL: ICD-10-PCS | Mod: 26,50,, | Performed by: RADIOLOGY

## 2022-09-26 PROCEDURE — 73130 X-RAY EXAM OF HAND: CPT | Mod: TC,50

## 2022-09-26 PROCEDURE — 3066F NEPHROPATHY DOC TX: CPT | Mod: CPTII,S$GLB,,

## 2022-09-26 PROCEDURE — 3051F PR MOST RECENT HEMOGLOBIN A1C LEVEL 7.0 - < 8.0%: ICD-10-PCS | Mod: CPTII,S$GLB,,

## 2022-09-26 PROCEDURE — 99999 PR PBB SHADOW E&M-EST. PATIENT-LVL V: CPT | Mod: PBBFAC,,,

## 2022-09-26 PROCEDURE — 1160F PR REVIEW ALL MEDS BY PRESCRIBER/CLIN PHARMACIST DOCUMENTED: ICD-10-PCS | Mod: CPTII,S$GLB,,

## 2022-09-26 PROCEDURE — 3078F PR MOST RECENT DIASTOLIC BLOOD PRESSURE < 80 MM HG: ICD-10-PCS | Mod: CPTII,S$GLB,,

## 2022-09-26 PROCEDURE — 1159F MED LIST DOCD IN RCRD: CPT | Mod: CPTII,S$GLB,,

## 2022-09-26 PROCEDURE — 99999 PR PBB SHADOW E&M-EST. PATIENT-LVL V: ICD-10-PCS | Mod: PBBFAC,,,

## 2022-09-26 PROCEDURE — 73630 XR FOOT COMPLETE 3 VIEW BILATERAL: ICD-10-PCS | Mod: 26,50,, | Performed by: RADIOLOGY

## 2022-09-26 PROCEDURE — 3078F DIAST BP <80 MM HG: CPT | Mod: CPTII,S$GLB,,

## 2022-09-26 PROCEDURE — 3074F PR MOST RECENT SYSTOLIC BLOOD PRESSURE < 130 MM HG: ICD-10-PCS | Mod: CPTII,S$GLB,,

## 2022-09-26 PROCEDURE — 3008F BODY MASS INDEX DOCD: CPT | Mod: CPTII,S$GLB,,

## 2022-09-26 PROCEDURE — 73630 X-RAY EXAM OF FOOT: CPT | Mod: TC,50

## 2022-09-26 PROCEDURE — 1159F PR MEDICATION LIST DOCUMENTED IN MEDICAL RECORD: ICD-10-PCS | Mod: CPTII,S$GLB,,

## 2022-09-26 PROCEDURE — 73630 X-RAY EXAM OF FOOT: CPT | Mod: 26,50,, | Performed by: RADIOLOGY

## 2022-09-26 PROCEDURE — 99205 PR OFFICE/OUTPT VISIT, NEW, LEVL V, 60-74 MIN: ICD-10-PCS | Mod: S$GLB,,,

## 2022-09-26 PROCEDURE — 1160F RVW MEDS BY RX/DR IN RCRD: CPT | Mod: CPTII,S$GLB,,

## 2022-09-26 PROCEDURE — 3051F HG A1C>EQUAL 7.0%<8.0%: CPT | Mod: CPTII,S$GLB,,

## 2022-09-26 PROCEDURE — 3061F NEG MICROALBUMINURIA REV: CPT | Mod: CPTII,S$GLB,,

## 2022-09-26 PROCEDURE — 3074F SYST BP LT 130 MM HG: CPT | Mod: CPTII,S$GLB,,

## 2022-09-26 PROCEDURE — 3066F PR DOCUMENTATION OF TREATMENT FOR NEPHROPATHY: ICD-10-PCS | Mod: CPTII,S$GLB,,

## 2022-09-26 RX ORDER — ERGOCALCIFEROL 1.25 MG/1
50000 CAPSULE ORAL
Qty: 12 CAPSULE | Refills: 1 | Status: SHIPPED | OUTPATIENT
Start: 2022-09-26 | End: 2022-12-12 | Stop reason: SDUPTHER

## 2022-09-26 RX ORDER — DICLOFENAC SODIUM 10 MG/G
2 GEL TOPICAL 4 TIMES DAILY
Qty: 100 G | Refills: 6 | Status: SHIPPED | OUTPATIENT
Start: 2022-09-26

## 2022-09-26 ASSESSMENT — ROUTINE ASSESSMENT OF PATIENT INDEX DATA (RAPID3): MDHAQ FUNCTION SCORE: 1

## 2022-09-26 NOTE — PROGRESS NOTES
RHEUMATOLOGY OUTPATIENT CLINIC NOTE    09/26/2022    Subjective:       Patient ID: Luciana Moraes is a 61 y.o. female.    Chief Complaint: No chief complaint on file.      HPI   Luciana Moraes is a 61 y.o. pleasant female here for rheumatology initial evaluation.     Reerred by Dr. Cedric Huerta MD for rheumatoid arthritis. Previously seen by Dr. Camargo at \[facility]".   Previously found to have elevated CCP >300 and RICHARD 1:320.     She has previously been following with Rheum in [place]. Recently moved to [place] and is seeking out Rheum care in . She believes she is taking hcq 200 mg bid but not certain. She fills her pill box up each week and takes whatever is in there. Today her pain ins 7/10.  She states she has pain all of the time. Mostly in her hips and low back. Occ with pain in her left thumb and bruce PIPs. She is right handed.     Recently started ozempic for weight loss and is down 5-7 lbs.     No joint swelling, no warmth to joints, no morning stiffness >1hr.   No muscle weakness.  No synovitis.   No fever, lymphadenopathy, no unexpected weight loss, no fatigue  No rashes on palms, no vasculitis  No shortness of breath or pleuritic chest pain.     Rheumatologic review of systems negative otherwise.     Family History:Mom RA, Great aunt and cousin with Lupus    Prior therapies:  Mtx HCQ    Physical Exam  No obvious synovitis, no erythema, no increased warmth to any joints bruce UE/LE.   Strength 5/5 bruce UE/LE  No rash.   No oral lesions.         Past Medical History:   Diagnosis Date    Diabetes mellitus, type 2     Hyperlipidemia     Osteoarthritis     Polymyositis     Rheumatoid arthritis      Past Surgical History:   Procedure Laterality Date    CARPAL TUNNEL RELEASE Bilateral     EPIDURAL STEROID INJECTION INTO CERVICAL SPINE N/A 9/22/2022    Procedure: C7/T1 [facility];  Surgeon: Luis M Pereyra MD;  Location: [facility];  Service: Pain Management;  Laterality: N/A;    HIP  "SURGERY      HYSTERECTOMY  2008    due to bleeding, pain from fibroids, retains one ovary     History reviewed. No pertinent family history.  Social History     Socioeconomic History    Marital status:    Tobacco Use    Smoking status: Former     Types: Cigarettes     Quit date: 3/17/2001     Years since quittin.5    Smokeless tobacco: Never   Substance and Sexual Activity    Alcohol use: Not Currently    Drug use: Never    Sexual activity: Yes     Partners: Male     Review of patient's allergies indicates:  No Known Allergies        Objective:   /70   Pulse (!) 53   Ht 5' 7" (1.702 m)   Wt 88 kg (194 lb 0.1 oz)   BMI 30.39 kg/m²   Immunization History   Administered Date(s) Administered    COVID-19, MRNA, LN-S, PF (Pfizer) (Purple Cap) 2021, 2021              Recent Results (from the past 672 hour(s))   POCT glucose    Collection Time: 22 10:57 AM   Result Value Ref Range    POCT Glucose 83 70 - 110 mg/dL   Urinalysis    Collection Time: 22  9:24 AM   Result Value Ref Range    Specimen UA Urine, Clean Catch     Color, UA Yellow Yellow, Straw, Deepti    Appearance, UA Clear Clear    pH, UA 6.0 5.0 - 8.0    Specific Gravity, UA >=1.030 (A) 1.005 - 1.030    Protein, UA Negative Negative    Glucose, UA Negative Negative    Ketones, UA Negative Negative    Bilirubin (UA) Negative Negative    Occult Blood UA Negative Negative    Nitrite, UA Negative Negative    Leukocytes, UA Negative Negative   CBC Auto Differential    Collection Time: 22  9:46 AM   Result Value Ref Range    WBC 10.53 3.90 - 12.70 K/uL    RBC 5.19 4.00 - 5.40 M/uL    Hemoglobin 14.3 12.0 - 16.0 g/dL    Hematocrit 45.7 37.0 - 48.5 %    MCV 88 82 - 98 fL    MCH 27.6 27.0 - 31.0 pg    MCHC 31.3 (L) 32.0 - 36.0 g/dL    RDW 15.0 (H) 11.5 - 14.5 %    Platelets 147 (L) 150 - 450 K/uL    MPV 11.5 9.2 - 12.9 fL    Immature Granulocytes 0.1 0.0 - 0.5 %    Gran # (ANC) 3.9 1.8 - 7.7 K/uL    Immature Grans (Abs) " 0.01 0.00 - 0.04 K/uL    Lymph # 5.6 (H) 1.0 - 4.8 K/uL    Mono # 0.8 0.3 - 1.0 K/uL    Eos # 0.2 0.0 - 0.5 K/uL    Baso # 0.03 0.00 - 0.20 K/uL    nRBC 0 0 /100 WBC    Gran % 37.2 (L) 38.0 - 73.0 %    Lymph % 53.4 (H) 18.0 - 48.0 %    Mono % 7.4 4.0 - 15.0 %    Eosinophil % 1.6 0.0 - 8.0 %    Basophil % 0.3 0.0 - 1.9 %    Platelet Estimate Clumped (A)     Differential Method Automated    Comprehensive Metabolic Panel    Collection Time: 09/26/22  9:46 AM   Result Value Ref Range    Sodium 141 136 - 145 mmol/L    Potassium 4.0 3.5 - 5.1 mmol/L    Chloride 110 95 - 110 mmol/L    CO2 21 (L) 23 - 29 mmol/L    Glucose 84 70 - 110 mg/dL    BUN 14 6 - 20 mg/dL    Creatinine 0.8 0.5 - 1.4 mg/dL    Calcium 8.8 8.7 - 10.5 mg/dL    Total Protein 7.4 6.0 - 8.4 g/dL    Albumin 3.9 3.5 - 5.2 g/dL    Total Bilirubin 0.5 0.1 - 1.0 mg/dL    Alkaline Phosphatase 93 55 - 135 U/L    AST 15 10 - 40 U/L    ALT 12 10 - 44 U/L    Anion Gap 10 8 - 16 mmol/L    eGFR >60 >60 mL/min/1.73 m^2   C-Reactive Protein    Collection Time: 09/26/22  9:46 AM   Result Value Ref Range    CRP 2.4 0.0 - 8.2 mg/L        No results found for: TBGOLDPLUS   No results found for: HAV, HEPAIGM, HEPBIGM, HEPBCAB, HBEAG, HEPCAB     Xray Right hand 5/30/2019  There is a suggestion of an erosion at the 2nd metacarpal base. No other finding to suggest erosion. No fracture.     Xray Left hand 5/30/2019  There is a suggestion of an erosion at the 2nd metacarpal base. No other finding to suggest erosion. No fracture.     Xray lumbar spine 7/22/21  FINDINGS:   Alignment is unremarkable. Vertebral body heights are maintained. Intervertebral disc heights are within normal limits. Facet arthropathy at L5-S1, to a lesser extent L4-5. Pedicles are intact and symmetric.      Xray hip with pelvis 7/22/21  Bones: Sclerotic changes of the bilateral SI joints, worse on the right. Degenerative changes of the pubic bones. Overall decreased bone mineralization.   Joints: Hip joint  spaces are similar to prior study. SI joints and pubic symphysis are intact.   Soft Tissues: Pelvic phleboliths present. No significant soft tissue abnormality.    Xray thoracic spine 4/18/22  FINDINGS:   Mild levoconvex curvature of the thoracolumbar spine. Vertebral bodies demonstrate normal height. Multilevel decreased disc space height. No acute fracture or acute subluxation.  MRI cspine 8/8/2022  Impression:     Multilevel degenerative changes as detailed above.     Minimal spinal canal stenosis at C4-C5 and C5-C6.     Varying degrees of bilateral neural foraminal stenosis as detailed above, most severe at the right C5-C6 level.    Vitamin D deficiency       HLAB27 negative 10/3/2019 (per chart review external labs)    Xray bruce feet 9/26/22  Right: There is no radiographic evidence of acute osseous, articular, or soft tissue abnormality.  Joint spaces are preserved.     Left: There is no radiographic evidence of acute osseous, articular, or soft tissue abnormality.  Joint spaces are preserved.    Xray bruce hands 9/26/22  FINDINGS:  Right hand: There is no radiographic evidence of acute osseous, articular, or soft tissue abnormality.  There is mild osteoarthritis seen scattered throughout the IP and MCP joints as well as the 1st CMC joint.  No erosive changes demonstrated     Left hand: There is no radiographic evidence of acute osseous, articular, or soft tissue abnormality.  There is mild osteoarthritis seen scattered throughout the IP and MCP joints as well as the 1st CMC joint.  No erosive changes demonstrated      Assessment:       1. Rheumatoid arthritis, involving unspecified site, unspecified whether rheumatoid factor present    2. Positive RICHARD (antinuclear antibody)    3. Vitamin D deficiency          Impression:     Seropositive Rheumatoid Arthriitis   Dx 2010 with elevated CCP (>300) and RICHARD 1:320  Prior on mtx, hcq. Unsure if she is currently on hcq. She will check medications at home. Chronic pain in  low back, hips. May be moreso from lumbar facetarthropathy  09/26/2022 RF 19, .4  Sed rate 48, platelets mildly low.  Will check on next labs.    OA and Facet arthropathy L4-L5, L5-S1 with nikhil sciatica  Prior completed PT and NSAIDs without help.   Nikhil hip injection 11/18   Troch bursa injection 3/19  On gabapentin 100 mg q day and 80 mg at bedtime  Flexeril 10 mg at bedtime  Follows with pain mgmt for injections.     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 Prior had neg MRI. Encourage cancer screening.     Vit D def  Level 15.8 on 2/22    Other specified counseling  Over 10 minutes spent regarding below topics:  - Immunization counseling done.  - Nutrition and exercise counseling.  - Medication counseling provided.     Plan:          Orders Placed This Encounter   Procedures    X-Ray Foot Complete Bilateral    X-Ray Hand 3 View Bilateral    CBC Auto Differential    Comprehensive Metabolic Panel    Sedimentation rate    C-Reactive Protein    Rheumatoid Factor    Cyclic Citrullinated Peptide Antibody, IgG    RICHARD Screen w/Reflex    C3 Complement    Anti-DNA Ab, Double-Stranded    C4 Complement    Urinalysis      Above workup today    Recommend continue hcq 200 mg bid pending workup  Pt states she is unsure why she takes some of her medication. Discussed inflammatory vs mechanical arthritis and the treatments.   Annual eye exams    Vit D 50K units weekly. Recheck Vit D 3/2022    Apply topical Voltaren/diclofenac to affected joint 3-4 times daily as needed    Continue following with pain mgmt    Recommend patient discuss wanting motorized scooter with PCP    All labs and xrays today  Follow up 3-4 months with reg4 prior    Indiana Olmos PA-C  [facility]  Rheumatology       69 minutes of total time spent on the encounter, which includes face to face time and non-face to face time preparing to see the patient (eg, review of tests), Obtaining and/or reviewing separately  obtained history, Documenting clinical information in the electronic or other health record, Independently interpreting results (not separately reported) and communicating results to the patient/family/caregiver, or Care coordination (not separately reported).

## 2022-09-26 NOTE — TELEPHONE ENCOUNTER
Spoke with pt and she advised me that she want to get a power scooter because she have to walk everywhere due to not having transportation. Pt says she's not able to keep walking and want to see if she can qualify for the power scooter.

## 2022-09-26 NOTE — TELEPHONE ENCOUNTER
----- Message from Mirtha Ramey sent at 9/26/2022 11:35 AM CDT -----  Pt called regarding getting a prescription order for a scooter. Call back number is [phone]. Thx. EL

## 2022-09-27 ENCOUNTER — OFFICE VISIT (OUTPATIENT)
Dept: PRIMARY CARE CLINIC | Facility: CLINIC | Age: 62
End: 2022-09-27
Payer: MEDICARE

## 2022-09-27 VITALS
DIASTOLIC BLOOD PRESSURE: 72 MMHG | OXYGEN SATURATION: 96 % | BODY MASS INDEX: 30.52 KG/M2 | WEIGHT: 194.44 LBS | HEIGHT: 67 IN | RESPIRATION RATE: 16 BRPM | TEMPERATURE: 98 F | HEART RATE: 58 BPM | SYSTOLIC BLOOD PRESSURE: 114 MMHG

## 2022-09-27 DIAGNOSIS — Z78.9 IMPAIRED MOBILITY AND ADLS: Primary | ICD-10-CM

## 2022-09-27 DIAGNOSIS — G89.29 OTHER CHRONIC PAIN: ICD-10-CM

## 2022-09-27 DIAGNOSIS — M06.9 RHEUMATOID ARTHRITIS, INVOLVING UNSPECIFIED SITE, UNSPECIFIED WHETHER RHEUMATOID FACTOR PRESENT: ICD-10-CM

## 2022-09-27 DIAGNOSIS — M19.90 OSTEOARTHRITIS, UNSPECIFIED OSTEOARTHRITIS TYPE, UNSPECIFIED SITE: ICD-10-CM

## 2022-09-27 DIAGNOSIS — Z74.09 IMPAIRED MOBILITY AND ADLS: Primary | ICD-10-CM

## 2022-09-27 PROBLEM — E66.01 SEVERE OBESITY (BMI 35.0-39.9) WITH COMORBIDITY: Status: RESOLVED | Noted: 2022-08-01 | Resolved: 2022-09-27

## 2022-09-27 PROCEDURE — 3051F PR MOST RECENT HEMOGLOBIN A1C LEVEL 7.0 - < 8.0%: ICD-10-PCS | Mod: CPTII,S$GLB,, | Performed by: FAMILY MEDICINE

## 2022-09-27 PROCEDURE — 3074F PR MOST RECENT SYSTOLIC BLOOD PRESSURE < 130 MM HG: ICD-10-PCS | Mod: CPTII,S$GLB,, | Performed by: FAMILY MEDICINE

## 2022-09-27 PROCEDURE — 3008F BODY MASS INDEX DOCD: CPT | Mod: CPTII,S$GLB,, | Performed by: FAMILY MEDICINE

## 2022-09-27 PROCEDURE — 1160F PR REVIEW ALL MEDS BY PRESCRIBER/CLIN PHARMACIST DOCUMENTED: ICD-10-PCS | Mod: CPTII,S$GLB,, | Performed by: FAMILY MEDICINE

## 2022-09-27 PROCEDURE — 1160F RVW MEDS BY RX/DR IN RCRD: CPT | Mod: CPTII,S$GLB,, | Performed by: FAMILY MEDICINE

## 2022-09-27 PROCEDURE — 3074F SYST BP LT 130 MM HG: CPT | Mod: CPTII,S$GLB,, | Performed by: FAMILY MEDICINE

## 2022-09-27 PROCEDURE — 3066F NEPHROPATHY DOC TX: CPT | Mod: CPTII,S$GLB,, | Performed by: FAMILY MEDICINE

## 2022-09-27 PROCEDURE — 99215 PR OFFICE/OUTPT VISIT, EST, LEVL V, 40-54 MIN: ICD-10-PCS | Mod: S$GLB,,, | Performed by: FAMILY MEDICINE

## 2022-09-27 PROCEDURE — 3061F PR NEG MICROALBUMINURIA RESULT DOCUMENTED/REVIEW: ICD-10-PCS | Mod: CPTII,S$GLB,, | Performed by: FAMILY MEDICINE

## 2022-09-27 PROCEDURE — 99999 PR PBB SHADOW E&M-EST. PATIENT-LVL IV: CPT | Mod: PBBFAC,,, | Performed by: FAMILY MEDICINE

## 2022-09-27 PROCEDURE — 99215 OFFICE O/P EST HI 40 MIN: CPT | Mod: S$GLB,,, | Performed by: FAMILY MEDICINE

## 2022-09-27 PROCEDURE — 99999 PR PBB SHADOW E&M-EST. PATIENT-LVL IV: ICD-10-PCS | Mod: PBBFAC,,, | Performed by: FAMILY MEDICINE

## 2022-09-27 PROCEDURE — 3008F PR BODY MASS INDEX (BMI) DOCUMENTED: ICD-10-PCS | Mod: CPTII,S$GLB,, | Performed by: FAMILY MEDICINE

## 2022-09-27 PROCEDURE — 3061F NEG MICROALBUMINURIA REV: CPT | Mod: CPTII,S$GLB,, | Performed by: FAMILY MEDICINE

## 2022-09-27 PROCEDURE — 3078F DIAST BP <80 MM HG: CPT | Mod: CPTII,S$GLB,, | Performed by: FAMILY MEDICINE

## 2022-09-27 PROCEDURE — 1159F MED LIST DOCD IN RCRD: CPT | Mod: CPTII,S$GLB,, | Performed by: FAMILY MEDICINE

## 2022-09-27 PROCEDURE — 3051F HG A1C>EQUAL 7.0%<8.0%: CPT | Mod: CPTII,S$GLB,, | Performed by: FAMILY MEDICINE

## 2022-09-27 PROCEDURE — 3066F PR DOCUMENTATION OF TREATMENT FOR NEPHROPATHY: ICD-10-PCS | Mod: CPTII,S$GLB,, | Performed by: FAMILY MEDICINE

## 2022-09-27 PROCEDURE — 1159F PR MEDICATION LIST DOCUMENTED IN MEDICAL RECORD: ICD-10-PCS | Mod: CPTII,S$GLB,, | Performed by: FAMILY MEDICINE

## 2022-09-27 PROCEDURE — 3078F PR MOST RECENT DIASTOLIC BLOOD PRESSURE < 80 MM HG: ICD-10-PCS | Mod: CPTII,S$GLB,, | Performed by: FAMILY MEDICINE

## 2022-09-27 RX ORDER — HYDROXYCHLOROQUINE SULFATE 200 MG/1
200 TABLET, FILM COATED ORAL 2 TIMES DAILY
Qty: 180 TABLET | Refills: 1 | Status: SHIPPED | OUTPATIENT
Start: 2022-09-27 | End: 2022-12-12 | Stop reason: SDUPTHER

## 2022-09-27 NOTE — PROGRESS NOTES
"    [facility]       [address] [place] [address]      Phone: [phone]      Fax: [phone]    Cedric Huerta MD                Office Visit  09/27/2022        Subjective      HPI:  Luciana Moraes is a 61 y.o. female presents today in clinic for "No chief complaint on file.  ."     61-year-old female presents today to discuss mobility issues.    Patient states that she was declared disabled from her job ([employer]) in 2013.  Subsequently, in 2015, she was to clear disabled through social security and started receiving disability benefits.  Disability all started when she got hurt on the job.  She fell, had a labral tear of her left hip.  This led to developing arthritis in the right hip.  She was placed on restrictions, such as no lifting, alternating sitting/walking/standing.  Because she could not do all of her job functions, she was declared disabled.      Since then, her arthritis has worsened.  She has both osteo and rheumatoid (RICHARD positive) arthritis.  She currently follows with rheumatology and spine specialist.  Her spine specialist did MRIs of the cervical and lumbar spine.  He also did a recent procedure on her cervical spine to treat cervical radiculopathy.  Additionally, he has referred her to a neurosurgeon to discuss further possible interventions.    Because of her chronic joint pains, she has difficulty with independent living.  At home, she has significant difficulty completing activities of daily living, such as folding clothes, washing dishes, cooking/preparing meals.  She also has significant difficulty getting in and out of bed, walking around the house, walking to the mailbox to check mail.  She has a cane at home, but feels unsteady with it.  She does have a Rollator at home, which she uses for support to get in and out of bed, and when cooking, eating, but her body position when using the Rollator tends to cause her " neck, back, hips to hurt and she is sometimes unable to complete these activities.  Her chronic pains also make it challenging for her to leave home to shop for groceries, personal items.  It is difficult for her to carry grocery bags as the added weight tends to cause her joints to flare up.    Ideally, she would like to obtain a power scooter to assist with her mobility in the home and in public.  She is safely able and willing to operate and get on and off the scooter.  Her chronic neck pains and cervical radiculopathy make her unable to operate a manual wheelchair.    PMH:  Dm, HLD, RA, OA, chronic pains.  PSH:  Left shoulder.  Left hip labrum.  Hysterectomy.  One ovary.  FNH:  DM, CVA  Allergies:  NKDA  Social:  On disability.  Previously worked for [employer].  T:  Denies.  Quit   A:  Denies  D:  Denies    Exercise:  Does chair exercises/stretches.    Pap:  Had hysterectomy.  MM2022    Colon:  In [place], approximately 2016.  Repeat 10 years ()      The following were updated and reviewed by myself in the chart: medications, past medical history, past surgical history, family history, social history, and allergies.     Medications:  Current Outpatient Medications on File Prior to Visit   Medication Sig Dispense Refill    aspirin 81 MG Chew Take 81 mg by mouth.      atorvastatin (LIPITOR) 80 MG tablet Take 1 tablet (80 mg total) by mouth once daily. 90 tablet 3    diclofenac sodium (VOLTAREN) 1 % Gel Apply 2 g topically 4 (four) times daily. 100 g 6    ergocalciferol (ERGOCALCIFEROL) 50,000 unit Cap Take 1 capsule (50,000 Units total) by mouth every 7 days. 12 capsule 1    gabapentin (NEURONTIN) 300 MG capsule Take 1 capsule (300 mg total) by mouth 3 (three) times daily. 90 capsule 2    metFORMIN (GLUCOPHAGE-XR) 500 MG ER 24hr tablet Take 2 tablets (1,000 mg total) by mouth daily with breakfast. 180 tablet 3    nabumetone (RELAFEN) 750 MG tablet Take 1 tablet (750 mg total) by mouth 2  (two) times daily as needed for Pain. 60 tablet 1    semaglutide (OZEMPIC) 0.25 mg or 0.5 mg(2 mg/1.5 mL) pen injector Inject 0.5 mg into the skin every 7 days. 1 pen 12    tiZANidine (ZANAFLEX) 4 MG tablet Take 1 tablet (4 mg total) by mouth 2 (two) times daily as needed. 60 tablet 1    [DISCONTINUED] hydrOXYchloroQUINE (PLAQUENIL) 200 mg tablet Take 400 mg by mouth.      hydrOXYchloroQUINE (PLAQUENIL) 200 mg tablet Take 1 tablet (200 mg total) by mouth 2 (two) times daily. 180 tablet 1     No current facility-administered medications on file prior to visit.        PMHx:  Past Medical History:   Diagnosis Date    Diabetes mellitus, type 2     Hyperlipidemia     Osteoarthritis     Polymyositis     Rheumatoid arthritis       Patient Active Problem List    Diagnosis Date Noted    Neck pain 2022    Muscle spasms of neck 2022        PSHx:  Past Surgical History:   Procedure Laterality Date    CARPAL TUNNEL RELEASE Bilateral     EPIDURAL STEROID INJECTION INTO CERVICAL SPINE N/A 2022    Procedure: C7/T1 [facility];  Surgeon: Luis M Pereyra MD;  Location: [facility];  Service: Pain Management;  Laterality: N/A;    HIP SURGERY      HYSTERECTOMY      due to bleeding, pain from fibroids, retains one ovary        FHx:  Family History   Problem Relation Age of Onset    Rheum arthritis Mother     No Known Problems Father         Social:  Social History     Socioeconomic History    Marital status:    Tobacco Use    Smoking status: Former     Types: Cigarettes     Quit date: 3/17/2001     Years since quittin.5    Smokeless tobacco: Never   Substance and Sexual Activity    Alcohol use: Not Currently    Drug use: Never    Sexual activity: Yes     Partners: Male        Allergies:  Review of patient's allergies indicates:  No Known Allergies     ROS:  Review of Systems   Constitutional:  Negative for activity change, appetite change, chills and fever.   HENT:  Negative for congestion, postnasal  "drip, rhinorrhea, sore throat and trouble swallowing.    Respiratory:  Negative for cough, shortness of breath and wheezing.    Cardiovascular:  Negative for chest pain and palpitations.   Gastrointestinal:  Negative for abdominal pain, constipation, diarrhea, nausea and vomiting.   Genitourinary:  Negative for difficulty urinating.   Musculoskeletal:  Positive for arthralgias, gait problem and myalgias.   Skin:  Negative for color change and rash.   Neurological:  Negative for speech difficulty and headaches.   All other systems reviewed and are negative.       Objective      /72   Pulse (!) 58   Temp 97.5 °F (36.4 °C) (Temporal)   Resp 16   Ht 5' 7" (1.702 m)   Wt 88.2 kg (194 lb 7.1 oz)   SpO2 96%   BMI 30.45 kg/m²   Ht Readings from Last 3 Encounters:   09/27/22 5' 7" (1.702 m)   09/26/22 5' 7" (1.702 m)   09/22/22 5' 7" (1.702 m)     Wt Readings from Last 3 Encounters:   09/27/22 88.2 kg (194 lb 7.1 oz)   09/26/22 88 kg (194 lb 0.1 oz)   09/22/22 87.5 kg (192 lb 14.4 oz)       PHYSICAL EXAM:  Physical Exam  Vitals and nursing note reviewed.   Constitutional:       General: She is not in acute distress.     Appearance: Normal appearance.   HENT:      Head: Normocephalic and atraumatic.      Right Ear: Tympanic membrane, ear canal and external ear normal.      Left Ear: Tympanic membrane, ear canal and external ear normal.      Nose: Nose normal. No congestion or rhinorrhea.      Mouth/Throat:      Mouth: Mucous membranes are moist.      Pharynx: Oropharynx is clear. No oropharyngeal exudate or posterior oropharyngeal erythema.   Eyes:      Extraocular Movements: Extraocular movements intact.      Conjunctiva/sclera: Conjunctivae normal.      Pupils: Pupils are equal, round, and reactive to light.   Cardiovascular:      Rate and Rhythm: Normal rate and regular rhythm.   Pulmonary:      Effort: Pulmonary effort is normal. No respiratory distress.      Breath sounds: No wheezing, rhonchi or rales. "   Lymphadenopathy:      Cervical: No cervical adenopathy.   Skin:     General: Skin is warm and dry.      Findings: No rash.   Neurological:      Mental Status: She is alert.            LABS / IMAGING:  Recent Results (from the past 4368 hour(s))   CBC Auto Differential    Collection Time: 08/01/22  2:55 PM   Result Value Ref Range    WBC 8.25 3.90 - 12.70 K/uL    RBC 4.75 4.00 - 5.40 M/uL    Hemoglobin 12.8 12.0 - 16.0 g/dL    Hematocrit 42.5 37.0 - 48.5 %    MCV 90 82 - 98 fL    MCH 26.9 (L) 27.0 - 31.0 pg    MCHC 30.1 (L) 32.0 - 36.0 g/dL    RDW 15.1 (H) 11.5 - 14.5 %    Platelets 231 150 - 450 K/uL    MPV 11.7 9.2 - 12.9 fL    Immature Granulocytes 0.2 0.0 - 0.5 %    Gran # (ANC) 3.7 1.8 - 7.7 K/uL    Immature Grans (Abs) 0.02 0.00 - 0.04 K/uL    Lymph # 3.5 1.0 - 4.8 K/uL    Mono # 0.7 0.3 - 1.0 K/uL    Eos # 0.3 0.0 - 0.5 K/uL    Baso # 0.03 0.00 - 0.20 K/uL    nRBC 0 0 /100 WBC    Gran % 44.4 38.0 - 73.0 %    Lymph % 42.8 18.0 - 48.0 %    Mono % 8.8 4.0 - 15.0 %    Eosinophil % 3.4 0.0 - 8.0 %    Basophil % 0.4 0.0 - 1.9 %    Differential Method Automated    Comprehensive Metabolic Panel    Collection Time: 08/01/22  2:55 PM   Result Value Ref Range    Sodium 139 136 - 145 mmol/L    Potassium 3.8 3.5 - 5.1 mmol/L    Chloride 105 95 - 110 mmol/L    CO2 23 23 - 29 mmol/L    Glucose 162 (H) 70 - 110 mg/dL    BUN 15 6 - 20 mg/dL    Creatinine 0.9 0.5 - 1.4 mg/dL    Calcium 9.1 8.7 - 10.5 mg/dL    Total Protein 7.1 6.0 - 8.4 g/dL    Albumin 3.5 3.5 - 5.2 g/dL    Total Bilirubin 0.4 0.1 - 1.0 mg/dL    Alkaline Phosphatase 98 55 - 135 U/L    AST 15 10 - 40 U/L    ALT 21 10 - 44 U/L    Anion Gap 11 8 - 16 mmol/L    eGFR >60.0 >60 mL/min/1.73 m^2   TSH    Collection Time: 08/01/22  2:55 PM   Result Value Ref Range    TSH 0.793 0.400 - 4.000 uIU/mL   Lipid Panel    Collection Time: 08/01/22  2:55 PM   Result Value Ref Range    Cholesterol 218 (H) 120 - 199 mg/dL    Triglycerides 234 (H) 30 - 150 mg/dL    HDL 45 40 - 75  mg/dL    LDL Cholesterol 126.2 63.0 - 159.0 mg/dL    HDL/Cholesterol Ratio 20.6 20.0 - 50.0 %    Total Cholesterol/HDL Ratio 4.8 2.0 - 5.0    Non-HDL Cholesterol 173 mg/dL   Hemoglobin A1C    Collection Time: 08/01/22  2:55 PM   Result Value Ref Range    Hemoglobin A1C 7.4 (H) 4.0 - 5.6 %    Estimated Avg Glucose 166 (H) 68 - 131 mg/dL   Microalbumin/creatinine urine ratio    Collection Time: 08/01/22  3:01 PM   Result Value Ref Range    Microalbumin, Urine 11.0 ug/mL    Creatinine, Urine 310.0 15.0 - 325.0 mg/dL    Microalb/Creat Ratio 3.5 0.0 - 30.0 ug/mg   POCT glucose    Collection Time: 09/22/22 10:57 AM   Result Value Ref Range    POCT Glucose 83 70 - 110 mg/dL   Urinalysis    Collection Time: 09/26/22  9:24 AM   Result Value Ref Range    Specimen UA Urine, Clean Catch     Color, UA Yellow Yellow, Straw, Deepti    Appearance, UA Clear Clear    pH, UA 6.0 5.0 - 8.0    Specific Gravity, UA >=1.030 (A) 1.005 - 1.030    Protein, UA Negative Negative    Glucose, UA Negative Negative    Ketones, UA Negative Negative    Bilirubin (UA) Negative Negative    Occult Blood UA Negative Negative    Nitrite, UA Negative Negative    Leukocytes, UA Negative Negative   CBC Auto Differential    Collection Time: 09/26/22  9:46 AM   Result Value Ref Range    WBC 10.53 3.90 - 12.70 K/uL    RBC 5.19 4.00 - 5.40 M/uL    Hemoglobin 14.3 12.0 - 16.0 g/dL    Hematocrit 45.7 37.0 - 48.5 %    MCV 88 82 - 98 fL    MCH 27.6 27.0 - 31.0 pg    MCHC 31.3 (L) 32.0 - 36.0 g/dL    RDW 15.0 (H) 11.5 - 14.5 %    Platelets 147 (L) 150 - 450 K/uL    MPV 11.5 9.2 - 12.9 fL    Immature Granulocytes 0.1 0.0 - 0.5 %    Gran # (ANC) 3.9 1.8 - 7.7 K/uL    Immature Grans (Abs) 0.01 0.00 - 0.04 K/uL    Lymph # 5.6 (H) 1.0 - 4.8 K/uL    Mono # 0.8 0.3 - 1.0 K/uL    Eos # 0.2 0.0 - 0.5 K/uL    Baso # 0.03 0.00 - 0.20 K/uL    nRBC 0 0 /100 WBC    Gran % 37.2 (L) 38.0 - 73.0 %    Lymph % 53.4 (H) 18.0 - 48.0 %    Mono % 7.4 4.0 - 15.0 %    Eosinophil % 1.6 0.0 -  8.0 %    Basophil % 0.3 0.0 - 1.9 %    Platelet Estimate Clumped (A)     Differential Method Automated    Comprehensive Metabolic Panel    Collection Time: 09/26/22  9:46 AM   Result Value Ref Range    Sodium 141 136 - 145 mmol/L    Potassium 4.0 3.5 - 5.1 mmol/L    Chloride 110 95 - 110 mmol/L    CO2 21 (L) 23 - 29 mmol/L    Glucose 84 70 - 110 mg/dL    BUN 14 6 - 20 mg/dL    Creatinine 0.8 0.5 - 1.4 mg/dL    Calcium 8.8 8.7 - 10.5 mg/dL    Total Protein 7.4 6.0 - 8.4 g/dL    Albumin 3.9 3.5 - 5.2 g/dL    Total Bilirubin 0.5 0.1 - 1.0 mg/dL    Alkaline Phosphatase 93 55 - 135 U/L    AST 15 10 - 40 U/L    ALT 12 10 - 44 U/L    Anion Gap 10 8 - 16 mmol/L    eGFR >60 >60 mL/min/1.73 m^2   Sedimentation rate    Collection Time: 09/26/22  9:46 AM   Result Value Ref Range    Sed Rate 48 (H) 0 - 36 mm/Hr   C-Reactive Protein    Collection Time: 09/26/22  9:46 AM   Result Value Ref Range    CRP 2.4 0.0 - 8.2 mg/L   Rheumatoid Factor    Collection Time: 09/26/22  9:46 AM   Result Value Ref Range    Rheumatoid Factor 19.0 (H) 0.0 - 15.0 IU/mL   Cyclic Citrullinated Peptide Antibody, IgG    Collection Time: 09/26/22  9:46 AM   Result Value Ref Range    CCP Antibodies 354.4 (H) <5.0 U/mL   C3 Complement    Collection Time: 09/26/22  9:46 AM   Result Value Ref Range    Complement (C-3) 150 50 - 180 mg/dL   C4 Complement    Collection Time: 09/26/22  9:46 AM   Result Value Ref Range    Complement (C-4) 37 11 - 44 mg/dL     EXAMINATION:  XR CERVICAL SPINE 2 OR 3 VIEWS     CLINICAL HISTORY:  Radiculopathy, cervical region     COMPARISON:  No comparison studies are available.     FINDINGS:  There is normal alignment of the 7 cervical vertebra. Multilevel marginal spondylosis and anterior annular calcifications especially from C4/C5 through C6/C7. The vertebral body heights and intervertebral disc heights are   well maintained. The posterior elements are intact and the prevertebral soft tissues are normal thickness. The orientation  of the dens and the lateral masses are normal.     The lung apices are clear. Mildly tortuous aorta.     Impression:     1.  Negative for acute process involving the cervical spine.     2.  Multilevel degenerative disc changes manifesting mainly is marginal spondylosis and anterior annular calcifications in the mid to lower cervical region.        Electronically signed by: Keith Mann MD  Date:                                            08/02/2022  Time:                                           10:15           Exam Ended: 08/02/22 09:59 Last Resulted: 08/02/22 10:15           EXAMINATION:  MRI CERVICAL SPINE WITHOUT CONTRAST     CLINICAL HISTORY:  Neck pain, chronic, degenerative changes on xray;.  Radiculopathy, cervical region     TECHNIQUE:  Multiplanar, multisequence MR images of the cervical spine were acquired without the administration of contrast.     COMPARISON:  Cervical spine x-ray dated 08/02/2022     FINDINGS:  There are 7 non-rib-bearing cervical vertebrae.  Mild cervical straightening noted.  Alignment is otherwise unremarkable with no significant listhesis.  No acute fracture or compression deformity.  No aggressive focal signal abnormality.     Visualized posterior fossa is unremarkable.  Craniocervical junction is well maintained.  Visualized spinal cord is normal in size and signal.     C1-C2: Atlanto odontoid osteophytosis noted without significant dorsal pannus formation.  No significant spinal canal stenosis.     C2-C3: No significant spinal canal or neural foraminal stenosis.     C3-C4: Right greater than left uncovertebral hypertrophy and mild bilateral facet arthropathy.  No significant spinal canal or neural foraminal stenosis.     C4-C5: Bilateral uncovertebral hypertrophy/disc osteophyte complex and mild bilateral facet arthropathy.  Minimal spinal canal stenosis.  Mild-to-moderate right and mild left neural foraminal stenosis.     C5-C6: Right greater than left uncovertebral  hypertrophy/disc osteophyte complex and mild bilateral facet arthropathy.  Minimal spinal canal stenosis with particular narrowing of the right spinal canal.  Moderate to severe right and mild left neural foraminal stenosis.     C6-C7: Bilateral uncovertebral hypertrophy/disc osteophyte complex and mild bilateral facet arthropathy.  No significant spinal canal stenosis.  Mild right neural foraminal stenosis.  No significant left neural foraminal stenosis.     C7-T1: Mild bilateral facet arthropathy.  No significant spinal canal stenosis.  Mild bilateral neural foraminal stenosis.     Visualized soft tissues are unremarkable.     Impression:     Multilevel degenerative changes as detailed above.     Minimal spinal canal stenosis at C4-C5 and C5-C6.     Varying degrees of bilateral neural foraminal stenosis as detailed above, most severe at the right C5-C6 level.        Electronically signed by: Armando Langley  Date:                                            08/08/2022  Time:                                           15:47           Exam Ended: 08/08/22 15:39 Last Resulted: 08/08/22 15:47               Assessment    1. Impaired mobility and ADLs    2. Rheumatoid arthritis, involving unspecified site, unspecified whether rheumatoid factor present    3. Osteoarthritis, unspecified osteoarthritis type, unspecified site    4. Other chronic pain          Plan    Diagnoses and all orders for this visit:    Impaired mobility and ADLs    Rheumatoid arthritis, involving unspecified site, unspecified whether rheumatoid factor present    Osteoarthritis, unspecified osteoarthritis type, unspecified site    Other chronic pain    Long discussion with patient today regarding mobility, ADLs, IADLs.  She has significant difficulty moving around in her home, completing ADLs, such as getting in and out of bed, even with her cane/Rollator.  She is willing and able to safely operate a power scooter.    Will place order today for power  alina.    FOLLOW-UP:  Follow up if symptoms worsen or fail to improve.    I spent a total of 45 minutes face to face and non-face to face on the date of this visit.This includes time preparing to see the patient (eg, review of tests, notes), obtaining and/or reviewing additional history from an independent historian and/or outside medical records, documenting clinical information in the electronic health record, independently interpreting results and/or communicating results to the patient/family/caregiver, or care coordinator.    Signed by:  Cedric Huerta MD

## 2022-09-28 ENCOUNTER — TELEPHONE (OUTPATIENT)
Dept: PRIMARY CARE CLINIC | Facility: CLINIC | Age: 62
End: 2022-09-28
Payer: MEDICARE

## 2022-09-28 NOTE — TELEPHONE ENCOUNTER
Spoke with pt and advised her that she need to call her insurance company. Pt verbalized understanding.

## 2022-09-28 NOTE — TELEPHONE ENCOUNTER
----- Message from Jos Lacy sent at 9/28/2022  9:45 AM CDT -----  Contact: [phone]  Pt would like to consult with a nurse in regards to the services for her motor scooter. She stated that the company have her old [place] Address and when they contact her they informed that they do not service at her new location in the [facility]. Please call her back at  [phone]. Thanks KB

## 2022-10-04 ENCOUNTER — OFFICE VISIT (OUTPATIENT)
Dept: NEUROSURGERY | Facility: CLINIC | Age: 62
End: 2022-10-04
Payer: MEDICARE

## 2022-10-04 ENCOUNTER — TELEPHONE (OUTPATIENT)
Dept: RHEUMATOLOGY | Facility: CLINIC | Age: 62
End: 2022-10-04
Payer: MEDICARE

## 2022-10-04 VITALS
HEIGHT: 67 IN | SYSTOLIC BLOOD PRESSURE: 118 MMHG | BODY MASS INDEX: 30.45 KG/M2 | RESPIRATION RATE: 18 BRPM | WEIGHT: 194 LBS | DIASTOLIC BLOOD PRESSURE: 78 MMHG | HEART RATE: 52 BPM

## 2022-10-04 DIAGNOSIS — M54.2 CERVICALGIA: Primary | ICD-10-CM

## 2022-10-04 DIAGNOSIS — M50.30 DDD (DEGENERATIVE DISC DISEASE), CERVICAL: ICD-10-CM

## 2022-10-04 DIAGNOSIS — M54.12 CERVICAL RADICULOPATHY: ICD-10-CM

## 2022-10-04 PROCEDURE — 1159F MED LIST DOCD IN RCRD: CPT | Mod: CPTII,S$GLB,, | Performed by: PHYSICIAN ASSISTANT

## 2022-10-04 PROCEDURE — 3008F BODY MASS INDEX DOCD: CPT | Mod: CPTII,S$GLB,, | Performed by: PHYSICIAN ASSISTANT

## 2022-10-04 PROCEDURE — 3074F PR MOST RECENT SYSTOLIC BLOOD PRESSURE < 130 MM HG: ICD-10-PCS | Mod: CPTII,S$GLB,, | Performed by: PHYSICIAN ASSISTANT

## 2022-10-04 PROCEDURE — 3051F HG A1C>EQUAL 7.0%<8.0%: CPT | Mod: CPTII,S$GLB,, | Performed by: PHYSICIAN ASSISTANT

## 2022-10-04 PROCEDURE — 3061F PR NEG MICROALBUMINURIA RESULT DOCUMENTED/REVIEW: ICD-10-PCS | Mod: CPTII,S$GLB,, | Performed by: PHYSICIAN ASSISTANT

## 2022-10-04 PROCEDURE — 3066F NEPHROPATHY DOC TX: CPT | Mod: CPTII,S$GLB,, | Performed by: PHYSICIAN ASSISTANT

## 2022-10-04 PROCEDURE — 3074F SYST BP LT 130 MM HG: CPT | Mod: CPTII,S$GLB,, | Performed by: PHYSICIAN ASSISTANT

## 2022-10-04 PROCEDURE — 99999 PR PBB SHADOW E&M-EST. PATIENT-LVL IV: ICD-10-PCS | Mod: PBBFAC,,, | Performed by: PHYSICIAN ASSISTANT

## 2022-10-04 PROCEDURE — 99203 OFFICE O/P NEW LOW 30 MIN: CPT | Mod: S$GLB,,, | Performed by: PHYSICIAN ASSISTANT

## 2022-10-04 PROCEDURE — 3078F PR MOST RECENT DIASTOLIC BLOOD PRESSURE < 80 MM HG: ICD-10-PCS | Mod: CPTII,S$GLB,, | Performed by: PHYSICIAN ASSISTANT

## 2022-10-04 PROCEDURE — 99999 PR PBB SHADOW E&M-EST. PATIENT-LVL IV: CPT | Mod: PBBFAC,,, | Performed by: PHYSICIAN ASSISTANT

## 2022-10-04 PROCEDURE — 3051F PR MOST RECENT HEMOGLOBIN A1C LEVEL 7.0 - < 8.0%: ICD-10-PCS | Mod: CPTII,S$GLB,, | Performed by: PHYSICIAN ASSISTANT

## 2022-10-04 PROCEDURE — 99203 PR OFFICE/OUTPT VISIT, NEW, LEVL III, 30-44 MIN: ICD-10-PCS | Mod: S$GLB,,, | Performed by: PHYSICIAN ASSISTANT

## 2022-10-04 PROCEDURE — 1159F PR MEDICATION LIST DOCUMENTED IN MEDICAL RECORD: ICD-10-PCS | Mod: CPTII,S$GLB,, | Performed by: PHYSICIAN ASSISTANT

## 2022-10-04 PROCEDURE — 3008F PR BODY MASS INDEX (BMI) DOCUMENTED: ICD-10-PCS | Mod: CPTII,S$GLB,, | Performed by: PHYSICIAN ASSISTANT

## 2022-10-04 PROCEDURE — 3066F PR DOCUMENTATION OF TREATMENT FOR NEPHROPATHY: ICD-10-PCS | Mod: CPTII,S$GLB,, | Performed by: PHYSICIAN ASSISTANT

## 2022-10-04 PROCEDURE — 3061F NEG MICROALBUMINURIA REV: CPT | Mod: CPTII,S$GLB,, | Performed by: PHYSICIAN ASSISTANT

## 2022-10-04 PROCEDURE — 3078F DIAST BP <80 MM HG: CPT | Mod: CPTII,S$GLB,, | Performed by: PHYSICIAN ASSISTANT

## 2022-10-04 RX ORDER — IBUPROFEN 800 MG/1
800 TABLET ORAL 3 TIMES DAILY
COMMUNITY
Start: 2022-09-29

## 2022-10-04 NOTE — PROGRESS NOTES
"Subjective:      Patient ID: Luciana Moraes is a 61 y.o. female.    HPI:  Cervical Spine Pain (C-spine) (The pt was referred over by pain management for neck issues. The pt c/o of neck, back and hip pain. The pt stated the pain started years ago and is worsened with movement and nothing helps with the pain. The pt reports her pain 8/10 and denies having any recent falls.)    The patient is here today for evaluation of cervical radiculopathy.  She is referred to our clinic by Dr. Luis M Pereyra.  The patient has been experiencing neck pain for years.   States it seems to be getting worse over time.  Denies recent falls, other injuries.    Patient states she has pain with her neck and it radiates down both arms now. It was just with RUE but now she reprots it is affecting L arm.  It is difficult to sleep with the pain.  Also has numbness/tingling and weakness.  RUE > LUE.    Currently taking Gabapentin, tizanidine and Ibuprofen.   Rates her pain 8/10 today.  Has participated with physical therapy. Patient states she tried it for 2 weeks and stopped because she felt it made her symptoms worse.  Patient states that whenever she does anything with RUE it feels like her entire arm has pressure and becomes numb- "like a water balloon."  She reports weakness with hands and h/o dropping things.    No previous neck surgery.  9/22/22- C7/T1 [facility]- patient reports no relief.  2011/2012- bilateral [facility] in [place].  Left Shoulder Scope- 2010  Objective:     Body mass index is 30.39 kg/m².  Vitals:    10/04/22 0841   BP: 118/78   Pulse: (!) 52   Resp: 18            Neck:  None  Paraspinal tenderness   None  Paraspinal muscle spasms   None  Pain with flexion and extention   WNL Decreased R Range of motion shoulders   Neg Not tested Spurling's sign     Motor:   Right Right Left Left  Level Group   5 4 5  C5 Deltoid   5 4 5  C6 Bicep   5  5   Wrist extension    5 4 5  C7 Triceps   5  5   Wrist flexion   5 4 5 4+ C8    5 " 3 5 3 T1 Interossei      Sensation:  NL Decreased (R/L/BL) Level Sensation    [x]   C5 Lateral upper arm   [x]   C6 Thumb and index finger, lat forearm   [x]   C7 Middle finger   [x]   C8 Ring and little finger   [x]   T1 Medial arm      Reflex:  2+  Bicep tendon   2+  Brachioradialis   2+  Triceps tendon   Not present + bruce Pena's   none  Clonus   neg + bruce Tinel's         Lab Results   Component Value Date    WBC 10.53 09/26/2022    HCT 45.7 09/26/2022         Results for orders placed during the hospital encounter of 08/08/22    MRI Cervical Spine Without Contrast    Narrative  EXAMINATION:  MRI CERVICAL SPINE WITHOUT CONTRAST    CLINICAL HISTORY:  Neck pain, chronic, degenerative changes on xray;.  Radiculopathy, cervical region    TECHNIQUE:  Multiplanar, multisequence MR images of the cervical spine were acquired without the administration of contrast.    COMPARISON:  Cervical spine x-ray dated 08/02/2022    FINDINGS:  There are 7 non-rib-bearing cervical vertebrae.  Mild cervical straightening noted.  Alignment is otherwise unremarkable with no significant listhesis.  No acute fracture or compression deformity.  No aggressive focal signal abnormality.    Visualized posterior fossa is unremarkable.  Craniocervical junction is well maintained.  Visualized spinal cord is normal in size and signal.    C1-C2: Atlanto odontoid osteophytosis noted without significant dorsal pannus formation.  No significant spinal canal stenosis.    C2-C3: No significant spinal canal or neural foraminal stenosis.    C3-C4: Right greater than left uncovertebral hypertrophy and mild bilateral facet arthropathy.  No significant spinal canal or neural foraminal stenosis.    C4-C5: Bilateral uncovertebral hypertrophy/disc osteophyte complex and mild bilateral facet arthropathy.  Minimal spinal canal stenosis.  Mild-to-moderate right and mild left neural foraminal stenosis.    C5-C6: Right greater than left uncovertebral  hypertrophy/disc osteophyte complex and mild bilateral facet arthropathy.  Minimal spinal canal stenosis with particular narrowing of the right spinal canal.  Moderate to severe right and mild left neural foraminal stenosis.    C6-C7: Bilateral uncovertebral hypertrophy/disc osteophyte complex and mild bilateral facet arthropathy.  No significant spinal canal stenosis.  Mild right neural foraminal stenosis.  No significant left neural foraminal stenosis.    C7-T1: Mild bilateral facet arthropathy.  No significant spinal canal stenosis.  Mild bilateral neural foraminal stenosis.    Visualized soft tissues are unremarkable.    Impression  Multilevel degenerative changes as detailed above.  Minimal spinal canal stenosis at C4-C5 and C5-C6.  Varying degrees of bilateral neural foraminal stenosis as detailed above, most severe at the right C5-C6 level.        Results for orders placed during the hospital encounter of 08/02/22    X-Ray Cervical Spine 2 or 3 Views  FINDINGS:  There is normal alignment of the 7 cervical vertebra. Multilevel marginal spondylosis and anterior annular calcifications especially from C4/C5 through C6/C7. The vertebral body heights and intervertebral disc heights are   well maintained. The posterior elements are intact and the prevertebral soft tissues are normal thickness. The orientation of the dens and the lateral masses are normal.    The lung apices are clear. Mildly tortuous aorta.    Impression  1.  Negative for acute process involving the cervical spine.  2.  Multilevel degenerative disc changes manifesting mainly is marginal spondylosis and anterior annular calcifications in the mid to lower cervical region.      INDEPENDENT INTERPRETATION OF TEST:  See MRI findings above.    Assessment:     1. Cervical radiculopathy    2. DDD (degenerative disc disease), cervical      Plan:     Cervical radiculopathy  -     Ambulatory referral/consult to Neurosurgery    DDD (degenerative disc disease),  cervical  -     Ambulatory referral/consult to Neurosurgery    Discussed recent MRI findings with patient. I do not see any significant central stenosis however she does have foraminal stenosis.  Will recommend further evaluation of bony anatomy with CT cervical spine as well as EMG/NCS of upper extremities to evaluate Cerv Radiculopathy vs other neuropathy.   She will follow-up after imaging/testing for discussion.    Neva Jacobson PA-C  [facility]

## 2022-10-04 NOTE — PROGRESS NOTES
Reason for Consult:  The primary encounter diagnosis was Constipation, unspecified constipation type. A diagnosis of Pelvic pain in female was also pertinent to this visit.    PCP: Cedric Huerta   [address] [facility] / [place] [id number]    HPI:  This is a 61 y.o. female here for evaluation of constipation. Associated with pain intermittent.     No aggravating factor. Relives with bowel movement.    Chronic pain to right side, seen by OBGYN. Her Pelvic US showed retain normal ovary on right side.  Patient states has had since before her hysterectomy      Abdominal pain - intermittent  Reflux - no  Dysphagia - no   Bowel habits - normal  GI bleeding - none  NSAID usage - none      ROS:  Constitutional: No fevers, chills, No weight loss  ENT: No allergies  CV: No chest pain  Pulm: No cough, No shortness of breath  Ophtho: No vision changes  GI: see HPI  Derm: No rash  Heme: No lymphadenopathy, No bruising  MSK: No arthritis  : No dysuria, No hematuria  Endo: No hot or cold intolerance  Neuro: No syncope, No seizure  Psych: No anxiety, No depression    Medical History:  has a past medical history of Diabetes mellitus, type 2, Hyperlipidemia, Osteoarthritis, Polymyositis, and Rheumatoid arthritis.    Surgical History:  has a past surgical history that includes Hysterectomy (2008); Carpal tunnel release (Bilateral); Hip surgery; and Epidural steroid injection into cervical spine (N/A, 9/22/2022).    Family History: family history includes No Known Problems in her father; Rheum arthritis in her mother..     Social History:  reports that she quit smoking about 21 years ago. Her smoking use included cigarettes. She has never used smokeless tobacco. She reports that she does not currently use alcohol. She reports that she does not use drugs.    Review of patient's allergies indicates:  No Known Allergies    Current Outpatient Rx   Medication Sig Dispense Refill    aspirin 81 MG Chew Take 81 mg by mouth.       "atorvastatin (LIPITOR) 80 MG tablet Take 1 tablet (80 mg total) by mouth once daily. 90 tablet 3    diclofenac sodium (VOLTAREN) 1 % Gel Apply 2 g topically 4 (four) times daily. 100 g 6    ergocalciferol (ERGOCALCIFEROL) 50,000 unit Cap Take 1 capsule (50,000 Units total) by mouth every 7 days. 12 capsule 1    gabapentin (NEURONTIN) 300 MG capsule Take 1 capsule (300 mg total) by mouth 3 (three) times daily. 90 capsule 2    hydrOXYchloroQUINE (PLAQUENIL) 200 mg tablet Take 1 tablet (200 mg total) by mouth 2 (two) times daily. 180 tablet 1    ibuprofen (ADVIL,MOTRIN) 800 MG tablet Take 800 mg by mouth 3 (three) times daily.      metFORMIN (GLUCOPHAGE-XR) 500 MG ER 24hr tablet Take 2 tablets (1,000 mg total) by mouth daily with breakfast. 180 tablet 3    nabumetone (RELAFEN) 750 MG tablet Take 1 tablet (750 mg total) by mouth 2 (two) times daily as needed for Pain. 60 tablet 1    polyethylene glycol (GLYCOLAX) 17 gram PwPk Take 17 g by mouth once daily. 30 each 11    semaglutide (OZEMPIC) 0.25 mg or 0.5 mg(2 mg/1.5 mL) pen injector Inject 0.5 mg into the skin every 7 days. 1 pen 12    tiZANidine (ZANAFLEX) 4 MG tablet Take 1 tablet (4 mg total) by mouth 2 (two) times daily as needed. 60 tablet 1       Objective Findings:    Vital Signs:  /76 (BP Location: Left arm, Patient Position: Sitting, BP Method: Medium (Manual))   Pulse 63   Ht 5' 2" (1.575 m)   Wt 87 kg (191 lb 12.8 oz)   SpO2 97%   BMI 35.08 kg/m²   Body mass index is 35.08 kg/m².    Physical Exam:  General Appearance: Well appearing in no acute distress  Head:   Normocephalic, without obvious abnormality  Eyes:    No scleral icterus, EOMI  ENT: Neck supple, Lips, mucosa, and tongue normal; teeth and gums normal  Lungs: CTA bilaterally in anterior and posterior fields, no wheezes, no crackles.  Heart:  Regular rate and rhythm, S1, S2 normal, no murmurs heard  Abdomen: Soft, non tender, non distended with positive bowel sounds in all four " quadrants. No hepatosplenomegaly, ascites, or mass  Extremities: 2+ pulses, no clubbing, cyanosis or edema  Skin: No rash  Neurologic: CN II-XII intact      Labs:  Lab Results   Component Value Date    WBC 10.53 09/26/2022    HGB 14.3 09/26/2022    HCT 45.7 09/26/2022     (L) 09/26/2022    CHOL 218 (H) 08/01/2022    TRIG 234 (H) 08/01/2022    HDL 45 08/01/2022    ALT 12 09/26/2022    AST 15 09/26/2022     09/26/2022    K 4.0 09/26/2022     09/26/2022    CREATININE 0.8 09/26/2022    BUN 14 09/26/2022    CO2 21 (L) 09/26/2022    TSH 0.793 08/01/2022    HGBA1C 7.4 (H) 08/01/2022         Endoscopy:      Assessment:  1. Constipation, unspecified constipation type    2. Pelvic pain in female          Colonoscopy  Laxative      Time Statement   Time Includes preparing to see patient, reviewing  diagnostic studies and records, direct face-to-face visit, completing orders, medications , reconciliation, prescription management, and care coordination    We discussed in depth the nature of the patient's disease, the management plan in details. I have provided the patient with an opportunity to ask questions and have all questions answered to his satisfaction.       KELLY PIMENTEL MD  Gastroenterology & Transplant Hepatology  [facility], [place]

## 2022-10-05 ENCOUNTER — TELEPHONE (OUTPATIENT)
Dept: PHYSICAL MEDICINE AND REHAB | Facility: CLINIC | Age: 62
End: 2022-10-05
Payer: MEDICARE

## 2022-10-05 ENCOUNTER — OFFICE VISIT (OUTPATIENT)
Dept: GASTROENTEROLOGY | Facility: CLINIC | Age: 62
End: 2022-10-05
Payer: MEDICARE

## 2022-10-05 ENCOUNTER — HOSPITAL ENCOUNTER (OUTPATIENT)
Dept: RADIOLOGY | Facility: HOSPITAL | Age: 62
Discharge: HOME | End: 2022-10-05
Attending: PHYSICIAN ASSISTANT
Payer: MEDICARE

## 2022-10-05 VITALS
OXYGEN SATURATION: 97 % | BODY MASS INDEX: 35.3 KG/M2 | HEART RATE: 63 BPM | HEIGHT: 62 IN | WEIGHT: 191.81 LBS | SYSTOLIC BLOOD PRESSURE: 116 MMHG | DIASTOLIC BLOOD PRESSURE: 76 MMHG

## 2022-10-05 DIAGNOSIS — K59.00 CONSTIPATION, UNSPECIFIED CONSTIPATION TYPE: Primary | ICD-10-CM

## 2022-10-05 DIAGNOSIS — R10.2 PELVIC PAIN IN FEMALE: ICD-10-CM

## 2022-10-05 DIAGNOSIS — M54.2 CERVICALGIA: ICD-10-CM

## 2022-10-05 PROCEDURE — 3008F BODY MASS INDEX DOCD: CPT | Mod: CPTII,S$GLB,, | Performed by: INTERNAL MEDICINE

## 2022-10-05 PROCEDURE — 3078F PR MOST RECENT DIASTOLIC BLOOD PRESSURE < 80 MM HG: ICD-10-PCS | Mod: CPTII,S$GLB,, | Performed by: INTERNAL MEDICINE

## 2022-10-05 PROCEDURE — 99999 PR PBB SHADOW E&M-EST. PATIENT-LVL IV: ICD-10-PCS | Mod: PBBFAC,,, | Performed by: INTERNAL MEDICINE

## 2022-10-05 PROCEDURE — 99203 PR OFFICE/OUTPT VISIT, NEW, LEVL III, 30-44 MIN: ICD-10-PCS | Mod: S$GLB,,, | Performed by: INTERNAL MEDICINE

## 2022-10-05 PROCEDURE — 99999 PR PBB SHADOW E&M-EST. PATIENT-LVL IV: CPT | Mod: PBBFAC,,, | Performed by: INTERNAL MEDICINE

## 2022-10-05 PROCEDURE — 72125 CT NECK SPINE W/O DYE: CPT | Mod: TC,PN

## 2022-10-05 PROCEDURE — 3074F PR MOST RECENT SYSTOLIC BLOOD PRESSURE < 130 MM HG: ICD-10-PCS | Mod: CPTII,S$GLB,, | Performed by: INTERNAL MEDICINE

## 2022-10-05 PROCEDURE — 3051F PR MOST RECENT HEMOGLOBIN A1C LEVEL 7.0 - < 8.0%: ICD-10-PCS | Mod: CPTII,S$GLB,, | Performed by: INTERNAL MEDICINE

## 2022-10-05 PROCEDURE — 3061F PR NEG MICROALBUMINURIA RESULT DOCUMENTED/REVIEW: ICD-10-PCS | Mod: CPTII,S$GLB,, | Performed by: INTERNAL MEDICINE

## 2022-10-05 PROCEDURE — 3051F HG A1C>EQUAL 7.0%<8.0%: CPT | Mod: CPTII,S$GLB,, | Performed by: INTERNAL MEDICINE

## 2022-10-05 PROCEDURE — 3008F PR BODY MASS INDEX (BMI) DOCUMENTED: ICD-10-PCS | Mod: CPTII,S$GLB,, | Performed by: INTERNAL MEDICINE

## 2022-10-05 PROCEDURE — 3074F SYST BP LT 130 MM HG: CPT | Mod: CPTII,S$GLB,, | Performed by: INTERNAL MEDICINE

## 2022-10-05 PROCEDURE — 3066F PR DOCUMENTATION OF TREATMENT FOR NEPHROPATHY: ICD-10-PCS | Mod: CPTII,S$GLB,, | Performed by: INTERNAL MEDICINE

## 2022-10-05 PROCEDURE — 99203 OFFICE O/P NEW LOW 30 MIN: CPT | Mod: S$GLB,,, | Performed by: INTERNAL MEDICINE

## 2022-10-05 PROCEDURE — 3078F DIAST BP <80 MM HG: CPT | Mod: CPTII,S$GLB,, | Performed by: INTERNAL MEDICINE

## 2022-10-05 PROCEDURE — 1159F PR MEDICATION LIST DOCUMENTED IN MEDICAL RECORD: ICD-10-PCS | Mod: CPTII,S$GLB,, | Performed by: INTERNAL MEDICINE

## 2022-10-05 PROCEDURE — 3066F NEPHROPATHY DOC TX: CPT | Mod: CPTII,S$GLB,, | Performed by: INTERNAL MEDICINE

## 2022-10-05 PROCEDURE — 1159F MED LIST DOCD IN RCRD: CPT | Mod: CPTII,S$GLB,, | Performed by: INTERNAL MEDICINE

## 2022-10-05 PROCEDURE — 3061F NEG MICROALBUMINURIA REV: CPT | Mod: CPTII,S$GLB,, | Performed by: INTERNAL MEDICINE

## 2022-10-05 RX ORDER — SODIUM, POTASSIUM,MAG SULFATES 17.5-3.13G
1 SOLUTION, RECONSTITUTED, ORAL ORAL DAILY
Qty: 1 KIT | Refills: 0 | Status: SHIPPED | OUTPATIENT
Start: 2022-10-05 | End: 2022-10-07

## 2022-10-05 RX ORDER — POLYETHYLENE GLYCOL 3350 17 G/17G
17 POWDER, FOR SOLUTION ORAL DAILY
Qty: 30 EACH | Refills: 11 | Status: SHIPPED | OUTPATIENT
Start: 2022-10-05

## 2022-10-05 NOTE — TELEPHONE ENCOUNTER
----- Message from Michelle Ponce MA sent at 10/4/2022  9:31 AM CDT -----  Regarding: emg appt  Dakota White can we get this pt in for an emg per Dr Carver?     Thanks.

## 2022-10-07 ENCOUNTER — HOSPITAL ENCOUNTER (OUTPATIENT)
Dept: PREADMISSION TESTING | Facility: HOSPITAL | Age: 62
Discharge: HOME | End: 2022-10-07
Attending: INTERNAL MEDICINE
Payer: MEDICARE

## 2022-10-07 DIAGNOSIS — R10.2 PELVIC PAIN IN FEMALE: ICD-10-CM

## 2022-10-07 DIAGNOSIS — K59.00 CONSTIPATION, UNSPECIFIED CONSTIPATION TYPE: Primary | ICD-10-CM

## 2022-10-11 ENCOUNTER — OFFICE VISIT (OUTPATIENT)
Dept: PAIN MEDICINE | Facility: CLINIC | Age: 62
End: 2022-10-11
Payer: MEDICARE

## 2022-10-11 ENCOUNTER — TELEPHONE (OUTPATIENT)
Dept: PHYSICAL MEDICINE AND REHAB | Facility: CLINIC | Age: 62
End: 2022-10-11
Payer: MEDICARE

## 2022-10-11 VITALS
HEART RATE: 59 BPM | BODY MASS INDEX: 35.57 KG/M2 | DIASTOLIC BLOOD PRESSURE: 69 MMHG | WEIGHT: 193.31 LBS | SYSTOLIC BLOOD PRESSURE: 126 MMHG | HEIGHT: 62 IN

## 2022-10-11 DIAGNOSIS — G56.01 CARPAL TUNNEL SYNDROME OF RIGHT WRIST: ICD-10-CM

## 2022-10-11 DIAGNOSIS — M50.30 DDD (DEGENERATIVE DISC DISEASE), CERVICAL: Primary | ICD-10-CM

## 2022-10-11 DIAGNOSIS — M47.812 CERVICAL SPONDYLOSIS: ICD-10-CM

## 2022-10-11 DIAGNOSIS — M54.12 CERVICAL RADICULOPATHY: ICD-10-CM

## 2022-10-11 PROCEDURE — 99214 PR OFFICE/OUTPT VISIT, EST, LEVL IV, 30-39 MIN: ICD-10-PCS | Mod: S$GLB,,, | Performed by: ANESTHESIOLOGY

## 2022-10-11 PROCEDURE — 3074F PR MOST RECENT SYSTOLIC BLOOD PRESSURE < 130 MM HG: ICD-10-PCS | Mod: CPTII,S$GLB,, | Performed by: ANESTHESIOLOGY

## 2022-10-11 PROCEDURE — 99214 OFFICE O/P EST MOD 30 MIN: CPT | Mod: S$GLB,,, | Performed by: ANESTHESIOLOGY

## 2022-10-11 PROCEDURE — 3051F HG A1C>EQUAL 7.0%<8.0%: CPT | Mod: CPTII,S$GLB,, | Performed by: ANESTHESIOLOGY

## 2022-10-11 PROCEDURE — 3061F NEG MICROALBUMINURIA REV: CPT | Mod: CPTII,S$GLB,, | Performed by: ANESTHESIOLOGY

## 2022-10-11 PROCEDURE — 3066F NEPHROPATHY DOC TX: CPT | Mod: CPTII,S$GLB,, | Performed by: ANESTHESIOLOGY

## 2022-10-11 PROCEDURE — 3078F PR MOST RECENT DIASTOLIC BLOOD PRESSURE < 80 MM HG: ICD-10-PCS | Mod: CPTII,S$GLB,, | Performed by: ANESTHESIOLOGY

## 2022-10-11 PROCEDURE — 3078F DIAST BP <80 MM HG: CPT | Mod: CPTII,S$GLB,, | Performed by: ANESTHESIOLOGY

## 2022-10-11 PROCEDURE — 3061F PR NEG MICROALBUMINURIA RESULT DOCUMENTED/REVIEW: ICD-10-PCS | Mod: CPTII,S$GLB,, | Performed by: ANESTHESIOLOGY

## 2022-10-11 PROCEDURE — 3074F SYST BP LT 130 MM HG: CPT | Mod: CPTII,S$GLB,, | Performed by: ANESTHESIOLOGY

## 2022-10-11 PROCEDURE — 1159F PR MEDICATION LIST DOCUMENTED IN MEDICAL RECORD: ICD-10-PCS | Mod: CPTII,S$GLB,, | Performed by: ANESTHESIOLOGY

## 2022-10-11 PROCEDURE — 1159F MED LIST DOCD IN RCRD: CPT | Mod: CPTII,S$GLB,, | Performed by: ANESTHESIOLOGY

## 2022-10-11 PROCEDURE — 99999 PR PBB SHADOW E&M-EST. PATIENT-LVL IV: CPT | Mod: PBBFAC,,, | Performed by: ANESTHESIOLOGY

## 2022-10-11 PROCEDURE — 3066F PR DOCUMENTATION OF TREATMENT FOR NEPHROPATHY: ICD-10-PCS | Mod: CPTII,S$GLB,, | Performed by: ANESTHESIOLOGY

## 2022-10-11 PROCEDURE — 99999 PR PBB SHADOW E&M-EST. PATIENT-LVL IV: ICD-10-PCS | Mod: PBBFAC,,, | Performed by: ANESTHESIOLOGY

## 2022-10-11 PROCEDURE — 3051F PR MOST RECENT HEMOGLOBIN A1C LEVEL 7.0 - < 8.0%: ICD-10-PCS | Mod: CPTII,S$GLB,, | Performed by: ANESTHESIOLOGY

## 2022-10-11 PROCEDURE — 3008F PR BODY MASS INDEX (BMI) DOCUMENTED: ICD-10-PCS | Mod: CPTII,S$GLB,, | Performed by: ANESTHESIOLOGY

## 2022-10-11 PROCEDURE — 3008F BODY MASS INDEX DOCD: CPT | Mod: CPTII,S$GLB,, | Performed by: ANESTHESIOLOGY

## 2022-10-11 RX ORDER — PREGABALIN 75 MG/1
75 CAPSULE ORAL 2 TIMES DAILY
Qty: 60 CAPSULE | Refills: 0 | Status: SHIPPED | OUTPATIENT
Start: 2022-10-11 | End: 2023-05-12 | Stop reason: SDUPTHER

## 2022-10-12 NOTE — PROGRESS NOTES
Interventional Pain Progress Note       Referring Physician: No ref. provider found    PCP: Cedric Huerta MD    Chief Complaint:   Chief Complaint   Patient presents with    Neck Pain     Interval History (10/12/2022):  Patient returns to clinic after procedure.  Patient reports minimal relief after C7-T1 interlaminar epidural steroid injection on 09/22/2022.  Patient reports continued right-sided pain that starts over the right neck and right trapezius area and then radiates down to her right hand.  Pain is worse at night and with lifting, better with rest and in the morning.  Pain is rated 8 out 10. Denies any fevers, chills, changes in gait, weakness, or bowel and bladder incontinence        Interval History (10/12/2022):  Patient returns to clinic for MRI review.  Patient reports continued neck pain primarily on her right side that then radiates to her right upper extremity to her fingertips and numbness and tingling pain.  She reports associated decreased strength in her right upper extremity with his pain.  Pain is worse with neck rotation and lifting objects, better with heat and rest.  Pain is rated a 7/10. Denies any fevers, chills, changes in gait, weakness, or bowel and bladder incontinence        SUBJECTIVE:    Luciana Moraes is a 61 y.o. female who presents to the clinic for the evaluation of neck and lower pain. The pain started over 10 years ago and symptoms have been worsening.  Neck pain is described as a throbbing pain that starts diffusely over her neck.  She reports that in the last 2 months she has had increased in right upper extremity radicular pain that goes to her fingertips.  She denies any traumas to her neck or any previous surgeries on her spine her major joints.  Pain is worse with neck rotation and lifting objects, better with flexion and heat.  Back pain is described as a throbbing axial pain in a band across her lower back.  She reports minimal radiation to her  bilateral lower extremities.  Pain is worse with extension, better with flexion.  The pain is described as numbing, sharp and tingling and is rated as  10 out of 10 .   The pain is rated with a score of  6/10 on the BEST day and a score of 10/10 on the WORST day.  Symptoms interfere with daily activity and work. The pain is exacerbated by Walking, Extension, Lifting and Getting out of bed/chair.  The pain is mitigated by nothing.     Patient denies night fever/night sweats, urinary incontinence, bowel incontinence, significant weight loss, significant motor weakness and loss of sensations.      Non-Pharmacologic Treatments:  Physical Therapy/Home Exercise: yes  Ice/Heat:yes  TENS: no  Acupuncture: no  Massage: yes  Chiropractic: no        Previous Pain Medications:  Tylenol, NSAIDs, gabapentin, muscle relaxers, opioids, topicals     report:  Reviewed and consistent with medication use as prescribed.    Pain Procedures:   9/22/22:  C7-T1 interlaminar epidural steroid injection, minimal relief      Imaging:   MRI CERVICAL SPINE WITHOUT CONTRAST 8/2/22     CLINICAL HISTORY:  Neck pain, chronic, degenerative changes on xray;.  Radiculopathy, cervical region     TECHNIQUE:  Multiplanar, multisequence MR images of the cervical spine were acquired without the administration of contrast.     COMPARISON:  Cervical spine x-ray dated 08/02/2022     FINDINGS:  There are 7 non-rib-bearing cervical vertebrae.  Mild cervical straightening noted.  Alignment is otherwise unremarkable with no significant listhesis.  No acute fracture or compression deformity.  No aggressive focal signal abnormality.     Visualized posterior fossa is unremarkable.  Craniocervical junction is well maintained.  Visualized spinal cord is normal in size and signal.     C1-C2: Atlanto odontoid osteophytosis noted without significant dorsal pannus formation.  No significant spinal canal stenosis.     C2-C3: No significant spinal canal or neural foraminal  stenosis.     C3-C4: Right greater than left uncovertebral hypertrophy and mild bilateral facet arthropathy.  No significant spinal canal or neural foraminal stenosis.     C4-C5: Bilateral uncovertebral hypertrophy/disc osteophyte complex and mild bilateral facet arthropathy.  Minimal spinal canal stenosis.  Mild-to-moderate right and mild left neural foraminal stenosis.     C5-C6: Right greater than left uncovertebral hypertrophy/disc osteophyte complex and mild bilateral facet arthropathy.  Minimal spinal canal stenosis with particular narrowing of the right spinal canal.  Moderate to severe right and mild left neural foraminal stenosis.     C6-C7: Bilateral uncovertebral hypertrophy/disc osteophyte complex and mild bilateral facet arthropathy.  No significant spinal canal stenosis.  Mild right neural foraminal stenosis.  No significant left neural foraminal stenosis.     C7-T1: Mild bilateral facet arthropathy.  No significant spinal canal stenosis.  Mild bilateral neural foraminal stenosis.     Visualized soft tissues are unremarkable.     Impression:     Multilevel degenerative changes as detailed above.     Minimal spinal canal stenosis at C4-C5 and C5-C6.     Varying degrees of bilateral neural foraminal stenosis as detailed above, most severe at the right C5-C6 level.      MRI Lumbar Spine w/o Contast 7/8/19  There is lumbar facet arthropathy at L5-S1 and L4-L5. No stenosis or disc herniation is evident. Patient appears obese. Patient may be a candidate for an image guided steroid injection treatment trial.   .       Past Medical History:   Diagnosis Date    Diabetes mellitus, type 2     Hyperlipidemia     Osteoarthritis     Polymyositis     Rheumatoid arthritis      Past Surgical History:   Procedure Laterality Date    CARPAL TUNNEL RELEASE Bilateral     EPIDURAL STEROID INJECTION INTO CERVICAL SPINE N/A 9/22/2022    Procedure: C7/T1 [facility];  Surgeon: Luis M Pereyra MD;  Location: [facility];   Service: Pain Management;  Laterality: N/A;    HIP SURGERY      HYSTERECTOMY  2008    due to bleeding, pain from fibroids, retains one ovary     Social History     Socioeconomic History    Marital status:    Tobacco Use    Smoking status: Former     Types: Cigarettes     Quit date: 3/17/2001     Years since quittin.5    Smokeless tobacco: Never   Substance and Sexual Activity    Alcohol use: Not Currently    Drug use: Never    Sexual activity: Yes     Partners: Male     Family History   Problem Relation Age of Onset    Rheum arthritis Mother     No Known Problems Father        Review of patient's allergies indicates:  No Known Allergies    Current Outpatient Medications   Medication Sig    aspirin 81 MG Chew Take 81 mg by mouth.    atorvastatin (LIPITOR) 80 MG tablet Take 1 tablet (80 mg total) by mouth once daily.    diclofenac sodium (VOLTAREN) 1 % Gel Apply 2 g topically 4 (four) times daily.    ergocalciferol (ERGOCALCIFEROL) 50,000 unit Cap Take 1 capsule (50,000 Units total) by mouth every 7 days.    hydrOXYchloroQUINE (PLAQUENIL) 200 mg tablet Take 1 tablet (200 mg total) by mouth 2 (two) times daily.    ibuprofen (ADVIL,MOTRIN) 800 MG tablet Take 800 mg by mouth 3 (three) times daily.    metFORMIN (GLUCOPHAGE-XR) 500 MG ER 24hr tablet Take 2 tablets (1,000 mg total) by mouth daily with breakfast.    nabumetone (RELAFEN) 750 MG tablet Take 1 tablet (750 mg total) by mouth 2 (two) times daily as needed for Pain.    polyethylene glycol (GLYCOLAX) 17 gram PwPk Take 17 g by mouth once daily.    semaglutide (OZEMPIC) 0.25 mg or 0.5 mg(2 mg/1.5 mL) pen injector Inject 0.5 mg into the skin every 7 days.    tiZANidine (ZANAFLEX) 4 MG tablet Take 1 tablet (4 mg total) by mouth 2 (two) times daily as needed.    pregabalin (LYRICA) 75 MG capsule Take 1 capsule (75 mg total) by mouth 2 (two) times daily.     No current facility-administered medications for this visit.         ROS  Review of Systems  "  Constitutional:  Negative for chills, diaphoresis, fatigue and fever.   Respiratory:  Negative for chest tightness, shortness of breath, wheezing and stridor.    Cardiovascular:  Negative for chest pain and leg swelling.   Gastrointestinal:  Negative for blood in stool, diarrhea, nausea and vomiting.   Endocrine: Negative for cold intolerance and heat intolerance.   Genitourinary:  Positive for urgency. Negative for dysuria and hematuria.   Musculoskeletal:  Positive for arthralgias, back pain, myalgias, neck pain and neck stiffness. Negative for gait problem and joint swelling.   Skin:  Negative for rash.   Neurological:  Positive for weakness and numbness. Negative for tremors, seizures, speech difficulty, light-headedness and headaches.   Hematological:  Does not bruise/bleed easily.   Psychiatric/Behavioral:  Negative for agitation, confusion and suicidal ideas. The patient is not nervous/anxious.           OBJECTIVE:  /69   Pulse (!) 59   Ht 5' 2" (1.575 m)   Wt 87.7 kg (193 lb 5.5 oz)   BMI 35.36 kg/m²         Physical Exam  Constitutional:       General: She is not in acute distress.     Appearance: Normal appearance. She is not ill-appearing.   HENT:      Head: Normocephalic and atraumatic.      Nose: No congestion or rhinorrhea.      Mouth/Throat:      Mouth: Mucous membranes are moist.   Eyes:      Extraocular Movements: Extraocular movements intact.      Pupils: Pupils are equal, round, and reactive to light.   Cardiovascular:      Pulses: Normal pulses.   Pulmonary:      Effort: Pulmonary effort is normal.   Abdominal:      General: Abdomen is flat.      Palpations: Abdomen is soft.   Musculoskeletal:      Comments: Positive TInsel and Phalen on right head   Skin:     General: Skin is warm and dry.      Capillary Refill: Capillary refill takes less than 2 seconds.   Neurological:      Mental Status: She is alert and oriented to person, place, and time.      Cranial Nerves: No cranial nerve " deficit.      Sensory: No sensory deficit.      Motor: Weakness present. No abnormal muscle tone.      Gait: Gait abnormal.      Deep Tendon Reflexes:      Reflex Scores:       Tricep reflexes are 2+ on the right side and 2+ on the left side.       Bicep reflexes are 2+ on the right side and 2+ on the left side.       Brachioradialis reflexes are 1+ on the right side and 1+ on the left side.     Comments: Antalgic gait  Strength of right upper extremity diffusely decreased secondary to pain.  5/5 strength and muscle groups of left upper extremity     Psychiatric:         Mood and Affect: Mood normal.         Behavior: Behavior normal.         Thought Content: Thought content normal.         Musculoskeletal:    Cervical Exam  Incision: no  Pain with Flexion: yes  Pain with Extension: yes  Paraspinous TTP:  Right Trapezius  Facet TTP:  C5-6, right greater than left  Spurling:  Positive on the right  ROM:  Decreased secondary to pain          LABS:  Lab Results   Component Value Date    WBC 10.53 09/26/2022    HGB 14.3 09/26/2022    HCT 45.7 09/26/2022    MCV 88 09/26/2022     (L) 09/26/2022       CMP  Sodium   Date Value Ref Range Status   09/26/2022 141 136 - 145 mmol/L Final     Potassium   Date Value Ref Range Status   09/26/2022 4.0 3.5 - 5.1 mmol/L Final     Chloride   Date Value Ref Range Status   09/26/2022 110 95 - 110 mmol/L Final     CO2   Date Value Ref Range Status   09/26/2022 21 (L) 23 - 29 mmol/L Final     Glucose   Date Value Ref Range Status   09/26/2022 84 70 - 110 mg/dL Final     BUN   Date Value Ref Range Status   09/26/2022 14 6 - 20 mg/dL Final     Creatinine   Date Value Ref Range Status   09/26/2022 0.8 0.5 - 1.4 mg/dL Final     Calcium   Date Value Ref Range Status   09/26/2022 8.8 8.7 - 10.5 mg/dL Final     Total Protein   Date Value Ref Range Status   09/26/2022 7.4 6.0 - 8.4 g/dL Final     Albumin   Date Value Ref Range Status   09/26/2022 3.9 3.5 - 5.2 g/dL Final     Total Bilirubin    Date Value Ref Range Status   09/26/2022 0.5 0.1 - 1.0 mg/dL Final     Comment:     For infants and newborns, interpretation of results should be based  on gestational age, weight and in agreement with clinical  observations.    Premature Infant recommended reference ranges:  Up to 24 hours.............<8.0 mg/dL  Up to 48 hours............<12.0 mg/dL  3-5 days..................<15.0 mg/dL  6-29 days.................<15.0 mg/dL       Alkaline Phosphatase   Date Value Ref Range Status   09/26/2022 93 55 - 135 U/L Final     AST   Date Value Ref Range Status   09/26/2022 15 10 - 40 U/L Final     ALT   Date Value Ref Range Status   09/26/2022 12 10 - 44 U/L Final     Anion Gap   Date Value Ref Range Status   09/26/2022 10 8 - 16 mmol/L Final       Lab Results   Component Value Date    HGBA1C 7.4 (H) 08/01/2022             ASSESSMENT:       61 y.o. year old female with neck pain, consistent with     1. DDD (degenerative disc disease), cervical        2. Cervical spondylosis  IR Facet Inj Cerv Thoracic 2nd Vert Bi    IR Facet Inj Cervical Thoracic 1st Vert Bi    Case Request-RAD/Other Procedure Area: Bilateral C5-7 MBB with RN IV sedation      3. Cervical radiculopathy  Nerve conduction test    pregabalin (LYRICA) 75 MG capsule      4. Carpal tunnel syndrome of right wrist  Nerve conduction test    Ambulatory referral/consult to Orthopedics    pregabalin (LYRICA) 75 MG capsule        DDD (degenerative disc disease), cervical    Cervical spondylosis  -     IR Facet Inj Cerv Thoracic 2nd Vert Bi; Future; Expected date: 10/11/2022  -     IR Facet Inj Cervical Thoracic 1st Vert Bi; Future; Expected date: 10/11/2022  -     Case Request-RAD/Other Procedure Area: Bilateral C5-7 MBB with RN IV sedation    Cervical radiculopathy  -     Nerve conduction test; Future  -     pregabalin (LYRICA) 75 MG capsule; Take 1 capsule (75 mg total) by mouth 2 (two) times daily.  Dispense: 60 capsule; Refill: 0    Carpal tunnel syndrome of  right wrist  -     Nerve conduction test; Future  -     Ambulatory referral/consult to Orthopedics; Future; Expected date: 10/18/2022  -     pregabalin (LYRICA) 75 MG capsule; Take 1 capsule (75 mg total) by mouth 2 (two) times daily.  Dispense: 60 capsule; Refill: 0           PLAN:   - Interventions:    Schedule patient for bilateral C5-C7 medial branch block cervical spondylosis  -9/22/22:  C7-T1 interlaminar epidural steroid injection, minimal relief    - Anticoagulation use:   yes aspirin    - Medications:   Discontinue gabapentin and start Lyrica 75 mg twice a day  Continue Relafen 750 mg twice a day as needed   Continue tizanidine 4 mg twice a day as needed   Continue Plaquenil as prescribed by rheumatology    - Therapy:    Continue formal physical therapy for neck pain    - Imaging:   We will order bilateral upper extremity EMG and nerve conduction study to further evaluate right upper extremity pain consistent with cervical radiculopathy with previous history of right carpal tunnel release   MRI of cervical spine reviewed.  Significant for eccentric right disc bulge at C5-C6 with associated severe right foraminal stenosis   X-rays of cervical spine obtained today reviewed.  Significant for spondylosis and anterior annular calcifications noted at C4-5, C5-6, and C6-C7    - Consults:   Continue follow-up with Rheumatology    - Counseled patient regarding the importance of activity modification and physical therapy    - Patient Questions: Answered all of the patient's questions regarding diagnosis, therapy, and treatment    - Follow up visit: return to clinic 2-4 weeks after procedure        The above plan and management options were discussed at length with patient. Patient is in agreement with the above and verbalized understanding.    I discussed the goals of interventional chronic pain management with the patient on today's visit.  I explained the utility of injections for diagnostic and therapeutic  purposes.  We discussed a multimodal approach to pain including treating the patient's given worst pain at any given time.  We will use a systematic approach to addressing pain.  We will also adopt a multimodal approach that includes injections, adjuvant medications, physical therapy, at times psychiatry.  There may be a limited role for opioid use intermittently in the treatment of pain, more particularly for acute pain although no one approach can be used as a sole treatment modality.    I emphasized the importance of regular exercise, core strengthening and stretching, diet and weight loss as a cornerstone of long-term pain management.      Luis M Pereyra MD  Interventional Pain Management  [facility]    Disclaimer:  This note was prepared using voice recognition system and is likely to have sound alike errors that may have been overlooked even after proof reading.  Please call me with any questions

## 2022-10-12 NOTE — H&P (VIEW-ONLY)
Interventional Pain Progress Note       Referring Physician: No ref. provider found    PCP: Cedric Huerta MD    Chief Complaint:   Chief Complaint   Patient presents with    Neck Pain     Interval History (10/12/2022):  Patient returns to clinic after procedure.  Patient reports minimal relief after C7-T1 interlaminar epidural steroid injection on 09/22/2022.  Patient reports continued right-sided pain that starts over the right neck and right trapezius area and then radiates down to her right hand.  Pain is worse at night and with lifting, better with rest and in the morning.  Pain is rated 8 out 10. Denies any fevers, chills, changes in gait, weakness, or bowel and bladder incontinence        Interval History (10/12/2022):  Patient returns to clinic for MRI review.  Patient reports continued neck pain primarily on her right side that then radiates to her right upper extremity to her fingertips and numbness and tingling pain.  She reports associated decreased strength in her right upper extremity with his pain.  Pain is worse with neck rotation and lifting objects, better with heat and rest.  Pain is rated a 7/10. Denies any fevers, chills, changes in gait, weakness, or bowel and bladder incontinence        SUBJECTIVE:    Luciana Moraes is a 61 y.o. female who presents to the clinic for the evaluation of neck and lower pain. The pain started over 10 years ago and symptoms have been worsening.  Neck pain is described as a throbbing pain that starts diffusely over her neck.  She reports that in the last 2 months she has had increased in right upper extremity radicular pain that goes to her fingertips.  She denies any traumas to her neck or any previous surgeries on her spine her major joints.  Pain is worse with neck rotation and lifting objects, better with flexion and heat.  Back pain is described as a throbbing axial pain in a band across her lower back.  She reports minimal radiation to her  bilateral lower extremities.  Pain is worse with extension, better with flexion.  The pain is described as numbing, sharp and tingling and is rated as  10 out of 10 .   The pain is rated with a score of  6/10 on the BEST day and a score of 10/10 on the WORST day.  Symptoms interfere with daily activity and work. The pain is exacerbated by Walking, Extension, Lifting and Getting out of bed/chair.  The pain is mitigated by nothing.     Patient denies night fever/night sweats, urinary incontinence, bowel incontinence, significant weight loss, significant motor weakness and loss of sensations.      Non-Pharmacologic Treatments:  Physical Therapy/Home Exercise: yes  Ice/Heat:yes  TENS: no  Acupuncture: no  Massage: yes  Chiropractic: no        Previous Pain Medications:  Tylenol, NSAIDs, gabapentin, muscle relaxers, opioids, topicals     report:  Reviewed and consistent with medication use as prescribed.    Pain Procedures:   9/22/22:  C7-T1 interlaminar epidural steroid injection, minimal relief      Imaging:   MRI CERVICAL SPINE WITHOUT CONTRAST 8/2/22     CLINICAL HISTORY:  Neck pain, chronic, degenerative changes on xray;.  Radiculopathy, cervical region     TECHNIQUE:  Multiplanar, multisequence MR images of the cervical spine were acquired without the administration of contrast.     COMPARISON:  Cervical spine x-ray dated 08/02/2022     FINDINGS:  There are 7 non-rib-bearing cervical vertebrae.  Mild cervical straightening noted.  Alignment is otherwise unremarkable with no significant listhesis.  No acute fracture or compression deformity.  No aggressive focal signal abnormality.     Visualized posterior fossa is unremarkable.  Craniocervical junction is well maintained.  Visualized spinal cord is normal in size and signal.     C1-C2: Atlanto odontoid osteophytosis noted without significant dorsal pannus formation.  No significant spinal canal stenosis.     C2-C3: No significant spinal canal or neural foraminal  stenosis.     C3-C4: Right greater than left uncovertebral hypertrophy and mild bilateral facet arthropathy.  No significant spinal canal or neural foraminal stenosis.     C4-C5: Bilateral uncovertebral hypertrophy/disc osteophyte complex and mild bilateral facet arthropathy.  Minimal spinal canal stenosis.  Mild-to-moderate right and mild left neural foraminal stenosis.     C5-C6: Right greater than left uncovertebral hypertrophy/disc osteophyte complex and mild bilateral facet arthropathy.  Minimal spinal canal stenosis with particular narrowing of the right spinal canal.  Moderate to severe right and mild left neural foraminal stenosis.     C6-C7: Bilateral uncovertebral hypertrophy/disc osteophyte complex and mild bilateral facet arthropathy.  No significant spinal canal stenosis.  Mild right neural foraminal stenosis.  No significant left neural foraminal stenosis.     C7-T1: Mild bilateral facet arthropathy.  No significant spinal canal stenosis.  Mild bilateral neural foraminal stenosis.     Visualized soft tissues are unremarkable.     Impression:     Multilevel degenerative changes as detailed above.     Minimal spinal canal stenosis at C4-C5 and C5-C6.     Varying degrees of bilateral neural foraminal stenosis as detailed above, most severe at the right C5-C6 level.      MRI Lumbar Spine w/o Contast 7/8/19  There is lumbar facet arthropathy at L5-S1 and L4-L5. No stenosis or disc herniation is evident. Patient appears obese. Patient may be a candidate for an image guided steroid injection treatment trial.   .       Past Medical History:   Diagnosis Date    Diabetes mellitus, type 2     Hyperlipidemia     Osteoarthritis     Polymyositis     Rheumatoid arthritis      Past Surgical History:   Procedure Laterality Date    CARPAL TUNNEL RELEASE Bilateral     EPIDURAL STEROID INJECTION INTO CERVICAL SPINE N/A 9/22/2022    Procedure: C7/T1 [facility];  Surgeon: Luis M Pereyra MD;  Location: [facility];   Service: Pain Management;  Laterality: N/A;    HIP SURGERY      HYSTERECTOMY  2008    due to bleeding, pain from fibroids, retains one ovary     Social History     Socioeconomic History    Marital status:    Tobacco Use    Smoking status: Former     Types: Cigarettes     Quit date: 3/17/2001     Years since quittin.5    Smokeless tobacco: Never   Substance and Sexual Activity    Alcohol use: Not Currently    Drug use: Never    Sexual activity: Yes     Partners: Male     Family History   Problem Relation Age of Onset    Rheum arthritis Mother     No Known Problems Father        Review of patient's allergies indicates:  No Known Allergies    Current Outpatient Medications   Medication Sig    aspirin 81 MG Chew Take 81 mg by mouth.    atorvastatin (LIPITOR) 80 MG tablet Take 1 tablet (80 mg total) by mouth once daily.    diclofenac sodium (VOLTAREN) 1 % Gel Apply 2 g topically 4 (four) times daily.    ergocalciferol (ERGOCALCIFEROL) 50,000 unit Cap Take 1 capsule (50,000 Units total) by mouth every 7 days.    hydrOXYchloroQUINE (PLAQUENIL) 200 mg tablet Take 1 tablet (200 mg total) by mouth 2 (two) times daily.    ibuprofen (ADVIL,MOTRIN) 800 MG tablet Take 800 mg by mouth 3 (three) times daily.    metFORMIN (GLUCOPHAGE-XR) 500 MG ER 24hr tablet Take 2 tablets (1,000 mg total) by mouth daily with breakfast.    nabumetone (RELAFEN) 750 MG tablet Take 1 tablet (750 mg total) by mouth 2 (two) times daily as needed for Pain.    polyethylene glycol (GLYCOLAX) 17 gram PwPk Take 17 g by mouth once daily.    semaglutide (OZEMPIC) 0.25 mg or 0.5 mg(2 mg/1.5 mL) pen injector Inject 0.5 mg into the skin every 7 days.    tiZANidine (ZANAFLEX) 4 MG tablet Take 1 tablet (4 mg total) by mouth 2 (two) times daily as needed.    pregabalin (LYRICA) 75 MG capsule Take 1 capsule (75 mg total) by mouth 2 (two) times daily.     No current facility-administered medications for this visit.         ROS  Review of Systems  "  Constitutional:  Negative for chills, diaphoresis, fatigue and fever.   Respiratory:  Negative for chest tightness, shortness of breath, wheezing and stridor.    Cardiovascular:  Negative for chest pain and leg swelling.   Gastrointestinal:  Negative for blood in stool, diarrhea, nausea and vomiting.   Endocrine: Negative for cold intolerance and heat intolerance.   Genitourinary:  Positive for urgency. Negative for dysuria and hematuria.   Musculoskeletal:  Positive for arthralgias, back pain, myalgias, neck pain and neck stiffness. Negative for gait problem and joint swelling.   Skin:  Negative for rash.   Neurological:  Positive for weakness and numbness. Negative for tremors, seizures, speech difficulty, light-headedness and headaches.   Hematological:  Does not bruise/bleed easily.   Psychiatric/Behavioral:  Negative for agitation, confusion and suicidal ideas. The patient is not nervous/anxious.           OBJECTIVE:  /69   Pulse (!) 59   Ht 5' 2" (1.575 m)   Wt 87.7 kg (193 lb 5.5 oz)   BMI 35.36 kg/m²         Physical Exam  Constitutional:       General: She is not in acute distress.     Appearance: Normal appearance. She is not ill-appearing.   HENT:      Head: Normocephalic and atraumatic.      Nose: No congestion or rhinorrhea.      Mouth/Throat:      Mouth: Mucous membranes are moist.   Eyes:      Extraocular Movements: Extraocular movements intact.      Pupils: Pupils are equal, round, and reactive to light.   Cardiovascular:      Pulses: Normal pulses.   Pulmonary:      Effort: Pulmonary effort is normal.   Abdominal:      General: Abdomen is flat.      Palpations: Abdomen is soft.   Musculoskeletal:      Comments: Positive TInsel and Phalen on right head   Skin:     General: Skin is warm and dry.      Capillary Refill: Capillary refill takes less than 2 seconds.   Neurological:      Mental Status: She is alert and oriented to person, place, and time.      Cranial Nerves: No cranial nerve " deficit.      Sensory: No sensory deficit.      Motor: Weakness present. No abnormal muscle tone.      Gait: Gait abnormal.      Deep Tendon Reflexes:      Reflex Scores:       Tricep reflexes are 2+ on the right side and 2+ on the left side.       Bicep reflexes are 2+ on the right side and 2+ on the left side.       Brachioradialis reflexes are 1+ on the right side and 1+ on the left side.     Comments: Antalgic gait  Strength of right upper extremity diffusely decreased secondary to pain.  5/5 strength and muscle groups of left upper extremity     Psychiatric:         Mood and Affect: Mood normal.         Behavior: Behavior normal.         Thought Content: Thought content normal.         Musculoskeletal:    Cervical Exam  Incision: no  Pain with Flexion: yes  Pain with Extension: yes  Paraspinous TTP:  Right Trapezius  Facet TTP:  C5-6, right greater than left  Spurling:  Positive on the right  ROM:  Decreased secondary to pain          LABS:  Lab Results   Component Value Date    WBC 10.53 09/26/2022    HGB 14.3 09/26/2022    HCT 45.7 09/26/2022    MCV 88 09/26/2022     (L) 09/26/2022       CMP  Sodium   Date Value Ref Range Status   09/26/2022 141 136 - 145 mmol/L Final     Potassium   Date Value Ref Range Status   09/26/2022 4.0 3.5 - 5.1 mmol/L Final     Chloride   Date Value Ref Range Status   09/26/2022 110 95 - 110 mmol/L Final     CO2   Date Value Ref Range Status   09/26/2022 21 (L) 23 - 29 mmol/L Final     Glucose   Date Value Ref Range Status   09/26/2022 84 70 - 110 mg/dL Final     BUN   Date Value Ref Range Status   09/26/2022 14 6 - 20 mg/dL Final     Creatinine   Date Value Ref Range Status   09/26/2022 0.8 0.5 - 1.4 mg/dL Final     Calcium   Date Value Ref Range Status   09/26/2022 8.8 8.7 - 10.5 mg/dL Final     Total Protein   Date Value Ref Range Status   09/26/2022 7.4 6.0 - 8.4 g/dL Final     Albumin   Date Value Ref Range Status   09/26/2022 3.9 3.5 - 5.2 g/dL Final     Total Bilirubin    Date Value Ref Range Status   09/26/2022 0.5 0.1 - 1.0 mg/dL Final     Comment:     For infants and newborns, interpretation of results should be based  on gestational age, weight and in agreement with clinical  observations.    Premature Infant recommended reference ranges:  Up to 24 hours.............<8.0 mg/dL  Up to 48 hours............<12.0 mg/dL  3-5 days..................<15.0 mg/dL  6-29 days.................<15.0 mg/dL       Alkaline Phosphatase   Date Value Ref Range Status   09/26/2022 93 55 - 135 U/L Final     AST   Date Value Ref Range Status   09/26/2022 15 10 - 40 U/L Final     ALT   Date Value Ref Range Status   09/26/2022 12 10 - 44 U/L Final     Anion Gap   Date Value Ref Range Status   09/26/2022 10 8 - 16 mmol/L Final       Lab Results   Component Value Date    HGBA1C 7.4 (H) 08/01/2022             ASSESSMENT:       61 y.o. year old female with neck pain, consistent with     1. DDD (degenerative disc disease), cervical        2. Cervical spondylosis  IR Facet Inj Cerv Thoracic 2nd Vert Bi    IR Facet Inj Cervical Thoracic 1st Vert Bi    Case Request-RAD/Other Procedure Area: Bilateral C5-7 MBB with RN IV sedation      3. Cervical radiculopathy  Nerve conduction test    pregabalin (LYRICA) 75 MG capsule      4. Carpal tunnel syndrome of right wrist  Nerve conduction test    Ambulatory referral/consult to Orthopedics    pregabalin (LYRICA) 75 MG capsule        DDD (degenerative disc disease), cervical    Cervical spondylosis  -     IR Facet Inj Cerv Thoracic 2nd Vert Bi; Future; Expected date: 10/11/2022  -     IR Facet Inj Cervical Thoracic 1st Vert Bi; Future; Expected date: 10/11/2022  -     Case Request-RAD/Other Procedure Area: Bilateral C5-7 MBB with RN IV sedation    Cervical radiculopathy  -     Nerve conduction test; Future  -     pregabalin (LYRICA) 75 MG capsule; Take 1 capsule (75 mg total) by mouth 2 (two) times daily.  Dispense: 60 capsule; Refill: 0    Carpal tunnel syndrome of  right wrist  -     Nerve conduction test; Future  -     Ambulatory referral/consult to Orthopedics; Future; Expected date: 10/18/2022  -     pregabalin (LYRICA) 75 MG capsule; Take 1 capsule (75 mg total) by mouth 2 (two) times daily.  Dispense: 60 capsule; Refill: 0           PLAN:   - Interventions:    Schedule patient for bilateral C5-C7 medial branch block cervical spondylosis  -9/22/22:  C7-T1 interlaminar epidural steroid injection, minimal relief    - Anticoagulation use:   yes aspirin    - Medications:   Discontinue gabapentin and start Lyrica 75 mg twice a day  Continue Relafen 750 mg twice a day as needed   Continue tizanidine 4 mg twice a day as needed   Continue Plaquenil as prescribed by rheumatology    - Therapy:    Continue formal physical therapy for neck pain    - Imaging:   We will order bilateral upper extremity EMG and nerve conduction study to further evaluate right upper extremity pain consistent with cervical radiculopathy with previous history of right carpal tunnel release   MRI of cervical spine reviewed.  Significant for eccentric right disc bulge at C5-C6 with associated severe right foraminal stenosis   X-rays of cervical spine obtained today reviewed.  Significant for spondylosis and anterior annular calcifications noted at C4-5, C5-6, and C6-C7    - Consults:   Continue follow-up with Rheumatology    - Counseled patient regarding the importance of activity modification and physical therapy    - Patient Questions: Answered all of the patient's questions regarding diagnosis, therapy, and treatment    - Follow up visit: return to clinic 2-4 weeks after procedure        The above plan and management options were discussed at length with patient. Patient is in agreement with the above and verbalized understanding.    I discussed the goals of interventional chronic pain management with the patient on today's visit.  I explained the utility of injections for diagnostic and therapeutic  purposes.  We discussed a multimodal approach to pain including treating the patient's given worst pain at any given time.  We will use a systematic approach to addressing pain.  We will also adopt a multimodal approach that includes injections, adjuvant medications, physical therapy, at times psychiatry.  There may be a limited role for opioid use intermittently in the treatment of pain, more particularly for acute pain although no one approach can be used as a sole treatment modality.    I emphasized the importance of regular exercise, core strengthening and stretching, diet and weight loss as a cornerstone of long-term pain management.      Luis M Pereyra MD  Interventional Pain Management  [facility]    Disclaimer:  This note was prepared using voice recognition system and is likely to have sound alike errors that may have been overlooked even after proof reading.  Please call me with any questions

## 2022-10-14 ENCOUNTER — TELEPHONE (OUTPATIENT)
Dept: SPORTS MEDICINE | Facility: CLINIC | Age: 62
End: 2022-10-14
Payer: MEDICARE

## 2022-10-14 DIAGNOSIS — M25.539 PAIN IN WRIST, UNSPECIFIED LATERALITY: Primary | ICD-10-CM

## 2022-10-14 NOTE — TELEPHONE ENCOUNTER
Spoke with pt to schedule appt for ortho referral for right wrist pain, CTS. Pt agreed to 10/17 appt with Dr. Baltazar at 11am. Informed pt to arrive 30 min prior for xrays. Pt verbalized understanding of appt date, time, and location.

## 2022-10-17 ENCOUNTER — OFFICE VISIT (OUTPATIENT)
Dept: SPORTS MEDICINE | Facility: CLINIC | Age: 62
End: 2022-10-17
Payer: MEDICARE

## 2022-10-17 ENCOUNTER — HOSPITAL ENCOUNTER (OUTPATIENT)
Dept: RADIOLOGY | Facility: HOSPITAL | Age: 62
Discharge: HOME | End: 2022-10-17
Attending: STUDENT IN AN ORGANIZED HEALTH CARE EDUCATION/TRAINING PROGRAM
Payer: MEDICARE

## 2022-10-17 DIAGNOSIS — M47.812 CERVICAL SPONDYLOSIS: ICD-10-CM

## 2022-10-17 DIAGNOSIS — E11.9 TYPE 2 DIABETES MELLITUS WITHOUT COMPLICATION, WITHOUT LONG-TERM CURRENT USE OF INSULIN: ICD-10-CM

## 2022-10-17 DIAGNOSIS — M25.511 CHRONIC RIGHT SHOULDER PAIN: ICD-10-CM

## 2022-10-17 DIAGNOSIS — G89.29 CHRONIC RIGHT SHOULDER PAIN: ICD-10-CM

## 2022-10-17 DIAGNOSIS — M25.539 PAIN IN WRIST, UNSPECIFIED LATERALITY: ICD-10-CM

## 2022-10-17 DIAGNOSIS — M25.511 RIGHT SHOULDER PAIN, UNSPECIFIED CHRONICITY: Primary | ICD-10-CM

## 2022-10-17 DIAGNOSIS — M54.12 CERVICAL RADICULOPATHY: Primary | ICD-10-CM

## 2022-10-17 DIAGNOSIS — Z86.69 H/O CARPAL TUNNEL SYNDROME: ICD-10-CM

## 2022-10-17 PROCEDURE — 3051F HG A1C>EQUAL 7.0%<8.0%: CPT | Mod: CPTII,S$GLB,, | Performed by: STUDENT IN AN ORGANIZED HEALTH CARE EDUCATION/TRAINING PROGRAM

## 2022-10-17 PROCEDURE — 3051F PR MOST RECENT HEMOGLOBIN A1C LEVEL 7.0 - < 8.0%: ICD-10-PCS | Mod: CPTII,S$GLB,, | Performed by: STUDENT IN AN ORGANIZED HEALTH CARE EDUCATION/TRAINING PROGRAM

## 2022-10-17 PROCEDURE — 1160F RVW MEDS BY RX/DR IN RCRD: CPT | Mod: CPTII,S$GLB,, | Performed by: STUDENT IN AN ORGANIZED HEALTH CARE EDUCATION/TRAINING PROGRAM

## 2022-10-17 PROCEDURE — 1159F MED LIST DOCD IN RCRD: CPT | Mod: CPTII,S$GLB,, | Performed by: STUDENT IN AN ORGANIZED HEALTH CARE EDUCATION/TRAINING PROGRAM

## 2022-10-17 PROCEDURE — 1159F PR MEDICATION LIST DOCUMENTED IN MEDICAL RECORD: ICD-10-PCS | Mod: CPTII,S$GLB,, | Performed by: STUDENT IN AN ORGANIZED HEALTH CARE EDUCATION/TRAINING PROGRAM

## 2022-10-17 PROCEDURE — 99204 PR OFFICE/OUTPT VISIT, NEW, LEVL IV, 45-59 MIN: ICD-10-PCS | Mod: S$GLB,,, | Performed by: STUDENT IN AN ORGANIZED HEALTH CARE EDUCATION/TRAINING PROGRAM

## 2022-10-17 PROCEDURE — 3061F PR NEG MICROALBUMINURIA RESULT DOCUMENTED/REVIEW: ICD-10-PCS | Mod: CPTII,S$GLB,, | Performed by: STUDENT IN AN ORGANIZED HEALTH CARE EDUCATION/TRAINING PROGRAM

## 2022-10-17 PROCEDURE — 99999 PR PBB SHADOW E&M-EST. PATIENT-LVL III: ICD-10-PCS | Mod: PBBFAC,,, | Performed by: STUDENT IN AN ORGANIZED HEALTH CARE EDUCATION/TRAINING PROGRAM

## 2022-10-17 PROCEDURE — 73110 X-RAY EXAM OF WRIST: CPT | Mod: TC,50,PN

## 2022-10-17 PROCEDURE — 3066F NEPHROPATHY DOC TX: CPT | Mod: CPTII,S$GLB,, | Performed by: STUDENT IN AN ORGANIZED HEALTH CARE EDUCATION/TRAINING PROGRAM

## 2022-10-17 PROCEDURE — 99999 PR PBB SHADOW E&M-EST. PATIENT-LVL III: CPT | Mod: PBBFAC,,, | Performed by: STUDENT IN AN ORGANIZED HEALTH CARE EDUCATION/TRAINING PROGRAM

## 2022-10-17 PROCEDURE — 99204 OFFICE O/P NEW MOD 45 MIN: CPT | Mod: S$GLB,,, | Performed by: STUDENT IN AN ORGANIZED HEALTH CARE EDUCATION/TRAINING PROGRAM

## 2022-10-17 PROCEDURE — 1160F PR REVIEW ALL MEDS BY PRESCRIBER/CLIN PHARMACIST DOCUMENTED: ICD-10-PCS | Mod: CPTII,S$GLB,, | Performed by: STUDENT IN AN ORGANIZED HEALTH CARE EDUCATION/TRAINING PROGRAM

## 2022-10-17 PROCEDURE — 3066F PR DOCUMENTATION OF TREATMENT FOR NEPHROPATHY: ICD-10-PCS | Mod: CPTII,S$GLB,, | Performed by: STUDENT IN AN ORGANIZED HEALTH CARE EDUCATION/TRAINING PROGRAM

## 2022-10-17 PROCEDURE — 3061F NEG MICROALBUMINURIA REV: CPT | Mod: CPTII,S$GLB,, | Performed by: STUDENT IN AN ORGANIZED HEALTH CARE EDUCATION/TRAINING PROGRAM

## 2022-10-17 NOTE — PROGRESS NOTES
Patient ID: Luciana Moraes  YOB: 1960  MRN: [id number]    Chief Complaint: Pain and Numbness of the Right Shoulder      Referred By: Luis M Pereyra MD    Occupation: Disabled      History of Present Illness: Luciana Moraes is a right-hand dominant 61 y.o. female who presents today with 10/10 pain c/o right shoulder pain. She states that the pain began months ago toward the beginning of 2022. She states that she is seeing Dr. Pereyra for cervical spine pain that radiates into her arm. She has a history of bilateral carpal tunnel syndrome and had release surgery in 2009 and 2010.  She recently underwent C7-T1 [facility].  She has tried physical therapy recently for the neck and shoulder but could only complete 4 visits due to worsening pain.  She continues to have persistent right arm pain and parasthesias.  She describes the pain as a constant aching and heaviness in her right arm. Her shoulder pain radiates down her arm. She has numbness in her upper arm. She has tried ibuprofen, advil, and aleve for her pain. These OTC relieve the neck pain but not the shoulder pain. She uses voltaren cream with no relief. She states that tilting her head makes the arm pain worse and at night, the pain makes it hard to sleep.     She is scheduled for an upcoming EMG/NCV on 10/26 with Dr. Villa and C5-C7 MBB with Dr. Pereyra on 10/31.    Past Medical History:   Past Medical History:   Diagnosis Date    Diabetes mellitus, type 2     Hyperlipidemia     Osteoarthritis     Polymyositis     Rheumatoid arthritis      Past Surgical History:   Procedure Laterality Date    CARPAL TUNNEL RELEASE Bilateral     EPIDURAL STEROID INJECTION INTO CERVICAL SPINE N/A 9/22/2022    Procedure: C7/T1 [facility];  Surgeon: Luis M Pereyra MD;  Location: [facility];  Service: Pain Management;  Laterality: N/A;    HIP SURGERY      HYSTERECTOMY  2008    due to bleeding, pain from fibroids, retains one ovary      Family History   Problem Relation Age of Onset    Rheum arthritis Mother     No Known Problems Father      Social History     Socioeconomic History    Marital status:    Tobacco Use    Smoking status: Former     Types: Cigarettes     Quit date: 3/17/2001     Years since quittin.6    Smokeless tobacco: Never   Substance and Sexual Activity    Alcohol use: Not Currently    Drug use: Never    Sexual activity: Yes     Partners: Male     Medication List with Changes/Refills   Current Medications    ASPIRIN 81 MG CHEW    Take 81 mg by mouth.    ATORVASTATIN (LIPITOR) 80 MG TABLET    Take 1 tablet (80 mg total) by mouth once daily.    DICLOFENAC SODIUM (VOLTAREN) 1 % GEL    Apply 2 g topically 4 (four) times daily.    ERGOCALCIFEROL (ERGOCALCIFEROL) 50,000 UNIT CAP    Take 1 capsule (50,000 Units total) by mouth every 7 days.    HYDROXYCHLOROQUINE (PLAQUENIL) 200 MG TABLET    Take 1 tablet (200 mg total) by mouth 2 (two) times daily.    IBUPROFEN (ADVIL,MOTRIN) 800 MG TABLET    Take 800 mg by mouth 3 (three) times daily.    METFORMIN (GLUCOPHAGE-XR) 500 MG ER 24HR TABLET    Take 2 tablets (1,000 mg total) by mouth daily with breakfast.    NABUMETONE (RELAFEN) 750 MG TABLET    Take 1 tablet (750 mg total) by mouth 2 (two) times daily as needed for Pain.    POLYETHYLENE GLYCOL (GLYCOLAX) 17 GRAM PWPK    Take 17 g by mouth once daily.    PREGABALIN (LYRICA) 75 MG CAPSULE    Take 1 capsule (75 mg total) by mouth 2 (two) times daily.    SEMAGLUTIDE (OZEMPIC) 0.25 MG OR 0.5 MG(2 MG/1.5 ML) PEN INJECTOR    Inject 0.5 mg into the skin every 7 days.    TIZANIDINE (ZANAFLEX) 4 MG TABLET    Take 1 tablet (4 mg total) by mouth 2 (two) times daily as needed.     Review of patient's allergies indicates:  No Known Allergies    Physical Exam:   There is no height or weight on file to calculate BMI.    Physical Exam  Constitutional:       General: She is not in acute distress.     Appearance: Normal appearance.   HENT:       Head: Normocephalic and atraumatic.   Eyes:      Extraocular Movements: Extraocular movements intact.      Conjunctiva/sclera: Conjunctivae normal.   Pulmonary:      Effort: Pulmonary effort is normal. No respiratory distress.   Skin:     General: Skin is warm and dry.   Neurological:      General: No focal deficit present.      Mental Status: She is alert and oriented to person, place, and time.   Psychiatric:         Mood and Affect: Mood normal.         Detailed MSK exam:   Right Shoulder:  Inspection: No effusion, erythema, or ecchymosis   Palpation tenderness: Midbiceps, trapezius     Nontender proximal biceps, AC joint, subacromial space  Range of motion: Full ROM equal bilaterally  Strength:  5/5 Abduction with pain    5/5 External Rotation in Adduction with pain    5/5 Internal Rotation with pain  Special Tests: Shoulder impingement tests equivocal due to constant pain and guarding    + Spurling's   N/V Rad Exam:  C4 Normal sensory (clavicle)   Normal motor (no scapular winging)     C5 Abnormal sensory (lateral upper arm) Normal motor (shoulder abd)     C6 Abnormal sensory (radial hand)  Normal motor (wrist ext)  C7 Abnormal sensory (2, 3, 4 fingers)  Normal motor (wrist flex)  C8 Abnormal sensory (5th finger)  Normal motor (thumb ext)  T1 Abnormal sensory (medial elbow)  Normal motor (finger abd)  Normal radial and ulnar pulses, warm and well perfused with capillary refill < 2 sec      Imaging:  X-Ray Wrist Complete Bilateral  Narrative: EXAMINATION:  XR WRIST COMPLETE 3 VIEWS BILATERAL    CLINICAL HISTORY:  XR WRIST COMPLETE 3 VIEWS BILATERALPain in unspecified wrist    COMPARISON:  None    FINDINGS:  Ten views of the bilateral wrists were obtained.    No evidence of acute fracture or dislocation.  Bony mineralization is normal.  Soft tissues are unremarkable.  Impression: No acute fracture or dislocation.    Electronically signed by: Anthony Winchester MD  Date:    10/17/2022  Time:    10:54      Relevant  imaging results were reviewed and interpreted by me and per my read: normal appearing radiographs of bilateral wrists, without any significant degenerative findings, and no fractures or other acute abnormalities.    This was discussed with the patient and / or family today.       Patient Instructions   Assessment:  Luciana Moraes is a 61 y.o. female with a chief complaint of Pain and Numbness of the Right Shoulder      Encounter Diagnoses   Name Primary?    Cervical radiculopathy Yes    Cervical spondylosis     Chronic right shoulder pain     H/O carpal tunnel syndrome     Type 2 diabetes mellitus without complication, without long-term current use of insulin         Plan:  XR wrist b/l reviewed today, and unremarkable  History & clinical exam suggestive of multifactorial pain, highly suspicious for cervical radiculopathy as the primary culprit, although she may have some element of rotator cuff tendinopathy and/or carpal tunnel syndrome  She has a remote h/o carpal tunnel syndrome, but this pain feels different from prior  Labs reviewedL A1c 7.4%, GFR wnl, and LFTs wnl  I will not change her medications at this time, and I recommend that she continue Ibuprofen 800 mg TID, pregabalin 75 mg BID, and topical voltaren gel, as prescribed by her other providers  Recommend to continue with EMG as previously schedule  We will follow up after EMG results and consider carpal tunnel vs diagnostic subacromial injection  She has upcoming cervical MBB as well on 10/31, which I recommend she continue with  Pending EMG/NCS, and any further injections that may be indicated, she may benefit from formal PT as well    Follow-up: 10/28 with R shoulder XR or sooner if there are any problems between now and then.    Thank you for choosing [facility] and Dr. López Baltazar for your orthopedic & sports medicine care. It is our goal to provide you with exceptional care that will help keep you healthy, active,  and get you back in the game.    Please do not hesitate to reach out to us via email, phone, or MyChart with any questions, concerns, or feedback.    If you are experiencing pain/discomfort ,or have questions after 5pm and would like to be connected to the [facility] on-call team, please call this number and specify which Sports Medicine provider is treating you: [phone]          A copy of today's visit note has been sent to the referring provider.       López Baltazar MD  Primary Care Sports Medicine        Disclaimer: This note was prepared using a voice recognition system and is likely to have sound alike errors within the text.

## 2022-10-17 NOTE — PATIENT INSTRUCTIONS
Assessment:  Luciana Moraes is a 61 y.o. female with a chief complaint of Pain and Numbness of the Right Shoulder      Encounter Diagnoses   Name Primary?    Cervical radiculopathy Yes    Cervical spondylosis     Chronic right shoulder pain     H/O carpal tunnel syndrome     Type 2 diabetes mellitus without complication, without long-term current use of insulin         Plan:  XR wrist b/l reviewed today, and unremarkable  History & clinical exam suggestive of multifactorial pain, highly suspicious for cervical radiculopathy as the primary culprit, although she may have some element of rotator cuff tendinopathy and/or carpal tunnel syndrome  She has a remote h/o carpal tunnel syndrome, but this pain feels different from prior  Labs reviewedL A1c 7.4%, GFR wnl, and LFTs wnl  I will not change her medications at this time, and I recommend that she continue Ibuprofen 800 mg TID, pregabalin 75 mg BID, and topical voltaren gel, as prescribed by her other providers  Recommend to continue with EMG as previously schedule  We will follow up after EMG results and consider carpal tunnel vs diagnostic subacromial injection  She has upcoming cervical MBB as well on 10/31, which I recommend she continue with  Pending EMG/NCS, and any further injections that may be indicated, she may benefit from formal PT as well    Follow-up: 10/28 with R shoulder XR or sooner if there are any problems between now and then.    Thank you for choosing [facility] and Dr. López Baltazar for your orthopedic & sports medicine care. It is our goal to provide you with exceptional care that will help keep you healthy, active, and get you back in the game.    Please do not hesitate to reach out to us via email, phone, or MyChart with any questions, concerns, or feedback.    If you are experiencing pain/discomfort ,or have questions after 5pm and would like to be connected to the [facility]  on-call team, please call this number and specify which Sports Medicine provider is treating you: [phone]

## 2022-10-17 NOTE — PROGRESS NOTES
Patient ID: Luciana Moraes  YOB: 1960  MRN: [id number]    Chief Complaint: Pain and Numbness of the Right Shoulder      Referred By: Luis M Pereyra MD      ***Occupation: Disabled      History of Present Illness: Luciana Moraes is a right-hand dominant 61 y.o. female who presents today with 10/10 pain c/o right shoulder pain. She states that the pain began months ago toward the beginning of 2022. She states that she is seeing Dr. Pereyra for cervical spine pain that radiates into her arm. She has a history of bilateral carpal tunnel syndrome and had release surgery in 2009 and 2010. She states she was referred to rule out CTS causing her symptoms. She describes the pain as a constant aching and heaviness in her right arm. Her shoulder pain radiates down her arm. She has numbness in her upper arm. She has tried ibuprofen, advil, and aleve for her pain. These OTC relieve the neck pain but not the shoulder pain. She uses voltaren cream with no relief. She states that tilting her head makes the arm pain worse and at night, the pain makes it hard to sleep.       The patient is active in none.      ***    Past Medical History:   Past Medical History:   Diagnosis Date    Diabetes mellitus, type 2     Hyperlipidemia     Osteoarthritis     Polymyositis     Rheumatoid arthritis      Past Surgical History:   Procedure Laterality Date    CARPAL TUNNEL RELEASE Bilateral     EPIDURAL STEROID INJECTION INTO CERVICAL SPINE N/A 9/22/2022    Procedure: C7/T1 [facility];  Surgeon: Luis M Pereyra MD;  Location: [facility];  Service: Pain Management;  Laterality: N/A;    HIP SURGERY      HYSTERECTOMY  2008    due to bleeding, pain from fibroids, retains one ovary     Family History   Problem Relation Age of Onset    Rheum arthritis Mother     No Known Problems Father      Social History     Socioeconomic History    Marital status:    Tobacco Use    Smoking status: Former     Types:  Cigarettes     Quit date: 3/17/2001     Years since quittin.6    Smokeless tobacco: Never   Substance and Sexual Activity    Alcohol use: Not Currently    Drug use: Never    Sexual activity: Yes     Partners: Male     Medication List with Changes/Refills   Current Medications    ASPIRIN 81 MG CHEW    Take 81 mg by mouth.    ATORVASTATIN (LIPITOR) 80 MG TABLET    Take 1 tablet (80 mg total) by mouth once daily.    DICLOFENAC SODIUM (VOLTAREN) 1 % GEL    Apply 2 g topically 4 (four) times daily.    ERGOCALCIFEROL (ERGOCALCIFEROL) 50,000 UNIT CAP    Take 1 capsule (50,000 Units total) by mouth every 7 days.    HYDROXYCHLOROQUINE (PLAQUENIL) 200 MG TABLET    Take 1 tablet (200 mg total) by mouth 2 (two) times daily.    IBUPROFEN (ADVIL,MOTRIN) 800 MG TABLET    Take 800 mg by mouth 3 (three) times daily.    METFORMIN (GLUCOPHAGE-XR) 500 MG ER 24HR TABLET    Take 2 tablets (1,000 mg total) by mouth daily with breakfast.    NABUMETONE (RELAFEN) 750 MG TABLET    Take 1 tablet (750 mg total) by mouth 2 (two) times daily as needed for Pain.    POLYETHYLENE GLYCOL (GLYCOLAX) 17 GRAM PWPK    Take 17 g by mouth once daily.    PREGABALIN (LYRICA) 75 MG CAPSULE    Take 1 capsule (75 mg total) by mouth 2 (two) times daily.    SEMAGLUTIDE (OZEMPIC) 0.25 MG OR 0.5 MG(2 MG/1.5 ML) PEN INJECTOR    Inject 0.5 mg into the skin every 7 days.    TIZANIDINE (ZANAFLEX) 4 MG TABLET    Take 1 tablet (4 mg total) by mouth 2 (two) times daily as needed.     Review of patient's allergies indicates:  No Known Allergies    Physical Exam:   There is no height or weight on file to calculate BMI.    Physical Exam      Detailed MSK exam:   Ortho/SPM Exam       Imaging:  X-Ray Wrist Complete Bilateral  Narrative: EXAMINATION:  XR WRIST COMPLETE 3 VIEWS BILATERAL    CLINICAL HISTORY:  XR WRIST COMPLETE 3 VIEWS BILATERALPain in unspecified wrist    COMPARISON:  None    FINDINGS:  Ten views of the bilateral wrists were obtained.    No evidence of  acute fracture or dislocation.  Bony mineralization is normal.  Soft tissues are unremarkable.  Impression: No acute fracture or dislocation.    Electronically signed by: Anthony Winchester MD  Date:    10/17/2022  Time:    10:54    ***    Relevant imaging results were reviewed and interpreted by me and per my read: ***    This was discussed with the patient and / or family today.       There are no Patient Instructions on file for this visit.        A copy of today's visit note has been sent to the referring provider.       López Baltazar MD  Primary Care Sports Medicine        Disclaimer: This note was prepared using a voice recognition system and is likely to have sound alike errors within the text.     I spent a total of *** minutes on the day of the visit.This includes face to face time and non-face to face time preparing to see the patient (eg, review of tests), obtaining and/or reviewing separately obtained history, documenting clinical information in the electronic or other health record, independently interpreting results and communicating results to the patient/family/caregiver, or care coordinator.

## 2022-10-24 ENCOUNTER — DOCUMENTATION ONLY (OUTPATIENT)
Dept: REHABILITATION | Facility: HOSPITAL | Age: 62
End: 2022-10-24
Payer: MEDICARE

## 2022-10-24 PROBLEM — M54.2 NECK PAIN: Status: RESOLVED | Noted: 2022-08-03 | Resolved: 2022-10-24

## 2022-10-24 PROBLEM — M62.838 MUSCLE SPASMS OF NECK: Status: RESOLVED | Noted: 2022-08-03 | Resolved: 2022-10-24

## 2022-10-24 NOTE — PROGRESS NOTES
[facility]  PT Discharge Note    Name: Luciana Moraes  Clinic Number: [id number]    Therapy Diagnosis:        Encounter Diagnoses   Name Primary?    Neck pain Yes    Muscle spasms of neck           Physician: Salome Bansal MD     Visit Date: 9/2/2022     Physician Orders: PT Eval and Treat   Medical Diagnosis from Referral: M54.12 (ICD-10-CM) - Cervical radiculopathy      Evaluation Date: 8/3/2022    Date of Last visit: 9/2/2022  Total Visits Received: 6    ASSESSMENT      Pt tolerated therapy session well today. She continues to present with sign and symptoms that are consistent with right C6-7 facet impingement with nerve root irritation.     Discharge reason: Patient has not attended therapy since 9/2      Goals:  Short Term Goals: 5 weeks   1.Pt will be independent with HEP performance in order to continue PT progress at home.   2. Pt will improve Cervical ROM motion  at least 10%   3. Pt will report pain at worst of 5/10 or less  4. Pt will improve B shoulder ROM to 140 deg flex, abd     Long Term Goals: 10 weeks   1. Pt will improve FOTO disability score to 51% disability or less in order to improve overall QOL and return to PLOF.   2. Pt will report pain at worst of 2/10 or less  3 .Pt will improve Cervical ROM motion  To WNL  4. Pt will improve B shoulder ROM to 160 deg flex, abd  PLAN   This patient is discharged from Physical Therapy      Taylor Jama, PT

## 2022-10-25 ENCOUNTER — PATIENT MESSAGE (OUTPATIENT)
Dept: PAIN MEDICINE | Facility: HOSPITAL | Age: 62
End: 2022-10-25
Payer: MEDICARE

## 2022-10-26 ENCOUNTER — LAB VISIT (OUTPATIENT)
Dept: LAB | Facility: HOSPITAL | Age: 62
End: 2022-10-26
Payer: MEDICARE

## 2022-10-26 ENCOUNTER — OFFICE VISIT (OUTPATIENT)
Dept: PHYSICAL MEDICINE AND REHAB | Facility: CLINIC | Age: 62
End: 2022-10-26
Payer: MEDICARE

## 2022-10-26 VITALS — WEIGHT: 193 LBS | BODY MASS INDEX: 35.51 KG/M2 | HEIGHT: 62 IN | RESPIRATION RATE: 14 BRPM

## 2022-10-26 DIAGNOSIS — M54.12 CERVICAL RADICULOPATHY: ICD-10-CM

## 2022-10-26 DIAGNOSIS — M06.9 RHEUMATOID ARTHRITIS, INVOLVING UNSPECIFIED SITE, UNSPECIFIED WHETHER RHEUMATOID FACTOR PRESENT: ICD-10-CM

## 2022-10-26 DIAGNOSIS — M79.18 CERVICAL MYOFASCIAL PAIN SYNDROME: ICD-10-CM

## 2022-10-26 DIAGNOSIS — R76.8 POSITIVE ANA (ANTINUCLEAR ANTIBODY): ICD-10-CM

## 2022-10-26 DIAGNOSIS — G56.03 BILATERAL CARPAL TUNNEL SYNDROME: Primary | ICD-10-CM

## 2022-10-26 LAB
ALBUMIN SERPL BCP-MCNC: 3.6 G/DL (ref 3.5–5.2)
ALP SERPL-CCNC: 87 U/L (ref 55–135)
ALT SERPL W/O P-5'-P-CCNC: 10 U/L (ref 10–44)
ANION GAP SERPL CALC-SCNC: 11 MMOL/L (ref 8–16)
AST SERPL-CCNC: 13 U/L (ref 10–40)
BASOPHILS # BLD AUTO: 0.03 K/UL (ref 0–0.2)
BASOPHILS NFR BLD: 0.4 % (ref 0–1.9)
BILIRUB SERPL-MCNC: 0.4 MG/DL (ref 0.1–1)
BUN SERPL-MCNC: 10 MG/DL (ref 6–20)
C3 SERPL-MCNC: 152 MG/DL (ref 50–180)
C4 SERPL-MCNC: 42 MG/DL (ref 11–44)
CALCIUM SERPL-MCNC: 9.4 MG/DL (ref 8.7–10.5)
CHLORIDE SERPL-SCNC: 109 MMOL/L (ref 95–110)
CO2 SERPL-SCNC: 22 MMOL/L (ref 23–29)
CREAT SERPL-MCNC: 0.9 MG/DL (ref 0.5–1.4)
CRP SERPL-MCNC: 5.6 MG/L (ref 0–8.2)
DIFFERENTIAL METHOD: ABNORMAL
EOSINOPHIL # BLD AUTO: 0.3 K/UL (ref 0–0.5)
EOSINOPHIL NFR BLD: 3.8 % (ref 0–8)
ERYTHROCYTE [DISTWIDTH] IN BLOOD BY AUTOMATED COUNT: 14.8 % (ref 11.5–14.5)
ERYTHROCYTE [SEDIMENTATION RATE] IN BLOOD BY PHOTOMETRIC METHOD: 49 MM/HR (ref 0–36)
EST. GFR  (NO RACE VARIABLE): >60 ML/MIN/1.73 M^2
GLUCOSE SERPL-MCNC: 108 MG/DL (ref 70–110)
HCT VFR BLD AUTO: 43.1 % (ref 37–48.5)
HGB BLD-MCNC: 13.6 G/DL (ref 12–16)
IMM GRANULOCYTES # BLD AUTO: 0 K/UL (ref 0–0.04)
IMM GRANULOCYTES NFR BLD AUTO: 0 % (ref 0–0.5)
LYMPHOCYTES # BLD AUTO: 3.4 K/UL (ref 1–4.8)
LYMPHOCYTES NFR BLD: 47 % (ref 18–48)
MCH RBC QN AUTO: 27.6 PG (ref 27–31)
MCHC RBC AUTO-ENTMCNC: 31.6 G/DL (ref 32–36)
MCV RBC AUTO: 88 FL (ref 82–98)
MONOCYTES # BLD AUTO: 0.7 K/UL (ref 0.3–1)
MONOCYTES NFR BLD: 9.5 % (ref 4–15)
NEUTROPHILS # BLD AUTO: 2.8 K/UL (ref 1.8–7.7)
NEUTROPHILS NFR BLD: 39.3 % (ref 38–73)
NRBC BLD-RTO: 0 /100 WBC
PLATELET # BLD AUTO: 283 K/UL (ref 150–450)
PMV BLD AUTO: 10.9 FL (ref 9.2–12.9)
POTASSIUM SERPL-SCNC: 4.3 MMOL/L (ref 3.5–5.1)
PROT SERPL-MCNC: 7.5 G/DL (ref 6–8.4)
RBC # BLD AUTO: 4.92 M/UL (ref 4–5.4)
SODIUM SERPL-SCNC: 142 MMOL/L (ref 136–145)
WBC # BLD AUTO: 7.17 K/UL (ref 3.9–12.7)

## 2022-10-26 PROCEDURE — 3066F NEPHROPATHY DOC TX: CPT | Mod: CPTII,S$GLB,, | Performed by: PHYSICAL MEDICINE & REHABILITATION

## 2022-10-26 PROCEDURE — 86225 DNA ANTIBODY NATIVE: CPT

## 2022-10-26 PROCEDURE — 1160F PR REVIEW ALL MEDS BY PRESCRIBER/CLIN PHARMACIST DOCUMENTED: ICD-10-PCS | Mod: CPTII,S$GLB,, | Performed by: PHYSICAL MEDICINE & REHABILITATION

## 2022-10-26 PROCEDURE — 85025 COMPLETE CBC W/AUTO DIFF WBC: CPT

## 2022-10-26 PROCEDURE — 1159F MED LIST DOCD IN RCRD: CPT | Mod: CPTII,S$GLB,, | Performed by: PHYSICAL MEDICINE & REHABILITATION

## 2022-10-26 PROCEDURE — 99999 PR PBB SHADOW E&M-EST. PATIENT-LVL III: CPT | Mod: PBBFAC,,, | Performed by: PHYSICAL MEDICINE & REHABILITATION

## 2022-10-26 PROCEDURE — 86160 COMPLEMENT ANTIGEN: CPT | Mod: 59

## 2022-10-26 PROCEDURE — 95886 MUSC TEST DONE W/N TEST COMP: CPT | Mod: S$GLB,,, | Performed by: PHYSICAL MEDICINE & REHABILITATION

## 2022-10-26 PROCEDURE — 86140 C-REACTIVE PROTEIN: CPT

## 2022-10-26 PROCEDURE — 99202 OFFICE O/P NEW SF 15 MIN: CPT | Mod: 25,S$GLB,, | Performed by: PHYSICAL MEDICINE & REHABILITATION

## 2022-10-26 PROCEDURE — 99999 PR PBB SHADOW E&M-EST. PATIENT-LVL III: ICD-10-PCS | Mod: PBBFAC,,, | Performed by: PHYSICAL MEDICINE & REHABILITATION

## 2022-10-26 PROCEDURE — 3066F PR DOCUMENTATION OF TREATMENT FOR NEPHROPATHY: ICD-10-PCS | Mod: CPTII,S$GLB,, | Performed by: PHYSICAL MEDICINE & REHABILITATION

## 2022-10-26 PROCEDURE — 95912 NRV CNDJ TEST 11-12 STUDIES: CPT | Mod: S$GLB,,, | Performed by: PHYSICAL MEDICINE & REHABILITATION

## 2022-10-26 PROCEDURE — 3061F PR NEG MICROALBUMINURIA RESULT DOCUMENTED/REVIEW: ICD-10-PCS | Mod: CPTII,S$GLB,, | Performed by: PHYSICAL MEDICINE & REHABILITATION

## 2022-10-26 PROCEDURE — 95912 PR NERVE CONDUCTION STUDY; 11 -12 STUDIES: ICD-10-PCS | Mod: S$GLB,,, | Performed by: PHYSICAL MEDICINE & REHABILITATION

## 2022-10-26 PROCEDURE — 3061F NEG MICROALBUMINURIA REV: CPT | Mod: CPTII,S$GLB,, | Performed by: PHYSICAL MEDICINE & REHABILITATION

## 2022-10-26 PROCEDURE — 3051F PR MOST RECENT HEMOGLOBIN A1C LEVEL 7.0 - < 8.0%: ICD-10-PCS | Mod: CPTII,S$GLB,, | Performed by: PHYSICAL MEDICINE & REHABILITATION

## 2022-10-26 PROCEDURE — 86160 COMPLEMENT ANTIGEN: CPT

## 2022-10-26 PROCEDURE — 1160F RVW MEDS BY RX/DR IN RCRD: CPT | Mod: CPTII,S$GLB,, | Performed by: PHYSICAL MEDICINE & REHABILITATION

## 2022-10-26 PROCEDURE — 80053 COMPREHEN METABOLIC PANEL: CPT

## 2022-10-26 PROCEDURE — 99202 PR OFFICE/OUTPT VISIT, NEW, LEVL II, 15-29 MIN: ICD-10-PCS | Mod: 25,S$GLB,, | Performed by: PHYSICAL MEDICINE & REHABILITATION

## 2022-10-26 PROCEDURE — 85652 RBC SED RATE AUTOMATED: CPT

## 2022-10-26 PROCEDURE — 95886 PR EMG COMPLETE, W/ NERVE CONDUCTION STUDIES, 5+ MUSCLES: ICD-10-PCS | Mod: S$GLB,,, | Performed by: PHYSICAL MEDICINE & REHABILITATION

## 2022-10-26 PROCEDURE — 1159F PR MEDICATION LIST DOCUMENTED IN MEDICAL RECORD: ICD-10-PCS | Mod: CPTII,S$GLB,, | Performed by: PHYSICAL MEDICINE & REHABILITATION

## 2022-10-26 PROCEDURE — 3051F HG A1C>EQUAL 7.0%<8.0%: CPT | Mod: CPTII,S$GLB,, | Performed by: PHYSICAL MEDICINE & REHABILITATION

## 2022-10-26 NOTE — PROGRESS NOTES
[facility]   [address]  [place] [address]  Phone: [phone]        Full Name: Luciana Moraes YOB: 1960  Patient ID: [id number]      Visit Date: 10/26/2022 08:14  Age: 62 Years 0 Months Old  Examining Physician: Diane Villa M.D.  Referring Physician: HALEIGH Mendez  Reason for Referral: upper ext        Chief Complaint   Patient presents with    Neck Pain     Into arms       HPI: This is a 62 y.o.  female being seen in clinic today for evaluation of chronic neck achy pain with radiation of pain down into her right arm.  She feels numbness/tingling into her hands-worse on the right.  She has a history of bilateral CTR years ago and has cervical DJD/DDD.  Rest, rubbing, position change all provide minimal relief.     History obtained from patient    Past family, medical, social, and surgical history reviewed in chart    Review of Systems:     General- denies lethargy, weight change, fever, chills  Head/neck- denies swallowing difficulties  ENT- denies hearing changes  Cardiovascular-denies chest pain  Pulmonary- denies shortness of breath  GI- denies constipation or bowel incontinence  - denies bladder incontinence  Skin- denies wounds or rashes  Musculoskeletal- denies weakness, + pain  Neurologic- + numbness and tingling  Psychiatric- denies depressive or psychotic features, denies anxiety  Lymphatic-denies swelling  Endocrine- denies hypoglycemic symptoms/+DM history  All other pertinent systems negative     Physical Examination:  General: Well developed, well nourished female, NAD  HEENT:NCAT EOMI bilaterally   Pulmonary:Normal respirations    Spinal Examination: CERVICAL  Active ROM is within normal limits.  Inspection: No deformity of spinal alignment.  Palpation: No vertebral tenderness to percussion.  Tight and ttp at trapezius and rhomboids    Musculoskeletal Tests:  Phalen:   Elbow compression (ulnar): +on right  Tinels at wrist: +bilaterally    Bilateral  Upper and Lower Extremities:  Pulses are 2+ at radial bilaterally.  Shoulder/Elbow/Wrist/Hand ROM wnl except mild cuff weakness  Hip/Knee/Ankle ROM   Bilateral Extremities show normal capillary refill.  No signs of cyanosis, rubor, edema, skin changes, or dysvascular changes of appendages.  Nails appear intact.    Neurological Exam:  Cranial Nerves:  II-XII grossly intact    Manual Muscle Testing: (Motor 5=normal)  5/5 strength bilateral upper extremities    No focal atrophy is noted of either upper extremity.    Bilateral Reflexes:  Pena's response is absent bilaterally.  Sensation: tested to light touch  - intact in arms    Gait: Narrow base and good arm swing.      Entire procedure explained to patient prior to proceeding.  Verbal consent obtained      SNC      Nerve / Sites Rec. Site Onset Lat Peak Lat Amp Segments Distance Velocity     ms ms µV  mm m/s   R Median - Digit II (Antidromic)      Wrist Dig II 3.8 4.6 14.8 Wrist - Dig  37   L Median - Digit II (Antidromic)      Wrist Dig II 3.5 4.4 17.2 Wrist - Dig  40   R Ulnar - Digit V (Antidromic)      Wrist Dig V 2.8 3.6 23.0 Wrist - Dig V 140 50   L Ulnar - Digit V (Antidromic)      Wrist Dig V 2.7 3.5 34.2 Wrist - Dig V 140 53   R Radial - Anatomical snuff box (Forearm)      Forearm Wrist 1.8 2.4 31.5 Forearm - Wrist 100 55   L Radial - Anatomical snuff box (Forearm)      Forearm Wrist 1.8 2.4 15.4 Forearm - Wrist 100 56       CSI      Nerve / Sites Rec. Site Peak Lat NP Amp Segments Peak Diff     ms µV  ms   R Median - CSI      Median Thumb 3.8 18.8 Median - Radial 1.1      Radial Thumb 2.7 18.3 Median - Ulnar       CSI    CSI 1.1       MNC      Nerve / Sites Muscle Latency Amplitude Duration Rel Amp Segments Distance Lat Diff Velocity     ms mV ms %  mm ms m/s   R Median - APB      Wrist APB 4.5 9.8 6.1 100 Wrist - APB 80        Elbow APB 8.7 8.5 6.8 86.4 Elbow - Wrist 190 4.2 46   L Median - APB      Wrist APB 4.5 5.6 6.5 100 Wrist - APB 80         Elbow APB 9.1 5.5 6.5 98.5 Elbow - Wrist 230 4.6 50   R Ulnar - ADM      Wrist ADM 3.4 11.8 5.1 100 Wrist - ADM 80        B.Elbow ADM 7.4 10.0 5.5 85.3 B.Elbow - Wrist 210 4.1 52      A.Elbow ADM 9.6 8.8 5.6 87.3 A.Elbow - B.Elbow 110 2.1 52         A.Elbow - Wrist  6.2    L Ulnar - ADM      Wrist ADM 3.3 9.4 5.4 100 Wrist - ADM 80        B.Elbow ADM 7.4 9.2 5.6 97.2 B.Elbow - Wrist 205 4.1 50      A.Elbow ADM 9.5 9.4 5.8 102 A.Elbow - B.Elbow 110 2.1 53         A.Elbow - Wrist  6.2        EMG         EMG Summary Table     Spontaneous MUAP Recruitment   Muscle IA Fib PSW Fasc Other Dur. Dur Amp Dur Polys Pattern Effort   R. Deltoid N None None None . _NFT_ _NFT_ N N N N Max   R. Biceps brachii N None None None . _NFT_ _NFT_ N N N N Max   R. Triceps brachii Incr None None None . _NFT_ _NFT_ N N N N Max   R. Pronator teres Incr None None None . _NFT_ _NFT_ N N N N Max   R. Brachioradialis N None None None . _NFT_ _NFT_ N N N N Max   R. First dorsal interosseous N None None None . _NFT_ _NFT_ N N N N Max   R. Abductor pollicis brevis N None None None . _NFT_ _NFT_ N N N N Max                                          INTERPRETATION  -Bilateral median motor nerve conduction study showed prolonged  latency, normal amplitude, and dec conduction velocity on the right  -Bilateral median sensory nerve conduction study showed prolonged peak latency and normal amplitude  -Bilateral ulnar motor nerve conduction study showed normal latency, amplitude, and conduction velocity  -Bilateral ulnar sensory nerve conduction study showed normal peak latency and amplitude  -Bilateral radial sensory nerve conduction study showed normal peak latency and amplitude  -Right combined sensory index was significant  -Needle EMG examination performed to above mentioned muscles       IMPRESSION  ABNORMAL study  2. There is electrodiagnostic evidence of a moderate demyelinating median neuropathy (Carpal tunnel syndrome) across bilateral  wrists-slightly worse on the right.  There is slight evidence of a mild subacute irritation of the C7 nerve root.  3. There is clinical evidence of myofascial pain and rotator cuff weakness     PLAN  Discussed in detail for greater than 30 minutes about diagnosis and treatment plan    1. Follow up with referring provider: Neva Jacobson  2. Handouts on CTS,radic, etc provided. Rec referral to ortho for CTR eval. Rec fu with Dr Pereyra for radic and PT for myofascial pain  3. This study is good for one year. If symptoms worsen or do not improve, please re-consult.    Diane Villa M.D.  Physical Medicine and Rehab

## 2022-10-27 NOTE — PRE-PROCEDURE INSTRUCTIONS
Spoke with patient regarding procedure scheduled on 10.31    Arrival time 0630    Has patient been sick with fever or on antibiotics within the last 7 days? No    Does the patient have any open wounds, sores or rashes? No    Does the patient have any recent fractures? no    Has patient received a vaccination within the last 7 days? No    Received the COVID vaccination? yes    Has the patient stopped all medications as directed? Hold dm meds am of procedure     Does patient have a pacemaker and or defibrillator? no    Does the patient have a ride to and from procedure and someone reliable to remain with patient? Mother     Is the patient diabetic? yes    Does the patient have sleep apnea? Or use O2 at home? No and no     Is the patient receiving sedation? yes    Is the patient instructed to remain NPO beginning at midnight the night before their procedure? yes    Procedure location confirmed with patient? Yes    Covid- Denies signs/symptoms. Instructed to notify PAT/MD if any changes.

## 2022-10-28 ENCOUNTER — TELEPHONE (OUTPATIENT)
Dept: ORTHOPEDICS | Facility: CLINIC | Age: 62
End: 2022-10-28
Payer: MEDICARE

## 2022-10-28 ENCOUNTER — PATIENT MESSAGE (OUTPATIENT)
Dept: ORTHOPEDICS | Facility: CLINIC | Age: 62
End: 2022-10-28

## 2022-10-28 ENCOUNTER — HOSPITAL ENCOUNTER (OUTPATIENT)
Dept: RADIOLOGY | Facility: HOSPITAL | Age: 62
Discharge: HOME | End: 2022-10-28
Attending: STUDENT IN AN ORGANIZED HEALTH CARE EDUCATION/TRAINING PROGRAM
Payer: MEDICARE

## 2022-10-28 ENCOUNTER — OFFICE VISIT (OUTPATIENT)
Dept: ORTHOPEDICS | Facility: CLINIC | Age: 62
End: 2022-10-28
Payer: MEDICARE

## 2022-10-28 DIAGNOSIS — M25.511 RIGHT SHOULDER PAIN, UNSPECIFIED CHRONICITY: ICD-10-CM

## 2022-10-28 DIAGNOSIS — M67.911 TENDINOPATHY OF RIGHT ROTATOR CUFF: Primary | ICD-10-CM

## 2022-10-28 DIAGNOSIS — G56.03 BILATERAL CARPAL TUNNEL SYNDROME: ICD-10-CM

## 2022-10-28 DIAGNOSIS — M54.12 CERVICAL RADICULOPATHY: ICD-10-CM

## 2022-10-28 DIAGNOSIS — M75.41 SUBACROMIAL IMPINGEMENT OF RIGHT SHOULDER: ICD-10-CM

## 2022-10-28 LAB — DSDNA AB SER-ACNC: NORMAL [IU]/ML

## 2022-10-28 PROCEDURE — 99999 PR PBB SHADOW E&M-EST. PATIENT-LVL III: ICD-10-PCS | Mod: PBBFAC,,, | Performed by: STUDENT IN AN ORGANIZED HEALTH CARE EDUCATION/TRAINING PROGRAM

## 2022-10-28 PROCEDURE — 20611 LARGE JOINT ASPIRATION/INJECTION: R SUBACROMIAL BURSA: ICD-10-PCS | Mod: RT,S$GLB,, | Performed by: STUDENT IN AN ORGANIZED HEALTH CARE EDUCATION/TRAINING PROGRAM

## 2022-10-28 PROCEDURE — 99999 PR PBB SHADOW E&M-EST. PATIENT-LVL III: CPT | Mod: PBBFAC,,, | Performed by: STUDENT IN AN ORGANIZED HEALTH CARE EDUCATION/TRAINING PROGRAM

## 2022-10-28 PROCEDURE — 73030 X-RAY EXAM OF SHOULDER: CPT | Mod: TC,FY,PO,RT

## 2022-10-28 PROCEDURE — 73030 XR SHOULDER COMPLETE 2 OR MORE VIEWS RIGHT: ICD-10-PCS | Mod: 26,RT,, | Performed by: RADIOLOGY

## 2022-10-28 PROCEDURE — 3066F NEPHROPATHY DOC TX: CPT | Mod: CPTII,S$GLB,, | Performed by: STUDENT IN AN ORGANIZED HEALTH CARE EDUCATION/TRAINING PROGRAM

## 2022-10-28 PROCEDURE — 1159F PR MEDICATION LIST DOCUMENTED IN MEDICAL RECORD: ICD-10-PCS | Mod: CPTII,S$GLB,, | Performed by: STUDENT IN AN ORGANIZED HEALTH CARE EDUCATION/TRAINING PROGRAM

## 2022-10-28 PROCEDURE — 3061F PR NEG MICROALBUMINURIA RESULT DOCUMENTED/REVIEW: ICD-10-PCS | Mod: CPTII,S$GLB,, | Performed by: STUDENT IN AN ORGANIZED HEALTH CARE EDUCATION/TRAINING PROGRAM

## 2022-10-28 PROCEDURE — 20611 DRAIN/INJ JOINT/BURSA W/US: CPT | Mod: RT,S$GLB,, | Performed by: STUDENT IN AN ORGANIZED HEALTH CARE EDUCATION/TRAINING PROGRAM

## 2022-10-28 PROCEDURE — 1159F MED LIST DOCD IN RCRD: CPT | Mod: CPTII,S$GLB,, | Performed by: STUDENT IN AN ORGANIZED HEALTH CARE EDUCATION/TRAINING PROGRAM

## 2022-10-28 PROCEDURE — 73030 X-RAY EXAM OF SHOULDER: CPT | Mod: 26,RT,, | Performed by: RADIOLOGY

## 2022-10-28 PROCEDURE — 3066F PR DOCUMENTATION OF TREATMENT FOR NEPHROPATHY: ICD-10-PCS | Mod: CPTII,S$GLB,, | Performed by: STUDENT IN AN ORGANIZED HEALTH CARE EDUCATION/TRAINING PROGRAM

## 2022-10-28 PROCEDURE — 99213 PR OFFICE/OUTPT VISIT, EST, LEVL III, 20-29 MIN: ICD-10-PCS | Mod: 25,S$GLB,, | Performed by: STUDENT IN AN ORGANIZED HEALTH CARE EDUCATION/TRAINING PROGRAM

## 2022-10-28 PROCEDURE — 3061F NEG MICROALBUMINURIA REV: CPT | Mod: CPTII,S$GLB,, | Performed by: STUDENT IN AN ORGANIZED HEALTH CARE EDUCATION/TRAINING PROGRAM

## 2022-10-28 PROCEDURE — 3044F PR MOST RECENT HEMOGLOBIN A1C LEVEL <7.0%: ICD-10-PCS | Mod: CPTII,S$GLB,, | Performed by: STUDENT IN AN ORGANIZED HEALTH CARE EDUCATION/TRAINING PROGRAM

## 2022-10-28 PROCEDURE — 3044F HG A1C LEVEL LT 7.0%: CPT | Mod: CPTII,S$GLB,, | Performed by: STUDENT IN AN ORGANIZED HEALTH CARE EDUCATION/TRAINING PROGRAM

## 2022-10-28 PROCEDURE — 99213 OFFICE O/P EST LOW 20 MIN: CPT | Mod: 25,S$GLB,, | Performed by: STUDENT IN AN ORGANIZED HEALTH CARE EDUCATION/TRAINING PROGRAM

## 2022-10-28 RX ORDER — BETAMETHASONE SODIUM PHOSPHATE AND BETAMETHASONE ACETATE 3; 3 MG/ML; MG/ML
6 INJECTION, SUSPENSION INTRA-ARTICULAR; INTRALESIONAL; INTRAMUSCULAR; SOFT TISSUE
Status: DISCONTINUED | OUTPATIENT
Start: 2022-10-28 | End: 2022-10-28 | Stop reason: HOSPADM

## 2022-10-28 RX ADMIN — BETAMETHASONE SODIUM PHOSPHATE AND BETAMETHASONE ACETATE 6 MG: 3; 3 INJECTION, SUSPENSION INTRA-ARTICULAR; INTRALESIONAL; INTRAMUSCULAR; SOFT TISSUE at 03:10

## 2022-10-28 NOTE — PROCEDURES
Large Joint Aspiration/Injection: R subacromial bursa    Date/Time: 10/28/2022 3:30 PM  Performed by: López Baltazar MD  Authorized by: López Baltazar MD     Consent Done?:  Yes (Verbal)  Indications:  Pain  Site marked: the procedure site was marked    Timeout: prior to procedure the correct patient, procedure, and site was verified    Prep: patient was prepped and draped in usual sterile fashion      Local anesthesia used?: Yes    Anesthesia:  Local infiltration  Local anesthetic:  Topical anesthetic, bupivacaine 0.5% without epinephrine and lidocaine 1% without epinephrine  Anesthetic total (ml):  4      Details:  Needle Size:  21 G  Ultrasonic Guidance for needle placement?: Yes    Images are saved and documented.  Approach:  Lateral  Location:  Shoulder  Site:  R subacromial bursa  Medications:  6 mg betamethasone acetate-betamethasone sodium phosphate 6 mg/mL  Patient tolerance:  Patient tolerated the procedure well with no immediate complications     Ultrasound guidance was used for needle localization. Images were saved and stored for documentation. The appropriate structures were visualized. Dynamic visualization of the needle was continuous throughout the procedures and maintained good position.     We discussed the proper protocols after the injection such as no submerging pools, baths tubs, or hot tubs for 48 hr.  Showering is okay today.  We also discussed that blood sugars can be elevated after an injection and asked patient to properly check their sugars over the next few days and contact their PCP if there are any concerns.  We discussed red flags such as fevers, chills, red, warm, tender joint at the area of injection to please seek medical care immediately.

## 2022-10-28 NOTE — TELEPHONE ENCOUNTER
Spoke with pt. She asked to be r/s to original 11:30am appt. Informed pt that someone had already scheduled for that time. She stated she will stay with the 3:30 appt. Pt verbalized understanding of appt date, time, and location.      ----- Message from Darnell Garcia sent at 10/28/2022  9:00 AM CDT -----  Contact: Luciana  Type:  Patient Returning Call    Who Called:Luciana  Who Left Message for Patient:Nurse  Does the patient know what this is regarding?:yes appt  Would the patient rather a call back or a response via [facility]? Callback   Best Call Back Number:[phone]  Additional Information: n/a

## 2022-10-28 NOTE — PROGRESS NOTES
Patient ID: Luciana Moraes  YOB: 1960  MRN: [id number]    Chief Complaint: Follow-up (R shoulder, review EMG 10/26/22)      History of Present Illness: copied from previous clinic note  Luciana Moraes is a right-hand dominant 61 y.o. female who presents today with 10/10 pain c/o right shoulder pain. She states that the pain began months ago toward the beginning of 2022. She states that she is seeing Dr. Preeyra for cervical spine pain that radiates into her arm. She has a history of bilateral carpal tunnel syndrome and had release surgery in 2009 and 2010.  She recently underwent C7-T1 [facility].  She has tried physical therapy recently for the neck and shoulder but could only complete 4 visits due to worsening pain.  She continues to have persistent right arm pain and parasthesias.  She describes the pain as a constant aching and heaviness in her right arm. Her shoulder pain radiates down her arm. She has numbness in her upper arm. She has tried ibuprofen, advil, and aleve for her pain. These OTC relieve the neck pain but not the shoulder pain. She uses voltaren cream with no relief. She states that tilting her head makes the arm pain worse and at night, the pain makes it hard to sleep.      She is scheduled for an upcoming EMG/NCV on 10/26 with Dr. Villa and C5-C7 MBB with Dr. Pereyra on 10/31.    Interval History  EMG Wed w Dr Villa. Still primarily with aching, throbbing R shoulder pain. Not really any complaints of the wrists at this time. She had CTS in the past, was treated, and is not having those symptoms at this time    Past Medical History:   Past Medical History:   Diagnosis Date    Diabetes mellitus, type 2     Hyperlipidemia     Osteoarthritis     Polymyositis     Rheumatoid arthritis      Past Surgical History:   Procedure Laterality Date    CARPAL TUNNEL RELEASE Bilateral     EPIDURAL STEROID INJECTION INTO CERVICAL SPINE N/A 9/22/2022    Procedure: C7/T1  [facility];  Surgeon: Luis M Pereyra MD;  Location: [facility];  Service: Pain Management;  Laterality: N/A;    HIP SURGERY      HYSTERECTOMY      due to bleeding, pain from fibroids, retains one ovary     Family History   Problem Relation Age of Onset    Rheum arthritis Mother     No Known Problems Father      Social History     Socioeconomic History    Marital status:    Tobacco Use    Smoking status: Former     Types: Cigarettes     Quit date: 3/17/2001     Years since quittin.6    Smokeless tobacco: Never   Substance and Sexual Activity    Alcohol use: Not Currently    Drug use: Never    Sexual activity: Yes     Partners: Male     Medication List with Changes/Refills   Current Medications    ASPIRIN 81 MG CHEW    Take 81 mg by mouth.    ATORVASTATIN (LIPITOR) 80 MG TABLET    Take 1 tablet (80 mg total) by mouth once daily.    DICLOFENAC SODIUM (VOLTAREN) 1 % GEL    Apply 2 g topically 4 (four) times daily.    ERGOCALCIFEROL (ERGOCALCIFEROL) 50,000 UNIT CAP    Take 1 capsule (50,000 Units total) by mouth every 7 days.    HYDROXYCHLOROQUINE (PLAQUENIL) 200 MG TABLET    Take 1 tablet (200 mg total) by mouth 2 (two) times daily.    IBUPROFEN (ADVIL,MOTRIN) 800 MG TABLET    Take 800 mg by mouth 3 (three) times daily.    METFORMIN (GLUCOPHAGE-XR) 500 MG ER 24HR TABLET    Take 2 tablets (1,000 mg total) by mouth daily with breakfast.    NABUMETONE (RELAFEN) 750 MG TABLET    Take 1 tablet (750 mg total) by mouth 2 (two) times daily as needed for Pain.    POLYETHYLENE GLYCOL (GLYCOLAX) 17 GRAM PWPK    Take 17 g by mouth once daily.    PREGABALIN (LYRICA) 75 MG CAPSULE    Take 1 capsule (75 mg total) by mouth 2 (two) times daily.    SEMAGLUTIDE (OZEMPIC) 0.25 MG OR 0.5 MG(2 MG/1.5 ML) PEN INJECTOR    Inject 0.5 mg into the skin every 7 days.    TIZANIDINE (ZANAFLEX) 4 MG TABLET    Take 1 tablet (4 mg total) by mouth 2 (two) times daily as needed.     Review of patient's allergies indicates:  No Known  Allergies    Physical Exam:   There is no height or weight on file to calculate BMI.    Physical Exam  Constitutional:       General: She is not in acute distress.     Appearance: Normal appearance.   HENT:      Head: Normocephalic and atraumatic.   Eyes:      Extraocular Movements: Extraocular movements intact.      Conjunctiva/sclera: Conjunctivae normal.   Pulmonary:      Effort: Pulmonary effort is normal. No respiratory distress.   Musculoskeletal:      Right shoulder: No swelling or deformity.   Skin:     General: Skin is warm and dry.   Neurological:      General: No focal deficit present.      Mental Status: She is alert and oriented to person, place, and time.   Psychiatric:         Mood and Affect: Mood normal.         Detailed MSK exam:   General    Constitutional: She is oriented to person, place, and time. No distress.   HENT:   Head: Normocephalic and atraumatic.   Eyes: Conjunctivae are normal.   Pulmonary/Chest: Effort normal. No respiratory distress.   Abdominal: Normal appearance.   Neurological: She is alert and oriented to person, place, and time.   Psychiatric: Mood normal.         Right Shoulder Exam     Inspection/Observation   Swelling: absent  Bruising: absent  Deformity: absent    Tenderness   The patient is tender to palpation of the supraspinatus and acromion.    Crepitus   The patient has crepitus of the supraspinatus and acromion.    Tests & Signs   Lofton test: positive  Impingement: positive    Muscle Strength   Right Upper Extremity   Shoulder Abduction: 5/5   Shoulder External Rotation: 5/5   Supraspinatus: 5/5   Subscapularis: 5/5   Biceps: 5/5        Imaging:  X-ray Shoulder 2 or More Views Right  Narrative: EXAMINATION:  Four views right shoulder    CLINICAL HISTORY:  Pain    COMPARISON:  None    FINDINGS:  Mild AC joint hypertrophy is present with lateral acromial downsloping.  Glenohumeral joint space narrowing is present.  Acromial humeral interval is normal.  No fracture or  dislocation.  Impression: Degenerative changes as above    Electronically signed by: Aaron Harrington MD  Date:    10/28/2022  Time:    08:28      Relevant imaging results were reviewed and interpreted by me and per my read: mild degenerative changes of AC & GH joints. Otherwise, no significant abnormalities, malalignment, fracture or other acute abnormalities..      This was discussed with the patient and / or family today.       Patient Instructions   Assessment:  Luciana Moraes is a 62 y.o. female with a chief complaint of Follow-up (R shoulder, review EMG 10/26/22)      Encounter Diagnoses   Name Primary?    Tendinopathy of right rotator cuff Yes    Subacromial impingement of right shoulder     Bilateral carpal tunnel syndrome     Cervical radiculopathy         Plan:  EMG reviewed today, c/w moderate median neuropathy bilaterally, and more chronic C7 radiculopathy, as well as myofascial rotator cuff pain  XR reviewed today, mild degenerative changes  As patient's primary complaint at this time is shoulder pain, after detailed discussion of options, she elects for R SA CSI  Can consider carpal tunnel inj in future, depending on her symptoms, but her wrist symptoms are minimal at this time  Recommend to keep appt for Mon for cervical MBB with Pain Mgmt    Follow-up: 3 months or sooner if there are any problems between now and then.    Thank you for choosing [facility] and Dr. López Baltazar for your orthopedic & sports medicine care. It is our goal to provide you with exceptional care that will help keep you healthy, active, and get you back in the game.    Please do not hesitate to reach out to us via email, phone, or MyChart with any questions, concerns, or feedback.    If you are experiencing pain/discomfort ,or have questions after 5pm and would like to be connected to the [facility] on-call team, please call this number and specify which Sports  Medicine provider is treating you: [phone]       López Baltazar MD  Primary Care Sports Medicine      Disclaimer: This note was prepared using a voice recognition system and is likely to have sound alike errors within the text.

## 2022-10-28 NOTE — PATIENT INSTRUCTIONS
Assessment:  Luciana Moraes is a 62 y.o. female with a chief complaint of Follow-up (R shoulder, review EMG 10/26/22)      Encounter Diagnoses   Name Primary?    Tendinopathy of right rotator cuff Yes    Subacromial impingement of right shoulder     Bilateral carpal tunnel syndrome     Cervical radiculopathy         Plan:  EMG reviewed today, c/w moderate median neuropathy bilaterally, and more chronic C7 radiculopathy, as well as myofascial rotator cuff pain  XR reviewed today, mild degenerative changes  As patient's primary complaint at this time is shoulder pain, after detailed discussion of options, she elects for R SA CSI  Can consider carpal tunnel inj in future, depending on her symptoms, but her wrist symptoms are minimal at this time  Recommend to keep appt for Mon for cervical MBB with Pain Mgmt    Follow-up: 3 months or sooner if there are any problems between now and then.    Thank you for choosing [facility] and Dr. López Baltazar for your orthopedic & sports medicine care. It is our goal to provide you with exceptional care that will help keep you healthy, active, and get you back in the game.    Please do not hesitate to reach out to us via email, phone, or MyChart with any questions, concerns, or feedback.    If you are experiencing pain/discomfort ,or have questions after 5pm and would like to be connected to the [facility] on-call team, please call this number and specify which Sports Medicine provider is treating you: [phone]

## 2022-10-28 NOTE — TELEPHONE ENCOUNTER
LVM for pt to clarify new appt time after r/s this morning. Appt is with Dr. Baltazar at 3:30pm in [place]. New appt time was also sent via [employer]. Offered callback number for any questions or concerns.

## 2022-10-31 ENCOUNTER — HOSPITAL ENCOUNTER (OUTPATIENT)
Facility: HOSPITAL | Age: 62
Discharge: HOME | End: 2022-10-31
Attending: ANESTHESIOLOGY | Admitting: ANESTHESIOLOGY
Payer: MEDICARE

## 2022-10-31 VITALS
SYSTOLIC BLOOD PRESSURE: 144 MMHG | BODY MASS INDEX: 35.01 KG/M2 | TEMPERATURE: 98 F | RESPIRATION RATE: 13 BRPM | DIASTOLIC BLOOD PRESSURE: 72 MMHG | WEIGHT: 190.25 LBS | OXYGEN SATURATION: 96 % | HEART RATE: 54 BPM | HEIGHT: 62 IN

## 2022-10-31 DIAGNOSIS — M47.812 CERVICAL SPONDYLOSIS: Primary | ICD-10-CM

## 2022-10-31 LAB — POCT GLUCOSE: 93 MG/DL (ref 70–110)

## 2022-10-31 PROCEDURE — 64490 INJ PARAVERT F JNT C/T 1 LEV: CPT | Mod: 50 | Performed by: ANESTHESIOLOGY

## 2022-10-31 PROCEDURE — 64490 INJ PARAVERT F JNT C/T 1 LEV: CPT | Mod: 50,,, | Performed by: ANESTHESIOLOGY

## 2022-10-31 PROCEDURE — 25000003 PHARM REV CODE 250: Performed by: ANESTHESIOLOGY

## 2022-10-31 PROCEDURE — 64490 PR INJ DX/THER AGNT PARAVERT FACET JOINT,IMG GUIDE,CERV/THORAC, 1ST LEVEL: ICD-10-PCS | Mod: 50,,, | Performed by: ANESTHESIOLOGY

## 2022-10-31 PROCEDURE — 64491 PR INJ DX/THER AGNT PARAVERT FACET JOINT,IMG GUIDE,CERV/THORAC, 2ND LEVEL: ICD-10-PCS | Mod: 50,,, | Performed by: ANESTHESIOLOGY

## 2022-10-31 PROCEDURE — 99152 MOD SED SAME PHYS/QHP 5/>YRS: CPT | Performed by: ANESTHESIOLOGY

## 2022-10-31 PROCEDURE — 63600175 PHARM REV CODE 636 W HCPCS: Performed by: ANESTHESIOLOGY

## 2022-10-31 PROCEDURE — 64491 INJ PARAVERT F JNT C/T 2 LEV: CPT | Mod: 50,,, | Performed by: ANESTHESIOLOGY

## 2022-10-31 PROCEDURE — 82962 GLUCOSE BLOOD TEST: CPT | Performed by: ANESTHESIOLOGY

## 2022-10-31 PROCEDURE — 64491 INJ PARAVERT F JNT C/T 2 LEV: CPT | Mod: 50 | Performed by: ANESTHESIOLOGY

## 2022-10-31 RX ORDER — BUPIVACAINE HYDROCHLORIDE 5 MG/ML
INJECTION, SOLUTION EPIDURAL; INTRACAUDAL
Status: DISCONTINUED | OUTPATIENT
Start: 2022-10-31 | End: 2022-10-31 | Stop reason: HOSPADM

## 2022-10-31 RX ORDER — FENTANYL CITRATE 50 UG/ML
INJECTION, SOLUTION INTRAMUSCULAR; INTRAVENOUS
Status: DISCONTINUED | OUTPATIENT
Start: 2022-10-31 | End: 2022-10-31 | Stop reason: HOSPADM

## 2022-10-31 RX ORDER — ONDANSETRON 2 MG/ML
4 INJECTION INTRAMUSCULAR; INTRAVENOUS ONCE AS NEEDED
Status: DISCONTINUED | OUTPATIENT
Start: 2022-10-31 | End: 2022-10-31 | Stop reason: HOSPADM

## 2022-10-31 RX ORDER — DEXAMETHASONE SODIUM PHOSPHATE 10 MG/ML
INJECTION INTRAMUSCULAR; INTRAVENOUS
Status: DISCONTINUED | OUTPATIENT
Start: 2022-10-31 | End: 2022-10-31 | Stop reason: HOSPADM

## 2022-10-31 RX ORDER — MIDAZOLAM HYDROCHLORIDE 1 MG/ML
INJECTION, SOLUTION INTRAMUSCULAR; INTRAVENOUS
Status: DISCONTINUED | OUTPATIENT
Start: 2022-10-31 | End: 2022-10-31 | Stop reason: HOSPADM

## 2022-10-31 NOTE — DISCHARGE INSTRUCTIONS

## 2022-10-31 NOTE — OP NOTE
CERVICAL Medial Branch Block Under Fluoroscopy, C5/C6 and C6/C7    INFORMED CONSENT: The procedure, risks, benefits and options were discussed with patient. There are no contraindications to the procedure. The patient expressed understanding and agreed to proceed. The personnel performing the procedure was discussed.    Date of procedure 10/31/2022    Time-out taken to identify patient and procedure side prior to starting the procedure.                                                                     PROCEDURE:  Bilateral  medial branch block at the transverse processes of C5, C6, and C7    Pre Procedure diagnosis:    Cervical spondylosis [M47.812]  1. Cervical spondylosis        Post-Procedure diagnosis:   same    PHYSICIAN: Luis M Pereyra MD    ASSISTANTS: None    MEDICATIONS INJECTED:  1ml 0.5% Bupivicaine PF, at each level  LOCAL ANESTHETIC USED:   1ml Lidocaine PF, at each level    ESTIMATED BLOOD LOSS:  None.    COMPLICATIONS:  None.    Sedation: Conscious sedation provided by M.IRA    SEDATION MEDICATIONS: local/IV sedation: Versed 2 mg and fentanyl 50 mcg IV.  Conscious sedation ordered by MD.  Patient reevaluated and sedation administered by MD and monitored by RN.  Total sedation time was less than 15 min.    Total sedation time was >10 but <20 min      TECHNIQUE:   Laying in a prone position, the patient was prepped and draped in the usual sterile fashion using ChloraPrep and fenestrated drape.  The level was determined under fluoroscopic guidance.  Local anesthetic was given by going down to the hub of the 27-gauge 1.25in needle and raising a wheel.  A 25-gauge 3.5inch needle was introduced to the anatomic local of the medial branch at each of the stated above levels using fluoroscopy. Then after negative aspiration, the medication was injected slowly. The patient tolerated the procedure well.       The patient was monitored for approximately 30 minutes after the procedure.  Patient was given post  procedure and discharge instructions to follow at home.  The patient was discharged in a stable condition

## 2022-10-31 NOTE — DISCHARGE SUMMARY
Discharge Note  Short Stay      SUMMARY     Admit Date: 10/31/2022    Attending Physician: Luis M Pereyra MD        Discharge Physician: Luis M Pereyra MD        Discharge Date: 10/31/2022 7:57 AM    Procedure(s) (LRB):  Bilateral C5-7 MBB with RN IV sedation (Bilateral)    Final Diagnosis: Cervical spondylosis [M47.812]    Disposition: Home or self care    Patient Instructions:   Current Discharge Medication List        CONTINUE these medications which have NOT CHANGED    Details   atorvastatin (LIPITOR) 80 MG tablet Take 1 tablet (80 mg total) by mouth once daily.  Qty: 90 tablet, Refills: 3    Associated Diagnoses: Hyperlipidemia, unspecified hyperlipidemia type      ergocalciferol (ERGOCALCIFEROL) 50,000 unit Cap Take 1 capsule (50,000 Units total) by mouth every 7 days.  Qty: 12 capsule, Refills: 1    Associated Diagnoses: Vitamin D deficiency      hydrOXYchloroQUINE (PLAQUENIL) 200 mg tablet Take 1 tablet (200 mg total) by mouth 2 (two) times daily.  Qty: 180 tablet, Refills: 1      ibuprofen (ADVIL,MOTRIN) 800 MG tablet Take 800 mg by mouth 3 (three) times daily.      metFORMIN (GLUCOPHAGE-XR) 500 MG ER 24hr tablet Take 2 tablets (1,000 mg total) by mouth daily with breakfast.  Qty: 180 tablet, Refills: 3    Associated Diagnoses: Type 2 diabetes mellitus with other specified complication, without long-term current use of insulin      polyethylene glycol (GLYCOLAX) 17 gram PwPk Take 17 g by mouth once daily.  Qty: 30 each, Refills: 11    Associated Diagnoses: Constipation, unspecified constipation type; Pelvic pain in female      pregabalin (LYRICA) 75 MG capsule Take 1 capsule (75 mg total) by mouth 2 (two) times daily.  Qty: 60 capsule, Refills: 0    Associated Diagnoses: Cervical radiculopathy; Carpal tunnel syndrome of right wrist      semaglutide (OZEMPIC) 0.25 mg or 0.5 mg(2 mg/1.5 mL) pen injector Inject 0.5 mg into the skin every 7 days.  Qty: 1 pen, Refills: 12    Associated Diagnoses: Type 2  diabetes mellitus with other specified complication, without long-term current use of insulin      tiZANidine (ZANAFLEX) 4 MG tablet Take 1 tablet (4 mg total) by mouth 2 (two) times daily as needed.  Qty: 60 tablet, Refills: 1    Associated Diagnoses: Cervical radiculopathy; DDD (degenerative disc disease), cervical; Cervical spondylosis      aspirin 81 MG Chew Take 81 mg by mouth.      diclofenac sodium (VOLTAREN) 1 % Gel Apply 2 g topically 4 (four) times daily.  Qty: 100 g, Refills: 6    Associated Diagnoses: Rheumatoid arthritis, involving unspecified site, unspecified whether rheumatoid factor present      nabumetone (RELAFEN) 750 MG tablet Take 1 tablet (750 mg total) by mouth 2 (two) times daily as needed for Pain.  Qty: 60 tablet, Refills: 1    Associated Diagnoses: Cervical radiculopathy; DDD (degenerative disc disease), cervical; Cervical spondylosis                 Discharge Diagnosis: Cervical spondylosis [M47.812]  Condition on Discharge: Stable with no complications to procedure   Diet on Discharge: Same as before.  Activity: as per instruction sheet.  Discharge to: Home with a responsible adult.  Follow up: 2-4 weeks       Please call the office at [phone] if you experience any weakness or loss of sensation, fever > 101.5, pain uncontrolled with oral medications, persistent nausea/vomiting/or diarrhea, redness or drainage from the incisions, or any other worrisome concerns. If physician on call was not reached or could not communicate with our office for any reason please go to the nearest emergency department

## 2022-11-01 ENCOUNTER — TELEPHONE (OUTPATIENT)
Dept: PAIN MEDICINE | Facility: CLINIC | Age: 62
End: 2022-11-01
Payer: MEDICARE

## 2022-11-01 DIAGNOSIS — M47.812 CERVICAL SPONDYLOSIS: Primary | ICD-10-CM

## 2022-11-01 NOTE — TELEPHONE ENCOUNTER
Patient reports 90% relief in pain with bilateral C5-C7 medial branch block on 10/31/2022.  We will schedule patient for 2nd diagnostic block.  Patient gets relief from this block we will proceed with RFA.   Problem: Fall Risk (Adult)  Goal: Identify Related Risk Factors and Signs and Symptoms  Related risk factors and signs and symptoms are identified upon initiation of Human Response Clinical Practice Guideline (CPG)   Outcome: Ongoing (interventions implemented as appropriate)  Reviewed POC, pt verbalized understanding.

## 2022-11-01 NOTE — TELEPHONE ENCOUNTER
----- Message from Xu Davis MA sent at 10/11/2022 12:13 PM CDT -----  Regarding: Cervical MBB  Please call and check on % on relief pt received

## 2022-11-04 ENCOUNTER — ANESTHESIA EVENT (OUTPATIENT)
Dept: ENDOSCOPY | Facility: HOSPITAL | Age: 62
End: 2022-11-04
Payer: MEDICARE

## 2022-11-04 NOTE — ANESTHESIA PREPROCEDURE EVALUATION
11/04/2022  Luciana Moraes is a 62 y.o., female.  Past Medical History:   Diagnosis Date    Diabetes mellitus, type 2     Hyperlipidemia     Osteoarthritis     Polymyositis     Rheumatoid arthritis        Past Surgical History:   Procedure Laterality Date    CARPAL TUNNEL RELEASE Bilateral     EPIDURAL STEROID INJECTION INTO CERVICAL SPINE N/A 9/22/2022    Procedure: C7/T1 [facility];  Surgeon: Luis M Pereyra MD;  Location: [facility];  Service: Pain Management;  Laterality: N/A;    HIP SURGERY      HYSTERECTOMY  2008    due to bleeding, pain from fibroids, retains one ovary    INJECTION OF ANESTHETIC AGENT AROUND MEDIAL BRANCH NERVES INNERVATING CERVICAL FACET JOINT Bilateral 10/31/2022    Procedure: Bilateral C5-7 MBB with RN IV sedation;  Surgeon: Luis M Pereyra MD;  Location: [facility];  Service: Pain Management;  Laterality: Bilateral;     PCP    PMHx:  Past Medical History:   Diagnosis Date    Diabetes mellitus, type 2      Hyperlipidemia      Osteoarthritis      Polymyositis      Rheumatoid arthritis             Patient Active Problem List     Diagnosis Date Noted    Neck pain 08/03/2022    Muscle spasms of neck 08/03/2022         PSHx:        Past Surgical History:   Procedure Laterality Date    CARPAL TUNNEL RELEASE Bilateral      EPIDURAL STEROID INJECTION INTO CERVICAL SPINE N/A 9/22/2022     Procedure: C7/T1 [facility];  Surgeon: Luis M Pereyra MD;  Location: [facility];  Service: Pain Management;  Laterality: N/A;    HIP SURGERY        HYSTERECTOMY   2008     due to bleeding, pain from fibroids, retains one ovary          Pre-op Assessment    I have reviewed the Patient Summary Reports.     I have reviewed the Nursing Notes. I have reviewed the NPO Status.   I have reviewed the Medications.     Review of Systems  Anesthesia Hx:  No problems with previous  Anesthesia    Musculoskeletal:   Arthritis     Neurological:   Neuromuscular Disease,    Endocrine:   Diabetes        Physical Exam  General: Well nourished    Airway:  Mallampati: II / II  Mouth Opening: Normal  TM Distance: Normal  Tongue: Normal  Neck ROM: Normal ROM    Dental:  Intact        Anesthesia Plan  Type of Anesthesia, risks & benefits discussed:    Anesthesia Type: Gen Natural Airway  Intra-op Monitoring Plan: Standard ASA Monitors  Induction:  IV  Informed Consent: Informed consent signed with the Patient and all parties understand the risks and agree with anesthesia plan.  All questions answered.   ASA Score: 2  Day of Surgery Review of History & Physical: H&P Update referred to the surgeon/provider.    Ready For Surgery From Anesthesia Perspective.     .

## 2022-11-08 ENCOUNTER — ANESTHESIA (OUTPATIENT)
Dept: ENDOSCOPY | Facility: HOSPITAL | Age: 62
End: 2022-11-08
Payer: MEDICARE

## 2022-11-08 ENCOUNTER — HOSPITAL ENCOUNTER (OUTPATIENT)
Facility: HOSPITAL | Age: 62
Discharge: HOME | End: 2022-11-08
Attending: INTERNAL MEDICINE | Admitting: INTERNAL MEDICINE
Payer: MEDICARE

## 2022-11-08 VITALS
RESPIRATION RATE: 16 BRPM | TEMPERATURE: 97 F | HEART RATE: 77 BPM | HEIGHT: 62 IN | BODY MASS INDEX: 34.18 KG/M2 | SYSTOLIC BLOOD PRESSURE: 133 MMHG | DIASTOLIC BLOOD PRESSURE: 60 MMHG | OXYGEN SATURATION: 95 % | WEIGHT: 185.75 LBS

## 2022-11-08 DIAGNOSIS — Z12.11 COLON CANCER SCREENING: ICD-10-CM

## 2022-11-08 LAB — POCT GLUCOSE: 88 MG/DL (ref 70–110)

## 2022-11-08 PROCEDURE — D9220A PRA ANESTHESIA: Mod: PT,ANES,, | Performed by: ANESTHESIOLOGY

## 2022-11-08 PROCEDURE — D9220A PRA ANESTHESIA: ICD-10-PCS | Mod: PT,ANES,, | Performed by: ANESTHESIOLOGY

## 2022-11-08 PROCEDURE — 45380 COLONOSCOPY AND BIOPSY: CPT | Mod: PT,59,, | Performed by: INTERNAL MEDICINE

## 2022-11-08 PROCEDURE — 88305 TISSUE EXAM BY PATHOLOGIST: ICD-10-PCS | Mod: 26,,, | Performed by: PATHOLOGY

## 2022-11-08 PROCEDURE — D9220A PRA ANESTHESIA: Mod: PT,CRNA,, | Performed by: NURSE ANESTHETIST, CERTIFIED REGISTERED

## 2022-11-08 PROCEDURE — 37000009 HC ANESTHESIA EA ADD 15 MINS: Performed by: INTERNAL MEDICINE

## 2022-11-08 PROCEDURE — 45380 PR COLONOSCOPY,BIOPSY: ICD-10-PCS | Mod: PT,59,, | Performed by: INTERNAL MEDICINE

## 2022-11-08 PROCEDURE — 88305 TISSUE EXAM BY PATHOLOGIST: CPT | Mod: 26,,, | Performed by: PATHOLOGY

## 2022-11-08 PROCEDURE — 45385 COLONOSCOPY W/LESION REMOVAL: CPT | Mod: PT,,, | Performed by: INTERNAL MEDICINE

## 2022-11-08 PROCEDURE — 45380 COLONOSCOPY AND BIOPSY: CPT | Mod: PT,59 | Performed by: INTERNAL MEDICINE

## 2022-11-08 PROCEDURE — 25000003 PHARM REV CODE 250: Performed by: NURSE ANESTHETIST, CERTIFIED REGISTERED

## 2022-11-08 PROCEDURE — D9220A PRA ANESTHESIA: ICD-10-PCS | Mod: PT,CRNA,, | Performed by: NURSE ANESTHETIST, CERTIFIED REGISTERED

## 2022-11-08 PROCEDURE — 37000008 HC ANESTHESIA 1ST 15 MINUTES: Performed by: INTERNAL MEDICINE

## 2022-11-08 PROCEDURE — 45385 PR COLONOSCOPY,REMV LESN,SNARE: ICD-10-PCS | Mod: PT,,, | Performed by: INTERNAL MEDICINE

## 2022-11-08 PROCEDURE — 63600175 PHARM REV CODE 636 W HCPCS: Performed by: INTERNAL MEDICINE

## 2022-11-08 PROCEDURE — 27201089 HC SNARE, DISP (ANY): Performed by: INTERNAL MEDICINE

## 2022-11-08 PROCEDURE — 88305 TISSUE EXAM BY PATHOLOGIST: CPT | Mod: 59 | Performed by: PATHOLOGY

## 2022-11-08 PROCEDURE — 27201012 HC FORCEPS, HOT/COLD, DISP: Performed by: INTERNAL MEDICINE

## 2022-11-08 PROCEDURE — 63600175 PHARM REV CODE 636 W HCPCS: Performed by: NURSE ANESTHETIST, CERTIFIED REGISTERED

## 2022-11-08 PROCEDURE — 45385 COLONOSCOPY W/LESION REMOVAL: CPT | Mod: PT | Performed by: INTERNAL MEDICINE

## 2022-11-08 RX ORDER — LIDOCAINE HYDROCHLORIDE 20 MG/ML
INJECTION, SOLUTION EPIDURAL; INFILTRATION; INTRACAUDAL; PERINEURAL
Status: DISCONTINUED | OUTPATIENT
Start: 2022-11-08 | End: 2022-11-08

## 2022-11-08 RX ORDER — SODIUM CHLORIDE, SODIUM LACTATE, POTASSIUM CHLORIDE, CALCIUM CHLORIDE 600; 310; 30; 20 MG/100ML; MG/100ML; MG/100ML; MG/100ML
INJECTION, SOLUTION INTRAVENOUS CONTINUOUS
Status: DISCONTINUED | OUTPATIENT
Start: 2022-11-08 | End: 2022-11-08 | Stop reason: HOSPADM

## 2022-11-08 RX ORDER — PROPOFOL 10 MG/ML
VIAL (ML) INTRAVENOUS
Status: DISCONTINUED | OUTPATIENT
Start: 2022-11-08 | End: 2022-11-08

## 2022-11-08 RX ADMIN — PROPOFOL 50 MG: 10 INJECTION, EMULSION INTRAVENOUS at 01:11

## 2022-11-08 RX ADMIN — SODIUM CHLORIDE, SODIUM LACTATE, POTASSIUM CHLORIDE, AND CALCIUM CHLORIDE: .6; .31; .03; .02 INJECTION, SOLUTION INTRAVENOUS at 11:11

## 2022-11-08 RX ADMIN — PROPOFOL 50 MG: 10 INJECTION, EMULSION INTRAVENOUS at 12:11

## 2022-11-08 RX ADMIN — PROPOFOL 100 MG: 10 INJECTION, EMULSION INTRAVENOUS at 12:11

## 2022-11-08 RX ADMIN — LIDOCAINE HYDROCHLORIDE 80 MG: 20 INJECTION, SOLUTION EPIDURAL; INFILTRATION; INTRACAUDAL; PERINEURAL at 12:11

## 2022-11-08 NOTE — DISCHARGE SUMMARY
[facility] 1st Fl  Discharge Note  Short Stay    Procedure(s) (LRB):  COLONOSCOPY (N/A)      OUTCOME: Patient tolerated treatment/procedure well without complication and is now ready for discharge.    DISPOSITION: Home or Self Care    FINAL DIAGNOSIS:  Colon cancer screening    FOLLOWUP: With primary care provider    DISCHARGE INSTRUCTIONS:  No discharge procedures on file.

## 2022-11-08 NOTE — PLAN OF CARE
Discharge instructions reviewed with pt and daughter. handouts given, verbalized understanding with no further questions at this time. Dr. Monsalve spoke to pt at bedside, reviewed procedure and answered questions aware they are awaiting biopsy results with MD telephone number provided per AVS sheet. VSS on RA, no pain or nausea noted, tolerating po fluids without difficulty, no other complaints noted. Fall precautions reviewed, consents in chart, PIV to be removed at discharge.

## 2022-11-08 NOTE — [employer] PATIENT INSTRUCTIONS
Discharge Summary/Instructions after an Endoscopic Procedure  Patient Name: Luciana Moraes  Patient MRN: [id number]  Patient YOB: 1960  Tuesday, November 8, 2022  Kishore Monsalve MD  Dear patient,  As a result of recent federal legislation (The Federal Cures Act), you may   receive lab or pathology results from your procedure in your [facility]   account before your physician is able to contact you. Your physician or   their representative will relay the results to you with their   recommendations at their soonest availability.  Thank you,  RESTRICTIONS:  During your procedure today, you received medications for sedation.  These   medications may affect your judgment, balance and coordination.  Therefore,   for 24 hours, you have the following restrictions:   - DO NOT drive a car, operate machinery, make legal/financial decisions,   sign important papers or drink alcohol.    ACTIVITY:  Today: no heavy lifting, straining or running due to procedural   sedation/anesthesia.  The following day: return to full activity including work.  DIET:  Eat and drink normally unless instructed otherwise.     TREATMENT FOR COMMON SIDE EFFECTS:  - Mild abdominal pain, nausea, belching, bloating or excessive gas:  rest,   eat lightly and use a heating pad.  - Sore Throat: treat with throat lozenges and/or gargle with warm salt   water.  - Because air was used during the procedure, expelling large amounts of air   from your rectum or belching is normal.  - If a bowel prep was taken, you may not have a bowel movement for 1-3 days.    This is normal.  SYMPTOMS TO WATCH FOR AND REPORT TO YOUR PHYSICIAN:  1. Abdominal pain or bloating, other than gas cramps.  2. Chest pain.  3. Back pain.  4. Signs of infection such as: chills or fever occurring within 24 hours   after the procedure.  5. Rectal bleeding, which would show as bright red, maroon, or black stools.   (A tablespoon of blood from the rectum is not serious,  especially if   hemorrhoids are present.)  6. Vomiting.  7. Weakness or dizziness.  GO DIRECTLY TO THE NEAREST EMERGENCY ROOM IF YOU HAVE ANY OF THE FOLLOWING:      Difficulty breathing              Chills and/or fever over 101 F   Persistent vomiting and/or vomiting blood   Severe abdominal pain   Severe chest pain   Black, tarry stools   Bleeding- more than one tablespoon   Any other symptom or condition that you feel may need urgent attention  Your doctor recommends these additional instructions:  If any biopsies were taken, your doctors clinic will contact you in 1 to 2   weeks with any results.  - Discharge patient to home (via wheelchair).   - Resume previous diet.   - Continue present medications.   - Await pathology results.   - Repeat colonoscopy in 5 years for surveillance.   - Return to referring physician.  For questions, problems or results please call your physician Kishore Monsalve MD at Work:  [phone]  If you have any questions about the above instructions, call the GI   department at [phone] or call the endoscopy unit at [phone]   from 7am until 3 pm.  [facility] [place], EMERGENCY ROOM PHONE NUMBER:   [phone]  IF A COMPLICATION OR EMERGENCY SITUATION ARISES AND YOU ARE UNABLE TO REACH   YOUR PHYSICIAN - GO DIRECTLY TO THE EMERGENCY ROOM.  I have read or have had read to me these discharge instructions for my   procedure and have received a written copy.  I understand these   instructions and will follow-up with my physician if I have any questions.     __________________________________       _____________________________________  Nurse Signature                                          Patient/Designated   Responsible Party Signature  MD Kishore Dooley MD  11/8/2022 1:09:05 PM  This report has been verified and signed electronically.  Dear patient,  As a result of recent federal legislation (The Federal Cures Act), you  may   receive lab or pathology results from your procedure in your [facility]   account before your physician is able to contact you. Your physician or   their representative will relay the results to you with their   recommendations at their soonest availability.  Thank you,  [employer]

## 2022-11-08 NOTE — TRANSFER OF CARE
"Anesthesia Transfer of Care Note    Patient: Luciana Moraes    Procedure(s) Performed: Procedure(s) (LRB):  COLONOSCOPY (N/A)    Patient location: PACU    Anesthesia Type: general    Transport from OR: Transported from OR on room air with adequate spontaneous ventilation    Post pain: adequate analgesia    Post assessment: no apparent anesthetic complications and tolerated procedure well    Post vital signs: stable    Level of consciousness: awake and alert    Nausea/Vomiting: no nausea/vomiting    Complications: none    Transfer of care protocol was followed      Last vitals:   Visit Vitals  /60   Pulse 77   Temp 36.3 °C (97.4 °F) (Temporal)   Resp 16   Ht 5' 2" (1.575 m)   Wt 84.2 kg (185 lb 11.8 oz)   SpO2 95%   Breastfeeding No   BMI 33.97 kg/m²     "

## 2022-11-08 NOTE — ANESTHESIA POSTPROCEDURE EVALUATION
Anesthesia Post Evaluation    Patient: Luciana Moraes    Procedure(s) Performed: Procedure(s) (LRB):  COLONOSCOPY (N/A)    Final Anesthesia Type: general      Patient location during evaluation: PACU  Patient participation: Yes- Able to Participate  Level of consciousness: awake and alert and oriented  Post-procedure vital signs: reviewed and stable  Pain management: adequate  Airway patency: patent    PONV status at discharge: No PONV  Anesthetic complications: no      Cardiovascular status: blood pressure returned to baseline, stable and hemodynamically stable  Respiratory status: unassisted  Hydration status: euvolemic  Follow-up not needed.          Vitals Value Taken Time   /78 11/08/22 1334     11/08/22 1347   Pulse 62 11/08/22 1334   Resp 29 11/08/22 1334   SpO2 100 % 11/08/22 1334   Vitals shown include unvalidated device data.      Event Time   Out of Recovery 13:40:00         Pain/Elan Score: Elan Score: 10 (11/8/2022  1:21 PM)

## 2022-11-08 NOTE — H&P
Short Stay Endoscopy History and Physical    PCP - Cedric Huerta MD  Referring Physician - Kishore Monsalve MD  [address] [facility]  [place] [address]    Procedure - Colonoscopy  ASA - per anesthesia  Mallampati - per anesthesia  History of Anesthesia problems - no  Family history Anesthesia problems -  no   Plan of anesthesia - General    HPI  62 y.o. female  Reason for procedure: colon cancer screen      ROS:  Constitutional: No fevers, chills, No weight loss  CV: No chest pain  Pulm: No cough, No shortness of breath  GI: see HPI    Medical History:  has a past medical history of Diabetes mellitus, type 2, Hyperlipidemia, Osteoarthritis, Polymyositis, and Rheumatoid arthritis.    Surgical History:  has a past surgical history that includes Hysterectomy (2008); Carpal tunnel release (Bilateral); Hip surgery; Epidural steroid injection into cervical spine (N/A, 9/22/2022); and Injection of anesthetic agent around medial branch nerves innervating cervical facet joint (Bilateral, 10/31/2022).    Family History: family history includes No Known Problems in her father; Rheum arthritis in her mother..    Social History:  reports that she quit smoking about 21 years ago. Her smoking use included cigarettes. She has never used smokeless tobacco. She reports that she does not currently use alcohol. She reports that she does not use drugs.    Review of patient's allergies indicates:  No Known Allergies    Medications:   Medications Prior to Admission   Medication Sig Dispense Refill Last Dose    metFORMIN (GLUCOPHAGE-XR) 500 MG ER 24hr tablet Take 2 tablets (1,000 mg total) by mouth daily with breakfast. 180 tablet 3 Past Week    semaglutide (OZEMPIC) 0.25 mg or 0.5 mg(2 mg/1.5 mL) pen injector Inject 0.5 mg into the skin every 7 days. 1 pen 12 Past Week    aspirin 81 MG Chew Take 81 mg by mouth.       atorvastatin (LIPITOR) 80 MG tablet Take 1 tablet (80 mg total) by mouth once daily. 90 tablet 3      diclofenac sodium (VOLTAREN) 1 % Gel Apply 2 g topically 4 (four) times daily. 100 g 6     ergocalciferol (ERGOCALCIFEROL) 50,000 unit Cap Take 1 capsule (50,000 Units total) by mouth every 7 days. 12 capsule 1     hydrOXYchloroQUINE (PLAQUENIL) 200 mg tablet Take 1 tablet (200 mg total) by mouth 2 (two) times daily. 180 tablet 1     ibuprofen (ADVIL,MOTRIN) 800 MG tablet Take 800 mg by mouth 3 (three) times daily.       nabumetone (RELAFEN) 750 MG tablet Take 1 tablet (750 mg total) by mouth 2 (two) times daily as needed for Pain. 60 tablet 1     polyethylene glycol (GLYCOLAX) 17 gram PwPk Take 17 g by mouth once daily. 30 each 11     pregabalin (LYRICA) 75 MG capsule Take 1 capsule (75 mg total) by mouth 2 (two) times daily. 60 capsule 0     tiZANidine (ZANAFLEX) 4 MG tablet Take 1 tablet (4 mg total) by mouth 2 (two) times daily as needed. 60 tablet 1        Physical Exam:    Vital Signs:   Vitals:    11/08/22 1108   BP: (!) 141/89   Pulse: 66   Resp: 16   Temp: 97.4 °F (36.3 °C)       General Appearance: Well appearing in no acute distress  Abdomen: Soft, non tender, non distended with normal bowel sounds, no masses    Labs:  Lab Results   Component Value Date    WBC 7.17 10/26/2022    HGB 13.6 10/26/2022    HCT 43.1 10/26/2022     10/26/2022    CHOL 218 (H) 08/01/2022    TRIG 234 (H) 08/01/2022    HDL 45 08/01/2022    ALT 10 10/26/2022    AST 13 10/26/2022     10/26/2022    K 4.3 10/26/2022     10/26/2022    CREATININE 0.9 10/26/2022    BUN 10 10/26/2022    CO2 22 (L) 10/26/2022    TSH 0.793 08/01/2022    HGBA1C 6.8 (H) 10/26/2022       I have explained the risks and benefits of this endoscopic procedure to the patient including but not limited to bleeding, inflammation, infection, perforation, and death.    SEDATION PLAN: per anesthesia      History reviewed, vital signs satisfactory, cardiopulmonary status satisfactory, sedation options, risks and plans have been discussed with the patient   All their questions were answered and the patient agrees to the sedation procedures as planned and the patient is deemed an appropriate candidate for the sedation as planned.    Procedure explained to patient, informed consent obtained and placed in chart.        Kishore Monsalve MD

## 2022-11-14 ENCOUNTER — PATIENT MESSAGE (OUTPATIENT)
Dept: GASTROENTEROLOGY | Facility: CLINIC | Age: 62
End: 2022-11-14
Payer: MEDICARE

## 2022-11-14 LAB
FINAL PATHOLOGIC DIAGNOSIS: NORMAL
GROSS: NORMAL
Lab: NORMAL

## 2022-11-18 ENCOUNTER — OFFICE VISIT (OUTPATIENT)
Dept: PRIMARY CARE CLINIC | Facility: CLINIC | Age: 62
End: 2022-11-18
Payer: MEDICARE

## 2022-11-18 ENCOUNTER — PATIENT MESSAGE (OUTPATIENT)
Dept: GASTROENTEROLOGY | Facility: CLINIC | Age: 62
End: 2022-11-18
Payer: MEDICARE

## 2022-11-18 VITALS
SYSTOLIC BLOOD PRESSURE: 126 MMHG | HEIGHT: 62 IN | WEIGHT: 188.94 LBS | DIASTOLIC BLOOD PRESSURE: 82 MMHG | HEART RATE: 61 BPM | BODY MASS INDEX: 34.77 KG/M2 | TEMPERATURE: 98 F

## 2022-11-18 DIAGNOSIS — E11.69 TYPE 2 DIABETES MELLITUS WITH OTHER SPECIFIED COMPLICATION, WITHOUT LONG-TERM CURRENT USE OF INSULIN: Primary | ICD-10-CM

## 2022-11-18 PROCEDURE — 3066F NEPHROPATHY DOC TX: CPT | Mod: CPTII,S$GLB,, | Performed by: FAMILY MEDICINE

## 2022-11-18 PROCEDURE — 3061F NEG MICROALBUMINURIA REV: CPT | Mod: CPTII,S$GLB,, | Performed by: FAMILY MEDICINE

## 2022-11-18 PROCEDURE — 99999 PR PBB SHADOW E&M-EST. PATIENT-LVL IV: ICD-10-PCS | Mod: PBBFAC,,, | Performed by: FAMILY MEDICINE

## 2022-11-18 PROCEDURE — 3061F PR NEG MICROALBUMINURIA RESULT DOCUMENTED/REVIEW: ICD-10-PCS | Mod: CPTII,S$GLB,, | Performed by: FAMILY MEDICINE

## 2022-11-18 PROCEDURE — 3044F PR MOST RECENT HEMOGLOBIN A1C LEVEL <7.0%: ICD-10-PCS | Mod: CPTII,S$GLB,, | Performed by: FAMILY MEDICINE

## 2022-11-18 PROCEDURE — 3008F PR BODY MASS INDEX (BMI) DOCUMENTED: ICD-10-PCS | Mod: CPTII,S$GLB,, | Performed by: FAMILY MEDICINE

## 2022-11-18 PROCEDURE — 3079F DIAST BP 80-89 MM HG: CPT | Mod: CPTII,S$GLB,, | Performed by: FAMILY MEDICINE

## 2022-11-18 PROCEDURE — 3044F HG A1C LEVEL LT 7.0%: CPT | Mod: CPTII,S$GLB,, | Performed by: FAMILY MEDICINE

## 2022-11-18 PROCEDURE — 1159F MED LIST DOCD IN RCRD: CPT | Mod: CPTII,S$GLB,, | Performed by: FAMILY MEDICINE

## 2022-11-18 PROCEDURE — 3074F PR MOST RECENT SYSTOLIC BLOOD PRESSURE < 130 MM HG: ICD-10-PCS | Mod: CPTII,S$GLB,, | Performed by: FAMILY MEDICINE

## 2022-11-18 PROCEDURE — 1159F PR MEDICATION LIST DOCUMENTED IN MEDICAL RECORD: ICD-10-PCS | Mod: CPTII,S$GLB,, | Performed by: FAMILY MEDICINE

## 2022-11-18 PROCEDURE — 99214 PR OFFICE/OUTPT VISIT, EST, LEVL IV, 30-39 MIN: ICD-10-PCS | Mod: S$GLB,,, | Performed by: FAMILY MEDICINE

## 2022-11-18 PROCEDURE — 3066F PR DOCUMENTATION OF TREATMENT FOR NEPHROPATHY: ICD-10-PCS | Mod: CPTII,S$GLB,, | Performed by: FAMILY MEDICINE

## 2022-11-18 PROCEDURE — 99999 PR PBB SHADOW E&M-EST. PATIENT-LVL IV: CPT | Mod: PBBFAC,,, | Performed by: FAMILY MEDICINE

## 2022-11-18 PROCEDURE — 3079F PR MOST RECENT DIASTOLIC BLOOD PRESSURE 80-89 MM HG: ICD-10-PCS | Mod: CPTII,S$GLB,, | Performed by: FAMILY MEDICINE

## 2022-11-18 PROCEDURE — 3008F BODY MASS INDEX DOCD: CPT | Mod: CPTII,S$GLB,, | Performed by: FAMILY MEDICINE

## 2022-11-18 PROCEDURE — 99214 OFFICE O/P EST MOD 30 MIN: CPT | Mod: S$GLB,,, | Performed by: FAMILY MEDICINE

## 2022-11-18 PROCEDURE — 3074F SYST BP LT 130 MM HG: CPT | Mod: CPTII,S$GLB,, | Performed by: FAMILY MEDICINE

## 2022-11-18 RX ORDER — METFORMIN HYDROCHLORIDE 500 MG/1
1000 TABLET, EXTENDED RELEASE ORAL
Qty: 180 TABLET | Refills: 3 | Status: SHIPPED | OUTPATIENT
Start: 2022-11-18 | End: 2023-09-12 | Stop reason: SDUPTHER

## 2022-11-18 NOTE — PATIENT INSTRUCTIONS
Seemed to be doing well on Ozempic! A1c is under control, you have lost some weight.  Keep up the good work!     Refill of Ozempic sent to pharmacy today.  Hopefully, they will give you a 90 day supply.    The box does come with six needles, but you will only be using four of them each month.    Continue at the 0.5 mg weekly dose for now.  We will reassess in three months and adjust as needed.    Continue to eat a healthy diet.  Be careful with portion sizes.  Includes lots of fresh fruits, vegetables, whole grains, lean proteins.  See info below.    Keep hydrated.  Be sure to drink at least 8-10, 8 oz, glasses of water every day.    Stay active.  Try to do some sort of physical activity every day.  Nothing outrageous, just try walking for 10-15 minutes each day.     Please keep your appointment in December for screening mammogram.

## 2022-11-18 NOTE — PROGRESS NOTES
"    [facility]       [address] [place] [address]      Phone: [phone]      Fax: [phone]    Cedric Huerta MD                Office Visit  2022        Subjective      HPI:  Luciana Moraes is a 62 y.o. female presents today in clinic for "Follow-up  ."     61-year-old female presents today to follow-up on diabetes.    Has diabetes.  Currently taking metformin XR 1000 mg daily.  Also taking Ozempic 0.5 mg daily.  Doing well with that.  No side effects.  No nausea, vomiting.  Helping to curb appetite.  A1c has come down from 7.4% to 6.8%.  Has lost approximately 10 lb since starting on it.  Continues to watch diet.    Has multiple joint pains, osteoarthritis.  Currently seeing sports Medicine/neurosurgery/pain management for this.  Has had several procedures.    Also has rheumatoid arthritis.  Was following with Rheumatology in [place].  From notes, it appears to be seropositive RA.  Was taking Plaquenil 400 mg daily.  Has an appointment with Rheumatology in December to get established.    PMH:  Dm, HLD, RA, OA, chronic pains.  PSH:  Left shoulder.  Left hip labrum.  Hysterectomy.  One ovary.  FNH:  DM, CVA  Allergies:  NKDA  Social:  On disability.  Previously worked for [employer].  T:  Denies.  Quit   A:  Denies  D:  Denies    Exercise:  Does chair exercises/stretches.    Pap:  Had hysterectomy.  MM2022?  Has appointment for MMG on 2022    Colon:  2022.  Two polyps, tubular adenomas.  Repeat five years ()      The following were updated and reviewed by myself in the chart: medications, past medical history, past surgical history, family history, social history, and allergies.     Medications:  Current Outpatient Medications on File Prior to Visit   Medication Sig Dispense Refill    aspirin 81 MG Chew Take 81 mg by mouth.      atorvastatin (LIPITOR) 80 MG tablet Take 1 tablet (80 mg total) by mouth " once daily. 90 tablet 3    diclofenac sodium (VOLTAREN) 1 % Gel Apply 2 g topically 4 (four) times daily. 100 g 6    ergocalciferol (ERGOCALCIFEROL) 50,000 unit Cap Take 1 capsule (50,000 Units total) by mouth every 7 days. 12 capsule 1    hydrOXYchloroQUINE (PLAQUENIL) 200 mg tablet Take 1 tablet (200 mg total) by mouth 2 (two) times daily. 180 tablet 1    ibuprofen (ADVIL,MOTRIN) 800 MG tablet Take 800 mg by mouth 3 (three) times daily.      nabumetone (RELAFEN) 750 MG tablet Take 1 tablet (750 mg total) by mouth 2 (two) times daily as needed for Pain. 60 tablet 1    polyethylene glycol (GLYCOLAX) 17 gram PwPk Take 17 g by mouth once daily. 30 each 11    pregabalin (LYRICA) 75 MG capsule Take 1 capsule (75 mg total) by mouth 2 (two) times daily. 60 capsule 0    tiZANidine (ZANAFLEX) 4 MG tablet Take 1 tablet (4 mg total) by mouth 2 (two) times daily as needed. 60 tablet 1    [DISCONTINUED] metFORMIN (GLUCOPHAGE-XR) 500 MG ER 24hr tablet Take 2 tablets (1,000 mg total) by mouth daily with breakfast. 180 tablet 3    [DISCONTINUED] semaglutide (OZEMPIC) 0.25 mg or 0.5 mg(2 mg/1.5 mL) pen injector Inject 0.5 mg into the skin every 7 days. 1 pen 12     No current facility-administered medications on file prior to visit.        PMHx:  Past Medical History:   Diagnosis Date    Diabetes mellitus, type 2     Hyperlipidemia     Osteoarthritis     Polymyositis     Rheumatoid arthritis       Patient Active Problem List    Diagnosis Date Noted    Colon cancer screening 11/08/2022        PSHx:  Past Surgical History:   Procedure Laterality Date    CARPAL TUNNEL RELEASE Bilateral     COLONOSCOPY N/A 11/8/2022    Procedure: COLONOSCOPY;  Surgeon: Kishore Monsalve MD;  Location: [facility] ENDO;  Service: Endoscopy;  Laterality: N/A;    EPIDURAL STEROID INJECTION INTO CERVICAL SPINE N/A 9/22/2022    Procedure: C7/T1 IL [facility];  Surgeon: Luis M Pereyra MD;  Location: [facility] PAIN MGT;  Service: Pain Management;  Laterality: N/A;    HIP  "SURGERY      HYSTERECTOMY  2008    due to bleeding, pain from fibroids, retains one ovary    INJECTION OF ANESTHETIC AGENT AROUND MEDIAL BRANCH NERVES INNERVATING CERVICAL FACET JOINT Bilateral 10/31/2022    Procedure: Bilateral C5-7 MBB with RN IV sedation;  Surgeon: Luis M Pereyra MD;  Location: [facility];  Service: Pain Management;  Laterality: Bilateral;        FHx:  Family History   Problem Relation Age of Onset    Rheum arthritis Mother     No Known Problems Father         Social:  Social History     Socioeconomic History    Marital status:    Tobacco Use    Smoking status: Former     Types: Cigarettes     Quit date: 3/17/2001     Years since quittin.6    Smokeless tobacco: Never   Substance and Sexual Activity    Alcohol use: Not Currently    Drug use: Never    Sexual activity: Yes     Partners: Male        Allergies:  Review of patient's allergies indicates:  No Known Allergies     ROS:  Review of Systems   Constitutional:  Negative for activity change, appetite change, chills and fever.   HENT:  Negative for congestion, postnasal drip, rhinorrhea, sore throat and trouble swallowing.    Respiratory:  Negative for cough, shortness of breath and wheezing.    Cardiovascular:  Negative for chest pain and palpitations.   Gastrointestinal:  Negative for abdominal pain, constipation, diarrhea, nausea and vomiting.   Genitourinary:  Negative for difficulty urinating.   Musculoskeletal:  Positive for arthralgias.   Skin:  Negative for color change and rash.   Neurological:  Negative for speech difficulty and headaches.   All other systems reviewed and are negative.       Objective      /82   Pulse 61   Temp 97.9 °F (36.6 °C)   Ht 5' 2" (1.575 m)   Wt 85.7 kg (188 lb 15 oz)   BMI 34.56 kg/m²   Ht Readings from Last 3 Encounters:   22 5' 2" (1.575 m)   22 5' 2" (1.575 m)   10/31/22 5' 2" (1.575 m)     Wt Readings from Last 3 Encounters:   22 85.7 kg (188 lb 15 oz) "   11/08/22 84.2 kg (185 lb 11.8 oz)   10/31/22 86.3 kg (190 lb 4.1 oz)       PHYSICAL EXAM:  Physical Exam  Vitals and nursing note reviewed.   Constitutional:       General: She is not in acute distress.     Appearance: Normal appearance.   HENT:      Head: Normocephalic and atraumatic.      Right Ear: Tympanic membrane, ear canal and external ear normal.      Left Ear: Tympanic membrane, ear canal and external ear normal.      Nose: Nose normal. No congestion or rhinorrhea.      Mouth/Throat:      Mouth: Mucous membranes are moist.      Pharynx: Oropharynx is clear. No oropharyngeal exudate or posterior oropharyngeal erythema.   Eyes:      Extraocular Movements: Extraocular movements intact.      Conjunctiva/sclera: Conjunctivae normal.      Pupils: Pupils are equal, round, and reactive to light.   Cardiovascular:      Rate and Rhythm: Normal rate and regular rhythm.   Pulmonary:      Effort: Pulmonary effort is normal. No respiratory distress.      Breath sounds: No wheezing, rhonchi or rales.   Musculoskeletal:         General: Normal range of motion.      Cervical back: Normal range of motion.   Lymphadenopathy:      Cervical: No cervical adenopathy.   Skin:     General: Skin is warm and dry.      Findings: No rash.   Neurological:      Mental Status: She is alert.            LABS / IMAGING:  Recent Results (from the past 4368 hour(s))   CBC Auto Differential    Collection Time: 08/01/22  2:55 PM   Result Value Ref Range    WBC 8.25 3.90 - 12.70 K/uL    RBC 4.75 4.00 - 5.40 M/uL    Hemoglobin 12.8 12.0 - 16.0 g/dL    Hematocrit 42.5 37.0 - 48.5 %    MCV 90 82 - 98 fL    MCH 26.9 (L) 27.0 - 31.0 pg    MCHC 30.1 (L) 32.0 - 36.0 g/dL    RDW 15.1 (H) 11.5 - 14.5 %    Platelets 231 150 - 450 K/uL    MPV 11.7 9.2 - 12.9 fL    Immature Granulocytes 0.2 0.0 - 0.5 %    Gran # (ANC) 3.7 1.8 - 7.7 K/uL    Immature Grans (Abs) 0.02 0.00 - 0.04 K/uL    Lymph # 3.5 1.0 - 4.8 K/uL    Mono # 0.7 0.3 - 1.0 K/uL    Eos # 0.3 0.0  - 0.5 K/uL    Baso # 0.03 0.00 - 0.20 K/uL    nRBC 0 0 /100 WBC    Gran % 44.4 38.0 - 73.0 %    Lymph % 42.8 18.0 - 48.0 %    Mono % 8.8 4.0 - 15.0 %    Eosinophil % 3.4 0.0 - 8.0 %    Basophil % 0.4 0.0 - 1.9 %    Differential Method Automated    Comprehensive Metabolic Panel    Collection Time: 08/01/22  2:55 PM   Result Value Ref Range    Sodium 139 136 - 145 mmol/L    Potassium 3.8 3.5 - 5.1 mmol/L    Chloride 105 95 - 110 mmol/L    CO2 23 23 - 29 mmol/L    Glucose 162 (H) 70 - 110 mg/dL    BUN 15 6 - 20 mg/dL    Creatinine 0.9 0.5 - 1.4 mg/dL    Calcium 9.1 8.7 - 10.5 mg/dL    Total Protein 7.1 6.0 - 8.4 g/dL    Albumin 3.5 3.5 - 5.2 g/dL    Total Bilirubin 0.4 0.1 - 1.0 mg/dL    Alkaline Phosphatase 98 55 - 135 U/L    AST 15 10 - 40 U/L    ALT 21 10 - 44 U/L    Anion Gap 11 8 - 16 mmol/L    eGFR >60.0 >60 mL/min/1.73 m^2   TSH    Collection Time: 08/01/22  2:55 PM   Result Value Ref Range    TSH 0.793 0.400 - 4.000 uIU/mL   Lipid Panel    Collection Time: 08/01/22  2:55 PM   Result Value Ref Range    Cholesterol 218 (H) 120 - 199 mg/dL    Triglycerides 234 (H) 30 - 150 mg/dL    HDL 45 40 - 75 mg/dL    LDL Cholesterol 126.2 63.0 - 159.0 mg/dL    HDL/Cholesterol Ratio 20.6 20.0 - 50.0 %    Total Cholesterol/HDL Ratio 4.8 2.0 - 5.0    Non-HDL Cholesterol 173 mg/dL   Hemoglobin A1C    Collection Time: 08/01/22  2:55 PM   Result Value Ref Range    Hemoglobin A1C 7.4 (H) 4.0 - 5.6 %    Estimated Avg Glucose 166 (H) 68 - 131 mg/dL   Microalbumin/creatinine urine ratio    Collection Time: 08/01/22  3:01 PM   Result Value Ref Range    Microalbumin, Urine 11.0 ug/mL    Creatinine, Urine 310.0 15.0 - 325.0 mg/dL    Microalb/Creat Ratio 3.5 0.0 - 30.0 ug/mg   POCT glucose    Collection Time: 09/22/22 10:57 AM   Result Value Ref Range    POCT Glucose 83 70 - 110 mg/dL   Urinalysis    Collection Time: 09/26/22  9:24 AM   Result Value Ref Range    Specimen UA Urine, Clean Catch     Color, UA Yellow Yellow, Straw, Deepti     Appearance, UA Clear Clear    pH, UA 6.0 5.0 - 8.0    Specific Gravity, UA >=1.030 (A) 1.005 - 1.030    Protein, UA Negative Negative    Glucose, UA Negative Negative    Ketones, UA Negative Negative    Bilirubin (UA) Negative Negative    Occult Blood UA Negative Negative    Nitrite, UA Negative Negative    Leukocytes, UA Negative Negative   CBC Auto Differential    Collection Time: 09/26/22  9:46 AM   Result Value Ref Range    WBC 10.53 3.90 - 12.70 K/uL    RBC 5.19 4.00 - 5.40 M/uL    Hemoglobin 14.3 12.0 - 16.0 g/dL    Hematocrit 45.7 37.0 - 48.5 %    MCV 88 82 - 98 fL    MCH 27.6 27.0 - 31.0 pg    MCHC 31.3 (L) 32.0 - 36.0 g/dL    RDW 15.0 (H) 11.5 - 14.5 %    Platelets 147 (L) 150 - 450 K/uL    MPV 11.5 9.2 - 12.9 fL    Immature Granulocytes 0.1 0.0 - 0.5 %    Gran # (ANC) 3.9 1.8 - 7.7 K/uL    Immature Grans (Abs) 0.01 0.00 - 0.04 K/uL    Lymph # 5.6 (H) 1.0 - 4.8 K/uL    Mono # 0.8 0.3 - 1.0 K/uL    Eos # 0.2 0.0 - 0.5 K/uL    Baso # 0.03 0.00 - 0.20 K/uL    nRBC 0 0 /100 WBC    Gran % 37.2 (L) 38.0 - 73.0 %    Lymph % 53.4 (H) 18.0 - 48.0 %    Mono % 7.4 4.0 - 15.0 %    Eosinophil % 1.6 0.0 - 8.0 %    Basophil % 0.3 0.0 - 1.9 %    Platelet Estimate Clumped (A)     Differential Method Automated    Comprehensive Metabolic Panel    Collection Time: 09/26/22  9:46 AM   Result Value Ref Range    Sodium 141 136 - 145 mmol/L    Potassium 4.0 3.5 - 5.1 mmol/L    Chloride 110 95 - 110 mmol/L    CO2 21 (L) 23 - 29 mmol/L    Glucose 84 70 - 110 mg/dL    BUN 14 6 - 20 mg/dL    Creatinine 0.8 0.5 - 1.4 mg/dL    Calcium 8.8 8.7 - 10.5 mg/dL    Total Protein 7.4 6.0 - 8.4 g/dL    Albumin 3.9 3.5 - 5.2 g/dL    Total Bilirubin 0.5 0.1 - 1.0 mg/dL    Alkaline Phosphatase 93 55 - 135 U/L    AST 15 10 - 40 U/L    ALT 12 10 - 44 U/L    Anion Gap 10 8 - 16 mmol/L    eGFR >60 >60 mL/min/1.73 m^2   Sedimentation rate    Collection Time: 09/26/22  9:46 AM   Result Value Ref Range    Sed Rate 48 (H) 0 - 36 mm/Hr   C-Reactive Protein     Collection Time: 09/26/22  9:46 AM   Result Value Ref Range    CRP 2.4 0.0 - 8.2 mg/L   Rheumatoid Factor    Collection Time: 09/26/22  9:46 AM   Result Value Ref Range    Rheumatoid Factor 19.0 (H) 0.0 - 15.0 IU/mL   Cyclic Citrullinated Peptide Antibody, IgG    Collection Time: 09/26/22  9:46 AM   Result Value Ref Range    CCP Antibodies 354.4 (H) <5.0 U/mL   RICHARD Screen w/Reflex    Collection Time: 09/26/22  9:46 AM   Result Value Ref Range    RICHARD Screen Positive (A) Negative <1:80   C3 Complement    Collection Time: 09/26/22  9:46 AM   Result Value Ref Range    Complement (C-3) 150 50 - 180 mg/dL   Anti-DNA Ab, Double-Stranded    Collection Time: 09/26/22  9:46 AM   Result Value Ref Range    ds DNA Ab Negative 1:10 Negative 1:10   C4 Complement    Collection Time: 09/26/22  9:46 AM   Result Value Ref Range    Complement (C-4) 37 11 - 44 mg/dL   RICHARD Pattern 1    Collection Time: 09/26/22  9:46 AM   Result Value Ref Range    RICHARD PATTERN 1 Speckled    RICHARD Profile    Collection Time: 09/26/22  9:46 AM   Result Value Ref Range    Anti Sm Antibody 0.07 0.00 - 0.99 Ratio    Anti-Sm Interpretation Negative Negative    Anti-SSA Antibody 0.05 0.00 - 0.99 Ratio    Anti-SSA Interpretation Negative Negative    Anti-SSB Antibody 0.09 0.00 - 0.99 Ratio    Anti-SSB Interpretation Negative Negative    ds DNA Ab Negative 1:10 Negative 1:10    Anti Sm/RNP Antibody 0.08 0.00 - 0.99 Ratio    Anti-Sm/RNP Interpretation Negative Negative   RICHARD Titer 1    Collection Time: 09/26/22  9:46 AM   Result Value Ref Range    RICHARD Titer 1 >=1:2560    Urinalysis    Collection Time: 10/26/22  7:52 AM   Result Value Ref Range    Specimen UA Urine, Clean Catch     Color, UA Yellow Yellow, Straw, Deepti    Appearance, UA Clear Clear    pH, UA 6.0 5.0 - 8.0    Specific Gravity, UA 1.020 1.005 - 1.030    Protein, UA Negative Negative    Glucose, UA Negative Negative    Ketones, UA Negative Negative    Bilirubin (UA) Negative Negative    Occult Blood  UA Negative Negative    Nitrite, UA Negative Negative    Leukocytes, UA Trace (A) Negative   Urinalysis Microscopic    Collection Time: 10/26/22  7:52 AM   Result Value Ref Range    WBC, UA 3 0 - 5 /hpf    Bacteria Rare None-Occ /hpf    Squam Epithel, UA 3 /hpf    Microscopic Comment SEE COMMENT    CBC Auto Differential    Collection Time: 10/26/22  7:54 AM   Result Value Ref Range    WBC 7.17 3.90 - 12.70 K/uL    RBC 4.92 4.00 - 5.40 M/uL    Hemoglobin 13.6 12.0 - 16.0 g/dL    Hematocrit 43.1 37.0 - 48.5 %    MCV 88 82 - 98 fL    MCH 27.6 27.0 - 31.0 pg    MCHC 31.6 (L) 32.0 - 36.0 g/dL    RDW 14.8 (H) 11.5 - 14.5 %    Platelets 283 150 - 450 K/uL    MPV 10.9 9.2 - 12.9 fL    Immature Granulocytes 0.0 0.0 - 0.5 %    Gran # (ANC) 2.8 1.8 - 7.7 K/uL    Immature Grans (Abs) 0.00 0.00 - 0.04 K/uL    Lymph # 3.4 1.0 - 4.8 K/uL    Mono # 0.7 0.3 - 1.0 K/uL    Eos # 0.3 0.0 - 0.5 K/uL    Baso # 0.03 0.00 - 0.20 K/uL    nRBC 0 0 /100 WBC    Gran % 39.3 38.0 - 73.0 %    Lymph % 47.0 18.0 - 48.0 %    Mono % 9.5 4.0 - 15.0 %    Eosinophil % 3.8 0.0 - 8.0 %    Basophil % 0.4 0.0 - 1.9 %    Differential Method Automated    Comprehensive Metabolic Panel    Collection Time: 10/26/22  7:54 AM   Result Value Ref Range    Sodium 142 136 - 145 mmol/L    Potassium 4.3 3.5 - 5.1 mmol/L    Chloride 109 95 - 110 mmol/L    CO2 22 (L) 23 - 29 mmol/L    Glucose 108 70 - 110 mg/dL    BUN 10 6 - 20 mg/dL    Creatinine 0.9 0.5 - 1.4 mg/dL    Calcium 9.4 8.7 - 10.5 mg/dL    Total Protein 7.5 6.0 - 8.4 g/dL    Albumin 3.6 3.5 - 5.2 g/dL    Total Bilirubin 0.4 0.1 - 1.0 mg/dL    Alkaline Phosphatase 87 55 - 135 U/L    AST 13 10 - 40 U/L    ALT 10 10 - 44 U/L    Anion Gap 11 8 - 16 mmol/L    eGFR >60 >60 mL/min/1.73 m^2   Sedimentation rate    Collection Time: 10/26/22  7:54 AM   Result Value Ref Range    Sed Rate 49 (H) 0 - 36 mm/Hr   C-Reactive Protein    Collection Time: 10/26/22  7:54 AM   Result Value Ref Range    CRP 5.6 0.0 - 8.2 mg/L   C3  "Complement    Collection Time: 10/26/22  7:54 AM   Result Value Ref Range    Complement (C-3) 152 50 - 180 mg/dL   Anti-DNA Ab, Double-Stranded    Collection Time: 10/26/22  7:54 AM   Result Value Ref Range    ds DNA Ab Negative 1:10 Negative 1:10   C4 Complement    Collection Time: 10/26/22  7:54 AM   Result Value Ref Range    Complement (C-4) 42 11 - 44 mg/dL   HEMOGLOBIN A1C    Collection Time: 10/26/22  7:54 AM   Result Value Ref Range    Hemoglobin A1C 6.8 (H) 4.0 - 5.6 %    Estimated Avg Glucose 148 (H) 68 - 131 mg/dL   POCT glucose    Collection Time: 10/31/22  7:15 AM   Result Value Ref Range    POCT Glucose 93 70 - 110 mg/dL   POCT glucose    Collection Time: 11/08/22 11:26 AM   Result Value Ref Range    POCT Glucose 88 70 - 110 mg/dL   Specimen to Pathology, Surgery Gastrointestinal tract    Collection Time: 11/08/22  1:08 PM   Result Value Ref Range    Final Pathologic Diagnosis       1. Colon, descending, polypectomy:  - Tubular adenoma.  2. Colon, sigmoid, polypectomy:  - Hyperplastic polyp.      Gross       Received in 2 parts:  Part 1:  Pathology ID:  \[facility]\""-  Patient ID:  [id number]  The specimen is received in formalin labeled "descending colon polypectomy".  The specimen consists of one tan-yellow fragment of soft tissue with  measurements 0.6 x 0.3 x 0.3 cm.  The specimen is submitted entirely in  cassette ZUV--1-A  Part 2:   Pathology ID:  \[facility]\""-  Patient ID:  [id number]  The specimen is received in formalin labeled "sigmoid polypectomy".  The  specimen consists of two tan-yellow fragments of soft tissue with  measurements from 0.2 cm to 0.3 cm in greatest dimension.  The specimen is  submitted entirely in cassette NGN--2-A  Nilo Veliz      Disclaimer       Unless the case is a 'gross only' or additional testing only, the final  diagnosis for each specimen is based on a microscopic examination of  appropriate tissue sections.           Assessment    1. Type 2 diabetes " mellitus with other specified complication, without long-term current use of insulin          Louis    Luciana was seen today for follow-up.    Diagnoses and all orders for this visit:    Type 2 diabetes mellitus with other specified complication, without long-term current use of insulin  -     semaglutide (OZEMPIC) 0.25 mg or 0.5 mg(2 mg/1.5 mL) pen injector; Inject 0.5 mg into the skin every 7 days.  -     metFORMIN (GLUCOPHAGE-XR) 500 MG ER 24hr tablet; Take 2 tablets (1,000 mg total) by mouth daily with breakfast.  -     HEMOGLOBIN A1C; Future    Physically, looks good today.  Doing well on Ozempic, metformin.  Continue 0.5 mg.  Refill sent to pharmacy.    Recheck A1c in three months.    FOLLOW-UP:  Follow up in about 3 months (around 2/18/2023) for recheck, check up.    I spent a total of 35 minutes face to face and non-face to face on the date of this visit.This includes time preparing to see the patient (eg, review of tests, notes), obtaining and/or reviewing additional history from an independent historian and/or outside medical records, documenting clinical information in the electronic health record, independently interpreting results and/or communicating results to the patient/family/caregiver, or care coordinator.    Signed by:  Cedric Huerta MD

## 2022-12-05 ENCOUNTER — HOSPITAL ENCOUNTER (OUTPATIENT)
Dept: RADIOLOGY | Facility: HOSPITAL | Age: 62
Discharge: HOME | End: 2022-12-05
Attending: NURSE PRACTITIONER
Payer: MEDICARE

## 2022-12-05 DIAGNOSIS — Z12.31 ENCOUNTER FOR SCREENING MAMMOGRAM FOR BREAST CANCER: ICD-10-CM

## 2022-12-05 PROCEDURE — 77067 MAMMO DIGITAL SCREENING BILAT WITH TOMO: ICD-10-PCS | Mod: 26,,, | Performed by: RADIOLOGY

## 2022-12-05 PROCEDURE — 77063 BREAST TOMOSYNTHESIS BI: CPT | Mod: 26,,, | Performed by: RADIOLOGY

## 2022-12-05 PROCEDURE — 77067 SCR MAMMO BI INCL CAD: CPT | Mod: 26,,, | Performed by: RADIOLOGY

## 2022-12-05 PROCEDURE — 77063 MAMMO DIGITAL SCREENING BILAT WITH TOMO: ICD-10-PCS | Mod: 26,,, | Performed by: RADIOLOGY

## 2022-12-05 PROCEDURE — 77063 BREAST TOMOSYNTHESIS BI: CPT | Mod: TC,PN

## 2022-12-12 ENCOUNTER — PATIENT MESSAGE (OUTPATIENT)
Dept: RHEUMATOLOGY | Facility: CLINIC | Age: 62
End: 2022-12-12

## 2022-12-12 ENCOUNTER — OFFICE VISIT (OUTPATIENT)
Dept: RHEUMATOLOGY | Facility: CLINIC | Age: 62
End: 2022-12-12
Payer: MEDICARE

## 2022-12-12 VITALS
HEIGHT: 62 IN | RESPIRATION RATE: 16 BRPM | BODY MASS INDEX: 36.1 KG/M2 | SYSTOLIC BLOOD PRESSURE: 126 MMHG | DIASTOLIC BLOOD PRESSURE: 71 MMHG | WEIGHT: 196.19 LBS

## 2022-12-12 DIAGNOSIS — Z71.89 COUNSELING ON HEALTH PROMOTION AND DISEASE PREVENTION: ICD-10-CM

## 2022-12-12 DIAGNOSIS — M06.9 RHEUMATOID ARTHRITIS, INVOLVING UNSPECIFIED SITE, UNSPECIFIED WHETHER RHEUMATOID FACTOR PRESENT: Primary | ICD-10-CM

## 2022-12-12 DIAGNOSIS — R76.8 POSITIVE ANA (ANTINUCLEAR ANTIBODY): ICD-10-CM

## 2022-12-12 DIAGNOSIS — E55.9 VITAMIN D DEFICIENCY: ICD-10-CM

## 2022-12-12 PROCEDURE — 99214 OFFICE O/P EST MOD 30 MIN: CPT | Mod: S$GLB,,,

## 2022-12-12 PROCEDURE — 3066F PR DOCUMENTATION OF TREATMENT FOR NEPHROPATHY: ICD-10-PCS | Mod: CPTII,S$GLB,,

## 2022-12-12 PROCEDURE — 3061F NEG MICROALBUMINURIA REV: CPT | Mod: CPTII,S$GLB,,

## 2022-12-12 PROCEDURE — 3008F BODY MASS INDEX DOCD: CPT | Mod: CPTII,S$GLB,,

## 2022-12-12 PROCEDURE — 3044F HG A1C LEVEL LT 7.0%: CPT | Mod: CPTII,S$GLB,,

## 2022-12-12 PROCEDURE — 3074F SYST BP LT 130 MM HG: CPT | Mod: CPTII,S$GLB,,

## 2022-12-12 PROCEDURE — 1160F PR REVIEW ALL MEDS BY PRESCRIBER/CLIN PHARMACIST DOCUMENTED: ICD-10-PCS | Mod: CPTII,S$GLB,,

## 2022-12-12 PROCEDURE — 1159F MED LIST DOCD IN RCRD: CPT | Mod: CPTII,S$GLB,,

## 2022-12-12 PROCEDURE — 1159F PR MEDICATION LIST DOCUMENTED IN MEDICAL RECORD: ICD-10-PCS | Mod: CPTII,S$GLB,,

## 2022-12-12 PROCEDURE — 3078F DIAST BP <80 MM HG: CPT | Mod: CPTII,S$GLB,,

## 2022-12-12 PROCEDURE — 3066F NEPHROPATHY DOC TX: CPT | Mod: CPTII,S$GLB,,

## 2022-12-12 PROCEDURE — 3008F PR BODY MASS INDEX (BMI) DOCUMENTED: ICD-10-PCS | Mod: CPTII,S$GLB,,

## 2022-12-12 PROCEDURE — 99999 PR PBB SHADOW E&M-EST. PATIENT-LVL IV: CPT | Mod: PBBFAC,,,

## 2022-12-12 PROCEDURE — 3074F PR MOST RECENT SYSTOLIC BLOOD PRESSURE < 130 MM HG: ICD-10-PCS | Mod: CPTII,S$GLB,,

## 2022-12-12 PROCEDURE — 3044F PR MOST RECENT HEMOGLOBIN A1C LEVEL <7.0%: ICD-10-PCS | Mod: CPTII,S$GLB,,

## 2022-12-12 PROCEDURE — 99999 PR PBB SHADOW E&M-EST. PATIENT-LVL IV: ICD-10-PCS | Mod: PBBFAC,,,

## 2022-12-12 PROCEDURE — 3061F PR NEG MICROALBUMINURIA RESULT DOCUMENTED/REVIEW: ICD-10-PCS | Mod: CPTII,S$GLB,,

## 2022-12-12 PROCEDURE — 3078F PR MOST RECENT DIASTOLIC BLOOD PRESSURE < 80 MM HG: ICD-10-PCS | Mod: CPTII,S$GLB,,

## 2022-12-12 PROCEDURE — 99214 PR OFFICE/OUTPT VISIT, EST, LEVL IV, 30-39 MIN: ICD-10-PCS | Mod: S$GLB,,,

## 2022-12-12 PROCEDURE — 1160F RVW MEDS BY RX/DR IN RCRD: CPT | Mod: CPTII,S$GLB,,

## 2022-12-12 RX ORDER — HYDROXYCHLOROQUINE SULFATE 200 MG/1
200 TABLET, FILM COATED ORAL 2 TIMES DAILY
Qty: 180 TABLET | Refills: 1 | Status: SHIPPED | OUTPATIENT
Start: 2022-12-12 | End: 2023-07-03 | Stop reason: SDUPTHER

## 2022-12-12 RX ORDER — ERGOCALCIFEROL 1.25 MG/1
50000 CAPSULE ORAL
Qty: 12 CAPSULE | Refills: 1 | Status: SHIPPED | OUTPATIENT
Start: 2022-12-12 | End: 2023-02-17 | Stop reason: ALTCHOICE

## 2022-12-12 RX ORDER — METHYLPREDNISOLONE 4 MG/1
TABLET ORAL
Qty: 21 EACH | Refills: 0 | Status: SHIPPED | OUTPATIENT
Start: 2022-12-12 | End: 2023-02-17 | Stop reason: ALTCHOICE

## 2022-12-12 NOTE — PROGRESS NOTES
RHEUMATOLOGY OUTPATIENT CLINIC NOTE    12/12/2022    Subjective:       Patient ID: Luciana Moraes is a 62 y.o. female.    Chief Complaint: Rheumatoid Arthritis    HPI   Luciana Moraes is a 62 y.o. pleasant female here for rheumatology follow up for seropositive RA and positive RICHARD.     She has not been taking hydroxychloroquine. She says she forgot what it was for. Reports pain in her bruce hands, hips, shoulders, 7/10 today. Worse with increased activity and mostly in thumb MCP.     No joint swelling, no warmth to joints, no morning stiffness >1hr.   No muscle weakness.  No synovitis.   No fever, lymphadenopathy, no unexpected weight loss, no fatigue  No rashes on palms, no vasculitis  No shortness of breath or pleuritic chest pain.     Rheumatologic review of systems negative otherwise.       Initial HPI:  Previously seen by Dr. Camargo at [facility].   Previously found to have elevated CCP >300 and RICHARD 1:320.     She has previously been following with Rheum in [place]. Recently moved to [place] and is seeking out Rheum care in . She believes she is taking hcq 200 mg bid but not certain. She fills her pill box up each week and takes whatever is in there. Today her pain ins 7/10.  She states she has pain all of the time. Mostly in her hips and low back. Occ with pain in her left thumb and bruce PIPs. She is right handed.     Recently started ozempic for weight loss and is down 5-7 lbs.   Family History:Mom RA, Great aunt and cousin with Lupus    Prior therapies:  Mtx HCQ    Physical Exam  No obvious synovitis, no erythema, no increased warmth to any joints bruce UE/LE.   Strength 5/5 bruce UE/LE  No rash.   No oral lesions.         Past Medical History:   Diagnosis Date    Diabetes mellitus, type 2     Hyperlipidemia     Osteoarthritis     Polymyositis     Rheumatoid arthritis      Past Surgical History:   Procedure Laterality Date    CARPAL TUNNEL RELEASE Bilateral     COLONOSCOPY N/A 11/08/2022  "   Procedure: COLONOSCOPY;  Surgeon: Kishore Monsalve MD;  Location: [facility] ENDO;  Service: Endoscopy;  Laterality: N/A;    EPIDURAL STEROID INJECTION INTO CERVICAL SPINE N/A 2022    Procedure: C7/T1 IL MERCY;  Surgeon: Luis M Pereyra MD;  Location: [facility] PAIN MGT;  Service: Pain Management;  Laterality: N/A;    HIP SURGERY      HYSTERECTOMY  2008    due to bleeding, pain from fibroids, retains one ovary    INJECTION OF ANESTHETIC AGENT AROUND MEDIAL BRANCH NERVES INNERVATING CERVICAL FACET JOINT Bilateral 10/31/2022    Procedure: Bilateral C5-7 MBB with RN IV sedation;  Surgeon: Luis M Pereyra MD;  Location: [facility] PAIN MGT;  Service: Pain Management;  Laterality: Bilateral;    OOPHORECTOMY      1 ovary removed     Family History   Problem Relation Age of Onset    Rheum arthritis Mother     No Known Problems Father      Social History     Socioeconomic History    Marital status:    Tobacco Use    Smoking status: Former     Types: Cigarettes     Quit date: 3/17/2001     Years since quittin.7    Smokeless tobacco: Never   Substance and Sexual Activity    Alcohol use: Not Currently    Drug use: Never    Sexual activity: Yes     Partners: Male     Review of patient's allergies indicates:  No Known Allergies        Objective:   /71   Resp 16   Ht 5' 2" (1.575 m)   Wt 89 kg (196 lb 3.4 oz)   BMI 35.89 kg/m²   Immunization History   Administered Date(s) Administered    COVID-19, MRNA, LN-S, PF (Pfizer) (Purple Cap) 2021, 2021        Recent Results (from the past 672 hour(s))   CBC Auto Differential    Collection Time: 22  9:23 AM   Result Value Ref Range    WBC 6.86 3.90 - 12.70 K/uL    RBC 4.91 4.00 - 5.40 M/uL    Hemoglobin 13.6 12.0 - 16.0 g/dL    Hematocrit 43.9 37.0 - 48.5 %    MCV 89 82 - 98 fL    MCH 27.7 27.0 - 31.0 pg    MCHC 31.0 (L) 32.0 - 36.0 g/dL    RDW 15.2 (H) 11.5 - 14.5 %    Platelets 157 150 - 450 K/uL    MPV 12.1 9.2 - 12.9 fL    Immature Granulocytes " 0.3 0.0 - 0.5 %    Gran # (ANC) 2.9 1.8 - 7.7 K/uL    Immature Grans (Abs) 0.02 0.00 - 0.04 K/uL    Lymph # 3.1 1.0 - 4.8 K/uL    Mono # 0.6 0.3 - 1.0 K/uL    Eos # 0.2 0.0 - 0.5 K/uL    Baso # 0.03 0.00 - 0.20 K/uL    nRBC 0 0 /100 WBC    Gran % 42.6 38.0 - 73.0 %    Lymph % 45.3 18.0 - 48.0 %    Mono % 8.5 4.0 - 15.0 %    Eosinophil % 2.9 0.0 - 8.0 %    Basophil % 0.4 0.0 - 1.9 %    Differential Method Automated    Comprehensive Metabolic Panel    Collection Time: 12/05/22  9:23 AM   Result Value Ref Range    Sodium 140 136 - 145 mmol/L    Potassium 4.3 3.5 - 5.1 mmol/L    Chloride 109 95 - 110 mmol/L    CO2 24 23 - 29 mmol/L    Glucose 157 (H) 70 - 110 mg/dL    BUN 13 6 - 20 mg/dL    Creatinine 0.9 0.5 - 1.4 mg/dL    Calcium 9.1 8.7 - 10.5 mg/dL    Total Protein 7.0 6.0 - 8.4 g/dL    Albumin 3.6 3.5 - 5.2 g/dL    Total Bilirubin 0.5 0.1 - 1.0 mg/dL    Alkaline Phosphatase 88 55 - 135 U/L    AST 18 10 - 40 U/L    ALT 13 10 - 44 U/L    Anion Gap 7 (L) 8 - 16 mmol/L    eGFR >60.0 >60 mL/min/1.73 m^2   Sedimentation rate    Collection Time: 12/05/22  9:23 AM   Result Value Ref Range    Sed Rate 39 (H) 0 - 36 mm/Hr   C-Reactive Protein    Collection Time: 12/05/22  9:23 AM   Result Value Ref Range    CRP 6.1 0.0 - 8.2 mg/L        No results found for: TBGOLDPLUS   No results found for: HAV, HEPAIGM, HEPBIGM, HEPBCAB, HBEAG, HEPCAB     Xray Right hand 5/30/2019  There is a suggestion of an erosion at the 2nd metacarpal base. No other finding to suggest erosion. No fracture.     Xray Left hand 5/30/2019  There is a suggestion of an erosion at the 2nd metacarpal base. No other finding to suggest erosion. No fracture.     Xray lumbar spine 7/22/21  FINDINGS:   Alignment is unremarkable. Vertebral body heights are maintained. Intervertebral disc heights are within normal limits. Facet arthropathy at L5-S1, to a lesser extent L4-5. Pedicles are intact and symmetric.      Xray hip with pelvis 7/22/21  Bones: Sclerotic  changes of the bilateral SI joints, worse on the right. Degenerative changes of the pubic bones. Overall decreased bone mineralization.   Joints: Hip joint spaces are similar to prior study. SI joints and pubic symphysis are intact.   Soft Tissues: Pelvic phleboliths present. No significant soft tissue abnormality.    Xray thoracic spine 4/18/22  FINDINGS:   Mild levoconvex curvature of the thoracolumbar spine. Vertebral bodies demonstrate normal height. Multilevel decreased disc space height. No acute fracture or acute subluxation.  MRI cspine 8/8/2022  Impression:     Multilevel degenerative changes as detailed above.     Minimal spinal canal stenosis at C4-C5 and C5-C6.     Varying degrees of bilateral neural foraminal stenosis as detailed above, most severe at the right C5-C6 level.    Vitamin D deficiency       HLAB27 negative 10/3/2019 (per chart review external labs)    Xray bruce feet 9/26/22  Right: There is no radiographic evidence of acute osseous, articular, or soft tissue abnormality.  Joint spaces are preserved.     Left: There is no radiographic evidence of acute osseous, articular, or soft tissue abnormality.  Joint spaces are preserved.    Xray bruce hands 9/26/22  FINDINGS:  Right hand: There is no radiographic evidence of acute osseous, articular, or soft tissue abnormality.  There is mild osteoarthritis seen scattered throughout the IP and MCP joints as well as the 1st CMC joint.  No erosive changes demonstrated     Left hand: There is no radiographic evidence of acute osseous, articular, or soft tissue abnormality.  There is mild osteoarthritis seen scattered throughout the IP and MCP joints as well as the 1st CMC joint.  No erosive changes demonstrated      Assessment:       1. Rheumatoid arthritis, involving unspecified site, unspecified whether rheumatoid factor present    2. Vitamin D deficiency    3. Positive RICHARD (antinuclear antibody)    4. Counseling on health promotion and disease prevention             Impression:     Seropositive Rheumatoid Arthriitis   Dx 2010 with elevated CCP (>300) and RICHARD 1:320  Prior on mtx, hcq.  Chronic pain in low back, hips. May be moreso from lumbar facetarthropathy  09/26/2022 RF 19, .4  Has not been taking hydroxychloroquine regularly.       OA and Facet arthropathy L4-L5, L5-S1 with nikhil sciatica  Prior completed PT and NSAIDs without help.   Nikhil hip injection 11/18   Troch bursa injection 3/19  On gabapentin 100 mg q day and 80 mg at bedtime  Flexeril 10 mg at bedtime  Follows with pain mgmt for injections.       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 Prior had neg MRI. Encourage cancer screening.       Vit D def  Level 15.8 on 2/22      Other specified counseling  Over 10 minutes spent regarding below topics:  - Nutrition and exercise counseling.  - Medication counseling provided.     Plan:             Recommend continue hcq 200 mg bid pending workup  Annual eye exams    Medrol dosepack to pharmacy. Monitor sugars while on this medication.    Vit D 50K units weekly.     Apply topical Voltaren/diclofenac to affected joint 3-4 times daily as needed    Continue following with pain mgmt    Discussed taking medication regularly.     Repeat Xrays hand/foot 9/2023  Follow up 3-4 months with reg4, vit D prior    Indiana Olmos PA-C  [facility]  Rheumatology       35 minutes of total time spent on the encounter, which includes face to face time and non-face to face time preparing to see the patient (eg, review of tests), Obtaining and/or reviewing separately obtained history, Documenting clinical information in the electronic or other health record, Independently interpreting results (not separately reported) and communicating results to the patient/family/caregiver, or Care coordination (not separately reported).

## 2022-12-12 NOTE — PATIENT INSTRUCTIONS
Take hydroxychloroquine daily. This will help with rheumatoid arthritis pain.   You have to take consistently!    Medrol dosepack -  This is for current flare. Watch sugar levels while on this.     Vitamin D once a week.

## 2023-01-05 ENCOUNTER — PATIENT MESSAGE (OUTPATIENT)
Dept: RESEARCH | Facility: HOSPITAL | Age: 63
End: 2023-01-05
Payer: MEDICARE

## 2023-02-13 ENCOUNTER — LAB VISIT (OUTPATIENT)
Dept: LAB | Facility: HOSPITAL | Age: 63
End: 2023-02-13
Attending: FAMILY MEDICINE
Payer: MEDICARE

## 2023-02-13 DIAGNOSIS — E11.69 TYPE 2 DIABETES MELLITUS WITH OTHER SPECIFIED COMPLICATION, WITHOUT LONG-TERM CURRENT USE OF INSULIN: ICD-10-CM

## 2023-02-13 LAB
ESTIMATED AVG GLUCOSE: 140 MG/DL (ref 68–131)
HBA1C MFR BLD: 6.5 % (ref 4–5.6)

## 2023-02-13 PROCEDURE — 36415 COLL VENOUS BLD VENIPUNCTURE: CPT | Mod: PN | Performed by: FAMILY MEDICINE

## 2023-02-13 PROCEDURE — 83036 HEMOGLOBIN GLYCOSYLATED A1C: CPT | Performed by: FAMILY MEDICINE

## 2023-02-17 ENCOUNTER — OFFICE VISIT (OUTPATIENT)
Dept: PRIMARY CARE CLINIC | Facility: CLINIC | Age: 63
End: 2023-02-17
Payer: MEDICARE

## 2023-02-17 VITALS
BODY MASS INDEX: 34.98 KG/M2 | RESPIRATION RATE: 16 BRPM | TEMPERATURE: 97 F | SYSTOLIC BLOOD PRESSURE: 120 MMHG | DIASTOLIC BLOOD PRESSURE: 80 MMHG | HEART RATE: 52 BPM | OXYGEN SATURATION: 98 % | WEIGHT: 190.06 LBS | HEIGHT: 62 IN

## 2023-02-17 DIAGNOSIS — E11.69 TYPE 2 DIABETES MELLITUS WITH OTHER SPECIFIED COMPLICATION, WITHOUT LONG-TERM CURRENT USE OF INSULIN: Primary | ICD-10-CM

## 2023-02-17 DIAGNOSIS — R63.4 WEIGHT LOSS: ICD-10-CM

## 2023-02-17 DIAGNOSIS — M06.9 RHEUMATOID ARTHRITIS, INVOLVING UNSPECIFIED SITE, UNSPECIFIED WHETHER RHEUMATOID FACTOR PRESENT: ICD-10-CM

## 2023-02-17 DIAGNOSIS — R21 RASH: ICD-10-CM

## 2023-02-17 DIAGNOSIS — M54.12 CERVICAL RADICULOPATHY: ICD-10-CM

## 2023-02-17 PROCEDURE — 99999 PR PBB SHADOW E&M-EST. PATIENT-LVL V: CPT | Mod: PBBFAC,,, | Performed by: FAMILY MEDICINE

## 2023-02-17 PROCEDURE — 3074F PR MOST RECENT SYSTOLIC BLOOD PRESSURE < 130 MM HG: ICD-10-PCS | Mod: CPTII,S$GLB,, | Performed by: FAMILY MEDICINE

## 2023-02-17 PROCEDURE — 1160F RVW MEDS BY RX/DR IN RCRD: CPT | Mod: CPTII,S$GLB,, | Performed by: FAMILY MEDICINE

## 2023-02-17 PROCEDURE — 99215 PR OFFICE/OUTPT VISIT, EST, LEVL V, 40-54 MIN: ICD-10-PCS | Mod: S$GLB,,, | Performed by: FAMILY MEDICINE

## 2023-02-17 PROCEDURE — 1159F PR MEDICATION LIST DOCUMENTED IN MEDICAL RECORD: ICD-10-PCS | Mod: CPTII,S$GLB,, | Performed by: FAMILY MEDICINE

## 2023-02-17 PROCEDURE — 3074F SYST BP LT 130 MM HG: CPT | Mod: CPTII,S$GLB,, | Performed by: FAMILY MEDICINE

## 2023-02-17 PROCEDURE — 3079F DIAST BP 80-89 MM HG: CPT | Mod: CPTII,S$GLB,, | Performed by: FAMILY MEDICINE

## 2023-02-17 PROCEDURE — 99999 PR PBB SHADOW E&M-EST. PATIENT-LVL V: ICD-10-PCS | Mod: PBBFAC,,, | Performed by: FAMILY MEDICINE

## 2023-02-17 PROCEDURE — 3008F BODY MASS INDEX DOCD: CPT | Mod: CPTII,S$GLB,, | Performed by: FAMILY MEDICINE

## 2023-02-17 PROCEDURE — 1159F MED LIST DOCD IN RCRD: CPT | Mod: CPTII,S$GLB,, | Performed by: FAMILY MEDICINE

## 2023-02-17 PROCEDURE — 99215 OFFICE O/P EST HI 40 MIN: CPT | Mod: S$GLB,,, | Performed by: FAMILY MEDICINE

## 2023-02-17 PROCEDURE — 3079F PR MOST RECENT DIASTOLIC BLOOD PRESSURE 80-89 MM HG: ICD-10-PCS | Mod: CPTII,S$GLB,, | Performed by: FAMILY MEDICINE

## 2023-02-17 PROCEDURE — 3044F HG A1C LEVEL LT 7.0%: CPT | Mod: CPTII,S$GLB,, | Performed by: FAMILY MEDICINE

## 2023-02-17 PROCEDURE — 3008F PR BODY MASS INDEX (BMI) DOCUMENTED: ICD-10-PCS | Mod: CPTII,S$GLB,, | Performed by: FAMILY MEDICINE

## 2023-02-17 PROCEDURE — 1160F PR REVIEW ALL MEDS BY PRESCRIBER/CLIN PHARMACIST DOCUMENTED: ICD-10-PCS | Mod: CPTII,S$GLB,, | Performed by: FAMILY MEDICINE

## 2023-02-17 PROCEDURE — 3044F PR MOST RECENT HEMOGLOBIN A1C LEVEL <7.0%: ICD-10-PCS | Mod: CPTII,S$GLB,, | Performed by: FAMILY MEDICINE

## 2023-02-17 RX ORDER — SEMAGLUTIDE 1.34 MG/ML
1 INJECTION, SOLUTION SUBCUTANEOUS
Qty: 1 PEN | Refills: 11 | Status: SHIPPED | OUTPATIENT
Start: 2023-02-17 | End: 2023-09-12 | Stop reason: DRUGHIGH

## 2023-02-17 NOTE — PATIENT INSTRUCTIONS
Physically, everything looks good today.      Blood sugar is doing very well on Ozempic.  Let us go ahead and increase the dose to 1 mg weekly to see how you do.  This should help with cravings, and keep the blood sugar under much better control.    For the rash, referral to Dermatology placed today.    Referral also placed to our Bariatric surgery Department to discuss getting gastric sleeve.      For the neck pains, I placed a referral to Dr. Hudson Snell.  Feel free to call him at [phone] to schedule an appointment.  His office is located at: [address] in [place].    I do not see an actual disability letter in your files.  I do, however, have the forms we filled out for your student loans.  Try those first.  If they work, great.  If not, please let me know and I can compose an actual letter.    Continue to eat a healthy diet.  Be careful with portion sizes.  Includes lots of fresh fruits, vegetables, whole grains, lean proteins.  See info below.    Keep hydrated.  Be sure to drink at least 8-10, 8 oz, glasses of water every day.    Stay active.  Try to do some sort of physical activity every day.  Nothing outrageous, just try walking for 10-15 minutes each day.

## 2023-02-17 NOTE — PROGRESS NOTES
"    [facility]       [address] [place] [address]      Phone: [phone]      Fax: [phone]    Cedric Huerta MD                Office Visit  2023        Subjective      HPI:  Luciana Moraes is a 62 y.o. female presents today in clinic for "Follow-up (3 month follow up)  ."     62-year-old female presents today to follow-up on diabetes, discuss other issues.    Overall, feels pretty good today.  No chest pain, shortness a breath.  No fever, chills.  No coughing, sneezing, no URI symptoms.  Appetite normal.  Bowel movements normal.  No urinary issues.    Has diabetes.  Still takes metformin XR 1000 mg daily.  Also taking Ozempic 0.5 mg daily.  Doing quite well with these.  No side effects.  No nausea, vomiting.  Helping to curb appetite.  A1c has been improving, recently down as low as 6.5%.  Would like to increase the dose.    Has lost some weight while taking the Ozempic.  Still would like to lose more, especially if it will help her blood sugar.  Is interested in bariatric surgery.  Would like referral to surgeon to discuss.    Has multiple joint pains, osteoarthritis, rheumatoid arthritis.  Currently seeing sports Medicine/neurosurgery/pain management/Rheumatology for this.  Has had several procedures, injections.  Has thought about trying medical marijuana.    She reports a rash on her chest, between the breasts.  Saw Dermatology for it in the past, had biopsy done.  Was given a cream, but that did not seem to help.  Heat tends to activated.  Makes it itch.    PMH:  Dm, HLD, RA, OA, chronic pains.  PSH:  Left shoulder.  Left hip labrum.  Hysterectomy.  One ovary.  FNH:  DM, CVA  Allergies:  NKDA  Social:  On disability.  Previously worked for [employer].  Recently went back to school at [employer].  T:  Micki.  Quit   A:  Denies  D:  Denies    Exercise:  Does chair exercises/stretches.    Pap:  Had hysterectomy.  MM2022?  " Has appointment for MMG on 12/05/2022    Colon:  11/08/2022.  Two polyps, tubular adenomas.  Repeat five years (2027)      The following were updated and reviewed by myself in the chart: medications, past medical history, past surgical history, family history, social history, and allergies.     Medications:  Current Outpatient Medications on File Prior to Visit   Medication Sig Dispense Refill    aspirin 81 MG Chew Take 81 mg by mouth.      atorvastatin (LIPITOR) 80 MG tablet Take 1 tablet (80 mg total) by mouth once daily. 90 tablet 3    diclofenac sodium (VOLTAREN) 1 % Gel Apply 2 g topically 4 (four) times daily. 100 g 6    hydrOXYchloroQUINE (PLAQUENIL) 200 mg tablet Take 1 tablet (200 mg total) by mouth 2 (two) times daily. 180 tablet 1    ibuprofen (ADVIL,MOTRIN) 800 MG tablet Take 800 mg by mouth 3 (three) times daily.      metFORMIN (GLUCOPHAGE-XR) 500 MG ER 24hr tablet Take 2 tablets (1,000 mg total) by mouth daily with breakfast. 180 tablet 3    nabumetone (RELAFEN) 750 MG tablet Take 1 tablet (750 mg total) by mouth 2 (two) times daily as needed for Pain. 60 tablet 1    polyethylene glycol (GLYCOLAX) 17 gram PwPk Take 17 g by mouth once daily. 30 each 11    pregabalin (LYRICA) 75 MG capsule Take 1 capsule (75 mg total) by mouth 2 (two) times daily. 60 capsule 0    tiZANidine (ZANAFLEX) 4 MG tablet Take 1 tablet (4 mg total) by mouth 2 (two) times daily as needed. 60 tablet 1    [DISCONTINUED] semaglutide (OZEMPIC) 0.25 mg or 0.5 mg(2 mg/1.5 mL) pen injector Inject 0.5 mg into the skin every 7 days. 3 pen 3    [DISCONTINUED] ergocalciferol (ERGOCALCIFEROL) 50,000 unit Cap Take 1 capsule (50,000 Units total) by mouth every 7 days. (Patient not taking: Reported on 2/17/2023) 12 capsule 1    [DISCONTINUED] methylPREDNISolone (MEDROL DOSEPACK) 4 mg tablet use as directed (Patient not taking: Reported on 2/17/2023) 21 each 0     No current facility-administered medications on file prior to visit.         PMHx:  Past Medical History:   Diagnosis Date    Diabetes mellitus, type 2     Hyperlipidemia     Osteoarthritis     Polymyositis     Rheumatoid arthritis       Patient Active Problem List    Diagnosis Date Noted    Colon cancer screening 2022        PSHx:  Past Surgical History:   Procedure Laterality Date    CARPAL TUNNEL RELEASE Bilateral     COLONOSCOPY N/A 2022    Procedure: COLONOSCOPY;  Surgeon: Kishore Monsalve MD;  Location: [facility] ENDO;  Service: Endoscopy;  Laterality: N/A;    EPIDURAL STEROID INJECTION INTO CERVICAL SPINE N/A 2022    Procedure: C7/T1 IL [facility];  Surgeon: Luis M Pereyra MD;  Location: [facility] PAIN MGT;  Service: Pain Management;  Laterality: N/A;    HIP SURGERY      HYSTERECTOMY      due to bleeding, pain from fibroids, retains one ovary    INJECTION OF ANESTHETIC AGENT AROUND MEDIAL BRANCH NERVES INNERVATING CERVICAL FACET JOINT Bilateral 10/31/2022    Procedure: Bilateral C5-7 MBB with RN IV sedation;  Surgeon: Luis M Pereyra MD;  Location: [facility] PAIN MGT;  Service: Pain Management;  Laterality: Bilateral;    OOPHORECTOMY      1 ovary removed    TUBAL LIGATION          FHx:  Family History   Problem Relation Age of Onset    Rheum arthritis Mother     No Known Problems Father         Social:  Social History     Socioeconomic History    Marital status:    Tobacco Use    Smoking status: Former     Types: Cigarettes     Quit date: 3/17/2001     Years since quittin.9    Smokeless tobacco: Never   Substance and Sexual Activity    Alcohol use: Never    Drug use: Never    Sexual activity: Yes     Partners: Male     Birth control/protection: None        Allergies:  Review of patient's allergies indicates:  No Known Allergies     ROS:  Review of Systems   Constitutional:  Negative for activity change, appetite change, chills and fever.   HENT:  Negative for congestion, postnasal drip, rhinorrhea, sore throat and trouble swallowing.    Respiratory:   "Negative for cough, shortness of breath and wheezing.    Cardiovascular:  Negative for chest pain and palpitations.   Gastrointestinal:  Negative for abdominal pain, constipation, diarrhea, nausea and vomiting.   Genitourinary:  Negative for difficulty urinating.   Musculoskeletal:  Positive for arthralgias and neck pain.   Skin:  Negative for color change and rash.   Neurological:  Negative for speech difficulty and headaches.   All other systems reviewed and are negative.       Objective      /80   Pulse (!) 52   Temp 97.3 °F (36.3 °C) (Temporal)   Resp 16   Ht 5' 2" (1.575 m)   Wt 86.2 kg (190 lb 0.6 oz)   SpO2 98%   BMI 34.76 kg/m²   Ht Readings from Last 3 Encounters:   02/17/23 5' 2" (1.575 m)   12/12/22 5' 2" (1.575 m)   11/18/22 5' 2" (1.575 m)     Wt Readings from Last 3 Encounters:   02/17/23 86.2 kg (190 lb 0.6 oz)   12/12/22 89 kg (196 lb 3.4 oz)   11/18/22 85.7 kg (188 lb 15 oz)       PHYSICAL EXAM:  Physical Exam  Vitals and nursing note reviewed.   Constitutional:       General: She is not in acute distress.     Appearance: Normal appearance.   HENT:      Head: Normocephalic and atraumatic.      Right Ear: Tympanic membrane, ear canal and external ear normal.      Left Ear: Tympanic membrane, ear canal and external ear normal.      Nose: Nose normal. No congestion or rhinorrhea.      Mouth/Throat:      Mouth: Mucous membranes are moist.      Pharynx: Oropharynx is clear. No oropharyngeal exudate or posterior oropharyngeal erythema.   Eyes:      Extraocular Movements: Extraocular movements intact.      Conjunctiva/sclera: Conjunctivae normal.      Pupils: Pupils are equal, round, and reactive to light.   Cardiovascular:      Rate and Rhythm: Normal rate and regular rhythm.   Pulmonary:      Effort: Pulmonary effort is normal. No respiratory distress.      Breath sounds: No wheezing, rhonchi or rales.   Musculoskeletal:         General: Normal range of motion.      Cervical back: Normal " range of motion.   Lymphadenopathy:      Cervical: No cervical adenopathy.   Skin:     General: Skin is warm and dry.      Findings: Rash present. Rash is papular.             Comments: Area between the breasts has what appears to be papular or hyperkeratotic rash.  Nontender.  No erythema.  No drainage.   Neurological:      Mental Status: She is alert.            LABS / IMAGING:  Recent Results (from the past 4368 hour(s))   POCT glucose    Collection Time: 09/22/22 10:57 AM   Result Value Ref Range    POCT Glucose 83 70 - 110 mg/dL   Urinalysis    Collection Time: 09/26/22  9:24 AM   Result Value Ref Range    Specimen UA Urine, Clean Catch     Color, UA Yellow Yellow, Straw, Deepti    Appearance, UA Clear Clear    pH, UA 6.0 5.0 - 8.0    Specific Gravity, UA >=1.030 (A) 1.005 - 1.030    Protein, UA Negative Negative    Glucose, UA Negative Negative    Ketones, UA Negative Negative    Bilirubin (UA) Negative Negative    Occult Blood UA Negative Negative    Nitrite, UA Negative Negative    Leukocytes, UA Negative Negative   CBC Auto Differential    Collection Time: 09/26/22  9:46 AM   Result Value Ref Range    WBC 10.53 3.90 - 12.70 K/uL    RBC 5.19 4.00 - 5.40 M/uL    Hemoglobin 14.3 12.0 - 16.0 g/dL    Hematocrit 45.7 37.0 - 48.5 %    MCV 88 82 - 98 fL    MCH 27.6 27.0 - 31.0 pg    MCHC 31.3 (L) 32.0 - 36.0 g/dL    RDW 15.0 (H) 11.5 - 14.5 %    Platelets 147 (L) 150 - 450 K/uL    MPV 11.5 9.2 - 12.9 fL    Immature Granulocytes 0.1 0.0 - 0.5 %    Gran # (ANC) 3.9 1.8 - 7.7 K/uL    Immature Grans (Abs) 0.01 0.00 - 0.04 K/uL    Lymph # 5.6 (H) 1.0 - 4.8 K/uL    Mono # 0.8 0.3 - 1.0 K/uL    Eos # 0.2 0.0 - 0.5 K/uL    Baso # 0.03 0.00 - 0.20 K/uL    nRBC 0 0 /100 WBC    Gran % 37.2 (L) 38.0 - 73.0 %    Lymph % 53.4 (H) 18.0 - 48.0 %    Mono % 7.4 4.0 - 15.0 %    Eosinophil % 1.6 0.0 - 8.0 %    Basophil % 0.3 0.0 - 1.9 %    Platelet Estimate Clumped (A)     Differential Method Automated    Comprehensive Metabolic Panel     Collection Time: 09/26/22  9:46 AM   Result Value Ref Range    Sodium 141 136 - 145 mmol/L    Potassium 4.0 3.5 - 5.1 mmol/L    Chloride 110 95 - 110 mmol/L    CO2 21 (L) 23 - 29 mmol/L    Glucose 84 70 - 110 mg/dL    BUN 14 6 - 20 mg/dL    Creatinine 0.8 0.5 - 1.4 mg/dL    Calcium 8.8 8.7 - 10.5 mg/dL    Total Protein 7.4 6.0 - 8.4 g/dL    Albumin 3.9 3.5 - 5.2 g/dL    Total Bilirubin 0.5 0.1 - 1.0 mg/dL    Alkaline Phosphatase 93 55 - 135 U/L    AST 15 10 - 40 U/L    ALT 12 10 - 44 U/L    Anion Gap 10 8 - 16 mmol/L    eGFR >60 >60 mL/min/1.73 m^2   Sedimentation rate    Collection Time: 09/26/22  9:46 AM   Result Value Ref Range    Sed Rate 48 (H) 0 - 36 mm/Hr   C-Reactive Protein    Collection Time: 09/26/22  9:46 AM   Result Value Ref Range    CRP 2.4 0.0 - 8.2 mg/L   Rheumatoid Factor    Collection Time: 09/26/22  9:46 AM   Result Value Ref Range    Rheumatoid Factor 19.0 (H) 0.0 - 15.0 IU/mL   Cyclic Citrullinated Peptide Antibody, IgG    Collection Time: 09/26/22  9:46 AM   Result Value Ref Range    CCP Antibodies 354.4 (H) <5.0 U/mL   RICHARD Screen w/Reflex    Collection Time: 09/26/22  9:46 AM   Result Value Ref Range    RICHARD Screen Positive (A) Negative <1:80   C3 Complement    Collection Time: 09/26/22  9:46 AM   Result Value Ref Range    Complement (C-3) 150 50 - 180 mg/dL   Anti-DNA Ab, Double-Stranded    Collection Time: 09/26/22  9:46 AM   Result Value Ref Range    ds DNA Ab Negative 1:10 Negative 1:10   C4 Complement    Collection Time: 09/26/22  9:46 AM   Result Value Ref Range    Complement (C-4) 37 11 - 44 mg/dL   RICHARD Pattern 1    Collection Time: 09/26/22  9:46 AM   Result Value Ref Range    RICHARD PATTERN 1 Speckled    RICHARD Profile    Collection Time: 09/26/22  9:46 AM   Result Value Ref Range    Anti Sm Antibody 0.07 0.00 - 0.99 Ratio    Anti-Sm Interpretation Negative Negative    Anti-SSA Antibody 0.05 0.00 - 0.99 Ratio    Anti-SSA Interpretation Negative Negative    Anti-SSB Antibody 0.09 0.00 -  0.99 Ratio    Anti-SSB Interpretation Negative Negative    ds DNA Ab Negative 1:10 Negative 1:10    Anti Sm/RNP Antibody 0.08 0.00 - 0.99 Ratio    Anti-Sm/RNP Interpretation Negative Negative   RICHARD Titer 1    Collection Time: 09/26/22  9:46 AM   Result Value Ref Range    RICHARD Titer 1 >=1:2560    Urinalysis    Collection Time: 10/26/22  7:52 AM   Result Value Ref Range    Specimen UA Urine, Clean Catch     Color, UA Yellow Yellow, Straw, Deepti    Appearance, UA Clear Clear    pH, UA 6.0 5.0 - 8.0    Specific Gravity, UA 1.020 1.005 - 1.030    Protein, UA Negative Negative    Glucose, UA Negative Negative    Ketones, UA Negative Negative    Bilirubin (UA) Negative Negative    Occult Blood UA Negative Negative    Nitrite, UA Negative Negative    Leukocytes, UA Trace (A) Negative   Urinalysis Microscopic    Collection Time: 10/26/22  7:52 AM   Result Value Ref Range    WBC, UA 3 0 - 5 /hpf    Bacteria Rare None-Occ /hpf    Squam Epithel, UA 3 /hpf    Microscopic Comment SEE COMMENT    CBC Auto Differential    Collection Time: 10/26/22  7:54 AM   Result Value Ref Range    WBC 7.17 3.90 - 12.70 K/uL    RBC 4.92 4.00 - 5.40 M/uL    Hemoglobin 13.6 12.0 - 16.0 g/dL    Hematocrit 43.1 37.0 - 48.5 %    MCV 88 82 - 98 fL    MCH 27.6 27.0 - 31.0 pg    MCHC 31.6 (L) 32.0 - 36.0 g/dL    RDW 14.8 (H) 11.5 - 14.5 %    Platelets 283 150 - 450 K/uL    MPV 10.9 9.2 - 12.9 fL    Immature Granulocytes 0.0 0.0 - 0.5 %    Gran # (ANC) 2.8 1.8 - 7.7 K/uL    Immature Grans (Abs) 0.00 0.00 - 0.04 K/uL    Lymph # 3.4 1.0 - 4.8 K/uL    Mono # 0.7 0.3 - 1.0 K/uL    Eos # 0.3 0.0 - 0.5 K/uL    Baso # 0.03 0.00 - 0.20 K/uL    nRBC 0 0 /100 WBC    Gran % 39.3 38.0 - 73.0 %    Lymph % 47.0 18.0 - 48.0 %    Mono % 9.5 4.0 - 15.0 %    Eosinophil % 3.8 0.0 - 8.0 %    Basophil % 0.4 0.0 - 1.9 %    Differential Method Automated    Comprehensive Metabolic Panel    Collection Time: 10/26/22  7:54 AM   Result Value Ref Range    Sodium 142 136 - 145 mmol/L  "   Potassium 4.3 3.5 - 5.1 mmol/L    Chloride 109 95 - 110 mmol/L    CO2 22 (L) 23 - 29 mmol/L    Glucose 108 70 - 110 mg/dL    BUN 10 6 - 20 mg/dL    Creatinine 0.9 0.5 - 1.4 mg/dL    Calcium 9.4 8.7 - 10.5 mg/dL    Total Protein 7.5 6.0 - 8.4 g/dL    Albumin 3.6 3.5 - 5.2 g/dL    Total Bilirubin 0.4 0.1 - 1.0 mg/dL    Alkaline Phosphatase 87 55 - 135 U/L    AST 13 10 - 40 U/L    ALT 10 10 - 44 U/L    Anion Gap 11 8 - 16 mmol/L    eGFR >60 >60 mL/min/1.73 m^2   Sedimentation rate    Collection Time: 10/26/22  7:54 AM   Result Value Ref Range    Sed Rate 49 (H) 0 - 36 mm/Hr   C-Reactive Protein    Collection Time: 10/26/22  7:54 AM   Result Value Ref Range    CRP 5.6 0.0 - 8.2 mg/L   C3 Complement    Collection Time: 10/26/22  7:54 AM   Result Value Ref Range    Complement (C-3) 152 50 - 180 mg/dL   Anti-DNA Ab, Double-Stranded    Collection Time: 10/26/22  7:54 AM   Result Value Ref Range    ds DNA Ab Negative 1:10 Negative 1:10   C4 Complement    Collection Time: 10/26/22  7:54 AM   Result Value Ref Range    Complement (C-4) 42 11 - 44 mg/dL   HEMOGLOBIN A1C    Collection Time: 10/26/22  7:54 AM   Result Value Ref Range    Hemoglobin A1C 6.8 (H) 4.0 - 5.6 %    Estimated Avg Glucose 148 (H) 68 - 131 mg/dL   POCT glucose    Collection Time: 10/31/22  7:15 AM   Result Value Ref Range    POCT Glucose 93 70 - 110 mg/dL   POCT glucose    Collection Time: 11/08/22 11:26 AM   Result Value Ref Range    POCT Glucose 88 70 - 110 mg/dL   Specimen to Pathology, Surgery Gastrointestinal tract    Collection Time: 11/08/22  1:08 PM   Result Value Ref Range    Final Pathologic Diagnosis       1. Colon, descending, polypectomy:  - Tubular adenoma.  2. Colon, sigmoid, polypectomy:  - Hyperplastic polyp.      Gross       Received in 2 parts:  Part 1:  Pathology ID:  [facility]-  Patient ID:  [id number]  The specimen is received in formalin labeled "descending colon polypectomy".  The specimen consists of one tan-yellow fragment of soft " "tissue with  measurements 0.6 x 0.3 x 0.3 cm.  The specimen is submitted entirely in  cassette CDA--1-A  Part 2:   Pathology ID:  [facility]-  Patient ID:  [id number]  The specimen is received in formalin labeled "sigmoid polypectomy".  The  specimen consists of two tan-yellow fragments of soft tissue with  measurements from 0.2 cm to 0.3 cm in greatest dimension.  The specimen is  submitted entirely in cassette EQO--2-A  Nilo Veliz      Disclaimer       Unless the case is a 'gross only' or additional testing only, the final  diagnosis for each specimen is based on a microscopic examination of  appropriate tissue sections.     CBC Auto Differential    Collection Time: 12/05/22  9:23 AM   Result Value Ref Range    WBC 6.86 3.90 - 12.70 K/uL    RBC 4.91 4.00 - 5.40 M/uL    Hemoglobin 13.6 12.0 - 16.0 g/dL    Hematocrit 43.9 37.0 - 48.5 %    MCV 89 82 - 98 fL    MCH 27.7 27.0 - 31.0 pg    MCHC 31.0 (L) 32.0 - 36.0 g/dL    RDW 15.2 (H) 11.5 - 14.5 %    Platelets 157 150 - 450 K/uL    MPV 12.1 9.2 - 12.9 fL    Immature Granulocytes 0.3 0.0 - 0.5 %    Gran # (ANC) 2.9 1.8 - 7.7 K/uL    Immature Grans (Abs) 0.02 0.00 - 0.04 K/uL    Lymph # 3.1 1.0 - 4.8 K/uL    Mono # 0.6 0.3 - 1.0 K/uL    Eos # 0.2 0.0 - 0.5 K/uL    Baso # 0.03 0.00 - 0.20 K/uL    nRBC 0 0 /100 WBC    Gran % 42.6 38.0 - 73.0 %    Lymph % 45.3 18.0 - 48.0 %    Mono % 8.5 4.0 - 15.0 %    Eosinophil % 2.9 0.0 - 8.0 %    Basophil % 0.4 0.0 - 1.9 %    Differential Method Automated    Comprehensive Metabolic Panel    Collection Time: 12/05/22  9:23 AM   Result Value Ref Range    Sodium 140 136 - 145 mmol/L    Potassium 4.3 3.5 - 5.1 mmol/L    Chloride 109 95 - 110 mmol/L    CO2 24 23 - 29 mmol/L    Glucose 157 (H) 70 - 110 mg/dL    BUN 13 6 - 20 mg/dL    Creatinine 0.9 0.5 - 1.4 mg/dL    Calcium 9.1 8.7 - 10.5 mg/dL    Total Protein 7.0 6.0 - 8.4 g/dL    Albumin 3.6 3.5 - 5.2 g/dL    Total Bilirubin 0.5 0.1 - 1.0 mg/dL    Alkaline Phosphatase 88 " 55 - 135 U/L    AST 18 10 - 40 U/L    ALT 13 10 - 44 U/L    Anion Gap 7 (L) 8 - 16 mmol/L    eGFR >60.0 >60 mL/min/1.73 m^2   Sedimentation rate    Collection Time: 12/05/22  9:23 AM   Result Value Ref Range    Sed Rate 39 (H) 0 - 36 mm/Hr   C-Reactive Protein    Collection Time: 12/05/22  9:23 AM   Result Value Ref Range    CRP 6.1 0.0 - 8.2 mg/L   HEMOGLOBIN A1C    Collection Time: 02/13/23  8:04 AM   Result Value Ref Range    Hemoglobin A1C 6.5 (H) 4.0 - 5.6 %    Estimated Avg Glucose 140 (H) 68 - 131 mg/dL         Assessment    1. Type 2 diabetes mellitus with other specified complication, without long-term current use of insulin    2. Cervical radiculopathy    3. Rheumatoid arthritis, involving unspecified site, unspecified whether rheumatoid factor present    4. Rash    5. Weight loss          Plan    Luciana was seen today for follow-up.    Diagnoses and all orders for this visit:    Type 2 diabetes mellitus with other specified complication, without long-term current use of insulin  -     semaglutide (OZEMPIC) 1 mg/dose (4 mg/3 mL); Inject 1 mg into the skin every 7 days.    Cervical radiculopathy  -     Ambulatory referral/consult to Neurology; Future    Rheumatoid arthritis, involving unspecified site, unspecified whether rheumatoid factor present  -     Ambulatory referral/consult to Neurology; Future    Rash  -     Ambulatory referral/consult to Dermatology; Future    Weight loss  -     Ambulatory referral/consult to Bariatric Surgery; Future    Doing very well on Ozempic.  Will increase dose to 1 mg weekly.    Per her request, will place referral to bariatric surgery.    For chronic neck pains, will have her see Dr. VIRGEN to discuss medical marijuana.  Referral placed today.    Referral to derm for 2nd opinion on rash.      FOLLOW-UP:  Follow up in about 6 months (around 8/17/2023) for check up.    I spent a total of 45 minutes face to face and non-face to face on the date of this visit.This includes time  preparing to see the patient (eg, review of tests, notes), obtaining and/or reviewing additional history from an independent historian and/or outside medical records, documenting clinical information in the electronic health record, independently interpreting results and/or communicating results to the patient/family/caregiver, or care coordinator.    Signed by:  Cedric Huerta MD

## 2023-02-27 ENCOUNTER — HOSPITAL ENCOUNTER (OUTPATIENT)
Dept: RADIOLOGY | Facility: HOSPITAL | Age: 63
Discharge: HOME | End: 2023-02-27
Payer: MEDICARE

## 2023-02-27 ENCOUNTER — TELEPHONE (OUTPATIENT)
Dept: PAIN MEDICINE | Facility: CLINIC | Age: 63
End: 2023-02-27
Payer: MEDICARE

## 2023-02-27 ENCOUNTER — OFFICE VISIT (OUTPATIENT)
Dept: RHEUMATOLOGY | Facility: CLINIC | Age: 63
End: 2023-02-27
Payer: MEDICARE

## 2023-02-27 VITALS
WEIGHT: 190.5 LBS | HEART RATE: 60 BPM | BODY MASS INDEX: 35.06 KG/M2 | RESPIRATION RATE: 16 BRPM | DIASTOLIC BLOOD PRESSURE: 66 MMHG | HEIGHT: 62 IN | SYSTOLIC BLOOD PRESSURE: 117 MMHG

## 2023-02-27 DIAGNOSIS — M54.12 CERVICAL RADICULITIS: ICD-10-CM

## 2023-02-27 DIAGNOSIS — R76.8 POSITIVE ANA (ANTINUCLEAR ANTIBODY): ICD-10-CM

## 2023-02-27 DIAGNOSIS — E55.9 VITAMIN D DEFICIENCY: ICD-10-CM

## 2023-02-27 DIAGNOSIS — M06.9 RHEUMATOID ARTHRITIS, INVOLVING UNSPECIFIED SITE, UNSPECIFIED WHETHER RHEUMATOID FACTOR PRESENT: Primary | ICD-10-CM

## 2023-02-27 DIAGNOSIS — Z71.89 COUNSELING ON HEALTH PROMOTION AND DISEASE PREVENTION: ICD-10-CM

## 2023-02-27 PROCEDURE — 3078F PR MOST RECENT DIASTOLIC BLOOD PRESSURE < 80 MM HG: ICD-10-PCS | Mod: CPTII,S$GLB,,

## 2023-02-27 PROCEDURE — 1159F MED LIST DOCD IN RCRD: CPT | Mod: CPTII,S$GLB,,

## 2023-02-27 PROCEDURE — 72052 XR CERVICAL SPINE 5 VIEW WITH FLEX AND EXT: ICD-10-PCS | Mod: 26,,, | Performed by: RADIOLOGY

## 2023-02-27 PROCEDURE — 3044F HG A1C LEVEL LT 7.0%: CPT | Mod: CPTII,S$GLB,,

## 2023-02-27 PROCEDURE — 3078F DIAST BP <80 MM HG: CPT | Mod: CPTII,S$GLB,,

## 2023-02-27 PROCEDURE — 99215 OFFICE O/P EST HI 40 MIN: CPT | Mod: S$GLB,,,

## 2023-02-27 PROCEDURE — 3074F PR MOST RECENT SYSTOLIC BLOOD PRESSURE < 130 MM HG: ICD-10-PCS | Mod: CPTII,S$GLB,,

## 2023-02-27 PROCEDURE — 99999 PR PBB SHADOW E&M-EST. PATIENT-LVL IV: ICD-10-PCS | Mod: PBBFAC,,,

## 2023-02-27 PROCEDURE — 1160F RVW MEDS BY RX/DR IN RCRD: CPT | Mod: CPTII,S$GLB,,

## 2023-02-27 PROCEDURE — 3044F PR MOST RECENT HEMOGLOBIN A1C LEVEL <7.0%: ICD-10-PCS | Mod: CPTII,S$GLB,,

## 2023-02-27 PROCEDURE — 72052 X-RAY EXAM NECK SPINE 6/>VWS: CPT | Mod: TC

## 2023-02-27 PROCEDURE — 1159F PR MEDICATION LIST DOCUMENTED IN MEDICAL RECORD: ICD-10-PCS | Mod: CPTII,S$GLB,,

## 2023-02-27 PROCEDURE — 3008F PR BODY MASS INDEX (BMI) DOCUMENTED: ICD-10-PCS | Mod: CPTII,S$GLB,,

## 2023-02-27 PROCEDURE — 1160F PR REVIEW ALL MEDS BY PRESCRIBER/CLIN PHARMACIST DOCUMENTED: ICD-10-PCS | Mod: CPTII,S$GLB,,

## 2023-02-27 PROCEDURE — 3074F SYST BP LT 130 MM HG: CPT | Mod: CPTII,S$GLB,,

## 2023-02-27 PROCEDURE — 3008F BODY MASS INDEX DOCD: CPT | Mod: CPTII,S$GLB,,

## 2023-02-27 PROCEDURE — 99999 PR PBB SHADOW E&M-EST. PATIENT-LVL IV: CPT | Mod: PBBFAC,,,

## 2023-02-27 PROCEDURE — 99215 PR OFFICE/OUTPT VISIT, EST, LEVL V, 40-54 MIN: ICD-10-PCS | Mod: S$GLB,,,

## 2023-02-27 PROCEDURE — 72052 X-RAY EXAM NECK SPINE 6/>VWS: CPT | Mod: 26,,, | Performed by: RADIOLOGY

## 2023-02-27 RX ORDER — ERGOCALCIFEROL 1.25 MG/1
50000 CAPSULE ORAL
Qty: 12 CAPSULE | Refills: 3 | Status: SHIPPED | OUTPATIENT
Start: 2023-02-27 | End: 2023-07-03

## 2023-02-27 NOTE — PROGRESS NOTES
RHEUMATOLOGY OUTPATIENT CLINIC NOTE    02/27/2023    Subjective:       Patient ID: Luciana Moraes is a 62 y.o. female.    Chief Complaint: Rheumatoid Arthritis    HPI   Luciana Moraes is a 62 y.o. pleasant female here for rheumatology follow up for seropositive RA and positive RICHARD.     [place]ell in [place] on the neutral ground [facility] [employer] [facility]. Has had pain in her low back left side with radiation into left leg. Today with neck pain as well worse with certain movements.        Has been taking hydroxychloroquine twice daily.     No joint swelling, no warmth to joints, no morning stiffness >1hr.   No muscle weakness.  No synovitis.   No fever, lymphadenopathy, no unexpected weight loss, no fatigue  No rashes on palms, no vasculitis  No shortness of breath or pleuritic chest pain.     Rheumatologic review of systems negative otherwise.       Initial HPI:  Previously seen by Dr. Camargo at [facility].   Previously found to have elevated CCP >300 and RICHARD 1:320.     She has previously been following with Rheum in [place]. Recently moved to [place] and is seeking out Rheum care in . She believes she is taking hcq 200 mg bid but not certain. She fills her pill box up each week and takes whatever is in there. She states she has pain all of the time. Mostly in her hips and low back. Occ with pain in her left thumb and bruce PIPs. She is right handed.     Recently started ozempic for weight loss and is down 5-7 lbs.       Family History:Mom RA, Great aunt and cousin with Lupus    Prior therapies:  Mtx HCQ    Physical Exam  No obvious synovitis, no erythema, no increased warmth to any joints bruce UE/LE.   Strength 5/5 bruce UE/LE  No rash.   No oral lesions.   Full range of motion neck. Patient reports pain with forward flexion and extension of neck.       Past Medical History:   Diagnosis Date    Diabetes mellitus, type 2     Hyperlipidemia     Osteoarthritis     Polymyositis     Rheumatoid  "arthritis      Past Surgical History:   Procedure Laterality Date    CARPAL TUNNEL RELEASE Bilateral     COLONOSCOPY N/A 2022    Procedure: COLONOSCOPY;  Surgeon: Kishore Monsalve MD;  Location: [facility] ENDO;  Service: Endoscopy;  Laterality: N/A;    EPIDURAL STEROID INJECTION INTO CERVICAL SPINE N/A 2022    Procedure: C7/T1 IL MERCY;  Surgeon: Luis M Pereyra MD;  Location: [facility] PAIN MGT;  Service: Pain Management;  Laterality: N/A;    HIP SURGERY      HYSTERECTOMY      due to bleeding, pain from fibroids, retains one ovary    INJECTION OF ANESTHETIC AGENT AROUND MEDIAL BRANCH NERVES INNERVATING CERVICAL FACET JOINT Bilateral 10/31/2022    Procedure: Bilateral C5-7 MBB with RN IV sedation;  Surgeon: Luis M Pereyra MD;  Location: [facility] PAIN MGT;  Service: Pain Management;  Laterality: Bilateral;    OOPHORECTOMY      1 ovary removed    TUBAL LIGATION       Family History   Problem Relation Age of Onset    Rheum arthritis Mother     No Known Problems Father      Social History     Socioeconomic History    Marital status:    Tobacco Use    Smoking status: Former     Types: Cigarettes     Quit date: 3/17/2001     Years since quittin.9    Smokeless tobacco: Never   Substance and Sexual Activity    Alcohol use: Never    Drug use: Never    Sexual activity: Yes     Partners: Male     Birth control/protection: None     Review of patient's allergies indicates:  No Known Allergies        Objective:   /66 (BP Location: Left arm, Patient Position: Sitting, BP Method: Large (Automatic))   Pulse 60   Resp 16   Ht 5' 2" (1.575 m)   Wt 86.4 kg (190 lb 7.6 oz)   BMI 34.84 kg/m²   Immunization History   Administered Date(s) Administered    COVID-19, MRNA, LN-S, PF (Pfizer) (Purple Cap) 2021, 2021        Recent Results (from the past 672 hour(s))   HEMOGLOBIN A1C    Collection Time: 23  8:04 AM   Result Value Ref Range    Hemoglobin A1C 6.5 (H) 4.0 - 5.6 %    Estimated " Avg Glucose 140 (H) 68 - 131 mg/dL        No results found for: TBGOLDPLUS   No results found for: HAV, HEPAIGM, HEPBIGM, HEPBCAB, HBEAG, HEPCAB     Xray Right hand 5/30/2019  There is a suggestion of an erosion at the 2nd metacarpal base. No other finding to suggest erosion. No fracture.     Xray Left hand 5/30/2019  There is a suggestion of an erosion at the 2nd metacarpal base. No other finding to suggest erosion. No fracture.     Xray lumbar spine 7/22/21  FINDINGS:   Alignment is unremarkable. Vertebral body heights are maintained. Intervertebral disc heights are within normal limits. Facet arthropathy at L5-S1, to a lesser extent L4-5. Pedicles are intact and symmetric.      Xray hip with pelvis 7/22/21  Bones: Sclerotic changes of the bilateral SI joints, worse on the right. Degenerative changes of the pubic bones. Overall decreased bone mineralization.   Joints: Hip joint spaces are similar to prior study. SI joints and pubic symphysis are intact.   Soft Tissues: Pelvic phleboliths present. No significant soft tissue abnormality.    Xray thoracic spine 4/18/22  FINDINGS:   Mild levoconvex curvature of the thoracolumbar spine. Vertebral bodies demonstrate normal height. Multilevel decreased disc space height. No acute fracture or acute subluxation.  MRI cspine 8/8/2022  Impression:     Multilevel degenerative changes as detailed above.     Minimal spinal canal stenosis at C4-C5 and C5-C6.     Varying degrees of bilateral neural foraminal stenosis as detailed above, most severe at the right C5-C6 level.    Vitamin D deficiency       HLAB27 negative 10/3/2019 (per chart review external labs)    Xray bruce feet 9/26/22  Right: There is no radiographic evidence of acute osseous, articular, or soft tissue abnormality.  Joint spaces are preserved.     Left: There is no radiographic evidence of acute osseous, articular, or soft tissue abnormality.  Joint spaces are preserved.    Xray bruce hands  9/26/22  FINDINGS:  Right hand: There is no radiographic evidence of acute osseous, articular, or soft tissue abnormality.  There is mild osteoarthritis seen scattered throughout the IP and MCP joints as well as the 1st CMC joint.  No erosive changes demonstrated     Left hand: There is no radiographic evidence of acute osseous, articular, or soft tissue abnormality.  There is mild osteoarthritis seen scattered throughout the IP and MCP joints as well as the 1st CMC joint.  No erosive changes demonstrated      Assessment:       1. Rheumatoid arthritis, involving unspecified site, unspecified whether rheumatoid factor present    2. Positive RICHARD (antinuclear antibody)    3. Vitamin D deficiency    4. Counseling on health promotion and disease prevention    5. Cervical radiculitis              Impression:     Seropositive Rheumatoid Arthriitis   Dx 2010 with elevated CCP (>300) and RICHARD 1:320  Prior on mtx, hcq.  Chronic pain in low back, hips. May be moreso from lumbar facetarthropathy  09/26/2022 RF 19, .4  Has been taking hydroxychloroquine twice daily.       OA and Facet arthropathy L4-L5, L5-S1 with nikhil sciatica  Prior completed PT and NSAIDs without help.   Nikhil hip injection 11/18   Troch bursa injection 3/19  On gabapentin 100 mg q day and 80 mg at bedtime  Flexeril 10 mg at bedtime  Follows with pain mgmt for injections.   Recent fall one week ago. She landed on her right hip but has been having left lumbar pain with radiation. Also pain in her neck with ROM. She would like an xray of her neck today.      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 Prior had neg MRI. Encourage cancer screening.       Vit D def  Level 15.8 on 2/22      Other specified counseling  Over 10 minutes spent regarding below topics:  - Nutrition and exercise counseling.  - Medication counseling provided.     Plan:             RA:  Recommend continue hcq 200 mg bid   Annual eye exams    Medrol dosepack as needed for  flares.    Repeat Xrays hand/foot 9/2023    Vit D def:  Vit D 50K units weekly.   Get from [facility]      OA/Facet arthropathy  Apply topical Voltaren/diclofenac to affected joint 3-4 times daily as needed  Continue following with pain management  Cervical xray today    Counseling:  Discussed taking medication regularly.     Reg4, vit d, hepatitis labs, TB today   Cervical xray today  RTC 4 months with reg4  (labs early in [place] or [place])    Indiana Olmos PA-C  [facility]  Rheumatology     41 minutes of total time spent on the encounter, which includes face to face time and non-face to face time preparing to see the patient (eg, review of tests), Obtaining and/or reviewing separately obtained history, Documenting clinical information in the electronic or other health record, Independently interpreting results (not separately reported) and communicating results to the patient/family/caregiver, or Care coordination (not separately reported).

## 2023-02-27 NOTE — Clinical Note
Good evening,  Could you please schedule Ms. Moraes with Dr. Pereyra. She is having more pain in her neck and received benefit from the last injection. Thank you!

## 2023-02-27 NOTE — TELEPHONE ENCOUNTER
----- Message from Indiana Olmos PA-C sent at 2/27/2023  4:08 PM CST -----  Good evening,   Could you please schedule Ms. Moraes with Dr. Pereyra. She is having more pain in her neck and received benefit from the last injection. Thank you!

## 2023-02-27 NOTE — TELEPHONE ENCOUNTER
Reached out to patient to schedule appointment from messages because pt is having neck pain. Apt has been made.   Pt understand. All questions answered.     Artis Mclean  Medical Assistant

## 2023-02-28 ENCOUNTER — OFFICE VISIT (OUTPATIENT)
Dept: PAIN MEDICINE | Facility: CLINIC | Age: 63
End: 2023-02-28
Payer: MEDICARE

## 2023-02-28 ENCOUNTER — HOSPITAL ENCOUNTER (OUTPATIENT)
Dept: RADIOLOGY | Facility: HOSPITAL | Age: 63
Discharge: HOME | End: 2023-02-28
Attending: ANESTHESIOLOGY
Payer: MEDICARE

## 2023-02-28 VITALS
HEART RATE: 64 BPM | SYSTOLIC BLOOD PRESSURE: 107 MMHG | HEIGHT: 62 IN | BODY MASS INDEX: 35.01 KG/M2 | RESPIRATION RATE: 17 BRPM | DIASTOLIC BLOOD PRESSURE: 60 MMHG | WEIGHT: 190.25 LBS

## 2023-02-28 DIAGNOSIS — M51.36 DDD (DEGENERATIVE DISC DISEASE), LUMBAR: ICD-10-CM

## 2023-02-28 DIAGNOSIS — M50.30 DDD (DEGENERATIVE DISC DISEASE), CERVICAL: ICD-10-CM

## 2023-02-28 DIAGNOSIS — M47.812 CERVICAL SPONDYLOSIS: Primary | ICD-10-CM

## 2023-02-28 DIAGNOSIS — M79.18 MYOFASCIAL PAIN: ICD-10-CM

## 2023-02-28 DIAGNOSIS — M47.816 LUMBAR SPONDYLOSIS: ICD-10-CM

## 2023-02-28 PROCEDURE — 3074F SYST BP LT 130 MM HG: CPT | Mod: CPTII,S$GLB,, | Performed by: ANESTHESIOLOGY

## 2023-02-28 PROCEDURE — 3078F PR MOST RECENT DIASTOLIC BLOOD PRESSURE < 80 MM HG: ICD-10-PCS | Mod: CPTII,S$GLB,, | Performed by: ANESTHESIOLOGY

## 2023-02-28 PROCEDURE — 3078F DIAST BP <80 MM HG: CPT | Mod: CPTII,S$GLB,, | Performed by: ANESTHESIOLOGY

## 2023-02-28 PROCEDURE — 99999 PR PBB SHADOW E&M-EST. PATIENT-LVL IV: ICD-10-PCS | Mod: PBBFAC,,, | Performed by: ANESTHESIOLOGY

## 2023-02-28 PROCEDURE — 1159F MED LIST DOCD IN RCRD: CPT | Mod: CPTII,S$GLB,, | Performed by: ANESTHESIOLOGY

## 2023-02-28 PROCEDURE — 99999 PR PBB SHADOW E&M-EST. PATIENT-LVL IV: CPT | Mod: PBBFAC,,, | Performed by: ANESTHESIOLOGY

## 2023-02-28 PROCEDURE — 99214 PR OFFICE/OUTPT VISIT, EST, LEVL IV, 30-39 MIN: ICD-10-PCS | Mod: S$GLB,,, | Performed by: ANESTHESIOLOGY

## 2023-02-28 PROCEDURE — 3074F PR MOST RECENT SYSTOLIC BLOOD PRESSURE < 130 MM HG: ICD-10-PCS | Mod: CPTII,S$GLB,, | Performed by: ANESTHESIOLOGY

## 2023-02-28 PROCEDURE — 3044F PR MOST RECENT HEMOGLOBIN A1C LEVEL <7.0%: ICD-10-PCS | Mod: CPTII,S$GLB,, | Performed by: ANESTHESIOLOGY

## 2023-02-28 PROCEDURE — 3008F BODY MASS INDEX DOCD: CPT | Mod: CPTII,S$GLB,, | Performed by: ANESTHESIOLOGY

## 2023-02-28 PROCEDURE — 3008F PR BODY MASS INDEX (BMI) DOCUMENTED: ICD-10-PCS | Mod: CPTII,S$GLB,, | Performed by: ANESTHESIOLOGY

## 2023-02-28 PROCEDURE — 99214 OFFICE O/P EST MOD 30 MIN: CPT | Mod: S$GLB,,, | Performed by: ANESTHESIOLOGY

## 2023-02-28 PROCEDURE — 1159F PR MEDICATION LIST DOCUMENTED IN MEDICAL RECORD: ICD-10-PCS | Mod: CPTII,S$GLB,, | Performed by: ANESTHESIOLOGY

## 2023-02-28 PROCEDURE — 72114 X-RAY EXAM L-S SPINE BENDING: CPT | Mod: 26,,, | Performed by: RADIOLOGY

## 2023-02-28 PROCEDURE — 72114 XR LUMBAR SPINE 5 VIEW WITH FLEX AND EXT: ICD-10-PCS | Mod: 26,,, | Performed by: RADIOLOGY

## 2023-02-28 PROCEDURE — 3044F HG A1C LEVEL LT 7.0%: CPT | Mod: CPTII,S$GLB,, | Performed by: ANESTHESIOLOGY

## 2023-02-28 PROCEDURE — 72114 X-RAY EXAM L-S SPINE BENDING: CPT | Mod: TC,PN

## 2023-03-01 ENCOUNTER — CLINICAL SUPPORT (OUTPATIENT)
Dept: REHABILITATION | Facility: HOSPITAL | Age: 63
End: 2023-03-01
Attending: ANESTHESIOLOGY
Payer: MEDICARE

## 2023-03-01 DIAGNOSIS — R29.3 POOR POSTURE: ICD-10-CM

## 2023-03-01 DIAGNOSIS — M47.812 CERVICAL SPONDYLOSIS: ICD-10-CM

## 2023-03-01 DIAGNOSIS — M53.86 DECREASED RANGE OF MOTION OF LUMBAR SPINE: ICD-10-CM

## 2023-03-01 DIAGNOSIS — M62.89 MUSCLE HYPERTONICITY: ICD-10-CM

## 2023-03-01 DIAGNOSIS — M53.82 DECREASED RANGE OF MOTION OF INTERVERTEBRAL DISCS OF CERVICAL SPINE: ICD-10-CM

## 2023-03-01 DIAGNOSIS — M47.816 LUMBAR SPONDYLOSIS: ICD-10-CM

## 2023-03-01 PROCEDURE — 97110 THERAPEUTIC EXERCISES: CPT | Mod: PN

## 2023-03-01 PROCEDURE — 97162 PT EVAL MOD COMPLEX 30 MIN: CPT | Mod: PN

## 2023-03-01 NOTE — PROGRESS NOTES
"[facility]  Physical Therapy Initial Evaluation    Date: 3/1/2023   Name: Luciana Moraes  Clinic Number: [id number]    Therapy Diagnosis:   Encounter Diagnoses   Name Primary?    Cervical spondylosis     Lumbar spondylosis      Physician: Luis M Pereyra MD    Physician Orders: PT Eval and Treat   Medical Diagnosis from Referral:   M47.812 (ICD-10-CM) - Cervical spondylosis   M47.816 (ICD-10-CM) - Lumbar spondylosis   Evaluation Date: 3/1/2023  Authorization Period Expiration: 3/29/2024  Plan of Care Expiration: 6/1/2023  Visit # / Visits authorized: 1/ 1    PN DUE: 4/1/2023    Time In: 7:20 AM  Time Out: 8:00 AM  Total Appointment Time (timed & untimed codes): 40 minutes    Precautions: Standard    Subjective   Date of onset: Patient reports that she fell twice at [employer] and due to fall she now has increased left sided neck pain and right sided low back pain. She states both times she fell she was on "neutral ground" in the grass and there was a dip in the ground. The second time she fell hard. She states she fell backwards and landed on her bottom. No LOC reported. She immediately felt low back pain, but not until later int he evening did she feel neck pain.   History of current condition - Luciana reports: left sided low back pain and right sided neck pain. She states that she has pain with prolonged sitting, prolonged standing, walking, house chores, transitional movements, school activities (sitting at desk), and with sleeping. She has much difficulty looking up and down with her neck due to pain. She is a full time student, but reports that her desk at home is ergonomically correct. She as well reports that she participates in an exercise class on [place] on Monday/Wednesday/Friday and modifies mostly sitting in the chair to perform exercises.     Medical History:   Past Medical History:   Diagnosis Date    Diabetes mellitus, type 2     Hyperlipidemia     Osteoarthritis  "    Polymyositis     Rheumatoid arthritis      Surgical History:   Luciana Moraes  has a past surgical history that includes Hysterectomy (2008); Carpal tunnel release (Bilateral); Hip surgery; Epidural steroid injection into cervical spine (N/A, 09/22/2022); Injection of anesthetic agent around medial branch nerves innervating cervical facet joint (Bilateral, 10/31/2022); Colonoscopy (N/A, 11/08/2022); Oophorectomy; and Tubal ligation (1985).    Medications:   Luciana has a current medication list which includes the following prescription(s): aspirin, atorvastatin, diclofenac sodium, ergocalciferol, hydroxychloroquine, ibuprofen, metformin, nabumetone, polyethylene glycol, pregabalin, ozempic, and tizanidine.    Allergies:   Review of patient's allergies indicates:  No Known Allergies     Imaging:   X-Ray Cervical Spine 5 View With Flex And Ext(2/27/2023):  Mild moderate disc space narrowing and osteophytic lipping C5-C6.  Mild osteophytic lipping anteriorly at C4-C5.  Alignment satisfactory.  No spondylolisthesis with flexion extension.  Mild osseous foraminal narrowing C5-C6 on the right.  Odontoid intact.  X-Ray Lumbar Complete Including Flex And Ext X-Ray Lumbar Complete Including Flex And Ext (2/28/2023): Negative for acute process involving the lumbar spine. Degenerative facet arthropathy of the lower lumbar spine with mild convex-right curvature of the lumbar spine.    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   Current Level of Function: increased pain with prolonged standing, prolonged sitting, walking, school activities, and house chores    Pain:  -Neck: Current 8/10, worst 10/10, best 6/10   -Back: Current: 10/10, worst 10/10, best 7/10  Location: left back and right neck    Description:   - Neck: sharp and constant   - Back: sharp and constant    Aggravating Factors:   - sitting, standing, desk work, house chores, transitional movements, walking, sleeping   Easing Factors: stretch out, switch position, apply pressure (at neck)     Patients goals: decrease pain in left low back and right neck; increased ease with schooling activities, increased ease with transitional movements; increased ease with house chores; increase standing and sitting tolerance; increased ease with walking and walking distances     Objective   Observation: pt pleasant and appropriate throughout evaluation; A&O x 3     Posture: pt in static sitting has bilateral rounded shoulders and forwards head. She is unable to sit for longer than 15 minutes before having to stand due to pain. She as well applies pressure to right side of her neck constantly.     Cervical Range of Motion:    Degrees Pain   Flexion (50) 10 Pain     Extension (75) 20 Pain     Right Rotation (75) 45 Pain     Left Rotation (75) 65 Pain     Right Side Bending (45) 22 Pain   Left Side Bending (45) 10 Pain      Shoulder Range of Motion:   - Pt has full range of motion in bilateral shoulders. Pain with flexion and abduction motions.     Upper Extremity Strength  (R) UE  (L) UE    Shoulder flexion: 4/5 Shoulder flexion: 4+/5   Shoulder Abduction: 4/5 Shoulder abduction: 4+/5   Shoulder ER 3+/5 Shoulder ER 4-/5   Shoulder IR 3+/5 Shoulder IR 4-/5   Elbow flexion: 4+/5 Elbow flexion: 4+/5   Elbow extension: 4-/5 Elbow extension: 4/5    4+/5 : 4+/5   Lower Trap 4-/5 Lower Trap 4-/5   Middle Trap 4-/5 Middle Trap 4-/5   Rhomboids 4-/5 Rhomboids 4-/5     Special Tests:  Distraction Slight relief of pain    Compression Increased pain    Spurlings Increased pain      Lumbar Range of Motion:    % of normal motion Pain   Flexion 75%   Pain        Extension 20%   Pain        Left Side Bending 20% Pain        Right Side Bending 20% Pain        Left  "rotation   20% Pain        Right Rotation   20% Pain             Lower Extremity Strength  Right LE  Left LE    Knee extension: 4/5 Knee extension: 4+/5   Knee flexion: 4/5 Knee flexion: 4+/5   Hip flexion: 4-/5 Hip flexion: 4/5   Hip extension:  4/5 Hip extension: 4/5   Hip abduction: 4-/5 Hip abduction: 4/5   Hip adduction: 4-/5 Hip adduction 4/5   Ankle dorsiflexion: 4+/5 Ankle dorsiflexion: 4+/5   Ankle plantarflexion: 5/5 Ankle plantarflexion: 5/5     Joint Mobility: hypermobility of cervical and lumbar spine with much pain and hypertonicity     Palpation: TTP over bilateral upper trap, (R) cervical paraspinals. TTP (L) QL, (L) paraspinals, (L) PSIS, and (L) piriformis     Sensation: bilateral upper and lower extremities intact to light touch.     Function: FOTO      TREATMENT   Treatment Time In: 7:50 AM  Treatment Time Out: 8:00 AM  Total Treatment time (time-based codes) separate from Evaluation: 10 minutes    Luciana received therapeutic exercises to develop strength, endurance, ROM, flexibility, posture, and core stabilization for 10 minutes including:  NECK:   - Upper trap stretch 2 x 30"  - levator scap stretch 2 x 30"  - posterior shoulder rolls   - Scapular retraction    BACK:   - LTR  - Single Knee to chest   - Open Book   - 3 way forward flexion     Home Exercises and Patient Education Provided    Education provided:   - - PT POC  - HEP  - PT prognosis and diagnosis  - all questions and concerns answered       Written Home Exercises Provided: yes.  Exercises were reviewed and Luciana was able to demonstrate them prior to the end of the session.  Luciana demonstrated fair  understanding of the education provided.     See EMR under Patient Instructions for exercises provided  3/1/2023 .    Assessment   Luciana is a 62 y.o. female referred to outpatient Physical Therapy with a medical diagnosis of   M47.812 (ICD-10-CM) - Cervical spondylosis   M47.816 (ICD-10-CM) - Lumbar spondylosis .      The patient " presents with impairments which include decreased ROM, decreased strength, decreased joint mobility, decreased muscle length, impaired balance, postural abnormalities, gait abnormalities, and decreased overall function.  These impairments are limiting patient's ability to walk, stand, sit, perform house hold chores, and perform all school activities.     Pt prognosis is Fair due to personal factors and co-morbidities listed below. Pt will benefit from skilled outpatient Physical Therapy to address the deficits stated above and in the chart below, provide pt/family education, and to maximize pt's level of independence.     Patient prognosis is Fair.   Patientt will benefit from skilled outpatient Physical Therapy to address the deficits stated above and in the chart below, provide patient /family education, and to maximize patientt's level of independence.     Plan of care discussed with patient: Yes  Patient's spiritual, cultural and educational needs considered and patient is agreeable to the plan of care and goals as stated below:     Anticipated Barriers for therapy: pain    Medical Necessity is demonstrated by the following  History  Co-morbidities and personal factors that may impact the plan of care Co-morbidities:   See above    Personal Factors:   age     moderate   Examination  Body Structures and Functions, activity limitations and participation restrictions that may impact the plan of care Body Regions:   head  neck  back  lower extremities  upper extremities    Body Systems:    ROM  strength  balance  gait  transitions    Participation Restrictions:   Walking  School activities  House chores     Activity limitations:   Learning and applying knowledge  no deficits    General Tasks and Commands  no deficits    Communication  no deficits    Mobility  lifting and carrying objects  walking    Self care  no deficits    Domestic Life  shopping  cooking  doing house work (cleaning house, washing dishes,  laundry)    Interactions/Relationships  no deficits    Life Areas  no deficits    Community and Social Life  no deficits         moderate   Clinical Presentation stable and uncomplicated moderate   Decision Making/ Complexity Score: moderate     Goals:  Short Term Goals: 5 weeks   Patient will demonstrate independence with HEP in order to progress toward functional independence  Pt will demonstrate improved pain by reports of less than or equal to 7/10 worst pain of cervical neck on the verbal rating scale in order to progress toward maximal functional ability and improve QOL.  Pt will be able to correct slouched posture in static sitting with minimal verbal cueing for improved QOL  Pt will improve cervical range of motion in all planes by 25% for improved QOL and increased ease with house hold chores   Pt will demonstrate improved pain by reports of less than or equal to 7/10 worst pain of low back on the verbal rating scale in order to progress toward maximal functional ability and improve QOL.  Pt will improve lumbar range of motion y 25% for improved QOL and functional mobility     Long Term Goals: 10 weeks   Patient will demonstrate improved function as indicated by a functional limitation score of 48% on the FOTO.  Pt will demonstrate improved pain by reports of less than or equal to 5/10 worst pain of cervical neck on the verbal rating scale in order to progress toward maximal functional ability and improve QOL.  pt will improve slouched posture in static sitting for improved QOL and increased ease with schooling activities.   pt will improve cervical range of motion with within functional limits with less than 3/10 pain for improved mobility needed to perform all house hold chores  Pt will improved bilateral upper extremity strength to 5/5 with less than 3/10 pain for improved functional upper extremity strength.   Pt will demonstrate improved pain by reports of less than or equal to 5/10 worst pain of low  back on the verbal rating scale in order to progress toward maximal functional ability and improve QOL.  Pt will improve lumbar range of motion to within functional limits for improved functional mobility   Pt will improve bilateral lower extremity strength to 5/5 for improved functional lower extremity strength     Plan   Plan of care Certification: 3/1/2023 to 5/1/2023.    Outpatient Physical Therapy 2 times weekly for 10 weeks to include the following interventions: Aquatic Therapy, Cervical/Lumbar Traction, Electrical Stimulation  , Gait Training, Manual Therapy, Moist Heat/ Ice, Neuromuscular Re-ed, Orthotic Management and Training, Patient Education, Self Care, Therapeutic Activities, Therapeutic Exercise, and Ultrasound. And any other therapies and modalities as clinically indicated and appropriate, including but not limited to FDN and telehealth. Pt may be seen by PTA as a part of pt's care team.     Ana Alonzo, PT, DPT  3/1/2023

## 2023-03-01 NOTE — PLAN OF CARE
"[facility]  Physical Therapy Initial Evaluation    Date: 3/1/2023   Name: Luciana Moraes  Clinic Number: [id number]    Therapy Diagnosis:   Encounter Diagnoses   Name Primary?    Cervical spondylosis     Lumbar spondylosis      Physician: Luis M Pereyra MD    Physician Orders: PT Eval and Treat   Medical Diagnosis from Referral:   M47.812 (ICD-10-CM) - Cervical spondylosis   M47.816 (ICD-10-CM) - Lumbar spondylosis   Evaluation Date: 3/1/2023  Authorization Period Expiration: 3/29/2024  Plan of Care Expiration: 6/1/2023  Visit # / Visits authorized: 1/ 1    PN DUE: 4/1/2023    Time In: 7:20 AM  Time Out: 8:00 AM  Total Appointment Time (timed & untimed codes): 40 minutes    Precautions: Standard    Subjective   Date of onset: Patient reports that she fell twice at [employer] and due to fall she now has increased left sided neck pain and right sided low back pain. She states both times she fell she was on "neutral ground" in the grass and there was a dip in the ground. The second time she fell hard. She states she fell backwards and landed on her bottom. No LOC reported. She immediately felt low back pain, but not until later int he evening did she feel neck pain.   History of current condition - Luciana reports: left sided low back pain and right sided neck pain. She states that she has pain with prolonged sitting, prolonged standing, walking, house chores, transitional movements, school activities (sitting at desk), and with sleeping. She has much difficulty looking up and down with her neck due to pain. She is a full time student, but reports that her desk at home is ergonomically correct. She as well reports that she participates in an exercise class on [facility] [employer] [employer] on Monday/Wednesday/Friday and modifies mostly sitting in the chair to perform exercises.     Medical History:   Past Medical History:   Diagnosis Date    Diabetes mellitus, type 2     Hyperlipidemia     Osteoarthritis  "    Polymyositis     Rheumatoid arthritis      Surgical History:   Luciana Moraes  has a past surgical history that includes Hysterectomy (2008); Carpal tunnel release (Bilateral); Hip surgery; Epidural steroid injection into cervical spine (N/A, 09/22/2022); Injection of anesthetic agent around medial branch nerves innervating cervical facet joint (Bilateral, 10/31/2022); Colonoscopy (N/A, 11/08/2022); Oophorectomy; and Tubal ligation (1985).    Medications:   Luciana has a current medication list which includes the following prescription(s): aspirin, atorvastatin, diclofenac sodium, ergocalciferol, hydroxychloroquine, ibuprofen, metformin, nabumetone, polyethylene glycol, pregabalin, ozempic, and tizanidine.    Allergies:   Review of patient's allergies indicates:  No Known Allergies     Imaging:   X-Ray Cervical Spine 5 View With Flex And Ext(2/27/2023):  Mild moderate disc space narrowing and osteophytic lipping C5-C6.  Mild osteophytic lipping anteriorly at C4-C5.  Alignment satisfactory.  No spondylolisthesis with flexion extension.  Mild osseous foraminal narrowing C5-C6 on the right.  Odontoid intact.  X-Ray Lumbar Complete Including Flex And Ext X-Ray Lumbar Complete Including Flex And Ext (2/28/2023): Negative for acute process involving the lumbar spine. Degenerative facet arthropathy of the lower lumbar spine with mild convex-right curvature of the lumbar spine.    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   Current Level of Function: increased pain with prolonged standing, prolonged sitting, walking, school activities, and house chores    Pain:  -Neck: Current 8/10, worst 10/10, best 6/10   -Back: Current: 10/10, worst 10/10, best 7/10  Location: left back and right neck    Description:   - Neck: sharp and constant   - Back: sharp and constant    Aggravating Factors:   - sitting, standing, desk work, house chores, transitional movements, walking, sleeping   Easing Factors: stretch out, switch position, apply pressure (at neck)     Patients goals: decrease pain in left low back and right neck; increased ease with schooling activities, increased ease with transitional movements; increased ease with house chores; increase standing and sitting tolerance; increased ease with walking and walking distances     Objective   Observation: pt pleasant and appropriate throughout evaluation; A&O x 3     Posture: pt in static sitting has bilateral rounded shoulders and forwards head. She is unable to sit for longer than 15 minutes before having to stand due to pain. She as well applies pressure to right side of her neck constantly.     Cervical Range of Motion:    Degrees Pain   Flexion (50) 10 Pain     Extension (75) 20 Pain     Right Rotation (75) 45 Pain     Left Rotation (75) 65 Pain     Right Side Bending (45) 22 Pain   Left Side Bending (45) 10 Pain      Shoulder Range of Motion:   - Pt has full range of motion in bilateral shoulders. Pain with flexion and abduction motions.     Upper Extremity Strength  (R) UE  (L) UE    Shoulder flexion: 4/5 Shoulder flexion: 4+/5   Shoulder Abduction: 4/5 Shoulder abduction: 4+/5   Shoulder ER 3+/5 Shoulder ER 4-/5   Shoulder IR 3+/5 Shoulder IR 4-/5   Elbow flexion: 4+/5 Elbow flexion: 4+/5   Elbow extension: 4-/5 Elbow extension: 4/5    4+/5 : 4+/5   Lower Trap 4-/5 Lower Trap 4-/5   Middle Trap 4-/5 Middle Trap 4-/5   Rhomboids 4-/5 Rhomboids 4-/5     Special Tests:  Distraction Slight relief of pain    Compression Increased pain    Spurlings Increased pain      Lumbar Range of Motion:    % of normal motion Pain   Flexion 75%   Pain        Extension 20%   Pain        Left Side Bending 20% Pain        Right Side Bending 20% Pain        Left  "rotation   20% Pain        Right Rotation   20% Pain             Lower Extremity Strength  Right LE  Left LE    Knee extension: 4/5 Knee extension: 4+/5   Knee flexion: 4/5 Knee flexion: 4+/5   Hip flexion: 4-/5 Hip flexion: 4/5   Hip extension:  4/5 Hip extension: 4/5   Hip abduction: 4-/5 Hip abduction: 4/5   Hip adduction: 4-/5 Hip adduction 4/5   Ankle dorsiflexion: 4+/5 Ankle dorsiflexion: 4+/5   Ankle plantarflexion: 5/5 Ankle plantarflexion: 5/5     Joint Mobility: hypermobility of cervical and lumbar spine with much pain and hypertonicity     Palpation: TTP over bilateral upper trap, (R) cervical paraspinals. TTP (L) QL, (L) paraspinals, (L) PSIS, and (L) piriformis     Sensation: bilateral upper and lower extremities intact to light touch.     Function: FOTO      TREATMENT   Treatment Time In: 7:50 AM  Treatment Time Out: 8:00 AM  Total Treatment time (time-based codes) separate from Evaluation: 10 minutes    Luciana received therapeutic exercises to develop strength, endurance, ROM, flexibility, posture, and core stabilization for 10 minutes including:  NECK:   - Upper trap stretch 2 x 30"  - levator scap stretch 2 x 30"  - posterior shoulder rolls   - Scapular retraction    BACK:   - LTR  - Single Knee to chest   - Open Book   - 3 way forward flexion     Home Exercises and Patient Education Provided    Education provided:   - - PT POC  - HEP  - PT prognosis and diagnosis  - all questions and concerns answered       Written Home Exercises Provided: yes.  Exercises were reviewed and Luciana was able to demonstrate them prior to the end of the session.  Luciana demonstrated fair  understanding of the education provided.     See EMR under Patient Instructions for exercises provided 3/1/2023.    Assessment   Luciana is a 62 y.o. female referred to outpatient Physical Therapy with a medical diagnosis of   M47.812 (ICD-10-CM) - Cervical spondylosis   M47.816 (ICD-10-CM) - Lumbar spondylosis .      The patient " presents with impairments which include decreased ROM, decreased strength, decreased joint mobility, decreased muscle length, impaired balance, postural abnormalities, gait abnormalities, and decreased overall function.  These impairments are limiting patient's ability to walk, stand, sit, perform house hold chores, and perform all school activities.     Pt prognosis is Fair due to personal factors and co-morbidities listed below. Pt will benefit from skilled outpatient Physical Therapy to address the deficits stated above and in the chart below, provide pt/family education, and to maximize pt's level of independence.     Patient prognosis is Fair.   Patientt will benefit from skilled outpatient Physical Therapy to address the deficits stated above and in the chart below, provide patient /family education, and to maximize patientt's level of independence.     Plan of care discussed with patient: Yes  Patient's spiritual, cultural and educational needs considered and patient is agreeable to the plan of care and goals as stated below:     Anticipated Barriers for therapy: pain    Medical Necessity is demonstrated by the following  History  Co-morbidities and personal factors that may impact the plan of care Co-morbidities:   See above    Personal Factors:   age     moderate   Examination  Body Structures and Functions, activity limitations and participation restrictions that may impact the plan of care Body Regions:   head  neck  back  lower extremities  upper extremities    Body Systems:    ROM  strength  balance  gait  transitions    Participation Restrictions:   Walking  School activities  House chores     Activity limitations:   Learning and applying knowledge  no deficits    General Tasks and Commands  no deficits    Communication  no deficits    Mobility  lifting and carrying objects  walking    Self care  no deficits    Domestic Life  shopping  cooking  doing house work (cleaning house, washing dishes,  laundry)    Interactions/Relationships  no deficits    Life Areas  no deficits    Community and Social Life  no deficits         moderate   Clinical Presentation stable and uncomplicated moderate   Decision Making/ Complexity Score: moderate     Goals:  Short Term Goals: 5 weeks   Patient will demonstrate independence with HEP in order to progress toward functional independence  Pt will demonstrate improved pain by reports of less than or equal to 7/10 worst pain of cervical neck on the verbal rating scale in order to progress toward maximal functional ability and improve QOL.  Pt will be able to correct slouched posture in static sitting with minimal verbal cueing for improved QOL  Pt will improve cervical range of motion in all planes by 25% for improved QOL and increased ease with house hold chores   Pt will demonstrate improved pain by reports of less than or equal to 7/10 worst pain of low back on the verbal rating scale in order to progress toward maximal functional ability and improve QOL.  Pt will improve lumbar range of motion y 25% for improved QOL and functional mobility     Long Term Goals: 10 weeks   Patient will demonstrate improved function as indicated by a functional limitation score of 48% on the FOTO.  Pt will demonstrate improved pain by reports of less than or equal to 5/10 worst pain of cervical neck on the verbal rating scale in order to progress toward maximal functional ability and improve QOL.  pt will improve slouched posture in static sitting for improved QOL and increased ease with schooling activities.   pt will improve cervical range of motion with within functional limits with less than 3/10 pain for improved mobility needed to perform all house hold chores  Pt will improved bilateral upper extremity strength to 5/5 with less than 3/10 pain for improved functional upper extremity strength.   Pt will demonstrate improved pain by reports of less than or equal to 5/10 worst pain of low  back on the verbal rating scale in order to progress toward maximal functional ability and improve QOL.  Pt will improve lumbar range of motion to within functional limits for improved functional mobility   Pt will improve bilateral lower extremity strength to 5/5 for improved functional lower extremity strength     Plan   Plan of care Certification: 3/1/2023 to 5/1/2023.    Outpatient Physical Therapy 2 times weekly for 10 weeks to include the following interventions: Aquatic Therapy, Cervical/Lumbar Traction, Electrical Stimulation , Gait Training, Manual Therapy, Moist Heat/ Ice, Neuromuscular Re-ed, Orthotic Management and Training, Patient Education, Self Care, Therapeutic Activities, Therapeutic Exercise, and Ultrasound. And any other therapies and modalities as clinically indicated and appropriate, including but not limited to FDN and telehealth. Pt may be seen by PTA as a part of pt's care team.     Ana Alonzo, PT, DPT  3/1/2023

## 2023-03-01 NOTE — PLAN OF CARE
"[facility]  Physical Therapy Initial Evaluation    Date: 3/1/2023   Name: Luciana Moraes  Clinic Number: [id number]    Therapy Diagnosis:   Encounter Diagnoses   Name Primary?    Cervical spondylosis     Lumbar spondylosis      Physician: Luis M Pereyra MD    Physician Orders: PT Eval and Treat   Medical Diagnosis from Referral:   M47.812 (ICD-10-CM) - Cervical spondylosis   M47.816 (ICD-10-CM) - Lumbar spondylosis   Evaluation Date: 3/1/2023  Authorization Period Expiration: 3/29/2024  Plan of Care Expiration: 6/1/2023  Visit # / Visits authorized: 1/ 1    PN DUE: 4/1/2023    Time In: 7:20 AM  Time Out: 8:00 AM  Total Appointment Time (timed & untimed codes): 40 minutes    Precautions: Standard    Subjective   Date of onset: Patient reports that she fell twice at [employer] and due to fall she now has increased left sided neck pain and right sided low back pain. She states both times she fell she was on "neutral ground" in the grass and there was a dip in the ground. The second time she fell hard. She states she fell backwards and landed on her bottom. No LOC reported. She immediately felt low back pain, but not until later int he evening did she feel neck pain.   History of current condition - Luciana reports: left sided low back pain and right sided neck pain. She states that she has pain with prolonged sitting, prolonged standing, walking, house chores, transitional movements, school activities (sitting at desk), and with sleeping. She has much difficulty looking up and down with her neck due to pain. She is a full time student, but reports that her desk at home is ergonomically correct. She as well reports that she participates in an exercise class on [employer] on Monday/Wednesday/Friday and modifies mostly sitting in the chair to perform exercises.     Medical History:   Past Medical History:   Diagnosis Date    Diabetes mellitus, type 2     Hyperlipidemia     Osteoarthritis  "    Polymyositis     Rheumatoid arthritis      Surgical History:   Luciana Moraes  has a past surgical history that includes Hysterectomy (2008); Carpal tunnel release (Bilateral); Hip surgery; Epidural steroid injection into cervical spine (N/A, 09/22/2022); Injection of anesthetic agent around medial branch nerves innervating cervical facet joint (Bilateral, 10/31/2022); Colonoscopy (N/A, 11/08/2022); Oophorectomy; and Tubal ligation (1985).    Medications:   Luciana has a current medication list which includes the following prescription(s): aspirin, atorvastatin, diclofenac sodium, ergocalciferol, hydroxychloroquine, ibuprofen, metformin, nabumetone, polyethylene glycol, pregabalin, ozempic, and tizanidine.    Allergies:   Review of patient's allergies indicates:  No Known Allergies     Imaging:   X-Ray Cervical Spine 5 View With Flex And Ext(2/27/2023):  Mild moderate disc space narrowing and osteophytic lipping C5-C6.  Mild osteophytic lipping anteriorly at C4-C5.  Alignment satisfactory.  No spondylolisthesis with flexion extension.  Mild osseous foraminal narrowing C5-C6 on the right.  Odontoid intact.  X-Ray Lumbar Complete Including Flex And Ext X-Ray Lumbar Complete Including Flex And Ext (2/28/2023): Negative for acute process involving the lumbar spine. Degenerative facet arthropathy of the lower lumbar spine with mild convex-right curvature of the lumbar spine.    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   Current Level of Function: increased pain with prolonged standing, prolonged sitting, walking, school activities, and house chores    Pain:  -Neck: Current 8/10, worst 10/10, best 6/10   -Back: Current: 10/10, worst 10/10, best 7/10  Location: left back and right neck    Description:   - Neck: sharp and constant   - Back: sharp and constant    Aggravating Factors:   - sitting, standing, desk work, house chores, transitional movements, walking, sleeping   Easing Factors: stretch out, switch position, apply pressure (at neck)     Patients goals: decrease pain in left low back and right neck; increased ease with schooling activities, increased ease with transitional movements; increased ease with house chores; increase standing and sitting tolerance; increased ease with walking and walking distances     Objective   Observation: pt pleasant and appropriate throughout evaluation; A&O x 3     Posture: pt in static sitting has bilateral rounded shoulders and forwards head. She is unable to sit for longer than 15 minutes before having to stand due to pain. She as well applies pressure to right side of her neck constantly.     Cervical Range of Motion:    Degrees Pain   Flexion (50) 10 Pain     Extension (75) 20 Pain     Right Rotation (75) 45 Pain     Left Rotation (75) 65 Pain     Right Side Bending (45) 22 Pain   Left Side Bending (45) 10 Pain      Shoulder Range of Motion:   - Pt has full range of motion in bilateral shoulders. Pain with flexion and abduction motions.     Upper Extremity Strength  (R) UE  (L) UE    Shoulder flexion: 4/5 Shoulder flexion: 4+/5   Shoulder Abduction: 4/5 Shoulder abduction: 4+/5   Shoulder ER 3+/5 Shoulder ER 4-/5   Shoulder IR 3+/5 Shoulder IR 4-/5   Elbow flexion: 4+/5 Elbow flexion: 4+/5   Elbow extension: 4-/5 Elbow extension: 4/5    4+/5 : 4+/5   Lower Trap 4-/5 Lower Trap 4-/5   Middle Trap 4-/5 Middle Trap 4-/5   Rhomboids 4-/5 Rhomboids 4-/5     Special Tests:  Distraction Slight relief of pain    Compression Increased pain    Spurlings Increased pain      Lumbar Range of Motion:    % of normal motion Pain   Flexion 75%   Pain        Extension 20%   Pain        Left Side Bending 20% Pain        Right Side Bending 20% Pain        Left  "rotation   20% Pain        Right Rotation   20% Pain             Lower Extremity Strength  Right LE  Left LE    Knee extension: 4/5 Knee extension: 4+/5   Knee flexion: 4/5 Knee flexion: 4+/5   Hip flexion: 4-/5 Hip flexion: 4/5   Hip extension:  4/5 Hip extension: 4/5   Hip abduction: 4-/5 Hip abduction: 4/5   Hip adduction: 4-/5 Hip adduction 4/5   Ankle dorsiflexion: 4+/5 Ankle dorsiflexion: 4+/5   Ankle plantarflexion: 5/5 Ankle plantarflexion: 5/5     Joint Mobility: hypermobility of cervical and lumbar spine with much pain and hypertonicity     Palpation: TTP over bilateral upper trap, (R) cervical paraspinals. TTP (L) QL, (L) paraspinals, (L) PSIS, and (L) piriformis     Sensation: bilateral upper and lower extremities intact to light touch.     Function: FOTO      TREATMENT   Treatment Time In: 7:50 AM  Treatment Time Out: 8:00 AM  Total Treatment time (time-based codes) separate from Evaluation: 10 minutes    Luciana received therapeutic exercises to develop strength, endurance, ROM, flexibility, posture, and core stabilization for 10 minutes including:  NECK:   - Upper trap stretch 2 x 30"  - levator scap stretch 2 x 30"  - posterior shoulder rolls   - Scapular retraction    BACK:   - LTR  - Single Knee to chest   - Open Book   - 3 way forward flexion     Home Exercises and Patient Education Provided    Education provided:   - - PT POC  - HEP  - PT prognosis and diagnosis  - all questions and concerns answered       Written Home Exercises Provided: yes.  Exercises were reviewed and Luciana was able to demonstrate them prior to the end of the session.  Luciana demonstrated fair  understanding of the education provided.     See EMR under Patient Instructions for exercises provided 3/1/2023.    Assessment   Luciana is a 62 y.o. female referred to outpatient Physical Therapy with a medical diagnosis of   M47.812 (ICD-10-CM) - Cervical spondylosis   M47.816 (ICD-10-CM) - Lumbar spondylosis .      The patient " presents with impairments which include decreased ROM, decreased strength, decreased joint mobility, decreased muscle length, impaired balance, postural abnormalities, gait abnormalities, and decreased overall function.  These impairments are limiting patient's ability to walk, stand, sit, perform house hold chores, and perform all school activities.     Pt prognosis is Fair due to personal factors and co-morbidities listed below. Pt will benefit from skilled outpatient Physical Therapy to address the deficits stated above and in the chart below, provide pt/family education, and to maximize pt's level of independence.     Patient prognosis is Fair.   Patientt will benefit from skilled outpatient Physical Therapy to address the deficits stated above and in the chart below, provide patient /family education, and to maximize patientt's level of independence.     Plan of care discussed with patient: Yes  Patient's spiritual, cultural and educational needs considered and patient is agreeable to the plan of care and goals as stated below:     Anticipated Barriers for therapy: pain    Medical Necessity is demonstrated by the following  History  Co-morbidities and personal factors that may impact the plan of care Co-morbidities:   See above    Personal Factors:   age     moderate   Examination  Body Structures and Functions, activity limitations and participation restrictions that may impact the plan of care Body Regions:   head  neck  back  lower extremities  upper extremities    Body Systems:    ROM  strength  balance  gait  transitions    Participation Restrictions:   Walking  School activities  House chores     Activity limitations:   Learning and applying knowledge  no deficits    General Tasks and Commands  no deficits    Communication  no deficits    Mobility  lifting and carrying objects  walking    Self care  no deficits    Domestic Life  shopping  cooking  doing house work (cleaning house, washing dishes,  laundry)    Interactions/Relationships  no deficits    Life Areas  no deficits    Community and Social Life  no deficits         moderate   Clinical Presentation stable and uncomplicated moderate   Decision Making/ Complexity Score: moderate     Goals:  Short Term Goals: 5 weeks   Patient will demonstrate independence with HEP in order to progress toward functional independence  Pt will demonstrate improved pain by reports of less than or equal to 7/10 worst pain of cervical neck on the verbal rating scale in order to progress toward maximal functional ability and improve QOL.  Pt will be able to correct slouched posture in static sitting with minimal verbal cueing for improved QOL  Pt will improve cervical range of motion in all planes by 25% for improved QOL and increased ease with house hold chores   Pt will demonstrate improved pain by reports of less than or equal to 7/10 worst pain of low back on the verbal rating scale in order to progress toward maximal functional ability and improve QOL.  Pt will improve lumbar range of motion y 25% for improved QOL and functional mobility     Long Term Goals: 10 weeks   Patient will demonstrate improved function as indicated by a functional limitation score of 48% on the FOTO.  Pt will demonstrate improved pain by reports of less than or equal to 5/10 worst pain of cervical neck on the verbal rating scale in order to progress toward maximal functional ability and improve QOL.  pt will improve slouched posture in static sitting for improved QOL and increased ease with schooling activities.   pt will improve cervical range of motion with within functional limits with less than 3/10 pain for improved mobility needed to perform all house hold chores  Pt will improved bilateral upper extremity strength to 5/5 with less than 3/10 pain for improved functional upper extremity strength.   Pt will demonstrate improved pain by reports of less than or equal to 5/10 worst pain of low  back on the verbal rating scale in order to progress toward maximal functional ability and improve QOL.  Pt will improve lumbar range of motion to within functional limits for improved functional mobility   Pt will improve bilateral lower extremity strength to 5/5 for improved functional lower extremity strength     Plan   Plan of care Certification: 3/1/2023 to 6/1/2023.    Outpatient Physical Therapy 2 times weekly for 10 weeks to include the following interventions: Aquatic Therapy, Cervical/Lumbar Traction, Electrical Stimulation , Gait Training, Manual Therapy, Moist Heat/ Ice, Neuromuscular Re-ed, Orthotic Management and Training, Patient Education, Self Care, Therapeutic Activities, Therapeutic Exercise, and Ultrasound. And any other therapies and modalities as clinically indicated and appropriate, including but not limited to FDN and telehealth. Pt may be seen by PTA as a part of pt's care team.     Ana Alonzo, PT, DPT  3/1/2023

## 2023-03-01 NOTE — PROGRESS NOTES
Interventional Pain Progress Note       Referring Physician: No ref. provider found    PCP: Cedric Huerta MD    Chief Complaint:   Chief Complaint   Patient presents with    Low-back Pain     Patient received 100% relief from MBB x 2 days.  Patient has pain in lower back, hips that radiates down left leg, pain in neck  and in mid spine.  Pain scale 7/10     Interval History (02/28/2023):  Patient returns to clinic after procedure.  Patient reports 100% relief and neck pain for 2 days after bilateral C5-C7 medial branch block on 10/31/2022.  Since last being seen, patient reports having fall from standing 1 week ago.  Patient denies any loss consciousness with fall.  She reports increase in neck and lower back pain.  The pain is described as a throbbing aching pain diffusely across her neck, worse at the base of the neck.  Pain is worse with extension and rotation, better with flexion heat.  Low back pain described as a stabbing aching pain that starts in the middle of the low back and radiates to the bilateral hips.  Pain is worse with standing and lifting, and better with sitting down and heat.  Pain is rated a 7/10. Denies any fevers, chills,  weakness, or bowel and bladder incontinence        Interval History (10/11/22):  Patient returns to clinic after procedure.  Patient reports minimal relief after C7-T1 interlaminar epidural steroid injection on 09/22/2022.  Patient reports continued right-sided pain that starts over the right neck and right trapezius area and then radiates down to her right hand.  Pain is worse at night and with lifting, better with rest and in the morning.  Pain is rated 8 out 10. Denies any fevers, chills, changes in gait, weakness, or bowel and bladder incontinence        Interval History (02/28/2023):  Patient returns to clinic for MRI review.  Patient reports continued neck pain primarily on her right side that then radiates to her right upper extremity to her fingertips and  numbness and tingling pain.  She reports associated decreased strength in her right upper extremity with his pain.  Pain is worse with neck rotation and lifting objects, better with heat and rest.  Pain is rated a 7/10. Denies any fevers, chills, changes in gait, weakness, or bowel and bladder incontinence        SUBJECTIVE:    Luciana Moraes is a 62 y.o. female who presents to the clinic for the evaluation of neck and lower pain. The pain started over 10 years ago and symptoms have been worsening.  Neck pain is described as a throbbing pain that starts diffusely over her neck.  She reports that in the last 2 months she has had increased in right upper extremity radicular pain that goes to her fingertips.  She denies any traumas to her neck or any previous surgeries on her spine her major joints.  Pain is worse with neck rotation and lifting objects, better with flexion and heat.  Back pain is described as a throbbing axial pain in a band across her lower back.  She reports minimal radiation to her bilateral lower extremities.  Pain is worse with extension, better with flexion.  The pain is described as numbing, sharp and tingling and is rated as  10 out of 10 .   The pain is rated with a score of  6/10 on the BEST day and a score of 10/10 on the WORST day.  Symptoms interfere with daily activity and work. The pain is exacerbated by Walking, Extension, Lifting and Getting out of bed/chair.  The pain is mitigated by nothing.     Patient denies night fever/night sweats, urinary incontinence, bowel incontinence, significant weight loss, significant motor weakness and loss of sensations.      Non-Pharmacologic Treatments:  Physical Therapy/Home Exercise: yes  Ice/Heat:yes  TENS: no  Acupuncture: no  Massage: yes  Chiropractic: no        Previous Pain Medications:  Tylenol, NSAIDs, gabapentin, muscle relaxers, opioids, topicals     report:  Reviewed and consistent with medication use as prescribed.    Pain  Procedures:   -10/31/2022: Bilateral C5-C7 medial branch block, 100% relief for 2 days  9/22/22:  C7-T1 interlaminar epidural steroid injection, minimal relief      Imaging:     Bilateral upper extremity EMG and nerve conduction study, 10/26/2022:  IMPRESSION  ABNORMAL study  2. There is electrodiagnostic evidence of a moderate demyelinating median neuropathy (Carpal tunnel syndrome) across bilateral wrists-slightly worse on the right.  There is slight evidence of a mild subacute irritation of the C7 nerve root.  3. There is clinical evidence of myofascial pain and rotator cuff weakness     MRI CERVICAL SPINE WITHOUT CONTRAST 8/2/22     CLINICAL HISTORY:  Neck pain, chronic, degenerative changes on xray;.  Radiculopathy, cervical region     TECHNIQUE:  Multiplanar, multisequence MR images of the cervical spine were acquired without the administration of contrast.     COMPARISON:  Cervical spine x-ray dated 08/02/2022     FINDINGS:  There are 7 non-rib-bearing cervical vertebrae.  Mild cervical straightening noted.  Alignment is otherwise unremarkable with no significant listhesis.  No acute fracture or compression deformity.  No aggressive focal signal abnormality.     Visualized posterior fossa is unremarkable.  Craniocervical junction is well maintained.  Visualized spinal cord is normal in size and signal.     C1-C2: Atlanto odontoid osteophytosis noted without significant dorsal pannus formation.  No significant spinal canal stenosis.     C2-C3: No significant spinal canal or neural foraminal stenosis.     C3-C4: Right greater than left uncovertebral hypertrophy and mild bilateral facet arthropathy.  No significant spinal canal or neural foraminal stenosis.     C4-C5: Bilateral uncovertebral hypertrophy/disc osteophyte complex and mild bilateral facet arthropathy.  Minimal spinal canal stenosis.  Mild-to-moderate right and mild left neural foraminal stenosis.     C5-C6: Right greater than left uncovertebral  hypertrophy/disc osteophyte complex and mild bilateral facet arthropathy.  Minimal spinal canal stenosis with particular narrowing of the right spinal canal.  Moderate to severe right and mild left neural foraminal stenosis.     C6-C7: Bilateral uncovertebral hypertrophy/disc osteophyte complex and mild bilateral facet arthropathy.  No significant spinal canal stenosis.  Mild right neural foraminal stenosis.  No significant left neural foraminal stenosis.     C7-T1: Mild bilateral facet arthropathy.  No significant spinal canal stenosis.  Mild bilateral neural foraminal stenosis.     Visualized soft tissues are unremarkable.     Impression:     Multilevel degenerative changes as detailed above.     Minimal spinal canal stenosis at C4-C5 and C5-C6.     Varying degrees of bilateral neural foraminal stenosis as detailed above, most severe at the right C5-C6 level.      MRI Lumbar Spine w/o Contast 7/8/19  There is lumbar facet arthropathy at L5-S1 and L4-L5. No stenosis or disc herniation is evident. Patient appears obese. Patient may be a candidate for an image guided steroid injection treatment trial.   .       Past Medical History:   Diagnosis Date    Diabetes mellitus, type 2     Hyperlipidemia     Osteoarthritis     Polymyositis     Rheumatoid arthritis      Past Surgical History:   Procedure Laterality Date    CARPAL TUNNEL RELEASE Bilateral     COLONOSCOPY N/A 11/08/2022    Procedure: COLONOSCOPY;  Surgeon: Kishore Monsalve MD;  Location: [facility] ENDO;  Service: Endoscopy;  Laterality: N/A;    EPIDURAL STEROID INJECTION INTO CERVICAL SPINE N/A 09/22/2022    Procedure: C7/T1 IL [facility];  Surgeon: Luis M Pereyra MD;  Location: [facility] PAIN MGT;  Service: Pain Management;  Laterality: N/A;    HIP SURGERY      HYSTERECTOMY  2008    due to bleeding, pain from fibroids, retains one ovary    INJECTION OF ANESTHETIC AGENT AROUND MEDIAL BRANCH NERVES INNERVATING CERVICAL FACET JOINT Bilateral 10/31/2022    Procedure:  Bilateral C5-7 MBB with RN IV sedation;  Surgeon: Luis M Pereyra MD;  Location: [facility] PAIN MGT;  Service: Pain Management;  Laterality: Bilateral;    OOPHORECTOMY      1 ovary removed    TUBAL LIGATION       Social History     Socioeconomic History    Marital status:    Tobacco Use    Smoking status: Former     Types: Cigarettes     Quit date: 3/17/2001     Years since quittin.9    Smokeless tobacco: Never   Substance and Sexual Activity    Alcohol use: Never    Drug use: Never    Sexual activity: Yes     Partners: Male     Birth control/protection: None     Family History   Problem Relation Age of Onset    Rheum arthritis Mother     No Known Problems Father        Review of patient's allergies indicates:  No Known Allergies    Current Outpatient Medications   Medication Sig    aspirin 81 MG Chew Take 81 mg by mouth.    atorvastatin (LIPITOR) 80 MG tablet Take 1 tablet (80 mg total) by mouth once daily.    diclofenac sodium (VOLTAREN) 1 % Gel Apply 2 g topically 4 (four) times daily.    ergocalciferol (ERGOCALCIFEROL) 50,000 unit Cap Take 1 capsule (50,000 Units total) by mouth every 7 days.    hydrOXYchloroQUINE (PLAQUENIL) 200 mg tablet Take 1 tablet (200 mg total) by mouth 2 (two) times daily.    ibuprofen (ADVIL,MOTRIN) 800 MG tablet Take 800 mg by mouth 3 (three) times daily.    metFORMIN (GLUCOPHAGE-XR) 500 MG ER 24hr tablet Take 2 tablets (1,000 mg total) by mouth daily with breakfast.    nabumetone (RELAFEN) 750 MG tablet Take 1 tablet (750 mg total) by mouth 2 (two) times daily as needed for Pain.    polyethylene glycol (GLYCOLAX) 17 gram PwPk Take 17 g by mouth once daily.    pregabalin (LYRICA) 75 MG capsule Take 1 capsule (75 mg total) by mouth 2 (two) times daily.    semaglutide (OZEMPIC) 1 mg/dose (4 mg/3 mL) Inject 1 mg into the skin every 7 days.    tiZANidine (ZANAFLEX) 4 MG tablet Take 1 tablet (4 mg total) by mouth 2 (two) times daily as needed.     No current  "facility-administered medications for this visit.         ROS  Review of Systems   Constitutional:  Negative for chills, diaphoresis, fatigue and fever.   Respiratory:  Negative for chest tightness, shortness of breath, wheezing and stridor.    Cardiovascular:  Negative for chest pain and leg swelling.   Gastrointestinal:  Negative for blood in stool, diarrhea, nausea and vomiting.   Endocrine: Negative for cold intolerance and heat intolerance.   Genitourinary:  Positive for urgency. Negative for dysuria and hematuria.   Musculoskeletal:  Positive for arthralgias, back pain, gait problem, myalgias, neck pain and neck stiffness. Negative for joint swelling.   Skin:  Negative for rash.   Neurological:  Positive for weakness and numbness. Negative for tremors, seizures, speech difficulty, light-headedness and headaches.   Hematological:  Does not bruise/bleed easily.   Psychiatric/Behavioral:  Negative for agitation, confusion and suicidal ideas. The patient is not nervous/anxious.           OBJECTIVE:  /60   Pulse 64   Resp 17   Ht 5' 2" (1.575 m)   Wt 86.3 kg (190 lb 4.1 oz)   BMI 34.80 kg/m²         Physical Exam  Constitutional:       General: She is not in acute distress.     Appearance: Normal appearance. She is not ill-appearing.   HENT:      Head: Normocephalic and atraumatic.      Nose: No congestion or rhinorrhea.   Eyes:      Extraocular Movements: Extraocular movements intact.      Pupils: Pupils are equal, round, and reactive to light.   Cardiovascular:      Pulses: Normal pulses.   Pulmonary:      Effort: Pulmonary effort is normal.   Abdominal:      General: Abdomen is flat.      Palpations: Abdomen is soft.   Musculoskeletal:      Comments: Positive TInsel and Phalen on right head   Skin:     General: Skin is warm and dry.      Capillary Refill: Capillary refill takes less than 2 seconds.   Neurological:      Mental Status: She is alert and oriented to person, place, and time.      Cranial " Nerves: No cranial nerve deficit.      Sensory: No sensory deficit.      Motor: Weakness present. No abnormal muscle tone.      Gait: Gait abnormal.      Comments: Antalgic gait     Psychiatric:         Mood and Affect: Mood normal.         Behavior: Behavior normal.         Thought Content: Thought content normal.       Musculoskeletal:    Cervical Exam  Incision: no  Pain with Flexion: no  Pain with Extension: yes  Paraspinous TTP:  Right-greater-than-left  Facet TTP:  C5-C6  Spurling:  Positive on the right  ROM:  Decreased        Lumbar Exam  Incision: no  Pain with Flexion: yes  Pain with Extension: yes  ROM:  Decreased  Paraspinous TTP:  Present bilaterally  Facet TTP:  L4-5  Facet Loading:  Positive bilaterally  SLR:  Negative bilaterally  SIJ TTP:  Positive bilaterally  SAMM:  Negative bilaterally            LABS:  Lab Results   Component Value Date    WBC 6.86 12/05/2022    HGB 13.6 12/05/2022    HCT 43.9 12/05/2022    MCV 89 12/05/2022     12/05/2022       CMP  Sodium   Date Value Ref Range Status   12/05/2022 140 136 - 145 mmol/L Final     Potassium   Date Value Ref Range Status   12/05/2022 4.3 3.5 - 5.1 mmol/L Final     Chloride   Date Value Ref Range Status   12/05/2022 109 95 - 110 mmol/L Final     CO2   Date Value Ref Range Status   12/05/2022 24 23 - 29 mmol/L Final     Glucose   Date Value Ref Range Status   12/05/2022 157 (H) 70 - 110 mg/dL Final     BUN   Date Value Ref Range Status   12/05/2022 13 6 - 20 mg/dL Final     Creatinine   Date Value Ref Range Status   12/05/2022 0.9 0.5 - 1.4 mg/dL Final     Calcium   Date Value Ref Range Status   12/05/2022 9.1 8.7 - 10.5 mg/dL Final     Total Protein   Date Value Ref Range Status   12/05/2022 7.0 6.0 - 8.4 g/dL Final     Albumin   Date Value Ref Range Status   12/05/2022 3.6 3.5 - 5.2 g/dL Final     Total Bilirubin   Date Value Ref Range Status   12/05/2022 0.5 0.1 - 1.0 mg/dL Final     Comment:     For infants and newborns, interpretation  of results should be based  on gestational age, weight and in agreement with clinical  observations.    Premature Infant recommended reference ranges:  Up to 24 hours.............<8.0 mg/dL  Up to 48 hours............<12.0 mg/dL  3-5 days..................<15.0 mg/dL  6-29 days.................<15.0 mg/dL       Alkaline Phosphatase   Date Value Ref Range Status   12/05/2022 88 55 - 135 U/L Final     AST   Date Value Ref Range Status   12/05/2022 18 10 - 40 U/L Final     ALT   Date Value Ref Range Status   12/05/2022 13 10 - 44 U/L Final     Anion Gap   Date Value Ref Range Status   12/05/2022 7 (L) 8 - 16 mmol/L Final       Lab Results   Component Value Date    HGBA1C 6.5 (H) 02/13/2023             ASSESSMENT:       62 y.o. year old female with neck pain, consistent with     1. Cervical spondylosis  Ambulatory referral/consult to Physical/Occupational Therapy    IR Facet Inj Cerv Thoracic 2nd Vert Bi    IR Facet Inj Cervical Thoracic 1st Vert Bi    Case Request-RAD/Other Procedure Area: Bilateral C5-7 MBB with RN IV sedation      2. DDD (degenerative disc disease), cervical        3. Lumbar spondylosis  Ambulatory referral/consult to Physical/Occupational Therapy    X-Ray Lumbar Complete Including Flex And Ext      4. DDD (degenerative disc disease), lumbar  X-Ray Lumbar Complete Including Flex And Ext      5. Myofascial pain          Cervical spondylosis  -     Ambulatory referral/consult to Physical/Occupational Therapy; Future; Expected date: 03/07/2023  -     IR Facet Inj Cerv Thoracic 2nd Vert Bi; Future; Expected date: 02/28/2023  -     IR Facet Inj Cervical Thoracic 1st Vert Bi; Future; Expected date: 02/28/2023  -     Case Request-RAD/Other Procedure Area: Bilateral C5-7 MBB with RN IV sedation    DDD (degenerative disc disease), cervical    Lumbar spondylosis  -     Ambulatory referral/consult to Physical/Occupational Therapy; Future; Expected date: 03/07/2023  -     X-Ray Lumbar Complete Including Flex And  Ext; Future; Expected date: 02/28/2023    DDD (degenerative disc disease), lumbar  -     X-Ray Lumbar Complete Including Flex And Ext; Future; Expected date: 02/28/2023    Myofascial pain           PLAN:   - Interventions:    Schedule patient for 2nd bilateral C5-C7 medial branch block.  Patient has similar relief, we will proceed with RFA.  -10/31/2022: Bilateral C5-C7 medial branch block, 100% relief for 2 days  -9/22/22:  C7-T1 interlaminar epidural steroid injection, minimal relief    - Anticoagulation use:   yes aspirin    - Medications:   Continue Lyrica 75 mg twice a day  Continue Relafen 750 mg twice a day as needed   Continue tizanidine 4 mg twice a day as needed   Continue Plaquenil as prescribed by rheumatology    - Therapy:    Refer to formal physical therapy for neck and lower back pain after fall from standing.    - Imaging:   We will obtain updated x-rays of lumbar spine after increased back pain after falling from standing 1 week ago.   MRI of cervical spine reviewed.  Significant for eccentric right disc bulge at C5-C6 with associated severe right foraminal stenosis   X-rays of cervical spine obtained today reviewed.  Significant for spondylosis and anterior annular calcifications noted at C4-5, C5-6, and C6-C7   EMG and nerve conduction study of bilateral upper extremity shows bilateral carpal tunnel syndrome and C7 radiculopathy    - Consults:   Continue follow-up with Rheumatology    - Counseled patient regarding the importance of activity modification and physical therapy    - Patient Questions: Answered all of the patient's questions regarding diagnosis, therapy, and treatment    - Follow up visit:  After procedure      The above plan and management options were discussed at length with patient. Patient is in agreement with the above and verbalized understanding.    I discussed the goals of interventional chronic pain management with the patient on today's visit.  I explained the utility of  injections for diagnostic and therapeutic purposes.  We discussed a multimodal approach to pain including treating the patient's given worst pain at any given time.  We will use a systematic approach to addressing pain.  We will also adopt a multimodal approach that includes injections, adjuvant medications, physical therapy, at times psychiatry.  There may be a limited role for opioid use intermittently in the treatment of pain, more particularly for acute pain although no one approach can be used as a sole treatment modality.    I emphasized the importance of regular exercise, core strengthening and stretching, diet and weight loss as a cornerstone of long-term pain management.      Luis M Pereyra MD  Interventional Pain Management  [facility]    Disclaimer:  This note was prepared using voice recognition system and is likely to have sound alike errors that may have been overlooked even after proof reading.  Please call me with any questions

## 2023-03-03 NOTE — PRE-PROCEDURE INSTRUCTIONS
Spoke with patient regarding procedure scheduled on 3.13    Arrival time 0845    Has patient been sick with fever or on antibiotics within the last 7 days? No    Does the patient have any open wounds, sores or rashes? No    Does the patient have any recent fractures? no    Has patient received a vaccination within the last 7 days? No    Received the COVID vaccination? yes    Has the patient stopped all medications as directed? NA    Does patient have a pacemaker and or defibrillator? no    Does the patient have a ride to and from procedure and someone reliable to remain with patient? Iveth     Is the patient diabetic? yes    Does the patient have sleep apnea? Or use O2 at home? No and no     Is the patient receiving sedation? yes    Is the patient instructed to remain NPO beginning at midnight the night before their procedure? yes    Procedure location confirmed with patient? Yes    Covid- Denies signs/symptoms. Instructed to notify PAT/MD if any changes.

## 2023-03-08 NOTE — PROGRESS NOTES
"[facility]   Physical Therapy Treatment Note     Name: Luciana Moraes  Clinic Number: [id number]    Therapy Diagnosis:   Encounter Diagnoses   Name Primary?    Muscle hypertonicity Yes    Decreased range of motion of lumbar spine     Decreased range of motion of intervertebral discs of cervical spine     Poor posture      Physician: Luis M Pereyra MD    Visit Date: 3/9/2023    Physician Orders: PT Eval and Treat   Medical Diagnosis from Referral:   M47.812 (ICD-10-CM) - Cervical spondylosis   M47.816 (ICD-10-CM) - Lumbar spondylosis   Evaluation Date: 3/1/2023  Authorization Period Expiration: 3/29/2024  Plan of Care Expiration: 6/1/2023  Visit # / Visits authorized: 1/ 11     PN DUE: 4/1/2023  PTA Visit #: 0/5     Time In: 7:30 AM  Time Out: 8:15 AM  Total Billable Time: 45 minutes    SUBJECTIVE   Pt reports: Her back pain is severe and feels like her back is about to lock up.     She was not compliant with home exercise program.  Response to previous treatment: first follow-up following initial evaluation.   Functional change: no change     Pain: 8/10  Location: bilateral low back      OBJECTIVE     Objective Measures updated at progress report unless specified.     Treatment     Luciana received the treatments listed below:    (exercises performed today are bolded)    therapeutic exercises to develop strength, endurance, ROM, flexibility, posture, and core stabilization for 35 minutes including:  Recumbent Bike for increased LE ROM strength, & endurance x 5 minutes     NECK:   - Upper trap stretch 2 x 30"  - levator scap stretch 2 x 30"  - posterior shoulder rolls   - Scapular retraction     BACK:   - Gastroc Stretch 3 x 30"  - DL Heel Raise 3 x 10  - 3 way forward flexion 5" x 5  - LTR x 20  - Double knee to chest with SB 2 x 10  - (B) Piriformis stretch 2 x 30"  - Supine hip adduction squeeze 3" x 30  - Bridges x 10  - Supine TA activation 10" x 10    manual therapy " "techniques: Joint mobilizations, Manual traction, Myofacial release, Soft tissue Mobilization, and Friction Massage were applied to the: (B) Low Back for 10 minutes, including:  STM to bilateral LB    neuromuscular re-education activities to improve: Balance, Coordination, Kinesthetic, Sense, Proprioception, and Posture for 0 minutes. The following activities were included:    therapeutic activities to improve functional performance for 0  minutes, including:    gait training to improve functional mobility and safety for 0  minutes, including:    Patient Education and Home Exercises     Home Exercises Provided and Patient Education Provided     Education provided:   -     Written Home Exercises Provided: Patient instructed to cont prior HEP. Exercises were reviewed and Luciana was able to demonstrate them prior to the end of the session.  Adinas demonstrated fair  understanding of the education provided. See EMR under Patient Instructions for exercises provided during therapy sessions    ASSESSMENT   Pt tolerated therapy session good today. She presents with increased low back pain that feels as though "its about to lock up". Focused session on low back mobility, gentle strengthening, and LE mobility and strengthening. Verbal cueing provided for increased motivation to continue with exercises throughout the session. Ended session with STM to bilateral low back with much hypertonicity noted along upper lumbar paraspinals. Pt reports no relief following therapy session and continues to ambulate with antalgic gait pattern out of the clinic.     Luciana Is progressing well towards her goals.   Pt prognosis is Fair.     Pt will continue to benefit from skilled outpatient physical therapy to address the deficits listed in the problem list box on initial evaluation, provide pt/family education and to maximize pt's level of independence in the home and community environment.     Pt's spiritual, cultural and educational needs " considered and pt agreeable to plan of care and goals.     Anticipated barriers to physical therapy: pain    Goals:   Short Term Goals: 5 weeks   Patient will demonstrate independence with HEP in order to progress toward functional independence  Pt will demonstrate improved pain by reports of less than or equal to 7/10 worst pain of cervical neck on the verbal rating scale in order to progress toward maximal functional ability and improve QOL.  Pt will be able to correct slouched posture in static sitting with minimal verbal cueing for improved QOL  Pt will improve cervical range of motion in all planes by 25% for improved QOL and increased ease with house hold chores   Pt will demonstrate improved pain by reports of less than or equal to 7/10 worst pain of low back on the verbal rating scale in order to progress toward maximal functional ability and improve QOL.  Pt will improve lumbar range of motion y 25% for improved QOL and functional mobility      Long Term Goals: 10 weeks   Patient will demonstrate improved function as indicated by a functional limitation score of 48% on the FOTO.  Pt will demonstrate improved pain by reports of less than or equal to 5/10 worst pain of cervical neck on the verbal rating scale in order to progress toward maximal functional ability and improve QOL.  pt will improve slouched posture in static sitting for improved QOL and increased ease with schooling activities.   pt will improve cervical range of motion with within functional limits with less than 3/10 pain for improved mobility needed to perform all house hold chores  Pt will improved bilateral upper extremity strength to 5/5 with less than 3/10 pain for improved functional upper extremity strength.   Pt will demonstrate improved pain by reports of less than or equal to 5/10 worst pain of low back on the verbal rating scale in order to progress toward maximal functional ability and improve QOL.  Pt will improve lumbar range of  motion to within functional limits for improved functional mobility   Pt will improve bilateral lower extremity strength to 5/5 for improved functional lower extremity strength     PLAN   Plan of care Certification: 3/1/2023 to 6/1/2023.    Cont PT per POC and progress pt as tolerated and appropriate.    Ana Alonzo, PT, DPT

## 2023-03-09 ENCOUNTER — CLINICAL SUPPORT (OUTPATIENT)
Dept: REHABILITATION | Facility: HOSPITAL | Age: 63
End: 2023-03-09
Payer: MEDICARE

## 2023-03-09 DIAGNOSIS — M53.86 DECREASED RANGE OF MOTION OF LUMBAR SPINE: ICD-10-CM

## 2023-03-09 DIAGNOSIS — M53.82 DECREASED RANGE OF MOTION OF INTERVERTEBRAL DISCS OF CERVICAL SPINE: ICD-10-CM

## 2023-03-09 DIAGNOSIS — R29.3 POOR POSTURE: ICD-10-CM

## 2023-03-09 DIAGNOSIS — M62.89 MUSCLE HYPERTONICITY: Primary | ICD-10-CM

## 2023-03-09 PROCEDURE — 97140 MANUAL THERAPY 1/> REGIONS: CPT | Mod: PN

## 2023-03-09 PROCEDURE — 97110 THERAPEUTIC EXERCISES: CPT | Mod: PN

## 2023-03-10 ENCOUNTER — TELEPHONE (OUTPATIENT)
Dept: PAIN MEDICINE | Facility: CLINIC | Age: 63
End: 2023-03-10
Payer: MEDICARE

## 2023-03-10 NOTE — TELEPHONE ENCOUNTER
Returned pt call. Informed pt that her arrival time for her procedure is 8:45 on Monday. All questions answered.

## 2023-03-13 ENCOUNTER — PATIENT MESSAGE (OUTPATIENT)
Dept: PAIN MEDICINE | Facility: CLINIC | Age: 63
End: 2023-03-13
Payer: MEDICARE

## 2023-03-13 ENCOUNTER — HOSPITAL ENCOUNTER (OUTPATIENT)
Facility: HOSPITAL | Age: 63
Discharge: HOME | End: 2023-03-13
Attending: ANESTHESIOLOGY | Admitting: ANESTHESIOLOGY
Payer: MEDICARE

## 2023-03-13 VITALS
HEART RATE: 64 BPM | BODY MASS INDEX: 34.5 KG/M2 | WEIGHT: 187.5 LBS | TEMPERATURE: 98 F | DIASTOLIC BLOOD PRESSURE: 67 MMHG | RESPIRATION RATE: 20 BRPM | SYSTOLIC BLOOD PRESSURE: 128 MMHG | OXYGEN SATURATION: 99 % | HEIGHT: 62 IN

## 2023-03-13 DIAGNOSIS — M47.812 CERVICAL SPONDYLOSIS: Primary | ICD-10-CM

## 2023-03-13 PROCEDURE — 64490 PR INJ DX/THER AGNT PARAVERT FACET JOINT,IMG GUIDE,CERV/THORAC, 1ST LEVEL: ICD-10-PCS | Mod: 50,,, | Performed by: ANESTHESIOLOGY

## 2023-03-13 PROCEDURE — 64491 INJ PARAVERT F JNT C/T 2 LEV: CPT | Mod: 50,,, | Performed by: ANESTHESIOLOGY

## 2023-03-13 PROCEDURE — 64491 INJ PARAVERT F JNT C/T 2 LEV: CPT | Mod: LT | Performed by: ANESTHESIOLOGY

## 2023-03-13 PROCEDURE — 99152 MOD SED SAME PHYS/QHP 5/>YRS: CPT | Performed by: ANESTHESIOLOGY

## 2023-03-13 PROCEDURE — 64490 INJ PARAVERT F JNT C/T 1 LEV: CPT | Mod: LT | Performed by: ANESTHESIOLOGY

## 2023-03-13 PROCEDURE — 25000003 PHARM REV CODE 250: Performed by: ANESTHESIOLOGY

## 2023-03-13 PROCEDURE — 63600175 PHARM REV CODE 636 W HCPCS: Performed by: ANESTHESIOLOGY

## 2023-03-13 PROCEDURE — 64491 PR INJ DX/THER AGNT PARAVERT FACET JOINT,IMG GUIDE,CERV/THORAC, 2ND LEVEL: ICD-10-PCS | Mod: 50,,, | Performed by: ANESTHESIOLOGY

## 2023-03-13 PROCEDURE — 64490 INJ PARAVERT F JNT C/T 1 LEV: CPT | Mod: 50,,, | Performed by: ANESTHESIOLOGY

## 2023-03-13 RX ORDER — MIDAZOLAM HYDROCHLORIDE 1 MG/ML
INJECTION, SOLUTION INTRAMUSCULAR; INTRAVENOUS
Status: DISCONTINUED | OUTPATIENT
Start: 2023-03-13 | End: 2023-03-13 | Stop reason: HOSPADM

## 2023-03-13 RX ORDER — FENTANYL CITRATE 50 UG/ML
INJECTION, SOLUTION INTRAMUSCULAR; INTRAVENOUS
Status: DISCONTINUED | OUTPATIENT
Start: 2023-03-13 | End: 2023-03-13 | Stop reason: HOSPADM

## 2023-03-13 RX ORDER — BUPIVACAINE HYDROCHLORIDE 5 MG/ML
INJECTION, SOLUTION EPIDURAL; INTRACAUDAL
Status: DISCONTINUED | OUTPATIENT
Start: 2023-03-13 | End: 2023-03-13 | Stop reason: HOSPADM

## 2023-03-13 RX ORDER — ONDANSETRON 2 MG/ML
4 INJECTION INTRAMUSCULAR; INTRAVENOUS ONCE AS NEEDED
Status: DISCONTINUED | OUTPATIENT
Start: 2023-03-13 | End: 2023-03-13 | Stop reason: HOSPADM

## 2023-03-13 NOTE — H&P
HPI  Patient presenting for Procedure(s) (LRB):  Bilateral C5-7 MBB with RN IV sedation (Bilateral)     Patient on Anti-coagulation Yes, ASA, may continue    No health changes since previous encounter    Past Medical History:   Diagnosis Date    Diabetes mellitus, type 2     Hyperlipidemia     Osteoarthritis     Polymyositis     Rheumatoid arthritis      Past Surgical History:   Procedure Laterality Date    CARPAL TUNNEL RELEASE Bilateral     COLONOSCOPY N/A 11/08/2022    Procedure: COLONOSCOPY;  Surgeon: Kishore Monsalve MD;  Location: [facility] ENDO;  Service: Endoscopy;  Laterality: N/A;    EPIDURAL STEROID INJECTION INTO CERVICAL SPINE N/A 09/22/2022    Procedure: C7/T1 [facility];  Surgeon: Luis M Pereyra MD;  Location: [facility] PAIN MGT;  Service: Pain Management;  Laterality: N/A;    HIP SURGERY      HYSTERECTOMY  2008    due to bleeding, pain from fibroids, retains one ovary    INJECTION OF ANESTHETIC AGENT AROUND MEDIAL BRANCH NERVES INNERVATING CERVICAL FACET JOINT Bilateral 10/31/2022    Procedure: Bilateral C5-7 MBB with RN IV sedation;  Surgeon: Luis M Pereyra MD;  Location: [facility] PAIN MGT;  Service: Pain Management;  Laterality: Bilateral;    OOPHORECTOMY      1 ovary removed    TUBAL LIGATION  1985     Review of patient's allergies indicates:  No Known Allergies     No current facility-administered medications on file prior to encounter.     Current Outpatient Medications on File Prior to Encounter   Medication Sig Dispense Refill    aspirin 81 MG Chew Take 81 mg by mouth.      ergocalciferol (ERGOCALCIFEROL) 50,000 unit Cap Take 1 capsule (50,000 Units total) by mouth every 7 days. 12 capsule 3    hydrOXYchloroQUINE (PLAQUENIL) 200 mg tablet Take 1 tablet (200 mg total) by mouth 2 (two) times daily. 180 tablet 1    ibuprofen (ADVIL,MOTRIN) 800 MG tablet Take 800 mg by mouth 3 (three) times daily.      metFORMIN (GLUCOPHAGE-XR) 500 MG ER 24hr tablet Take 2 tablets (1,000 mg total) by mouth daily with  "breakfast. 180 tablet 3    nabumetone (RELAFEN) 750 MG tablet Take 1 tablet (750 mg total) by mouth 2 (two) times daily as needed for Pain. 60 tablet 1    pregabalin (LYRICA) 75 MG capsule Take 1 capsule (75 mg total) by mouth 2 (two) times daily. 60 capsule 0    atorvastatin (LIPITOR) 80 MG tablet Take 1 tablet (80 mg total) by mouth once daily. 90 tablet 3    diclofenac sodium (VOLTAREN) 1 % Gel Apply 2 g topically 4 (four) times daily. 100 g 6    polyethylene glycol (GLYCOLAX) 17 gram PwPk Take 17 g by mouth once daily. 30 each 11    semaglutide (OZEMPIC) 1 mg/dose (4 mg/3 mL) Inject 1 mg into the skin every 7 days. 1 pen 11    tiZANidine (ZANAFLEX) 4 MG tablet Take 1 tablet (4 mg total) by mouth 2 (two) times daily as needed. 60 tablet 1        PMHx, PSHx, Allergies, Medications reviewed in epic    ROS negative except pain complaints in HPI    OBJECTIVE:    /65 (BP Location: Right arm, Patient Position: Sitting)   Pulse (!) 53   Temp 96.6 °F (35.9 °C) (Temporal)   Resp 16   Ht 5' 2" (1.575 m)   Wt 85.1 kg (187 lb 8.4 oz)   SpO2 96%   BMI 34.30 kg/m²     PHYSICAL EXAMINATION:    GENERAL: Well appearing, in no acute distress, alert and oriented x3.  PSYCH:  Mood and affect appropriate.  SKIN: Skin color, texture, turgor normal, no rashes or lesions which will impact the procedure.  CV: RRR with palpation of the radial artery.  PULM: No evidence of respiratory difficulty, symmetric chest rise. Clear to auscultation.  NEURO: Cranial nerves grossly intact.    Plan:    Proceed with procedure as planned Procedure(s) (LRB):  Bilateral C5-7 MBB with RN IV sedation (Bilateral)    Luis M Pereyra MD  03/13/2023            "

## 2023-03-13 NOTE — DISCHARGE INSTRUCTIONS

## 2023-03-14 LAB — POCT GLUCOSE: 90 MG/DL (ref 70–110)

## 2023-03-14 NOTE — OP NOTE
CERVICAL Medial Branch Block Under Fluoroscopy    INFORMED CONSENT: The procedure, risks, benefits and options were discussed with patient. There are no contraindications to the procedure. The patient expressed understanding and agreed to proceed. The personnel performing the procedure was discussed.    Date of procedure 03/14/2023    Time-out taken to identify patient and procedure side prior to starting the procedure.                                                                     PROCEDURE:  Bilateral  medial branch block at the transverse processes of C5, C6 and C7    Pre Procedure diagnosis:    Cervical spondylosis [M47.812]  1. Cervical spondylosis        Post-Procedure diagnosis:   same    PHYSICIAN: Luis M Pereyra MD    ASSISTANTS: None    MEDICATIONS INJECTED:  1ml 0.5% Bupivicaine PF, at each level  LOCAL ANESTHETIC USED:   1ml Lidocaine PF, at each level    ESTIMATED BLOOD LOSS:  None.    COMPLICATIONS:  None.    Sedation: Conscious sedation provided by M.D    SEDATION MEDICATIONS: local/IV sedation: Versed 2 mg and fentanyl 50 mcg IV.  Conscious sedation ordered by MD.  Patient reevaluated and sedation administered by MD and monitored by RN.  Total sedation time was less than 15 min.    Total sedation time was >10 but <20 min      TECHNIQUE:   Laying in a prone position, the patient was prepped and draped in the usual sterile fashion using ChloraPrep and fenestrated drape.  The level was determined under fluoroscopic guidance.  Local anesthetic was given by going down to the hub of the 27-gauge 1.25in needle and raising a wheel.  A 25-gauge 3.5inch needle was introduced to the anatomic local of the medial branch at each of the stated above levels using fluoroscopy. Then after negative aspiration, the medication was injected slowly. The patient tolerated the procedure well.       The patient was monitored for approximately 30 minutes after the procedure.  Patient was given post procedure and  discharge instructions to follow at home.  The patient was discharged in a stable condition

## 2023-03-14 NOTE — DISCHARGE SUMMARY
Discharge Note  Short Stay      SUMMARY     Admit Date: 3/13/2023    Attending Physician: Luis M Pereyra MD        Discharge Physician: Luis M Pereyra MD        Discharge Date: 3/14/2023 1:10 PM    Procedure(s) (LRB):  Bilateral C5-7 MBB with RN IV sedation (Bilateral)    Final Diagnosis: Cervical spondylosis [M47.812]    Disposition: Home or self care    Patient Instructions:   Discharge Medication List as of 3/13/2023  8:54 AM        CONTINUE these medications which have NOT CHANGED    Details   aspirin 81 MG Chew Take 81 mg by mouth., Starting Wed 10/6/2021, Historical Med      atorvastatin (LIPITOR) 80 MG tablet Take 1 tablet (80 mg total) by mouth once daily., Starting Mon 8/1/2022, Normal      diclofenac sodium (VOLTAREN) 1 % Gel Apply 2 g topically 4 (four) times daily., Starting Mon 9/26/2022, Normal      ergocalciferol (ERGOCALCIFEROL) 50,000 unit Cap Take 1 capsule (50,000 Units total) by mouth every 7 days., Starting Mon 2/27/2023, Normal      hydrOXYchloroQUINE (PLAQUENIL) 200 mg tablet Take 1 tablet (200 mg total) by mouth 2 (two) times daily., Starting Mon 12/12/2022, Normal      ibuprofen (ADVIL,MOTRIN) 800 MG tablet Take 800 mg by mouth 3 (three) times daily., Starting Thu 9/29/2022, Historical Med      metFORMIN (GLUCOPHAGE-XR) 500 MG ER 24hr tablet Take 2 tablets (1,000 mg total) by mouth daily with breakfast., Starting Fri 11/18/2022, Normal      nabumetone (RELAFEN) 750 MG tablet Take 1 tablet (750 mg total) by mouth 2 (two) times daily as needed for Pain., Starting Tue 9/6/2022, Normal      polyethylene glycol (GLYCOLAX) 17 gram PwPk Take 17 g by mouth once daily., Starting Wed 10/5/2022, Normal      pregabalin (LYRICA) 75 MG capsule Take 1 capsule (75 mg total) by mouth 2 (two) times daily., Starting Tue 10/11/2022, Normal      semaglutide (OZEMPIC) 1 mg/dose (4 mg/3 mL) Inject 1 mg into the skin every 7 days., Starting Fri 2/17/2023, Until Sat 2/17/2024, Normal      tiZANidine (ZANAFLEX)  4 MG tablet Take 1 tablet (4 mg total) by mouth 2 (two) times daily as needed., Starting Tue 9/6/2022, Normal                 Discharge Diagnosis: Cervical spondylosis [M47.812]  Condition on Discharge: Stable with no complications to procedure   Diet on Discharge: Same as before.  Activity: as per instruction sheet.  Discharge to: Home with a responsible adult.  Follow up: 2-4 weeks       Please call the office at [phone] if you experience any weakness or loss of sensation, fever > 101.5, pain uncontrolled with oral medications, persistent nausea/vomiting/or diarrhea, redness or drainage from the incisions, or any other worrisome concerns. If physician on call was not reached or could not communicate with our office for any reason please go to the nearest emergency department

## 2023-03-15 ENCOUNTER — TELEPHONE (OUTPATIENT)
Dept: PAIN MEDICINE | Facility: CLINIC | Age: 63
End: 2023-03-15
Payer: MEDICARE

## 2023-03-15 ENCOUNTER — PATIENT MESSAGE (OUTPATIENT)
Dept: RHEUMATOLOGY | Facility: CLINIC | Age: 63
End: 2023-03-15
Payer: MEDICARE

## 2023-03-15 DIAGNOSIS — R76.8 HEPATITIS B CORE ANTIBODY POSITIVE: Primary | ICD-10-CM

## 2023-03-15 DIAGNOSIS — M47.812 CERVICAL SPONDYLOSIS: Primary | ICD-10-CM

## 2023-03-15 NOTE — TELEPHONE ENCOUNTER
Patient reports 80% relief in neck pain for 8 hours after bilateral C5-C7 medial branch block on 03/13/2023.  This is her 2nd positive medial branch block.  We will proceed with RFA.

## 2023-03-16 ENCOUNTER — LAB VISIT (OUTPATIENT)
Dept: LAB | Facility: HOSPITAL | Age: 63
End: 2023-03-16
Payer: MEDICARE

## 2023-03-16 DIAGNOSIS — R76.8 HEPATITIS B CORE ANTIBODY POSITIVE: ICD-10-CM

## 2023-03-16 PROCEDURE — 87516 HEPATITIS B DNA AMP PROBE: CPT

## 2023-03-16 PROCEDURE — 36415 COLL VENOUS BLD VENIPUNCTURE: CPT | Mod: PN

## 2023-03-17 ENCOUNTER — PATIENT MESSAGE (OUTPATIENT)
Dept: PAIN MEDICINE | Facility: CLINIC | Age: 63
End: 2023-03-17
Payer: MEDICARE

## 2023-03-17 NOTE — PROGRESS NOTES
"[facility]   Physical Therapy Treatment Note     Name: Luciana Moraes  Clinic Number: [id number]    Therapy Diagnosis:   Encounter Diagnoses   Name Primary?    Muscle hypertonicity Yes    Decreased range of motion of lumbar spine     Decreased range of motion of intervertebral discs of cervical spine     Poor posture      Physician: Luis M Pereyra MD    Visit Date: 3/20/2023    Physician Orders: PT Eval and Treat   Medical Diagnosis from Referral:   M47.812 (ICD-10-CM) - Cervical spondylosis   M47.816 (ICD-10-CM) - Lumbar spondylosis   Evaluation Date: 3/1/2023  Authorization Period Expiration: 3/29/2024  Plan of Care Expiration: 6/1/2023  Visit # / Visits authorized: 2/ 11     PN DUE: 4/1/2023  PTA Visit #: 0/5     Time In: 7:30 AM  Time Out: 8:15 AM  Total Billable Time: 45 minutes    SUBJECTIVE   Pt reports: Her both neck and low back pain. She reports having 10/10 neck pain this morning.      She was not compliant with home exercise program.  Response to previous treatment: first follow-up following initial evaluation.   Functional change: no change     Pain: 7/10 (bilateral low back) ; 10/10 ( neck pain)  Location: bilateral low back & neck     OBJECTIVE     Objective Measures updated at progress report unless specified.     Treatment     Luciana received the treatments listed below:    (exercises performed today are bolded)    therapeutic exercises to develop strength, endurance, ROM, flexibility, posture, and core stabilization for 25 minutes including:  Recumbent Bike for increased LE ROM strength, & endurance x 5 minutes     NECK:   - (B) Upper trap stretch 2 x 30"  - (B) levator scap stretch 2 x 30"  - posterior shoulder rolls x 10  - Scapular retraction RTB 2 x 10  - Wall pushups 2 x 10  - (B) Bicep curls 3# 2 x 10       BACK:   - Gastroc Stretch 3 x 30"  - DL Heel Raise 3 x 10  - 3 way forward flexion 5" x 5  - LTR x 20  - Double knee to chest with SB 2 x 10  - (B) " "Piriformis stretch 2 x 30"  - Supine hip adduction squeeze 3" x 30  - Bridges x 10  - Supine TA activation 10" x 10    manual therapy techniques: Joint mobilizations, Manual traction, Myofacial release, Soft tissue Mobilization, and Friction Massage were applied to the: (B) Low Back for 10 minutes, including:  STM to bilateral Low back; (R) cervical paraspinals, (R) upper trap     neuromuscular re-education activities to improve: Balance, Coordination, Kinesthetic, Sense, Proprioception, and Posture for 10 minutes. The following activities were included:  - (B) Wall Ball flexion and Abduction x 20 ea (CW/CCW)  - (B) shoulder ER RTB 2 x 10    therapeutic activities to improve functional performance for 0  minutes, including:    gait training to improve functional mobility and safety for 0  minutes, including:    Patient Education and Home Exercises     Home Exercises Provided and Patient Education Provided     Education provided:   - continue to perform HEP at home to help with neck and back pain    Written Home Exercises Provided: Patient instructed to cont prior HEP. Exercises were reviewed and Luciana was able to demonstrate them prior to the end of the session.  Luciana demonstrated fair  understanding of the education provided. See EMR under Patient Instructions for exercises provided during therapy sessions    ASSESSMENT   Pt tolerated therapy session good today. She presents with increased right neck pain that she reports as 10 out of 10 pain. Focused session on cervical mobility, gentle shoulder and upper extremity strengthening, and postural stabilization. Verbal cueing provided for increased motivation to continue with exercises throughout the session. Ended session with STM to Right cervical paraspinals and upper trap with pt reporting much relief following. Continue to work on postural stabilization and scapular control for increased overall postural awareness.     Luciana Is progressing well towards her " goals.   Pt prognosis is Fair.     Pt will continue to benefit from skilled outpatient physical therapy to address the deficits listed in the problem list box on initial evaluation, provide pt/family education and to maximize pt's level of independence in the home and community environment.     Pt's spiritual, cultural and educational needs considered and pt agreeable to plan of care and goals.     Anticipated barriers to physical therapy: pain    Goals:   Short Term Goals: 5 weeks   Patient will demonstrate independence with HEP in order to progress toward functional independence  Pt will demonstrate improved pain by reports of less than or equal to 7/10 worst pain of cervical neck on the verbal rating scale in order to progress toward maximal functional ability and improve QOL.  Pt will be able to correct slouched posture in static sitting with minimal verbal cueing for improved QOL  Pt will improve cervical range of motion in all planes by 25% for improved QOL and increased ease with house hold chores   Pt will demonstrate improved pain by reports of less than or equal to 7/10 worst pain of low back on the verbal rating scale in order to progress toward maximal functional ability and improve QOL.  Pt will improve lumbar range of motion y 25% for improved QOL and functional mobility      Long Term Goals: 10 weeks   Patient will demonstrate improved function as indicated by a functional limitation score of 48% on the FOTO.  Pt will demonstrate improved pain by reports of less than or equal to 5/10 worst pain of cervical neck on the verbal rating scale in order to progress toward maximal functional ability and improve QOL.  pt will improve slouched posture in static sitting for improved QOL and increased ease with schooling activities.   pt will improve cervical range of motion with within functional limits with less than 3/10 pain for improved mobility needed to perform all house hold chores  Pt will improved  bilateral upper extremity strength to 5/5 with less than 3/10 pain for improved functional upper extremity strength.   Pt will demonstrate improved pain by reports of less than or equal to 5/10 worst pain of low back on the verbal rating scale in order to progress toward maximal functional ability and improve QOL.  Pt will improve lumbar range of motion to within functional limits for improved functional mobility   Pt will improve bilateral lower extremity strength to 5/5 for improved functional lower extremity strength     PLAN   Plan of care Certification: 3/1/2023 to 6/1/2023.    Cont PT per POC and progress pt as tolerated and appropriate.    Ana Alonzo, PT, DPT

## 2023-03-20 ENCOUNTER — CLINICAL SUPPORT (OUTPATIENT)
Dept: REHABILITATION | Facility: HOSPITAL | Age: 63
End: 2023-03-20
Payer: MEDICARE

## 2023-03-20 DIAGNOSIS — M53.86 DECREASED RANGE OF MOTION OF LUMBAR SPINE: ICD-10-CM

## 2023-03-20 DIAGNOSIS — M53.82 DECREASED RANGE OF MOTION OF INTERVERTEBRAL DISCS OF CERVICAL SPINE: ICD-10-CM

## 2023-03-20 DIAGNOSIS — M62.89 MUSCLE HYPERTONICITY: Primary | ICD-10-CM

## 2023-03-20 DIAGNOSIS — R29.3 POOR POSTURE: ICD-10-CM

## 2023-03-20 LAB — HBV DNA SERPL QL NAA+PROBE: NOT DETECTED

## 2023-03-20 PROCEDURE — 97112 NEUROMUSCULAR REEDUCATION: CPT | Mod: PN

## 2023-03-20 PROCEDURE — 97140 MANUAL THERAPY 1/> REGIONS: CPT | Mod: PN

## 2023-03-20 PROCEDURE — 97110 THERAPEUTIC EXERCISES: CPT | Mod: PN

## 2023-03-22 ENCOUNTER — CLINICAL SUPPORT (OUTPATIENT)
Dept: REHABILITATION | Facility: HOSPITAL | Age: 63
End: 2023-03-22
Payer: MEDICARE

## 2023-03-22 DIAGNOSIS — M53.82 DECREASED RANGE OF MOTION OF INTERVERTEBRAL DISCS OF CERVICAL SPINE: ICD-10-CM

## 2023-03-22 DIAGNOSIS — R29.3 POOR POSTURE: ICD-10-CM

## 2023-03-22 DIAGNOSIS — M62.89 MUSCLE HYPERTONICITY: Primary | ICD-10-CM

## 2023-03-22 DIAGNOSIS — M53.86 DECREASED RANGE OF MOTION OF LUMBAR SPINE: ICD-10-CM

## 2023-03-22 PROCEDURE — 97140 MANUAL THERAPY 1/> REGIONS: CPT | Mod: PN,CQ

## 2023-03-22 PROCEDURE — 97110 THERAPEUTIC EXERCISES: CPT | Mod: PN,CQ

## 2023-03-22 NOTE — PROGRESS NOTES
"[facility]   Physical Therapy Treatment Note     Name: Luciana Moraes  Clinic Number: [id number]    Therapy Diagnosis:   Encounter Diagnoses   Name Primary?    Muscle hypertonicity Yes    Decreased range of motion of lumbar spine     Decreased range of motion of intervertebral discs of cervical spine     Poor posture      Physician: Luis M Pereyra MD    Visit Date: 3/22/2023    Physician Orders: PT Eval and Treat   Medical Diagnosis from Referral:   M47.812 (ICD-10-CM) - Cervical spondylosis   M47.816 (ICD-10-CM) - Lumbar spondylosis   Evaluation Date: 3/1/2023  Authorization Period Expiration: 3/29/2024  Plan of Care Expiration: 6/1/2023  Visit # / Visits authorized: 3/ 11     PN DUE: 4/1/2023  PTA Visit #: 1/5     Time In: 7:30 AM  Time Out: 8:15 AM  Total Billable Time: 45 minutes    SUBJECTIVE   Pt reports: neck pain is worse than back.  Back doesn't feel bad at the moment.      She was not compliant with home exercise program.  Response to previous treatment: first follow-up following initial evaluation.   Functional change: no change     Pain: 7/10 (bilateral low back) ; 10/10 ( neck pain)  Location: bilateral low back & neck     OBJECTIVE     Objective Measures updated at progress report unless specified.     Treatment     Luciana received the treatments listed below:    (exercises performed today are bolded)    therapeutic exercises to develop strength, endurance, ROM, flexibility, posture, and core stabilization for 25 minutes including:  Recumbent Bike for increased LE ROM strength, & endurance x 5 minutes     NECK:   - (B) Upper trap stretch 2 x 30"  - (B) levator scap stretch 2 x 30"  - posterior shoulder rolls x 10  - Scapular retraction/rows, 10# 2 x 10  - Shoulder extensions, 10#, 2x10  - Wall pushups 2 x 10  - (B) Bicep curls 3# 2 x 10       BACK:   - Gastroc Stretch 3 x 30"  - DL Heel Raise 3 x 10  - 3 way forward flexion 5" x 5  - LTR x 20  - Double knee to chest " "with SB 2 x 10  - (B) Piriformis stretch 2 x 30"  - Supine hip adduction squeeze 3" x 30  - Bridges x 10  - Supine TA activation 10" x 10    manual therapy techniques: Joint mobilizations, Manual traction, Myofacial release, Soft tissue Mobilization, and Friction Massage were applied to the: (B) Low Back for 20 minutes, including:  STM to bilateral Low back; (R) cervical paraspinals, (R) upper trap   SOR  TPR to B UT  Manual cervical traction  Cervical mobs for extension/rotation, C2-7    neuromuscular re-education activities to improve: Balance, Coordination, Kinesthetic, Sense, Proprioception, and Posture for 10 minutes. The following activities were included:  - (B) Wall Ball flexion and Abduction x 20 ea (CW/CCW)  - (B) shoulder ER RTB 2 x 10    therapeutic activities to improve functional performance for 0  minutes, including:    gait training to improve functional mobility and safety for 0  minutes, including:    Patient Education and Home Exercises     Home Exercises Provided and Patient Education Provided     Education provided:   - continue to perform HEP at home to help with neck and back pain    Written Home Exercises Provided: Patient instructed to cont prior HEP. Exercises were reviewed and Luciana was able to demonstrate them prior to the end of the session.  Luciana demonstrated fair  understanding of the education provided. See EMR under Patient Instructions for exercises provided during therapy sessions    ASSESSMENT   Pt tolerated therapy session good today. Focused session on cervical mobility, postural strengthening, and decompression. Verbal cueing provided to correct for on interventions. Ended session with manual cervical traction with pt reporting much relief following. Continue to work on postural strength and thoracic mobility.  She did well with cervical mobs with improved pain free ROM.     Luciana Is progressing well towards her goals.   Pt prognosis is Fair.     Pt will continue to benefit " from skilled outpatient physical therapy to address the deficits listed in the problem list box on initial evaluation, provide pt/family education and to maximize pt's level of independence in the home and community environment.     Pt's spiritual, cultural and educational needs considered and pt agreeable to plan of care and goals.     Anticipated barriers to physical therapy: pain    Goals:   Short Term Goals: 5 weeks   Patient will demonstrate independence with HEP in order to progress toward functional independence  Pt will demonstrate improved pain by reports of less than or equal to 7/10 worst pain of cervical neck on the verbal rating scale in order to progress toward maximal functional ability and improve QOL.  Pt will be able to correct slouched posture in static sitting with minimal verbal cueing for improved QOL  Pt will improve cervical range of motion in all planes by 25% for improved QOL and increased ease with house hold chores   Pt will demonstrate improved pain by reports of less than or equal to 7/10 worst pain of low back on the verbal rating scale in order to progress toward maximal functional ability and improve QOL.  Pt will improve lumbar range of motion y 25% for improved QOL and functional mobility      Long Term Goals: 10 weeks   Patient will demonstrate improved function as indicated by a functional limitation score of 48% on the FOTO.  Pt will demonstrate improved pain by reports of less than or equal to 5/10 worst pain of cervical neck on the verbal rating scale in order to progress toward maximal functional ability and improve QOL.  pt will improve slouched posture in static sitting for improved QOL and increased ease with schooling activities.   pt will improve cervical range of motion with within functional limits with less than 3/10 pain for improved mobility needed to perform all house hold chores  Pt will improved bilateral upper extremity strength to 5/5 with less than 3/10 pain  for improved functional upper extremity strength.   Pt will demonstrate improved pain by reports of less than or equal to 5/10 worst pain of low back on the verbal rating scale in order to progress toward maximal functional ability and improve QOL.  Pt will improve lumbar range of motion to within functional limits for improved functional mobility   Pt will improve bilateral lower extremity strength to 5/5 for improved functional lower extremity strength     PLAN   Plan of care Certification: 3/1/2023 to 6/1/2023.    Cont PT per POC and progress pt as tolerated and appropriate.    Ryan Cummings, PTA

## 2023-03-23 ENCOUNTER — LAB VISIT (OUTPATIENT)
Dept: LAB | Facility: HOSPITAL | Age: 63
End: 2023-03-23
Payer: MEDICARE

## 2023-03-23 DIAGNOSIS — E55.9 VITAMIN D DEFICIENCY: ICD-10-CM

## 2023-03-23 DIAGNOSIS — M06.9 RHEUMATOID ARTHRITIS, INVOLVING UNSPECIFIED SITE, UNSPECIFIED WHETHER RHEUMATOID FACTOR PRESENT: ICD-10-CM

## 2023-03-23 LAB
ALBUMIN SERPL BCP-MCNC: 3.7 G/DL (ref 3.5–5.2)
ALP SERPL-CCNC: 84 U/L (ref 55–135)
ALT SERPL W/O P-5'-P-CCNC: 19 U/L (ref 10–44)
ANION GAP SERPL CALC-SCNC: 10 MMOL/L (ref 8–16)
AST SERPL-CCNC: 24 U/L (ref 10–40)
BASOPHILS # BLD AUTO: 0.02 K/UL (ref 0–0.2)
BASOPHILS NFR BLD: 0.3 % (ref 0–1.9)
BILIRUB SERPL-MCNC: 0.3 MG/DL (ref 0.1–1)
BUN SERPL-MCNC: 10 MG/DL (ref 6–20)
CALCIUM SERPL-MCNC: 9 MG/DL (ref 8.7–10.5)
CHLORIDE SERPL-SCNC: 108 MMOL/L (ref 95–110)
CO2 SERPL-SCNC: 24 MMOL/L (ref 23–29)
CREAT SERPL-MCNC: 0.8 MG/DL (ref 0.5–1.4)
CRP SERPL-MCNC: 4.6 MG/L (ref 0–8.2)
DIFFERENTIAL METHOD: ABNORMAL
EOSINOPHIL # BLD AUTO: 0.2 K/UL (ref 0–0.5)
EOSINOPHIL NFR BLD: 3.5 % (ref 0–8)
ERYTHROCYTE [DISTWIDTH] IN BLOOD BY AUTOMATED COUNT: 14.5 % (ref 11.5–14.5)
ERYTHROCYTE [SEDIMENTATION RATE] IN BLOOD BY WESTERGREN METHOD: 12 MM/HR (ref 0–20)
EST. GFR  (NO RACE VARIABLE): >60 ML/MIN/1.73 M^2
GLUCOSE SERPL-MCNC: 80 MG/DL (ref 70–110)
HCT VFR BLD AUTO: 43 % (ref 37–48.5)
HGB BLD-MCNC: 13.3 G/DL (ref 12–16)
IMM GRANULOCYTES # BLD AUTO: 0.01 K/UL (ref 0–0.04)
IMM GRANULOCYTES NFR BLD AUTO: 0.2 % (ref 0–0.5)
LYMPHOCYTES # BLD AUTO: 2.9 K/UL (ref 1–4.8)
LYMPHOCYTES NFR BLD: 46.2 % (ref 18–48)
MCH RBC QN AUTO: 27.5 PG (ref 27–31)
MCHC RBC AUTO-ENTMCNC: 30.9 G/DL (ref 32–36)
MCV RBC AUTO: 89 FL (ref 82–98)
MONOCYTES # BLD AUTO: 0.7 K/UL (ref 0.3–1)
MONOCYTES NFR BLD: 10.8 % (ref 4–15)
NEUTROPHILS # BLD AUTO: 2.5 K/UL (ref 1.8–7.7)
NEUTROPHILS NFR BLD: 39 % (ref 38–73)
NRBC BLD-RTO: 0 /100 WBC
PLATELET # BLD AUTO: 103 K/UL (ref 150–450)
PMV BLD AUTO: 11.6 FL (ref 9.2–12.9)
POTASSIUM SERPL-SCNC: 3.9 MMOL/L (ref 3.5–5.1)
PROT SERPL-MCNC: 7.5 G/DL (ref 6–8.4)
RBC # BLD AUTO: 4.84 M/UL (ref 4–5.4)
SODIUM SERPL-SCNC: 142 MMOL/L (ref 136–145)
WBC # BLD AUTO: 6.28 K/UL (ref 3.9–12.7)

## 2023-03-23 PROCEDURE — 80053 COMPREHEN METABOLIC PANEL: CPT

## 2023-03-23 PROCEDURE — 85025 COMPLETE CBC W/AUTO DIFF WBC: CPT

## 2023-03-23 PROCEDURE — 82306 VITAMIN D 25 HYDROXY: CPT

## 2023-03-23 PROCEDURE — 86140 C-REACTIVE PROTEIN: CPT

## 2023-03-23 PROCEDURE — 85651 RBC SED RATE NONAUTOMATED: CPT

## 2023-03-24 LAB — 25(OH)D3+25(OH)D2 SERPL-MCNC: 16 NG/ML (ref 30–96)

## 2023-03-27 ENCOUNTER — PATIENT MESSAGE (OUTPATIENT)
Dept: PAIN MEDICINE | Facility: CLINIC | Age: 63
End: 2023-03-27
Payer: MEDICARE

## 2023-03-27 ENCOUNTER — CLINICAL SUPPORT (OUTPATIENT)
Dept: REHABILITATION | Facility: HOSPITAL | Age: 63
End: 2023-03-27
Payer: MEDICARE

## 2023-03-27 DIAGNOSIS — M53.86 DECREASED RANGE OF MOTION OF LUMBAR SPINE: ICD-10-CM

## 2023-03-27 DIAGNOSIS — M62.89 MUSCLE HYPERTONICITY: Primary | ICD-10-CM

## 2023-03-27 DIAGNOSIS — R29.3 POOR POSTURE: ICD-10-CM

## 2023-03-27 DIAGNOSIS — M53.82 DECREASED RANGE OF MOTION OF INTERVERTEBRAL DISCS OF CERVICAL SPINE: ICD-10-CM

## 2023-03-27 PROCEDURE — 97140 MANUAL THERAPY 1/> REGIONS: CPT | Mod: PN

## 2023-03-27 PROCEDURE — 97110 THERAPEUTIC EXERCISES: CPT | Mod: PN

## 2023-03-27 NOTE — PROGRESS NOTES
"[facility]   Physical Therapy Treatment Note     Name: Luciana Moraes  Clinic Number: [id number]    Therapy Diagnosis:   Encounter Diagnoses   Name Primary?    Muscle hypertonicity Yes    Decreased range of motion of lumbar spine     Decreased range of motion of intervertebral discs of cervical spine     Poor posture      Physician: Luis M Pereyra MD    Visit Date: 3/27/2023    Physician Orders: PT Eval and Treat   Medical Diagnosis from Referral:   M47.812 (ICD-10-CM) - Cervical spondylosis   M47.816 (ICD-10-CM) - Lumbar spondylosis   Evaluation Date: 3/1/2023  Authorization Period Expiration: 3/29/2024  Plan of Care Expiration: 6/1/2023  Visit # / Visits authorized: 5/ 11     PN DUE: 4/1/2023  PTA Visit #: 0/5     Time In: 7:30 AM  Time Out: 8:15 AM  Total Billable Time: 45 minutes    SUBJECTIVE   Pt reports: Increased back and hip pain, due to increased driving throughout the weekend.      She was not compliant with home exercise program.  Response to previous treatment: no change  Functional change: no change     Pain: 7/10 (bilateral low back) ; 10/10 ( neck pain)  Location: bilateral low back & neck     OBJECTIVE     Objective Measures updated at progress report unless specified.     Treatment     Luciana received the treatments listed below:    (exercises performed today are bolded)    therapeutic exercises to develop strength, endurance, ROM, flexibility, posture, and core stabilization for 25 minutes including:  Recumbent Bike for increased LE ROM strength, & endurance x 5 minutes     NECK:   - (B) Upper trap stretch 2 x 30"  - (B) levator scap stretch 2 x 30"  - posterior shoulder rolls x 10  - Scapular retraction/rows, 10# 2 x 10  - Shoulder extensions, 10#, 2x10  - Wall pushups 2 x 10  - (B) Bicep curls 3# 2 x 10       BACK:   - Gastroc Stretch 3 x 30"  - DL Heel Raise 3 x 10  - 3 way forward flexion 5" x 5  - LTR x 20  - Double knee to chest with SB 2 x 10  - (B) " "Piriformis stretch 2 x 30"  - (B) Hamstring Stretch 2 x 30"  - (B) side-lying hamstring/IT band Stretch 2 x 30"  - (B) SLR 2 x 10  - (B) Side-lying hip abduction 2 x 10  - Supine hip adduction squeeze 3" x 30  - Bridges x 10  - Supine TA activation 10" x 10    manual therapy techniques: Joint mobilizations, Manual traction, Myofacial release, Soft tissue Mobilization, and Friction Massage were applied to the: (B) Low Back for 10 minutes, including:  STM to bilateral Low back; (R) cervical paraspinals, (R) upper trap   SOR  TPR to B UT  Manual cervical traction  Cervical mobs for extension/rotation, C2-7  +(B) lumbar traction over swissball 4 x 45"    neuromuscular re-education activities to improve: Balance, Coordination, Kinesthetic, Sense, Proprioception, and Posture for 10 minutes. The following activities were included:  - (B) Wall Ball flexion and Abduction x 20 ea (CW/CCW)  - (B) shoulder ER RTB 2 x 10    therapeutic activities to improve functional performance for 0  minutes, including:    gait training to improve functional mobility and safety for 0  minutes, including:    Patient Education and Home Exercises     Home Exercises Provided and Patient Education Provided     Education provided:   - continue to perform HEP at home to help with neck and back pain    Written Home Exercises Provided: Patient instructed to cont prior HEP. Exercises were reviewed and Margaritas was able to demonstrate them prior to the end of the session.  Adinas demonstrated fair  understanding of the education provided. See EMR under Patient Instructions for exercises provided during therapy sessions    ASSESSMENT   Pt tolerated therapy session good today. She presents with increased low back and hip pain/stiffness today. Focused session on Lower extremity mobility, lower extremity strengthening, and core stabilization. Verbal cueing provided on proper form to activate targeted musculature. Increased difficulty noted with straight leg " raise today with increased fatigue and no provocation of pain. Ended session with manual bilateral lumbar traction over swiss ball with much relief noted following.     Luciana Is progressing well towards her goals.   Pt prognosis is Fair.     Pt will continue to benefit from skilled outpatient physical therapy to address the deficits listed in the problem list box on initial evaluation, provide pt/family education and to maximize pt's level of independence in the home and community environment.     Pt's spiritual, cultural and educational needs considered and pt agreeable to plan of care and goals.     Anticipated barriers to physical therapy: pain    Goals:   Short Term Goals: 5 weeks   Patient will demonstrate independence with HEP in order to progress toward functional independence  Pt will demonstrate improved pain by reports of less than or equal to 7/10 worst pain of cervical neck on the verbal rating scale in order to progress toward maximal functional ability and improve QOL.  Pt will be able to correct slouched posture in static sitting with minimal verbal cueing for improved QOL  Pt will improve cervical range of motion in all planes by 25% for improved QOL and increased ease with house hold chores   Pt will demonstrate improved pain by reports of less than or equal to 7/10 worst pain of low back on the verbal rating scale in order to progress toward maximal functional ability and improve QOL.  Pt will improve lumbar range of motion y 25% for improved QOL and functional mobility      Long Term Goals: 10 weeks   Patient will demonstrate improved function as indicated by a functional limitation score of 48% on the FOTO.  Pt will demonstrate improved pain by reports of less than or equal to 5/10 worst pain of cervical neck on the verbal rating scale in order to progress toward maximal functional ability and improve QOL.  pt will improve slouched posture in static sitting for improved QOL and increased ease  with schooling activities.   pt will improve cervical range of motion with within functional limits with less than 3/10 pain for improved mobility needed to perform all house hold chores  Pt will improved bilateral upper extremity strength to 5/5 with less than 3/10 pain for improved functional upper extremity strength.   Pt will demonstrate improved pain by reports of less than or equal to 5/10 worst pain of low back on the verbal rating scale in order to progress toward maximal functional ability and improve QOL.  Pt will improve lumbar range of motion to within functional limits for improved functional mobility   Pt will improve bilateral lower extremity strength to 5/5 for improved functional lower extremity strength     PLAN   Plan of care Certification: 3/1/2023 to 6/1/2023.    Cont PT per POC and progress pt as tolerated and appropriate.    Ana Alonzo, PT, DPT  3/27/2023

## 2023-04-13 ENCOUNTER — CLINICAL SUPPORT (OUTPATIENT)
Dept: REHABILITATION | Facility: HOSPITAL | Age: 63
End: 2023-04-13
Payer: MEDICARE

## 2023-04-13 DIAGNOSIS — M62.89 MUSCLE HYPERTONICITY: Primary | ICD-10-CM

## 2023-04-13 DIAGNOSIS — R29.3 POOR POSTURE: ICD-10-CM

## 2023-04-13 DIAGNOSIS — M53.82 DECREASED RANGE OF MOTION OF INTERVERTEBRAL DISCS OF CERVICAL SPINE: ICD-10-CM

## 2023-04-13 DIAGNOSIS — M53.86 DECREASED RANGE OF MOTION OF LUMBAR SPINE: ICD-10-CM

## 2023-04-13 PROCEDURE — 97110 THERAPEUTIC EXERCISES: CPT | Mod: PN

## 2023-04-13 PROCEDURE — 97140 MANUAL THERAPY 1/> REGIONS: CPT | Mod: PN

## 2023-04-13 NOTE — PROGRESS NOTES
[facility]   Physical Therapy Treatment Note     Name: Luciana Moraes  Clinic Number: [id number]    Therapy Diagnosis:   Encounter Diagnoses   Name Primary?    Muscle hypertonicity Yes    Decreased range of motion of lumbar spine     Decreased range of motion of intervertebral discs of cervical spine     Poor posture      Physician: Luis M Pereyra MD    Visit Date: 4/13/2023    Physician Orders: PT Eval and Treat   Medical Diagnosis from Referral:   M47.812 (ICD-10-CM) - Cervical spondylosis   M47.816 (ICD-10-CM) - Lumbar spondylosis   Evaluation Date: 3/1/2023  Authorization Period Expiration: 3/29/2024  Plan of Care Expiration: 6/1/2023  Visit # / Visits authorized: 5/ 11     PN DUE: 5/13/2023  PTA Visit #: 0/5     Time In: 2:45 PM  Time Out: 3:25 PM  Total Billable Time: 40 minutes    SUBJECTIVE   Pt reports: Increased right shoulder and neck pain.     She was not compliant with home exercise program.  Response to previous treatment: no change  Functional change: no change     Pain: 7/10 (bilateral low back) ; 10/10 ( neck pain)  Location: bilateral low back & neck     OBJECTIVE     Objective Measures updated at progress report unless specified.     Cervical Range of Motion:     Degrees Pain   Flexion (50) 40 No pain      Extension (75) 20 Pain      Right Rotation (75) 45 Pain      Left Rotation (75) 65 Pain      Right Side Bending (45) 22 Pain   Left Side Bending (45) 20 Pain      Upper Extremity Strength  (R) UE   (L) UE     Shoulder flexion: 4+/5 Shoulder flexion: 5/5   Shoulder Abduction: 4+/5 Shoulder abduction: 5/5   Shoulder ER 4/5 Shoulder ER 5/5   Shoulder IR 4/5 Shoulder IR 5/5   Elbow flexion: 4+/5 Elbow flexion: 4+/5   Elbow extension: 4-/5 Elbow extension: 4/5    4+/5 : 4+/5   Lower Trap 4-/5 Lower Trap 4-/5   Middle Trap 4-/5 Middle Trap 4-/5   Rhomboids 4-/5 Rhomboids 4-/5      Function: FOTO    Treatment     Luciana received the treatments listed below:   "  (exercises performed today are bolded)    therapeutic exercises to develop strength, endurance, ROM, flexibility, posture, and core stabilization for 30 minutes including:  Recumbent Bike for increased LE ROM strength, & endurance x 5 minutes   UBE for increased UE ROM, strength & endurance 2 min x 2 min   +Test and measures for progress note    NECK:   - (B) Upper trap stretch 2 x 30"  - (B) levator scap stretch 2 x 30"  - posterior shoulder rolls x 10  - Shoulder extensions, 10#, 2x10  - Wall pushups 2 x 10  - (B) Bicep curls 3# 2 x 10  - Triceps Extension 13# 2 x 10  - Rows 13# 2 x 10     BACK:   - Gastroc Stretch 3 x 30"  - DL Heel Raise 3 x 10  - 3 way forward flexion 5" x 5  - LTR x 20  - Double knee to chest with SB 2 x 10  - (B) Piriformis stretch 2 x 30"  - (B) Hamstring Stretch 2 x 30"  - (B) side-lying hamstring/IT band Stretch 2 x 30"  - (B) SLR 2 x 10  - (B) Side-lying hip abduction 2 x 10  - Supine hip adduction squeeze 3" x 30  - Bridges x 10  - Supine TA activation 10" x 10    manual therapy techniques: Joint mobilizations, Manual traction, Myofacial release, Soft tissue Mobilization, and Friction Massage were applied to the: (B) Low Back for 0 minutes, including:  STM to bilateral Low back; (R) cervical paraspinals, (R) upper trap   SOR  TPR to B UT  Manual cervical traction  Cervical mobs for extension/rotation, C2-7  +(B) lumbar traction over swissball 4 x 45"    neuromuscular re-education activities to improve: Balance, Coordination, Kinesthetic, Sense, Proprioception, and Posture for 15 minutes. The following activities were included:  - (B) Wall Ball flexion and Abduction x 20 ea (CW/CCW)  - (B) shoulder ER RTB 2 x 10  - (R) shoulder flexion RTB 2 x 10  - (R) shoulder scaption RTB 2 x 10    therapeutic activities to improve functional performance for 0  minutes, including:    gait training to improve functional mobility and safety for 0  minutes, including:    Patient Education and Home " Exercises     Home Exercises Provided and Patient Education Provided     Education provided:   - continue to perform HEP at home to help with neck and back pain    Written Home Exercises Provided: Patient instructed to cont prior HEP. Exercises were reviewed and Luciana was able to demonstrate them prior to the end of the session.  Margaritas demonstrated fair  understanding of the education provided. See EMR under Patient Instructions for exercises provided during therapy sessions    ASSESSMENT   Pt tolerated therapy session good today. Test and measures taken and recorded above under the objective section. She shows slow improvements in her cervical range of motion with decreased pain provocations. She as well shows good progress with right upper extremity strength and much improvements in left upper extremity strength. Good improvements in overall functional mobility noted with FOTO outcome score. Today, she presents with right shoulder pain. Focused session on right shoulder strengthening, scapular stabilization, and upper extremity strengthening.      Luciana Is progressing well towards her goals.   Pt prognosis is Fair.     Pt will continue to benefit from skilled outpatient physical therapy to address the deficits listed in the problem list box on initial evaluation, provide pt/family education and to maximize pt's level of independence in the home and community environment.     Pt's spiritual, cultural and educational needs considered and pt agreeable to plan of care and goals.     Anticipated barriers to physical therapy: pain    Goals:   Short Term Goals: 5 weeks   Patient will demonstrate independence with HEP in order to progress toward functional independence  Pt will demonstrate improved pain by reports of less than or equal to 7/10 worst pain of cervical neck on the verbal rating scale in order to progress toward maximal functional ability and improve QOL.  Pt will be able to correct slouched posture in  static sitting with minimal verbal cueing for improved QOL  Pt will improve cervical range of motion in all planes by 25% for improved QOL and increased ease with house hold chores   Pt will demonstrate improved pain by reports of less than or equal to 7/10 worst pain of low back on the verbal rating scale in order to progress toward maximal functional ability and improve QOL.  Pt will improve lumbar range of motion y 25% for improved QOL and functional mobility      Long Term Goals: 10 weeks   Patient will demonstrate improved function as indicated by a functional limitation score of 48% on the FOTO.  Pt will demonstrate improved pain by reports of less than or equal to 5/10 worst pain of cervical neck on the verbal rating scale in order to progress toward maximal functional ability and improve QOL.  pt will improve slouched posture in static sitting for improved QOL and increased ease with schooling activities.   pt will improve cervical range of motion with within functional limits with less than 3/10 pain for improved mobility needed to perform all house hold chores  Pt will improved bilateral upper extremity strength to 5/5 with less than 3/10 pain for improved functional upper extremity strength.   Pt will demonstrate improved pain by reports of less than or equal to 5/10 worst pain of low back on the verbal rating scale in order to progress toward maximal functional ability and improve QOL.  Pt will improve lumbar range of motion to within functional limits for improved functional mobility   Pt will improve bilateral lower extremity strength to 5/5 for improved functional lower extremity strength     PLAN   Plan of care Certification: 3/1/2023 to 6/1/2023.    Cont PT per POC and progress pt as tolerated and appropriate.    Ana Alonzo, PT, DPT  3/27/2023

## 2023-04-18 ENCOUNTER — CLINICAL SUPPORT (OUTPATIENT)
Dept: REHABILITATION | Facility: HOSPITAL | Age: 63
End: 2023-04-18
Payer: MEDICARE

## 2023-04-18 DIAGNOSIS — M53.86 DECREASED RANGE OF MOTION OF LUMBAR SPINE: ICD-10-CM

## 2023-04-18 DIAGNOSIS — M53.82 DECREASED RANGE OF MOTION OF INTERVERTEBRAL DISCS OF CERVICAL SPINE: ICD-10-CM

## 2023-04-18 DIAGNOSIS — R29.3 POOR POSTURE: ICD-10-CM

## 2023-04-18 DIAGNOSIS — M62.89 MUSCLE HYPERTONICITY: Primary | ICD-10-CM

## 2023-04-18 PROCEDURE — 97140 MANUAL THERAPY 1/> REGIONS: CPT | Mod: PN,CQ

## 2023-04-18 PROCEDURE — 97112 NEUROMUSCULAR REEDUCATION: CPT | Mod: PN,CQ

## 2023-04-18 PROCEDURE — 97110 THERAPEUTIC EXERCISES: CPT | Mod: PN,CQ

## 2023-04-18 NOTE — PROGRESS NOTES
"[facility]   Physical Therapy Treatment Note     Name: Luciana Moraes  Clinic Number: [id number]    Therapy Diagnosis:   Encounter Diagnoses   Name Primary?    Muscle hypertonicity Yes    Decreased range of motion of lumbar spine     Decreased range of motion of intervertebral discs of cervical spine     Poor posture      Physician: Luis M Pereyra MD    Visit Date: 4/18/2023    Physician Orders: PT Eval and Treat   Medical Diagnosis from Referral:   M47.812 (ICD-10-CM) - Cervical spondylosis   M47.816 (ICD-10-CM) - Lumbar spondylosis   Evaluation Date: 3/1/2023  Authorization Period Expiration: 3/29/2024  Plan of Care Expiration: 6/1/2023  Visit # / Visits authorized: 6/ 11     PN DUE: 5/13/2023  PTA Visit #: 1/5     Time In: 8:00 AM  Time Out: 8:45 AM  Total Billable Time: 42 minutes    SUBJECTIVE   Pt reports: Increased neck pain.     She was not compliant with home exercise program.  Response to previous treatment: no change  Functional change: no change     Pain: 7/10 (bilateral low back) ; 10/10 ( neck pain)  Location: bilateral low back & neck     OBJECTIVE     Objective Measures updated at progress report unless specified.        Treatment     Luciana received the treatments listed below:    (exercises performed today are bolded)    therapeutic exercises to develop strength, endurance, ROM, flexibility, posture, and core stabilization for 22 minutes including:  Recumbent Bike for increased LE ROM strength, & endurance x 5 minutes   UBE for increased UE ROM, strength & endurance 2.5 min x 2.5 min       NECK:   - (B) Upper trap stretch 2 x 30"  - (B) levator scap stretch 2 x 30"  - posterior shoulder rolls x 10  - Shoulder unilateral extensions, 7#, 2x10  - Wall pushups 2 x 10  - (B) Bicep curls 3# 2 x 10  - Triceps Extension 13# 2 x 10  - unilateral Rows 10# 3 x 10     BACK:   - Gastroc Stretch 3 x 30"  - DL Heel Raise 3 x 10  - 3 way forward flexion 5" x 5  - LTR x 20  - " "Double knee to chest with SB 2 x 10  - (B) Piriformis stretch 2 x 30"  - (B) Hamstring Stretch 2 x 30"  - (B) side-lying hamstring/IT band Stretch 2 x 30"  - (B) SLR 2 x 10  - (B) Side-lying hip abduction 2 x 10  - Supine hip adduction squeeze 3" x 30  - Bridges x 10  - Supine TA activation 10" x 10    manual therapy techniques: Joint mobilizations, Manual traction, Myofacial release, Soft tissue Mobilization, and Friction Massage were applied to the: (B) Low Back and/or neck for 10 minutes, including:  STM to bilateral Low back; (R) cervical paraspinals, (R) upper trap   SOR  TPR to B UT  Manual cervical traction  Cervical mobs for extension/rotation, C2-7  +(B) lumbar traction over swissball 4 x 45"    neuromuscular re-education activities to improve: Balance, Coordination, Kinesthetic, Sense, Proprioception, and Posture for 10 minutes. The following activities were included:  - (B) Wall Ball flexion and Abduction x 20 ea (CW/CCW)  - (B) shoulder ER YTB 3 x 10  - (R) shoulder flexion RTB 2 x 10  - (R) shoulder scaption RTB 2 x 10    therapeutic activities to improve functional performance for 0  minutes, including:    gait training to improve functional mobility and safety for 0  minutes, including:    Patient Education and Home Exercises     Home Exercises Provided and Patient Education Provided     Education provided:   - continue to perform HEP at home to help with neck and back pain    Written Home Exercises Provided: Patient instructed to cont prior HEP. Exercises were reviewed and Luciana was able to demonstrate them prior to the end of the session.  [place] demonstrated fair  understanding of the education provided. See EMR under Patient Instructions for exercises provided during therapy sessions    ASSESSMENT   Pt has low motivation to participate in therapy as she requires coaxing to continue with exercises. Continued to perform interventions targeting postural and shoulder strength and ROM. Performed " cervical mobs with improved pain free ROM.  Will inquire response next session.    Luciana Is progressing well towards her goals.   Pt prognosis is Fair.     Pt will continue to benefit from skilled outpatient physical therapy to address the deficits listed in the problem list box on initial evaluation, provide pt/family education and to maximize pt's level of independence in the home and community environment.     Pt's spiritual, cultural and educational needs considered and pt agreeable to plan of care and goals.     Anticipated barriers to physical therapy: pain    Goals:   Short Term Goals: 5 weeks   Patient will demonstrate independence with HEP in order to progress toward functional independence  Pt will demonstrate improved pain by reports of less than or equal to 7/10 worst pain of cervical neck on the verbal rating scale in order to progress toward maximal functional ability and improve QOL.  Pt will be able to correct slouched posture in static sitting with minimal verbal cueing for improved QOL  Pt will improve cervical range of motion in all planes by 25% for improved QOL and increased ease with house hold chores   Pt will demonstrate improved pain by reports of less than or equal to 7/10 worst pain of low back on the verbal rating scale in order to progress toward maximal functional ability and improve QOL.  Pt will improve lumbar range of motion y 25% for improved QOL and functional mobility      Long Term Goals: 10 weeks   Patient will demonstrate improved function as indicated by a functional limitation score of 48% on the FOTO.  Pt will demonstrate improved pain by reports of less than or equal to 5/10 worst pain of cervical neck on the verbal rating scale in order to progress toward maximal functional ability and improve QOL.  pt will improve slouched posture in static sitting for improved QOL and increased ease with schooling activities.   pt will improve cervical range of motion with within  functional limits with less than 3/10 pain for improved mobility needed to perform all house hold chores  Pt will improved bilateral upper extremity strength to 5/5 with less than 3/10 pain for improved functional upper extremity strength.   Pt will demonstrate improved pain by reports of less than or equal to 5/10 worst pain of low back on the verbal rating scale in order to progress toward maximal functional ability and improve QOL.  Pt will improve lumbar range of motion to within functional limits for improved functional mobility   Pt will improve bilateral lower extremity strength to 5/5 for improved functional lower extremity strength     PLAN   Plan of care Certification: 3/1/2023 to 6/1/2023.    Cont PT per POC and progress pt as tolerated and appropriate.    Ryan Cummings, PTA  3/27/2023

## 2023-04-20 ENCOUNTER — CLINICAL SUPPORT (OUTPATIENT)
Dept: REHABILITATION | Facility: HOSPITAL | Age: 63
End: 2023-04-20
Payer: MEDICARE

## 2023-04-20 DIAGNOSIS — R29.3 POOR POSTURE: ICD-10-CM

## 2023-04-20 DIAGNOSIS — M53.86 DECREASED RANGE OF MOTION OF LUMBAR SPINE: ICD-10-CM

## 2023-04-20 DIAGNOSIS — M62.89 MUSCLE HYPERTONICITY: Primary | ICD-10-CM

## 2023-04-20 DIAGNOSIS — M53.82 DECREASED RANGE OF MOTION OF INTERVERTEBRAL DISCS OF CERVICAL SPINE: ICD-10-CM

## 2023-04-20 PROCEDURE — 97110 THERAPEUTIC EXERCISES: CPT | Mod: PN

## 2023-04-20 PROCEDURE — 97140 MANUAL THERAPY 1/> REGIONS: CPT | Mod: PN

## 2023-04-20 PROCEDURE — 97112 NEUROMUSCULAR REEDUCATION: CPT | Mod: PN

## 2023-04-20 NOTE — PROGRESS NOTES
"[facility]   Physical Therapy Treatment Note     Name: Luciana Moraes  Clinic Number: [id number]    Therapy Diagnosis:   Encounter Diagnoses   Name Primary?    Muscle hypertonicity Yes    Decreased range of motion of lumbar spine     Decreased range of motion of intervertebral discs of cervical spine     Poor posture      Physician: Luis M Pereyra MD    Visit Date: 4/20/2023    Physician Orders: PT Eval and Treat   Medical Diagnosis from Referral:   M47.812 (ICD-10-CM) - Cervical spondylosis   M47.816 (ICD-10-CM) - Lumbar spondylosis   Evaluation Date: 3/1/2023  Authorization Period Expiration: 3/29/2024  Plan of Care Expiration: 6/1/2023  Visit # / Visits authorized: 8/ 11     PN DUE: 5/13/2023  PTA Visit #: 0/5     Time In: 2:45 PM  Time Out: 3:30 PM  Total Billable Time: 42 minutes    SUBJECTIVE   Pt reports: Increased bilateral low back pain today. She reports her neck is feeling good today. Much soreness following last therapy session.      She was not compliant with home exercise program.  Response to previous treatment: increased neck soreness  Functional change: no change     Pain: 7/10 (bilateral low back) ; 10/10 ( neck pain)  Location: bilateral low back & neck     OBJECTIVE     Objective Measures updated at progress report unless specified.      Treatment     Luciana received the treatments listed below:    (exercises performed today are bolded)    therapeutic exercises to develop strength, endurance, ROM, flexibility, posture, and core stabilization for 25 minutes including:  Recumbent Bike for increased LE ROM strength, & endurance x 5 minutes   UBE for increased UE ROM, strength & endurance 2.5 min x 2.5 min       NECK:   - (B) Upper trap stretch 2 x 30"  - (B) levator scap stretch 2 x 30"  - posterior shoulder rolls x 10  - Shoulder unilateral extensions, 7#, 2x10  - Wall pushups 2 x 10  - (B) Bicep curls 3# 2 x 10  - Triceps Extension 13# 2 x 10  - unilateral Rows " "10# 3 x 10     BACK:   - Gastroc Stretch 3 x 30"  - DL Heel Raise 3 x 10  - Standing hip 3-way x 10 ea  - 3 way forward flexion 5" x 5  - LTR x 20  - Double knee to chest with SB 2 x 10  - (B) Piriformis stretch 2 x 30"  - (B) Hamstring Stretch 2 x 30"  - (B) side-lying hamstring/IT band Stretch 2 x 30"  - (B) SLR 2 x 10  - (B) Side-lying hip abduction 2 x 10  - Supine hip adduction squeeze 3" x 30  - Bridges x 10  - Supine TA activation 10" x 10    manual therapy techniques: Joint mobilizations, Manual traction, Myofacial release, Soft tissue Mobilization, and Friction Massage were applied to the: (B) Low Back and/or neck for 10 minutes, including:  STM to bilateral Low back; (R) cervical paraspinals, (R) upper trap   SOR  TPR to B UT  Manual cervical traction  Cervical mobs for extension/rotation, C2-7  +(B) lumbar traction over swissball 4 x 45"    neuromuscular re-education activities to improve: Balance, Coordination, Kinesthetic, Sense, Proprioception, and Posture for 10 minutes. The following activities were included:  - (B) Wall Ball flexion and Abduction x 20 ea (CW/CCW)  - (B) shoulder ER YTB 3 x 10  - (R) shoulder flexion RTB 2 x 10  - (R) shoulder scaption RTB 2 x 10  - Standing marches on foam 2 x 10  - Tandem gait in // bars x 5 laps   - Single leg balance 2 x 30"  - Alternating cone tapping (on foam) 2 x 10    therapeutic activities to improve functional performance for 0  minutes, including:    gait training to improve functional mobility and safety for 0  minutes, including:    Patient Education and Home Exercises     Home Exercises Provided and Patient Education Provided     Education provided:   - continue to perform HEP at home to help with neck and back pain    Written Home Exercises Provided: Patient instructed to cont prior HEP. Exercises were reviewed and Luciana was able to demonstrate them prior to the end of the session.  [place]iv[place] demonstrated fair  understanding of the education provided. " See EMR under Patient Instructions for exercises provided during therapy sessions    ASSESSMENT   Pt tolerated therapy session good today. She presents with increased low back pain today. Focused session on balance, core stability, and lower extremity strengthening and mobility. Cueing on control of motion during balance exercises today. Cueing for motivation of participation throughout session. Increased difficulty with single leg balance and single leg cone tapping. Ended session with STM to right low back with slight relief noted following.     Luciana Is progressing well towards her goals.   Pt prognosis is Fair.     Pt will continue to benefit from skilled outpatient physical therapy to address the deficits listed in the problem list box on initial evaluation, provide pt/family education and to maximize pt's level of independence in the home and community environment.     Pt's spiritual, cultural and educational needs considered and pt agreeable to plan of care and goals.     Anticipated barriers to physical therapy: pain    Goals:   Short Term Goals: 5 weeks   Patient will demonstrate independence with HEP in order to progress toward functional independence  Pt will demonstrate improved pain by reports of less than or equal to 7/10 worst pain of cervical neck on the verbal rating scale in order to progress toward maximal functional ability and improve QOL.  Pt will be able to correct slouched posture in static sitting with minimal verbal cueing for improved QOL  Pt will improve cervical range of motion in all planes by 25% for improved QOL and increased ease with house hold chores   Pt will demonstrate improved pain by reports of less than or equal to 7/10 worst pain of low back on the verbal rating scale in order to progress toward maximal functional ability and improve QOL.  Pt will improve lumbar range of motion y 25% for improved QOL and functional mobility      Long Term Goals: 10 weeks   Patient will  demonstrate improved function as indicated by a functional limitation score of 48% on the FOTO.  Pt will demonstrate improved pain by reports of less than or equal to 5/10 worst pain of cervical neck on the verbal rating scale in order to progress toward maximal functional ability and improve QOL.  pt will improve slouched posture in static sitting for improved QOL and increased ease with schooling activities.   pt will improve cervical range of motion with within functional limits with less than 3/10 pain for improved mobility needed to perform all house hold chores  Pt will improved bilateral upper extremity strength to 5/5 with less than 3/10 pain for improved functional upper extremity strength.   Pt will demonstrate improved pain by reports of less than or equal to 5/10 worst pain of low back on the verbal rating scale in order to progress toward maximal functional ability and improve QOL.  Pt will improve lumbar range of motion to within functional limits for improved functional mobility   Pt will improve bilateral lower extremity strength to 5/5 for improved functional lower extremity strength     PLAN   Plan of care Certification: 3/1/2023 to 6/1/2023.    Cont PT per POC and progress pt as tolerated and appropriate.    Ana Alonzo, PT  3/27/2023

## 2023-05-09 ENCOUNTER — PATIENT MESSAGE (OUTPATIENT)
Dept: RESEARCH | Facility: HOSPITAL | Age: 63
End: 2023-05-09
Payer: MEDICARE

## 2023-05-12 ENCOUNTER — OFFICE VISIT (OUTPATIENT)
Dept: PAIN MEDICINE | Facility: CLINIC | Age: 63
End: 2023-05-12
Payer: MEDICARE

## 2023-05-12 VITALS
HEIGHT: 62 IN | RESPIRATION RATE: 16 BRPM | SYSTOLIC BLOOD PRESSURE: 135 MMHG | WEIGHT: 188.5 LBS | HEART RATE: 57 BPM | DIASTOLIC BLOOD PRESSURE: 78 MMHG | BODY MASS INDEX: 34.69 KG/M2

## 2023-05-12 DIAGNOSIS — M50.30 DDD (DEGENERATIVE DISC DISEASE), CERVICAL: ICD-10-CM

## 2023-05-12 DIAGNOSIS — M47.812 CERVICAL SPONDYLOSIS: ICD-10-CM

## 2023-05-12 DIAGNOSIS — M54.12 CERVICAL RADICULOPATHY: ICD-10-CM

## 2023-05-12 DIAGNOSIS — G56.01 CARPAL TUNNEL SYNDROME OF RIGHT WRIST: ICD-10-CM

## 2023-05-12 PROCEDURE — 1159F PR MEDICATION LIST DOCUMENTED IN MEDICAL RECORD: ICD-10-PCS | Mod: CPTII,S$GLB,, | Performed by: PHYSICIAN ASSISTANT

## 2023-05-12 PROCEDURE — 3078F DIAST BP <80 MM HG: CPT | Mod: CPTII,S$GLB,, | Performed by: PHYSICIAN ASSISTANT

## 2023-05-12 PROCEDURE — 99214 OFFICE O/P EST MOD 30 MIN: CPT | Mod: S$GLB,,, | Performed by: PHYSICIAN ASSISTANT

## 2023-05-12 PROCEDURE — 3008F PR BODY MASS INDEX (BMI) DOCUMENTED: ICD-10-PCS | Mod: CPTII,S$GLB,, | Performed by: PHYSICIAN ASSISTANT

## 2023-05-12 PROCEDURE — 3078F PR MOST RECENT DIASTOLIC BLOOD PRESSURE < 80 MM HG: ICD-10-PCS | Mod: CPTII,S$GLB,, | Performed by: PHYSICIAN ASSISTANT

## 2023-05-12 PROCEDURE — 1160F RVW MEDS BY RX/DR IN RCRD: CPT | Mod: CPTII,S$GLB,, | Performed by: PHYSICIAN ASSISTANT

## 2023-05-12 PROCEDURE — 3044F PR MOST RECENT HEMOGLOBIN A1C LEVEL <7.0%: ICD-10-PCS | Mod: CPTII,S$GLB,, | Performed by: PHYSICIAN ASSISTANT

## 2023-05-12 PROCEDURE — 1159F MED LIST DOCD IN RCRD: CPT | Mod: CPTII,S$GLB,, | Performed by: PHYSICIAN ASSISTANT

## 2023-05-12 PROCEDURE — 3044F HG A1C LEVEL LT 7.0%: CPT | Mod: CPTII,S$GLB,, | Performed by: PHYSICIAN ASSISTANT

## 2023-05-12 PROCEDURE — 99214 PR OFFICE/OUTPT VISIT, EST, LEVL IV, 30-39 MIN: ICD-10-PCS | Mod: S$GLB,,, | Performed by: PHYSICIAN ASSISTANT

## 2023-05-12 PROCEDURE — 99999 PR PBB SHADOW E&M-EST. PATIENT-LVL III: ICD-10-PCS | Mod: PBBFAC,,, | Performed by: PHYSICIAN ASSISTANT

## 2023-05-12 PROCEDURE — 3008F BODY MASS INDEX DOCD: CPT | Mod: CPTII,S$GLB,, | Performed by: PHYSICIAN ASSISTANT

## 2023-05-12 PROCEDURE — 3075F SYST BP GE 130 - 139MM HG: CPT | Mod: CPTII,S$GLB,, | Performed by: PHYSICIAN ASSISTANT

## 2023-05-12 PROCEDURE — 99999 PR PBB SHADOW E&M-EST. PATIENT-LVL III: CPT | Mod: PBBFAC,,, | Performed by: PHYSICIAN ASSISTANT

## 2023-05-12 PROCEDURE — 3075F PR MOST RECENT SYSTOLIC BLOOD PRESS GE 130-139MM HG: ICD-10-PCS | Mod: CPTII,S$GLB,, | Performed by: PHYSICIAN ASSISTANT

## 2023-05-12 PROCEDURE — 1160F PR REVIEW ALL MEDS BY PRESCRIBER/CLIN PHARMACIST DOCUMENTED: ICD-10-PCS | Mod: CPTII,S$GLB,, | Performed by: PHYSICIAN ASSISTANT

## 2023-05-12 RX ORDER — TIZANIDINE 4 MG/1
4 TABLET ORAL 2 TIMES DAILY PRN
Qty: 60 TABLET | Refills: 5 | Status: SHIPPED | OUTPATIENT
Start: 2023-05-12

## 2023-05-12 RX ORDER — PREGABALIN 75 MG/1
75 CAPSULE ORAL 2 TIMES DAILY
Qty: 60 CAPSULE | Refills: 5 | Status: SHIPPED | OUTPATIENT
Start: 2023-05-12

## 2023-05-12 RX ORDER — NABUMETONE 750 MG/1
750 TABLET, FILM COATED ORAL 2 TIMES DAILY PRN
Qty: 60 TABLET | Refills: 5 | Status: SHIPPED | OUTPATIENT
Start: 2023-05-12

## 2023-05-12 NOTE — PROGRESS NOTES
Interventional Pain Progress Note       Referring Physician: No ref. provider found    PCP: Cedric Huerta MD    Chief Complaint:   Chief Complaint   Patient presents with    Low-back Pain     Patient received 35% relief from MBB.  Patient has pain in lower back and hips with pain radiating down both legs and pain in neck and top of shoulders down arms.  Pain scale 8/10     Interval History (5/12/2023): Luciana Moraes presents today for follow-up visit.  she underwent bilateral C5/7-7 MBB on 3/13/23.  The patient reports that she is/was better following the procedure.  she reports 80% pain relief for 8 hours following the procedure. She was originally scheduled for cervical RFAs, but reports with physical therapy she had considerable reduction in her neck pain and post poned the procedure. Patient reports pain as 8/10 today. Would like to consider more natural treatment options.    Lumbar x-ray 02/28/2023  FINDINGS:  There are 5 weight bearing lumbar vertebra.  Mild convex right curvature of the lower lumbar spine.  Lower lumbar spine degenerative facet arthropathy.  Mild marginal spondylosis.  The vertebral body heights and intervertebral disc heights are   well-maintained. Negative for spondylolysis or spondylolisthesis. The sacral ala and sacroiliac joints are intact. The bowel gas pattern is normal.     Vascular calcifications are present without aneurysmal change.  There are phleboliths versus ureteroliths some overlying the pelvis.     Impression:     1.  Negative for acute process involving the lumbar spine.     2.  Degenerative facet arthropathy of the lower lumbar spine with mild convex-right curvature of the lumbar spine.     3.  Incidental findings as noted above.  Interval History (02/28/2023):  Patient returns to clinic after procedure.  Patient reports 100% relief and neck pain for 2 days after bilateral C5-C7 medial branch block on 10/31/2022.  Since last being seen, patient reports  having fall from standing 1 week ago.  Patient denies any loss consciousness with fall.  She reports increase in neck and lower back pain.  The pain is described as a throbbing aching pain diffusely across her neck, worse at the base of the neck.  Pain is worse with extension and rotation, better with flexion heat.  Low back pain described as a stabbing aching pain that starts in the middle of the low back and radiates to the bilateral hips.  Pain is worse with standing and lifting, and better with sitting down and heat.  Pain is rated a 7/10. Denies any fevers, chills,  weakness, or bowel and bladder incontinence        Interval History (10/11/22):  Patient returns to clinic after procedure.  Patient reports minimal relief after C7-T1 interlaminar epidural steroid injection on 09/22/2022.  Patient reports continued right-sided pain that starts over the right neck and right trapezius area and then radiates down to her right hand.  Pain is worse at night and with lifting, better with rest and in the morning.  Pain is rated 8 out 10. Denies any fevers, chills, changes in gait, weakness, or bowel and bladder incontinence        Interval History (05/12/2023):  Patient returns to clinic for MRI review.  Patient reports continued neck pain primarily on her right side that then radiates to her right upper extremity to her fingertips and numbness and tingling pain.  She reports associated decreased strength in her right upper extremity with his pain.  Pain is worse with neck rotation and lifting objects, better with heat and rest.  Pain is rated a 7/10. Denies any fevers, chills, changes in gait, weakness, or bowel and bladder incontinence        SUBJECTIVE:    Luciana Moraes is a 62 y.o. female who presents to the clinic for the evaluation of neck and lower pain. The pain started over 10 years ago and symptoms have been worsening.  Neck pain is described as a throbbing pain that starts diffusely over her neck.  She  reports that in the last 2 months she has had increased in right upper extremity radicular pain that goes to her fingertips.  She denies any traumas to her neck or any previous surgeries on her spine her major joints.  Pain is worse with neck rotation and lifting objects, better with flexion and heat.  Back pain is described as a throbbing axial pain in a band across her lower back.  She reports minimal radiation to her bilateral lower extremities.  Pain is worse with extension, better with flexion.  The pain is described as numbing, sharp and tingling and is rated as  10 out of 10 .   The pain is rated with a score of  6/10 on the BEST day and a score of 10/10 on the WORST day.  Symptoms interfere with daily activity and work. The pain is exacerbated by Walking, Extension, Lifting and Getting out of bed/chair.  The pain is mitigated by nothing.     Patient denies night fever/night sweats, urinary incontinence, bowel incontinence, significant weight loss, significant motor weakness and loss of sensations.      Non-Pharmacologic Treatments:  Physical Therapy/Home Exercise: yes  Ice/Heat:yes  TENS: no  Acupuncture: no  Massage: yes  Chiropractic: no        Previous Pain Medications:  Tylenol, NSAIDs, gabapentin, muscle relaxers, opioids, topicals     report:  Reviewed and consistent with medication use as prescribed.    Pain Procedures:   -10/31/2022: Bilateral C5-C7 medial branch block, 100% relief for 2 days  9/22/22:  C7-T1 interlaminar epidural steroid injection, minimal relief      Imaging:     Bilateral upper extremity EMG and nerve conduction study, 10/26/2022:  IMPRESSION  ABNORMAL study  2. There is electrodiagnostic evidence of a moderate demyelinating median neuropathy (Carpal tunnel syndrome) across bilateral wrists-slightly worse on the right.  There is slight evidence of a mild subacute irritation of the C7 nerve root.  3. There is clinical evidence of myofascial pain and rotator cuff weakness     MRI  CERVICAL SPINE WITHOUT CONTRAST 8/2/22     CLINICAL HISTORY:  Neck pain, chronic, degenerative changes on xray;.  Radiculopathy, cervical region     TECHNIQUE:  Multiplanar, multisequence MR images of the cervical spine were acquired without the administration of contrast.     COMPARISON:  Cervical spine x-ray dated 08/02/2022     FINDINGS:  There are 7 non-rib-bearing cervical vertebrae.  Mild cervical straightening noted.  Alignment is otherwise unremarkable with no significant listhesis.  No acute fracture or compression deformity.  No aggressive focal signal abnormality.     Visualized posterior fossa is unremarkable.  Craniocervical junction is well maintained.  Visualized spinal cord is normal in size and signal.     C1-C2: Atlanto odontoid osteophytosis noted without significant dorsal pannus formation.  No significant spinal canal stenosis.     C2-C3: No significant spinal canal or neural foraminal stenosis.     C3-C4: Right greater than left uncovertebral hypertrophy and mild bilateral facet arthropathy.  No significant spinal canal or neural foraminal stenosis.     C4-C5: Bilateral uncovertebral hypertrophy/disc osteophyte complex and mild bilateral facet arthropathy.  Minimal spinal canal stenosis.  Mild-to-moderate right and mild left neural foraminal stenosis.     C5-C6: Right greater than left uncovertebral hypertrophy/disc osteophyte complex and mild bilateral facet arthropathy.  Minimal spinal canal stenosis with particular narrowing of the right spinal canal.  Moderate to severe right and mild left neural foraminal stenosis.     C6-C7: Bilateral uncovertebral hypertrophy/disc osteophyte complex and mild bilateral facet arthropathy.  No significant spinal canal stenosis.  Mild right neural foraminal stenosis.  No significant left neural foraminal stenosis.     C7-T1: Mild bilateral facet arthropathy.  No significant spinal canal stenosis.  Mild bilateral neural foraminal stenosis.     Visualized soft  tissues are unremarkable.     Impression:     Multilevel degenerative changes as detailed above.     Minimal spinal canal stenosis at C4-C5 and C5-C6.     Varying degrees of bilateral neural foraminal stenosis as detailed above, most severe at the right C5-C6 level.      MRI Lumbar Spine w/o Contast 7/8/19  There is lumbar facet arthropathy at L5-S1 and L4-L5. No stenosis or disc herniation is evident. Patient appears obese. Patient may be a candidate for an image guided steroid injection treatment trial.   .       Past Medical History:   Diagnosis Date    Diabetes mellitus, type 2     Hyperlipidemia     Osteoarthritis     Polymyositis     Rheumatoid arthritis      Past Surgical History:   Procedure Laterality Date    CARPAL TUNNEL RELEASE Bilateral     COLONOSCOPY N/A 11/08/2022    Procedure: COLONOSCOPY;  Surgeon: Kishore Monsalve MD;  Location: [facility] ENDO;  Service: Endoscopy;  Laterality: N/A;    EPIDURAL STEROID INJECTION INTO CERVICAL SPINE N/A 09/22/2022    Procedure: C7/T1 IL [facility];  Surgeon: Luis M Pereyra MD;  Location: [facility] PAIN MGT;  Service: Pain Management;  Laterality: N/A;    HIP SURGERY      HYSTERECTOMY  2008    due to bleeding, pain from fibroids, retains one ovary    INJECTION OF ANESTHETIC AGENT AROUND MEDIAL BRANCH NERVES INNERVATING CERVICAL FACET JOINT Bilateral 10/31/2022    Procedure: Bilateral C5-7 MBB with RN IV sedation;  Surgeon: Luis M Pereyra MD;  Location: [facility] PAIN MGT;  Service: Pain Management;  Laterality: Bilateral;    INJECTION OF ANESTHETIC AGENT AROUND MEDIAL BRANCH NERVES INNERVATING CERVICAL FACET JOINT Bilateral 3/13/2023    Procedure: Bilateral C5-7 MBB with RN IV sedation;  Surgeon: Luis M Pereyra MD;  Location: [facility] PAIN MGT;  Service: Pain Management;  Laterality: Bilateral;    OOPHORECTOMY      1 ovary removed    TUBAL LIGATION  1985     Social History     Socioeconomic History    Marital status:    Tobacco Use    Smoking status: Former     Types:  Cigarettes     Quit date: 3/17/2001     Years since quittin.1    Smokeless tobacco: Never   Substance and Sexual Activity    Alcohol use: Never    Drug use: Never    Sexual activity: Yes     Partners: Male     Birth control/protection: None     Family History   Problem Relation Age of Onset    Rheum arthritis Mother     No Known Problems Father        Review of patient's allergies indicates:  No Known Allergies    Current Outpatient Medications   Medication Sig    aspirin 81 MG Chew Take 81 mg by mouth.    atorvastatin (LIPITOR) 80 MG tablet Take 1 tablet (80 mg total) by mouth once daily.    diclofenac sodium (VOLTAREN) 1 % Gel Apply 2 g topically 4 (four) times daily.    ergocalciferol (ERGOCALCIFEROL) 50,000 unit Cap Take 1 capsule (50,000 Units total) by mouth every 7 days.    hydrOXYchloroQUINE (PLAQUENIL) 200 mg tablet Take 1 tablet (200 mg total) by mouth 2 (two) times daily.    ibuprofen (ADVIL,MOTRIN) 800 MG tablet Take 800 mg by mouth 3 (three) times daily.    metFORMIN (GLUCOPHAGE-XR) 500 MG ER 24hr tablet Take 2 tablets (1,000 mg total) by mouth daily with breakfast.    polyethylene glycol (GLYCOLAX) 17 gram PwPk Take 17 g by mouth once daily.    semaglutide (OZEMPIC) 1 mg/dose (4 mg/3 mL) Inject 1 mg into the skin every 7 days.    nabumetone (RELAFEN) 750 MG tablet Take 1 tablet (750 mg total) by mouth 2 (two) times daily as needed for Pain.    pregabalin (LYRICA) 75 MG capsule Take 1 capsule (75 mg total) by mouth 2 (two) times daily.    tiZANidine (ZANAFLEX) 4 MG tablet Take 1 tablet (4 mg total) by mouth 2 (two) times daily as needed (pain).     No current facility-administered medications for this visit.         ROS  Review of Systems   Constitutional:  Negative for chills, diaphoresis, fatigue and fever.   Respiratory:  Negative for chest tightness, shortness of breath, wheezing and stridor.    Cardiovascular:  Negative for chest pain and leg swelling.   Gastrointestinal:  Negative for blood  "in stool, diarrhea, nausea and vomiting.   Endocrine: Negative for cold intolerance and heat intolerance.   Genitourinary:  Positive for urgency. Negative for dysuria and hematuria.   Musculoskeletal:  Positive for arthralgias, back pain, gait problem, myalgias, neck pain and neck stiffness. Negative for joint swelling.   Skin:  Negative for rash.   Neurological:  Positive for weakness and numbness. Negative for tremors, seizures, speech difficulty, light-headedness and headaches.   Hematological:  Does not bruise/bleed easily.   Psychiatric/Behavioral:  Negative for agitation, confusion and suicidal ideas. The patient is not nervous/anxious.           OBJECTIVE:  /78   Pulse (!) 57   Resp 16   Ht 5' 2" (1.575 m)   Wt 85.5 kg (188 lb 7.9 oz)   BMI 34.48 kg/m²         Physical Exam  Constitutional:       General: She is not in acute distress.     Appearance: Normal appearance. She is not ill-appearing.   HENT:      Head: Normocephalic and atraumatic.      Nose: No congestion or rhinorrhea.   Eyes:      Extraocular Movements: Extraocular movements intact.      Pupils: Pupils are equal, round, and reactive to light.   Cardiovascular:      Pulses: Normal pulses.   Pulmonary:      Effort: Pulmonary effort is normal.   Abdominal:      General: Abdomen is flat.      Palpations: Abdomen is soft.   Musculoskeletal:      Comments: Positive TInsel and Phalen on right head   Skin:     General: Skin is warm and dry.      Capillary Refill: Capillary refill takes less than 2 seconds.   Neurological:      Mental Status: She is alert and oriented to person, place, and time.      Cranial Nerves: No cranial nerve deficit.      Sensory: No sensory deficit.      Motor: Weakness present. No abnormal muscle tone.      Gait: Gait abnormal.      Comments: Antalgic gait     Psychiatric:         Mood and Affect: Mood normal.         Behavior: Behavior normal.         Thought Content: Thought content normal. "       Musculoskeletal:    Cervical Exam  Incision: no  Pain with Flexion: no  Pain with Extension: yes  Paraspinous TTP:  Right-greater-than-left  Facet TTP:  C5-C6  Spurling:  Positive on the right  ROM:  Decreased        Lumbar Exam  Incision: no  Pain with Flexion: yes  Pain with Extension: yes  ROM:  Decreased  Paraspinous TTP:  Present bilaterally  Facet TTP:  L4-5  Facet Loading:  Positive bilaterally  SLR:  Negative bilaterally  SIJ TTP:  Positive bilaterally  SAMM:  Negative bilaterally            LABS:  Lab Results   Component Value Date    WBC 6.28 03/23/2023    HGB 13.3 03/23/2023    HCT 43.0 03/23/2023    MCV 89 03/23/2023     (L) 03/23/2023       CMP  Sodium   Date Value Ref Range Status   03/23/2023 142 136 - 145 mmol/L Final     Potassium   Date Value Ref Range Status   03/23/2023 3.9 3.5 - 5.1 mmol/L Final     Chloride   Date Value Ref Range Status   03/23/2023 108 95 - 110 mmol/L Final     CO2   Date Value Ref Range Status   03/23/2023 24 23 - 29 mmol/L Final     Glucose   Date Value Ref Range Status   03/23/2023 80 70 - 110 mg/dL Final     BUN   Date Value Ref Range Status   03/23/2023 10 6 - 20 mg/dL Final     Creatinine   Date Value Ref Range Status   03/23/2023 0.8 0.5 - 1.4 mg/dL Final     Calcium   Date Value Ref Range Status   03/23/2023 9.0 8.7 - 10.5 mg/dL Final     Total Protein   Date Value Ref Range Status   03/23/2023 7.5 6.0 - 8.4 g/dL Final     Albumin   Date Value Ref Range Status   03/23/2023 3.7 3.5 - 5.2 g/dL Final     Total Bilirubin   Date Value Ref Range Status   03/23/2023 0.3 0.1 - 1.0 mg/dL Final     Comment:     For infants and newborns, interpretation of results should be based  on gestational age, weight and in agreement with clinical  observations.    Premature Infant recommended reference ranges:  Up to 24 hours.............<8.0 mg/dL  Up to 48 hours............<12.0 mg/dL  3-5 days..................<15.0 mg/dL  6-29 days.................<15.0 mg/dL        Alkaline Phosphatase   Date Value Ref Range Status   03/23/2023 84 55 - 135 U/L Final     AST   Date Value Ref Range Status   03/23/2023 24 10 - 40 U/L Final     ALT   Date Value Ref Range Status   03/23/2023 19 10 - 44 U/L Final     Anion Gap   Date Value Ref Range Status   03/23/2023 10 8 - 16 mmol/L Final       Lab Results   Component Value Date    HGBA1C 6.5 (H) 02/13/2023             ASSESSMENT:       62 y.o. year old female with neck pain, consistent with     1. Cervical radiculopathy  tiZANidine (ZANAFLEX) 4 MG tablet    pregabalin (LYRICA) 75 MG capsule    nabumetone (RELAFEN) 750 MG tablet      2. DDD (degenerative disc disease), cervical  tiZANidine (ZANAFLEX) 4 MG tablet    nabumetone (RELAFEN) 750 MG tablet      3. Cervical spondylosis  tiZANidine (ZANAFLEX) 4 MG tablet    nabumetone (RELAFEN) 750 MG tablet      4. Carpal tunnel syndrome of right wrist  pregabalin (LYRICA) 75 MG capsule          Cervical radiculopathy  -     tiZANidine (ZANAFLEX) 4 MG tablet; Take 1 tablet (4 mg total) by mouth 2 (two) times daily as needed (pain).  Dispense: 60 tablet; Refill: 5  -     pregabalin (LYRICA) 75 MG capsule; Take 1 capsule (75 mg total) by mouth 2 (two) times daily.  Dispense: 60 capsule; Refill: 5  -     nabumetone (RELAFEN) 750 MG tablet; Take 1 tablet (750 mg total) by mouth 2 (two) times daily as needed for Pain.  Dispense: 60 tablet; Refill: 5    DDD (degenerative disc disease), cervical  -     tiZANidine (ZANAFLEX) 4 MG tablet; Take 1 tablet (4 mg total) by mouth 2 (two) times daily as needed (pain).  Dispense: 60 tablet; Refill: 5  -     nabumetone (RELAFEN) 750 MG tablet; Take 1 tablet (750 mg total) by mouth 2 (two) times daily as needed for Pain.  Dispense: 60 tablet; Refill: 5    Cervical spondylosis  -     tiZANidine (ZANAFLEX) 4 MG tablet; Take 1 tablet (4 mg total) by mouth 2 (two) times daily as needed (pain).  Dispense: 60 tablet; Refill: 5  -     nabumetone (RELAFEN) 750 MG tablet; Take  1 tablet (750 mg total) by mouth 2 (two) times daily as needed for Pain.  Dispense: 60 tablet; Refill: 5    Carpal tunnel syndrome of right wrist  -     pregabalin (LYRICA) 75 MG capsule; Take 1 capsule (75 mg total) by mouth 2 (two) times daily.  Dispense: 60 capsule; Refill: 5               PLAN:   - Interventions: None at this time, patient would like to defer any additional injections     -10/31/2022: Bilateral C5-C7 medial branch block, 100% relief for 2 days  -9/22/22:  C7-T1 interlaminar epidural steroid injection, minimal relief    - Anticoagulation use:   yes aspirin    - Medications:   Continue Lyrica 75 mg twice a day  Continue Relafen 750 mg twice a day as needed   Continue tizanidine 4 mg twice a day as needed   Continue Plaquenil as prescribed by rheumatology  Magnesium Glycinate 400 mg nightly  Start (over-the-counter) tumeric 1000 to 2000mg per day.  Ginger or ginger root in teas or smoothies  - Therapy:    Continue formal physical therapy for neck and lower back pain after fall from standing.    - Imaging:      MRI of cervical spine reviewed.  Significant for eccentric right disc bulge at C5-C6 with associated severe right foraminal stenosis   X-rays of cervical spine obtained today reviewed.  Significant for spondylosis and anterior annular calcifications noted at C4-5, C5-6, and C6-C7   EMG and nerve conduction study of bilateral upper extremity shows bilateral carpal tunnel syndrome and C7 radiculopathy    - Consults:   Continue follow-up with Rheumatology    - Counseled patient regarding the importance of activity modification and physical therapy    - Patient Questions: Answered all of the patient's questions regarding diagnosis, therapy, and treatment    - Follow up visit:  3 months      The above plan and management options were discussed at length with patient. Patient is in agreement with the above and verbalized understanding.    I discussed the goals of interventional chronic pain  management with the patient on today's visit.  I explained the utility of injections for diagnostic and therapeutic purposes.  We discussed a multimodal approach to pain including treating the patient's given worst pain at any given time.  We will use a systematic approach to addressing pain.  We will also adopt a multimodal approach that includes injections, adjuvant medications, physical therapy, at times psychiatry.  There may be a limited role for opioid use intermittently in the treatment of pain, more particularly for acute pain although no one approach can be used as a sole treatment modality.    I emphasized the importance of regular exercise, core strengthening and stretching, diet and weight loss as a cornerstone of long-term pain management.      Veronica Alonzo PA-C  Interventional Pain Management  [facility]    Disclaimer:  This note was prepared using voice recognition system and is likely to have sound alike errors that may have been overlooked even after proof reading.  Please call me with any questions

## 2023-05-12 NOTE — PATIENT INSTRUCTIONS
Magnesium Glycinate 400 mg nightly  Start (over-the-counter) tumeric 1000 to 2000mg per day.  Alea or alea root in teas or smoothies

## 2023-05-17 ENCOUNTER — OFFICE VISIT (OUTPATIENT)
Dept: DERMATOLOGY | Facility: CLINIC | Age: 63
End: 2023-05-17
Payer: MEDICARE

## 2023-05-17 DIAGNOSIS — L28.0 LICHEN SIMPLEX CHRONICUS: Primary | ICD-10-CM

## 2023-05-17 DIAGNOSIS — R21 RASH: ICD-10-CM

## 2023-05-17 PROCEDURE — 3044F PR MOST RECENT HEMOGLOBIN A1C LEVEL <7.0%: ICD-10-PCS | Mod: CPTII,,, | Performed by: PHYSICIAN ASSISTANT

## 2023-05-17 PROCEDURE — 1160F PR REVIEW ALL MEDS BY PRESCRIBER/CLIN PHARMACIST DOCUMENTED: ICD-10-PCS | Mod: CPTII,,, | Performed by: PHYSICIAN ASSISTANT

## 2023-05-17 PROCEDURE — 99203 PR OFFICE/OUTPT VISIT, NEW, LEVL III, 30-44 MIN: ICD-10-PCS | Mod: ,,, | Performed by: PHYSICIAN ASSISTANT

## 2023-05-17 PROCEDURE — 99203 OFFICE O/P NEW LOW 30 MIN: CPT | Mod: ,,, | Performed by: PHYSICIAN ASSISTANT

## 2023-05-17 PROCEDURE — 1160F RVW MEDS BY RX/DR IN RCRD: CPT | Mod: CPTII,,, | Performed by: PHYSICIAN ASSISTANT

## 2023-05-17 PROCEDURE — 99999 PR PBB SHADOW E&M-EST. PATIENT-LVL III: CPT | Mod: PBBFAC,,, | Performed by: PHYSICIAN ASSISTANT

## 2023-05-17 PROCEDURE — 3044F HG A1C LEVEL LT 7.0%: CPT | Mod: CPTII,,, | Performed by: PHYSICIAN ASSISTANT

## 2023-05-17 PROCEDURE — 1159F PR MEDICATION LIST DOCUMENTED IN MEDICAL RECORD: ICD-10-PCS | Mod: CPTII,,, | Performed by: PHYSICIAN ASSISTANT

## 2023-05-17 PROCEDURE — 1159F MED LIST DOCD IN RCRD: CPT | Mod: CPTII,,, | Performed by: PHYSICIAN ASSISTANT

## 2023-05-17 PROCEDURE — 99999 PR PBB SHADOW E&M-EST. PATIENT-LVL III: ICD-10-PCS | Mod: PBBFAC,,, | Performed by: PHYSICIAN ASSISTANT

## 2023-05-17 NOTE — PROGRESS NOTES
Subjective:      Patient ID:  Luciana Moraes is a 62 y.o. female who presents for No chief complaint on file.    Hx of LSC of mid inframammary chest, previously followed by outside derm (last seen by them 11/15/21) and s/p biopsy.  Here as new pt to establish care. C/o rash. Denies other rashes.  History of Present Illness: The patient presents with chief complaint of rash.  Location: central chest  Duration: many years  Signs/Symptoms: raised, rough, bumps, thickening, itching. Heat seems to exacerbate.    Prior treatments: TAC 0.1% oint    Outside derm notes reviewed by me from 2021 ([facility])    6/25/21 Pathology  Skin, central chest, biopsy:  - Lichen simplex chronicus.     MICROSCOPIC DESCRIPTION -   Sections of skin show epidermal psoriasiform hyperplasia with thick suprapapillary plates and prominent hyperkeratosis. There is mild superficial perivascular inflammation, papillary dermal edema, and dilatation of vessels.          Review of Systems   Constitutional:  Negative for fever and chills.   Gastrointestinal:  Negative for nausea and vomiting.   Skin:  Positive for itching, rash and activity-related sunscreen use. Negative for dry skin, sun sensitivity, daily sunscreen use, recent sunburn, dry lips and abscesses.   Hematologic/Lymphatic: Does not bruise/bleed easily.     Objective:   Physical Exam   Constitutional: She appears well-developed and well-nourished. No distress.   Neurological: She is alert and oriented to person, place, and time. She is not disoriented.   Psychiatric: She has a normal mood and affect.   Skin:   Areas Examined (abnormalities noted in diagram):   Head / Face Inspection Performed  Neck Inspection Performed  Chest / Axilla Inspection Performed  Abdomen Inspection Performed  Back Inspection Performed  RUE Inspected  LUE Inspection Performed          Diagram Legend     Erythematous scaling macule/papule c/w actinic keratosis       Vascular papule c/w  angioma      Pigmented verrucoid papule/plaque c/w seborrheic keratosis      Yellow umbilicated papule c/w sebaceous hyperplasia      Irregularly shaped tan macule c/w lentigo     1-2 mm smooth white papules consistent with Milia      Movable subcutaneous cyst with punctum c/w epidermal inclusion cyst      Subcutaneous movable cyst c/w pilar cyst      Firm pink to brown papule c/w dermatofibroma      Pedunculated fleshy papule(s) c/w skin tag(s)      Evenly pigmented macule c/w junctional nevus     Mildly variegated pigmented, slightly irregular-bordered macule c/w mildly atypical nevus      Flesh colored to evenly pigmented papule c/w intradermal nevus       Pink pearly papule/plaque c/w basal cell carcinoma      Erythematous hyperkeratotic cursted plaque c/w SCC      Surgical scar with no sign of skin cancer recurrence      Open and closed comedones      Inflammatory papules and pustules      Verrucoid papule consistent consistent with wart     Erythematous eczematous patches and plaques     Dystrophic onycholytic nail with subungual debris c/w onychomycosis     Umbilicated papule    Erythematous-base heme-crusted tan verrucoid plaque consistent with inflamed seborrheic keratosis     Erythematous Silvery Scaling Plaque c/w Psoriasis     See annotation      Assessment / Plan:        Lichen simplex chronicus  Biopsy proven per outside derm records (2021). Discussed dx and tx options. Advised to avoid scratching. Encouraged cool compresses, cool showers prn. May use otc Sarna anti-itch prn. She declines rx today for TCS or TCI. Would reconsider trial of Eucrisa vs. Protopic in future.     Rash  -     Ambulatory referral/consult to Dermatology             Follow up if symptoms worsen or fail to improve.

## 2023-05-17 NOTE — PROGRESS NOTES
Subjective:      Patient ID:  Luciana Moraes is a 62 y.o. female who presents for No chief complaint on file.    History of Present Illness: The patient presents with chief complaint of rash.  Location: chest between breast    Duration: 5 years  Signs/Symptoms: itching, dryness, patient states gets worse in hot weather    Prior treatments: Saw dermatologist treated with voltaren.     Review of Systems    Objective:   Physical Exam     Diagram Legend     Erythematous scaling macule/papule c/w actinic keratosis       Vascular papule c/w angioma      Pigmented verrucoid papule/plaque c/w seborrheic keratosis      Yellow umbilicated papule c/w sebaceous hyperplasia      Irregularly shaped tan macule c/w lentigo     1-2 mm smooth white papules consistent with Milia      Movable subcutaneous cyst with punctum c/w epidermal inclusion cyst      Subcutaneous movable cyst c/w pilar cyst      Firm pink to brown papule c/w dermatofibroma      Pedunculated fleshy papule(s) c/w skin tag(s)      Evenly pigmented macule c/w junctional nevus     Mildly variegated pigmented, slightly irregular-bordered macule c/w mildly atypical nevus      Flesh colored to evenly pigmented papule c/w intradermal nevus       Pink pearly papule/plaque c/w basal cell carcinoma      Erythematous hyperkeratotic cursted plaque c/w SCC      Surgical scar with no sign of skin cancer recurrence      Open and closed comedones      Inflammatory papules and pustules      Verrucoid papule consistent consistent with wart     Erythematous eczematous patches and plaques     Dystrophic onycholytic nail with subungual debris c/w onychomycosis     Umbilicated papule    Erythematous-base heme-crusted tan verrucoid plaque consistent with inflamed seborrheic keratosis     Erythematous Silvery Scaling Plaque c/w Psoriasis     See annotation      Assessment / Plan:        Rash  -     Ambulatory referral/consult to Dermatology             No follow-ups on file.

## 2023-05-17 NOTE — PROGRESS NOTES
Subjective:      Patient ID:  Luciana Moraes is a 62 y.o. female who presents for No chief complaint on file.    .kj    Review of Systems    Objective:   Physical Exam     Diagram Legend     Erythematous scaling macule/papule c/w actinic keratosis       Vascular papule c/w angioma      Pigmented verrucoid papule/plaque c/w seborrheic keratosis      Yellow umbilicated papule c/w sebaceous hyperplasia      Irregularly shaped tan macule c/w lentigo     1-2 mm smooth white papules consistent with Milia      Movable subcutaneous cyst with punctum c/w epidermal inclusion cyst      Subcutaneous movable cyst c/w pilar cyst      Firm pink to brown papule c/w dermatofibroma      Pedunculated fleshy papule(s) c/w skin tag(s)      Evenly pigmented macule c/w junctional nevus     Mildly variegated pigmented, slightly irregular-bordered macule c/w mildly atypical nevus      Flesh colored to evenly pigmented papule c/w intradermal nevus       Pink pearly papule/plaque c/w basal cell carcinoma      Erythematous hyperkeratotic cursted plaque c/w SCC      Surgical scar with no sign of skin cancer recurrence      Open and closed comedones      Inflammatory papules and pustules      Verrucoid papule consistent consistent with wart     Erythematous eczematous patches and plaques     Dystrophic onycholytic nail with subungual debris c/w onychomycosis     Umbilicated papule    Erythematous-base heme-crusted tan verrucoid plaque consistent with inflamed seborrheic keratosis     Erythematous Silvery Scaling Plaque c/w Psoriasis     See annotation      Assessment / Plan:        Rash  -     Ambulatory referral/consult to Dermatology             No follow-ups on file.

## 2023-05-17 NOTE — PATIENT INSTRUCTIONS
New Skin Care Regimen  Soap: Dove sensitive skin bar   Moisturizer: ceraVe or cetaphil cream  Detergent: Tide Free, All Free, or Cheer Free   Fabric softener: do not use  Colognes/Perfumes/Fragrances: do not use  Bathing: shower or bathe with lukewarm water < 10 minutes    Cool Compresses as needed for itching. Cool Showers.     Sarna anti-itch lotion may be used to help soothe itching.

## 2023-06-06 ENCOUNTER — TELEPHONE (OUTPATIENT)
Dept: PRIMARY CARE CLINIC | Facility: CLINIC | Age: 63
End: 2023-06-06
Payer: MEDICARE

## 2023-06-06 NOTE — TELEPHONE ENCOUNTER
Spoke with pt and advised her that I will look for her poaperwork and give her a call. Pt verbalized understanding

## 2023-06-06 NOTE — TELEPHONE ENCOUNTER
----- Message from Jessy Wrightana sent at 6/6/2023 12:13 PM CDT -----  Contact: pt  Pt is calling in regard to needing to know if the forms she dropped off on yesterday are ready for .  Please call her back at [phone] thanks/mpd

## 2023-06-09 ENCOUNTER — TELEPHONE (OUTPATIENT)
Dept: PRIMARY CARE CLINIC | Facility: CLINIC | Age: 63
End: 2023-06-09
Payer: MEDICARE

## 2023-06-09 NOTE — TELEPHONE ENCOUNTER
----- Message from Jlely Siegel sent at 6/9/2023  2:10 PM CDT -----  Contact: [phone]  Please call pt patti SOTO paperwork being picked up. Thanks

## 2023-06-13 ENCOUNTER — TELEPHONE (OUTPATIENT)
Dept: PRIMARY CARE CLINIC | Facility: CLINIC | Age: 63
End: 2023-06-13
Payer: MEDICARE

## 2023-06-13 NOTE — TELEPHONE ENCOUNTER
----- Message from Lucila Gaytan sent at 6/12/2023  9:41 AM CDT -----  Regarding: Paper Work  Contact: Patient  Patient would like a call back concerning picking up the paperwork she dropped off on 06/05/23. Please call at [phone] (home)

## 2023-06-13 NOTE — TELEPHONE ENCOUNTER
----- Message from Lexus Brewer sent at 6/13/2023  9:58 AM CDT -----  Contact: Luciana Chowdhury is returning a missed call. Please call her at [phone]

## 2023-06-13 NOTE — TELEPHONE ENCOUNTER
----- Message from Lexus Brewer sent at 6/13/2023  9:58 AM CDT -----  Contact: Luciana Chowdhury is returning a missed call. Please call her at [phone]

## 2023-06-20 ENCOUNTER — LAB VISIT (OUTPATIENT)
Dept: LAB | Facility: HOSPITAL | Age: 63
End: 2023-06-20
Payer: MEDICARE

## 2023-06-20 DIAGNOSIS — M06.9 RHEUMATOID ARTHRITIS, INVOLVING UNSPECIFIED SITE, UNSPECIFIED WHETHER RHEUMATOID FACTOR PRESENT: ICD-10-CM

## 2023-06-20 LAB
ALBUMIN SERPL BCP-MCNC: 3.7 G/DL (ref 3.5–5.2)
ALP SERPL-CCNC: 80 U/L (ref 55–135)
ALT SERPL W/O P-5'-P-CCNC: 8 U/L (ref 10–44)
ANION GAP SERPL CALC-SCNC: 7 MMOL/L (ref 8–16)
AST SERPL-CCNC: 15 U/L (ref 10–40)
BASOPHILS # BLD AUTO: 0.04 K/UL (ref 0–0.2)
BASOPHILS NFR BLD: 0.6 % (ref 0–1.9)
BILIRUB SERPL-MCNC: 0.5 MG/DL (ref 0.1–1)
BUN SERPL-MCNC: 15 MG/DL (ref 6–20)
CALCIUM SERPL-MCNC: 9.5 MG/DL (ref 8.7–10.5)
CHLORIDE SERPL-SCNC: 106 MMOL/L (ref 95–110)
CO2 SERPL-SCNC: 26 MMOL/L (ref 23–29)
CREAT SERPL-MCNC: 0.9 MG/DL (ref 0.5–1.4)
CRP SERPL-MCNC: 5.9 MG/L (ref 0–8.2)
DIFFERENTIAL METHOD: ABNORMAL
EOSINOPHIL # BLD AUTO: 0.2 K/UL (ref 0–0.5)
EOSINOPHIL NFR BLD: 3.6 % (ref 0–8)
ERYTHROCYTE [DISTWIDTH] IN BLOOD BY AUTOMATED COUNT: 14.4 % (ref 11.5–14.5)
ERYTHROCYTE [SEDIMENTATION RATE] IN BLOOD BY WESTERGREN METHOD: 9 MM/HR (ref 0–20)
EST. GFR  (NO RACE VARIABLE): >60 ML/MIN/1.73 M^2
GLUCOSE SERPL-MCNC: 93 MG/DL (ref 70–110)
HCT VFR BLD AUTO: 46.4 % (ref 37–48.5)
HGB BLD-MCNC: 14.2 G/DL (ref 12–16)
IMM GRANULOCYTES # BLD AUTO: 0.01 K/UL (ref 0–0.04)
IMM GRANULOCYTES NFR BLD AUTO: 0.1 % (ref 0–0.5)
LYMPHOCYTES # BLD AUTO: 3.6 K/UL (ref 1–4.8)
LYMPHOCYTES NFR BLD: 53.9 % (ref 18–48)
MCH RBC QN AUTO: 26.7 PG (ref 27–31)
MCHC RBC AUTO-ENTMCNC: 30.6 G/DL (ref 32–36)
MCV RBC AUTO: 87 FL (ref 82–98)
MONOCYTES # BLD AUTO: 0.5 K/UL (ref 0.3–1)
MONOCYTES NFR BLD: 7 % (ref 4–15)
NEUTROPHILS # BLD AUTO: 2.3 K/UL (ref 1.8–7.7)
NEUTROPHILS NFR BLD: 34.8 % (ref 38–73)
NRBC BLD-RTO: 0 /100 WBC
PLATELET # BLD AUTO: 149 K/UL (ref 150–450)
PMV BLD AUTO: 12.2 FL (ref 9.2–12.9)
POTASSIUM SERPL-SCNC: 4 MMOL/L (ref 3.5–5.1)
PROT SERPL-MCNC: 7.8 G/DL (ref 6–8.4)
RBC # BLD AUTO: 5.31 M/UL (ref 4–5.4)
SODIUM SERPL-SCNC: 139 MMOL/L (ref 136–145)
WBC # BLD AUTO: 6.7 K/UL (ref 3.9–12.7)

## 2023-06-20 PROCEDURE — 80053 COMPREHEN METABOLIC PANEL: CPT

## 2023-06-20 PROCEDURE — 36415 COLL VENOUS BLD VENIPUNCTURE: CPT | Mod: PN

## 2023-06-20 PROCEDURE — 85651 RBC SED RATE NONAUTOMATED: CPT

## 2023-06-20 PROCEDURE — 86140 C-REACTIVE PROTEIN: CPT

## 2023-06-20 PROCEDURE — 85025 COMPLETE CBC W/AUTO DIFF WBC: CPT

## 2023-06-28 ENCOUNTER — TELEPHONE (OUTPATIENT)
Dept: RHEUMATOLOGY | Facility: CLINIC | Age: 63
End: 2023-06-28
Payer: MEDICARE

## 2023-06-28 ENCOUNTER — PATIENT MESSAGE (OUTPATIENT)
Dept: RHEUMATOLOGY | Facility: CLINIC | Age: 63
End: 2023-06-28
Payer: MEDICARE

## 2023-07-03 ENCOUNTER — PATIENT MESSAGE (OUTPATIENT)
Dept: RHEUMATOLOGY | Facility: CLINIC | Age: 63
End: 2023-07-03

## 2023-07-03 ENCOUNTER — OFFICE VISIT (OUTPATIENT)
Dept: RHEUMATOLOGY | Facility: CLINIC | Age: 63
End: 2023-07-03
Payer: MEDICARE

## 2023-07-03 VITALS — RESPIRATION RATE: 16 BRPM | BODY MASS INDEX: 33.76 KG/M2 | HEIGHT: 62 IN | WEIGHT: 183.44 LBS

## 2023-07-03 DIAGNOSIS — Z79.899 HIGH RISK MEDICATION USE: ICD-10-CM

## 2023-07-03 DIAGNOSIS — E55.9 VITAMIN D DEFICIENCY: ICD-10-CM

## 2023-07-03 DIAGNOSIS — R76.8 POSITIVE ANA (ANTINUCLEAR ANTIBODY): ICD-10-CM

## 2023-07-03 DIAGNOSIS — Z78.0 ENCOUNTER FOR OSTEOPOROSIS SCREENING IN ASYMPTOMATIC POSTMENOPAUSAL PATIENT: ICD-10-CM

## 2023-07-03 DIAGNOSIS — M06.9 RHEUMATOID ARTHRITIS, INVOLVING UNSPECIFIED SITE, UNSPECIFIED WHETHER RHEUMATOID FACTOR PRESENT: Primary | ICD-10-CM

## 2023-07-03 DIAGNOSIS — Z13.820 ENCOUNTER FOR OSTEOPOROSIS SCREENING IN ASYMPTOMATIC POSTMENOPAUSAL PATIENT: ICD-10-CM

## 2023-07-03 PROCEDURE — 1159F MED LIST DOCD IN RCRD: CPT | Mod: CPTII,S$GLB,,

## 2023-07-03 PROCEDURE — 1159F PR MEDICATION LIST DOCUMENTED IN MEDICAL RECORD: ICD-10-PCS | Mod: CPTII,S$GLB,,

## 2023-07-03 PROCEDURE — 3044F PR MOST RECENT HEMOGLOBIN A1C LEVEL <7.0%: ICD-10-PCS | Mod: CPTII,S$GLB,,

## 2023-07-03 PROCEDURE — 99214 PR OFFICE/OUTPT VISIT, EST, LEVL IV, 30-39 MIN: ICD-10-PCS | Mod: S$GLB,,,

## 2023-07-03 PROCEDURE — 99999 PR PBB SHADOW E&M-EST. PATIENT-LVL III: ICD-10-PCS | Mod: PBBFAC,,,

## 2023-07-03 PROCEDURE — 99999 PR PBB SHADOW E&M-EST. PATIENT-LVL III: CPT | Mod: PBBFAC,,,

## 2023-07-03 PROCEDURE — 1160F PR REVIEW ALL MEDS BY PRESCRIBER/CLIN PHARMACIST DOCUMENTED: ICD-10-PCS | Mod: CPTII,S$GLB,,

## 2023-07-03 PROCEDURE — 1160F RVW MEDS BY RX/DR IN RCRD: CPT | Mod: CPTII,S$GLB,,

## 2023-07-03 PROCEDURE — 3008F PR BODY MASS INDEX (BMI) DOCUMENTED: ICD-10-PCS | Mod: CPTII,S$GLB,,

## 2023-07-03 PROCEDURE — 3044F HG A1C LEVEL LT 7.0%: CPT | Mod: CPTII,S$GLB,,

## 2023-07-03 PROCEDURE — 99214 OFFICE O/P EST MOD 30 MIN: CPT | Mod: S$GLB,,,

## 2023-07-03 PROCEDURE — 3008F BODY MASS INDEX DOCD: CPT | Mod: CPTII,S$GLB,,

## 2023-07-03 RX ORDER — HYDROXYCHLOROQUINE SULFATE 200 MG/1
200 TABLET, FILM COATED ORAL 2 TIMES DAILY
Qty: 180 TABLET | Refills: 1 | Status: SHIPPED | OUTPATIENT
Start: 2023-07-03 | End: 2024-02-19 | Stop reason: SDUPTHER

## 2023-07-03 RX ORDER — ERGOCALCIFEROL 1.25 MG/1
50000 CAPSULE ORAL
Qty: 24 CAPSULE | Refills: 3 | Status: SHIPPED | OUTPATIENT
Start: 2023-07-03

## 2023-07-03 NOTE — PROGRESS NOTES
RHEUMATOLOGY OUTPATIENT CLINIC NOTE    07/03/2023    Subjective:       Patient ID: Luciana Moraes is a 62 y.o. female.    Chief Complaint: Rheumatoid Arthritis    HPI   Luciana Moraes is a 62 y.o. pleasant female here for rheumatology follow up for seropositive RA and positive RICHARD.     Has had some in her left elbow within the last few weeks.  Denies any swelling or erythema or injury to it.  Otherwise joints are doing.    Has been taking hydroxychloroquine twice daily and high-dose vitamin-D once weekly.    No joint swelling, no warmth to joints, no morning stiffness >1hr.   No muscle weakness.  No synovitis.   No fever, lymphadenopathy, no unexpected weight loss, no fatigue  No rashes on palms, no vasculitis  No shortness of breath or pleuritic chest pain.     Rheumatologic review of systems negative otherwise.       Initial HPI:  Previously seen by Dr. Camargo at [facility].   Previously found to have elevated CCP >300 and RICHARD 1:320.     She has previously been following with Rheum in [place]. Recently moved to [place] and is seeking out Rheum care in . She believes she is taking hcq 200 mg bid but not certain. She fills her pill box up each week and takes whatever is in there. She states she has pain all of the time. Mostly in her hips and low back. Occ with pain in her left thumb and bruce PIPs. She is right handed.     Recently started ozempic for weight loss and is down 5-7 lbs.       Family History:Mom RA, Great aunt and cousin with Lupus    Prior therapies:  Mtx HCQ    Physical Exam  Tender to palpation left elbow, full range of motion.  No obvious synovitis, no erythema, no increased warmth to any joints bruce UE/LE.   Strength 5/5 bruce UE/LE  No rash.   No oral lesions.   Full range of motion neck. Patient reports pain with forward flexion and extension of neck.       Past Medical History:   Diagnosis Date    Diabetes mellitus, type 2     Hyperlipidemia     Osteoarthritis      "Polymyositis     Rheumatoid arthritis      Past Surgical History:   Procedure Laterality Date    CARPAL TUNNEL RELEASE Bilateral     COLONOSCOPY N/A 2022    Procedure: COLONOSCOPY;  Surgeon: Kishore Monsalve MD;  Location: [facility] ENDO;  Service: Endoscopy;  Laterality: N/A;    EPIDURAL STEROID INJECTION INTO CERVICAL SPINE N/A 2022    Procedure: C7/T1 IL [facility]CY;  Surgeon: Luis M Pereyra MD;  Location: [facility] PAIN MGT;  Service: Pain Management;  Laterality: N/A;    HIP SURGERY      HYSTERECTOMY      due to bleeding, pain from fibroids, retains one ovary    INJECTION OF ANESTHETIC AGENT AROUND MEDIAL BRANCH NERVES INNERVATING CERVICAL FACET JOINT Bilateral 10/31/2022    Procedure: Bilateral C5-7 MBB with RN IV sedation;  Surgeon: Luis M Pereyra MD;  Location: [facility] PAIN MGT;  Service: Pain Management;  Laterality: Bilateral;    INJECTION OF ANESTHETIC AGENT AROUND MEDIAL BRANCH NERVES INNERVATING CERVICAL FACET JOINT Bilateral 3/13/2023    Procedure: Bilateral C5-7 MBB with RN IV sedation;  Surgeon: Luis M Pereyra MD;  Location: [facility] PAIN MGT;  Service: Pain Management;  Laterality: Bilateral;    OOPHORECTOMY      1 ovary removed    TUBAL LIGATION  1985     Family History   Problem Relation Age of Onset    Rheum arthritis Mother     No Known Problems Father      Social History     Socioeconomic History    Marital status:    Tobacco Use    Smoking status: Former     Types: Cigarettes     Quit date: 3/17/2001     Years since quittin.3    Smokeless tobacco: Never   Substance and Sexual Activity    Alcohol use: Never    Drug use: Never    Sexual activity: Yes     Partners: Male     Birth control/protection: None     Review of patient's allergies indicates:  No Known Allergies        Objective:   Resp 16   Ht 5' 2" (1.575 m)   Wt 83.2 kg (183 lb 6.8 oz)   BMI 33.55 kg/m²   Immunization History   Administered Date(s) Administered    COVID-19, MRNA, LN-S, PF (Pfizer) (Purple Cap) " 11/13/2021, 12/04/2021        Recent Results (from the past 672 hour(s))   CBC Auto Differential    Collection Time: 06/20/23  7:41 AM   Result Value Ref Range    WBC 6.70 3.90 - 12.70 K/uL    RBC 5.31 4.00 - 5.40 M/uL    Hemoglobin 14.2 12.0 - 16.0 g/dL    Hematocrit 46.4 37.0 - 48.5 %    MCV 87 82 - 98 fL    MCH 26.7 (L) 27.0 - 31.0 pg    MCHC 30.6 (L) 32.0 - 36.0 g/dL    RDW 14.4 11.5 - 14.5 %    Platelets 149 (L) 150 - 450 K/uL    MPV 12.2 9.2 - 12.9 fL    Immature Granulocytes 0.1 0.0 - 0.5 %    Gran # (ANC) 2.3 1.8 - 7.7 K/uL    Immature Grans (Abs) 0.01 0.00 - 0.04 K/uL    Lymph # 3.6 1.0 - 4.8 K/uL    Mono # 0.5 0.3 - 1.0 K/uL    Eos # 0.2 0.0 - 0.5 K/uL    Baso # 0.04 0.00 - 0.20 K/uL    nRBC 0 0 /100 WBC    Gran % 34.8 (L) 38.0 - 73.0 %    Lymph % 53.9 (H) 18.0 - 48.0 %    Mono % 7.0 4.0 - 15.0 %    Eosinophil % 3.6 0.0 - 8.0 %    Basophil % 0.6 0.0 - 1.9 %    Differential Method Automated    Comprehensive Metabolic Panel    Collection Time: 06/20/23  7:41 AM   Result Value Ref Range    Sodium 139 136 - 145 mmol/L    Potassium 4.0 3.5 - 5.1 mmol/L    Chloride 106 95 - 110 mmol/L    CO2 26 23 - 29 mmol/L    Glucose 93 70 - 110 mg/dL    BUN 15 6 - 20 mg/dL    Creatinine 0.9 0.5 - 1.4 mg/dL    Calcium 9.5 8.7 - 10.5 mg/dL    Total Protein 7.8 6.0 - 8.4 g/dL    Albumin 3.7 3.5 - 5.2 g/dL    Total Bilirubin 0.5 0.1 - 1.0 mg/dL    Alkaline Phosphatase 80 55 - 135 U/L    AST 15 10 - 40 U/L    ALT 8 (L) 10 - 44 U/L    eGFR >60 >60 mL/min/1.73 m^2    Anion Gap 7 (L) 8 - 16 mmol/L   Sedimentation rate    Collection Time: 06/20/23  7:41 AM   Result Value Ref Range    Sed Rate 9 0 - 20 mm/Hr   C-Reactive Protein    Collection Time: 06/20/23  7:41 AM   Result Value Ref Range    CRP 5.9 0.0 - 8.2 mg/L          Lab Results   Component Value Date    TBGOLDPLUS Negative 02/27/2023      Lab Results   Component Value Date    HEPBCAB Reactive (A) 02/27/2023    HEPCAB Non-reactive 02/27/2023        Xray Right hand  5/30/2019  There is a suggestion of an erosion at the 2nd metacarpal base. No other finding to suggest erosion. No fracture.     Xray Left hand 5/30/2019  There is a suggestion of an erosion at the 2nd metacarpal base. No other finding to suggest erosion. No fracture.     Xray lumbar spine 7/22/21  FINDINGS:   Alignment is unremarkable. Vertebral body heights are maintained. Intervertebral disc heights are within normal limits. Facet arthropathy at L5-S1, to a lesser extent L4-5. Pedicles are intact and symmetric.      Xray hip with pelvis 7/22/21  Bones: Sclerotic changes of the bilateral SI joints, worse on the right. Degenerative changes of the pubic bones. Overall decreased bone mineralization.   Joints: Hip joint spaces are similar to prior study. SI joints and pubic symphysis are intact.   Soft Tissues: Pelvic phleboliths present. No significant soft tissue abnormality.    Xray thoracic spine 4/18/22  FINDINGS:   Mild levoconvex curvature of the thoracolumbar spine. Vertebral bodies demonstrate normal height. Multilevel decreased disc space height. No acute fracture or acute subluxation.  MRI cspine 8/8/2022  Impression:     Multilevel degenerative changes as detailed above.     Minimal spinal canal stenosis at C4-C5 and C5-C6.     Varying degrees of bilateral neural foraminal stenosis as detailed above, most severe at the right C5-C6 level.    Vitamin D deficiency       HLAB27 negative 10/3/2019 (per chart review external labs)    Xray bruce feet 9/26/22  Right: There is no radiographic evidence of acute osseous, articular, or soft tissue abnormality.  Joint spaces are preserved.     Left: There is no radiographic evidence of acute osseous, articular, or soft tissue abnormality.  Joint spaces are preserved.    Xray bruce hands 9/26/22  FINDINGS:  Right hand: There is no radiographic evidence of acute osseous, articular, or soft tissue abnormality.  There is mild osteoarthritis seen scattered throughout the IP and  MCP joints as well as the 1st CMC joint.  No erosive changes demonstrated     Left hand: There is no radiographic evidence of acute osseous, articular, or soft tissue abnormality.  There is mild osteoarthritis seen scattered throughout the IP and MCP joints as well as the 1st CMC joint.  No erosive changes demonstrated      Assessment:       1. Rheumatoid arthritis, involving unspecified site, unspecified whether rheumatoid factor present    2. Positive RICHARD (antinuclear antibody)    3. Vitamin D deficiency    4. Encounter for osteoporosis screening in asymptomatic postmenopausal patient    5. High risk medication use        Impression:     Seropositive Rheumatoid Arthriitis   Dx 2010 with elevated CCP (>300) and RICHARD 1:320  Prior on mtx, hcq.  Chronic pain in low back, hips. May be moreso from lumbar facetarthropathy  09/26/2022 RF 19, .4  Has been taking hydroxychloroquine twice daily.   Stable    Left elbow pain   New onset elbow pain mechanical pattern denies trauma or injury.  Tender to palpation no loss of range of motion.    OA and Facet arthropathy L4-L5, L5-S1 with nikhil sciatica  Prior completed PT and NSAIDs without help.   Nikhil hip injection 11/18   Troch bursa injection 3/19  On gabapentin 100 mg q day and 80 mg at bedtime  Flexeril 10 mg at bedtime  Follows with pain mgmt for injections.       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   Prior had neg MRI. Encourage cancer screening.       Vit D def  Level 16 on 03/23/2023      Osteoporosis screening  Due for dexa. Never done      Other specified counseling  Over 10 minutes spent regarding below topics:  - Nutrition and exercise counseling.  - Medication counseling provided.     Plan:     Rheumatoid arthritis:  Continue hcq 200 mg bid   Annual eye exams  Medrol dosepack as needed for flares.  Repeat Xrays hand/foot 9/2024    Vit D def:  Vit D 50K units increase to twice weekly  Get from [facility]    Osteoporosis screening  Due for  dexa    OA/Facet arthropathy  Apply topical Voltaren/diclofenac to affected joint 3-4 times daily as needed  Continue following with pain management    Counseling:  Discussed taking medication regularly.     RTC 4 months with reg4 , DEXA (labs early in [place] or [place])      Indiana Olmos PA-C  Rheumatology Department   [facility]        Disclaimer: This note was prepared using voice recognition system and is likely to have sound alike errors and is not proof read.  Please call me with any questions

## 2023-08-16 DIAGNOSIS — E11.9 TYPE 2 DIABETES MELLITUS WITHOUT COMPLICATION: ICD-10-CM

## 2023-09-01 DIAGNOSIS — E78.5 HYPERLIPIDEMIA, UNSPECIFIED HYPERLIPIDEMIA TYPE: ICD-10-CM

## 2023-09-01 RX ORDER — ATORVASTATIN CALCIUM 80 MG/1
80 TABLET, FILM COATED ORAL
Qty: 90 TABLET | Refills: 3 | Status: SHIPPED | OUTPATIENT
Start: 2023-09-01 | End: 2023-09-12 | Stop reason: SDUPTHER

## 2023-09-01 NOTE — TELEPHONE ENCOUNTER
Refill Routing Note   Medication(s) are not appropriate for processing by [facility] for the following reason(s):      Required labs outdated    [facility] action(s):  Defer Care Due:  Labs due              Appointments  past 12m or future 3m with PCP    Date Provider   Last Visit   2/17/2023 Cedric Huerta MD   Next Visit   9/12/2023 Cedric Huerta MD   ED visits in past 90 days: 0        Note composed:12:13 PM 09/01/2023

## 2023-09-01 NOTE — TELEPHONE ENCOUNTER
Care Due:                  Date            Visit Type   Department     Provider  --------------------------------------------------------------------------------                                EP -                              PRIMARY      [facility] PRIMARY  Last Visit: 02-      CARE ([facility])   ANDREW Huerta                              EP -                              PRIMARY      [facility] PRIMARY  Next Visit: 09-      CARE ([facility])   ANDREW Huerta                                                            Last  Test          Frequency    Reason                     Performed    Due Date  --------------------------------------------------------------------------------    HBA1C.......  6 months...  metFORMIN, semaglutide...  02- 08-    Lipid Panel.  12 months..  atorvastatin.............  08- 07-    [facility] Embedded Care Due Messages. Reference number: [id number].   9/01/2023 5:16:52 AM CDT

## 2023-09-12 ENCOUNTER — OFFICE VISIT (OUTPATIENT)
Dept: PRIMARY CARE CLINIC | Facility: CLINIC | Age: 63
End: 2023-09-12
Payer: MEDICARE

## 2023-09-12 VITALS
SYSTOLIC BLOOD PRESSURE: 126 MMHG | WEIGHT: 179.25 LBS | HEIGHT: 62 IN | TEMPERATURE: 98 F | HEART RATE: 60 BPM | DIASTOLIC BLOOD PRESSURE: 82 MMHG | BODY MASS INDEX: 32.99 KG/M2

## 2023-09-12 DIAGNOSIS — M06.9 RHEUMATOID ARTHRITIS, INVOLVING UNSPECIFIED SITE, UNSPECIFIED WHETHER RHEUMATOID FACTOR PRESENT: ICD-10-CM

## 2023-09-12 DIAGNOSIS — E78.5 HYPERLIPIDEMIA, UNSPECIFIED HYPERLIPIDEMIA TYPE: ICD-10-CM

## 2023-09-12 DIAGNOSIS — Z12.31 BREAST CANCER SCREENING BY MAMMOGRAM: ICD-10-CM

## 2023-09-12 DIAGNOSIS — M54.12 CERVICAL RADICULOPATHY: ICD-10-CM

## 2023-09-12 DIAGNOSIS — E11.69 TYPE 2 DIABETES MELLITUS WITH OTHER SPECIFIED COMPLICATION, WITHOUT LONG-TERM CURRENT USE OF INSULIN: Primary | ICD-10-CM

## 2023-09-12 DIAGNOSIS — Z11.4 ENCOUNTER FOR SCREENING FOR HIV: ICD-10-CM

## 2023-09-12 PROCEDURE — 99215 OFFICE O/P EST HI 40 MIN: CPT | Mod: S$GLB,,, | Performed by: FAMILY MEDICINE

## 2023-09-12 PROCEDURE — 3044F PR MOST RECENT HEMOGLOBIN A1C LEVEL <7.0%: ICD-10-PCS | Mod: CPTII,S$GLB,, | Performed by: FAMILY MEDICINE

## 2023-09-12 PROCEDURE — 1159F PR MEDICATION LIST DOCUMENTED IN MEDICAL RECORD: ICD-10-PCS | Mod: CPTII,S$GLB,, | Performed by: FAMILY MEDICINE

## 2023-09-12 PROCEDURE — 99999 PR PBB SHADOW E&M-EST. PATIENT-LVL V: ICD-10-PCS | Mod: PBBFAC,,, | Performed by: FAMILY MEDICINE

## 2023-09-12 PROCEDURE — 3008F PR BODY MASS INDEX (BMI) DOCUMENTED: ICD-10-PCS | Mod: CPTII,S$GLB,, | Performed by: FAMILY MEDICINE

## 2023-09-12 PROCEDURE — 99999 PR PBB SHADOW E&M-EST. PATIENT-LVL V: CPT | Mod: PBBFAC,,, | Performed by: FAMILY MEDICINE

## 2023-09-12 PROCEDURE — 3079F PR MOST RECENT DIASTOLIC BLOOD PRESSURE 80-89 MM HG: ICD-10-PCS | Mod: CPTII,S$GLB,, | Performed by: FAMILY MEDICINE

## 2023-09-12 PROCEDURE — 99215 PR OFFICE/OUTPT VISIT, EST, LEVL V, 40-54 MIN: ICD-10-PCS | Mod: S$GLB,,, | Performed by: FAMILY MEDICINE

## 2023-09-12 PROCEDURE — 1159F MED LIST DOCD IN RCRD: CPT | Mod: CPTII,S$GLB,, | Performed by: FAMILY MEDICINE

## 2023-09-12 PROCEDURE — 3074F SYST BP LT 130 MM HG: CPT | Mod: CPTII,S$GLB,, | Performed by: FAMILY MEDICINE

## 2023-09-12 PROCEDURE — 3044F HG A1C LEVEL LT 7.0%: CPT | Mod: CPTII,S$GLB,, | Performed by: FAMILY MEDICINE

## 2023-09-12 PROCEDURE — 3074F PR MOST RECENT SYSTOLIC BLOOD PRESSURE < 130 MM HG: ICD-10-PCS | Mod: CPTII,S$GLB,, | Performed by: FAMILY MEDICINE

## 2023-09-12 PROCEDURE — 3008F BODY MASS INDEX DOCD: CPT | Mod: CPTII,S$GLB,, | Performed by: FAMILY MEDICINE

## 2023-09-12 PROCEDURE — 3079F DIAST BP 80-89 MM HG: CPT | Mod: CPTII,S$GLB,, | Performed by: FAMILY MEDICINE

## 2023-09-12 RX ORDER — ATORVASTATIN CALCIUM 80 MG/1
80 TABLET, FILM COATED ORAL DAILY
Qty: 90 TABLET | Refills: 3 | Status: SHIPPED | OUTPATIENT
Start: 2023-09-12 | End: 2024-03-12 | Stop reason: SDUPTHER

## 2023-09-12 RX ORDER — SEMAGLUTIDE 2.68 MG/ML
2 INJECTION, SOLUTION SUBCUTANEOUS
Qty: 9 ML | Refills: 3 | Status: SHIPPED | OUTPATIENT
Start: 2023-09-12 | End: 2023-10-17 | Stop reason: SDUPTHER

## 2023-09-12 RX ORDER — METFORMIN HYDROCHLORIDE 500 MG/1
1000 TABLET, EXTENDED RELEASE ORAL
Qty: 180 TABLET | Refills: 3 | Status: SHIPPED | OUTPATIENT
Start: 2023-09-12

## 2023-09-12 NOTE — PROGRESS NOTES
"    [facility]       [address] [place] [address]      Phone: [phone]      Fax: [phone]    Cedric Huerta MD                Office Visit  2023        Subjective      HPI:  Luciana Moraes is a 62 y.o. female presents today in clinic for "Follow-up  ."     62-year-old female presents today to follow-up on diabetes, discuss other issues.    Overall, feels pretty good today.  No chest pain, shortness a breath.  No fever, chills.  No coughing, sneezing, no URI symptoms.  Appetite normal.  Bowel movements normal.  No urinary issues.    Has diabetes.  Still takes metformin XR 1000 mg daily.  Also taking Ozempic 1 mg daily.  Doing well with these.  We gets occasional episodes of nausea, usually a day or so after doing the injection.  Does notice that the 1st day after her shot she gets cravings for all sorts of food.  The next day, the cravings go away.  Has been able to lose about 17 lb overall.  Coming due for foot exam, eye exam.  Would like referrals to Podiatry, optometry for this.    Still has multiple joint pains, osteoarthritis, rheumatoid arthritis.  Currently seeing sports Medicine/neurosurgery/pain management/Rheumatology for this.  Has had several procedures, injections.  Has thought about trying medical marijuana.    PMH:  Dm, HLD, RA, OA, chronic pains.  PSH:  Left shoulder.  Left hip labrum.  Hysterectomy.  One ovary.  FNH:  DM, CVA  Allergies:  NKDA  Social:  On disability.  Previously worked for [employer].  Recently went back to school at [employer].  T:  Denies.  Quit   A:  Denies  D:  Denies    Exercise:  Does chair exercises/stretches.    Pap:  Had hysterectomy.  MM2022    Colon:  2022.  Two polyps, tubular adenomas.  Repeat five years ()        The following were updated and reviewed by myself in the chart: medications, past medical history, past surgical history, family history, social history, and " allergies.     Medications:  Current Outpatient Medications on File Prior to Visit   Medication Sig Dispense Refill    aspirin 81 MG Chew Take 81 mg by mouth.      diclofenac sodium (VOLTAREN) 1 % Gel Apply 2 g topically 4 (four) times daily. 100 g 6    ergocalciferol (ERGOCALCIFEROL) 50,000 unit Cap Take 1 capsule (50,000 Units total) by mouth twice a week. 24 capsule 3    hydrOXYchloroQUINE (PLAQUENIL) 200 mg tablet Take 1 tablet (200 mg total) by mouth 2 (two) times daily. 180 tablet 1    ibuprofen (ADVIL,MOTRIN) 800 MG tablet Take 800 mg by mouth 3 (three) times daily.      nabumetone (RELAFEN) 750 MG tablet Take 1 tablet (750 mg total) by mouth 2 (two) times daily as needed for Pain. 60 tablet 5    polyethylene glycol (GLYCOLAX) 17 gram PwPk Take 17 g by mouth once daily. 30 each 11    pregabalin (LYRICA) 75 MG capsule Take 1 capsule (75 mg total) by mouth 2 (two) times daily. 60 capsule 5    tiZANidine (ZANAFLEX) 4 MG tablet Take 1 tablet (4 mg total) by mouth 2 (two) times daily as needed (pain). 60 tablet 5    [DISCONTINUED] atorvastatin (LIPITOR) 80 MG tablet TAKE 1 TABLET(80 MG) BY MOUTH EVERY DAY 90 tablet 3    [DISCONTINUED] metFORMIN (GLUCOPHAGE-XR) 500 MG ER 24hr tablet Take 2 tablets (1,000 mg total) by mouth daily with breakfast. 180 tablet 3    [DISCONTINUED] semaglutide (OZEMPIC) 1 mg/dose (4 mg/3 mL) Inject 1 mg into the skin every 7 days. 1 pen 11     No current facility-administered medications on file prior to visit.        PMHx:  Past Medical History:   Diagnosis Date    Diabetes mellitus, type 2     Hyperlipidemia     Osteoarthritis     Polymyositis     Rheumatoid arthritis       Patient Active Problem List    Diagnosis Date Noted    Muscle hypertonicity 03/01/2023    Decreased range of motion of lumbar spine 03/01/2023    Decreased range of motion of intervertebral discs of cervical spine 03/01/2023    Poor posture 03/01/2023    Colon cancer screening 11/08/2022        PSHx:  Past Surgical  History:   Procedure Laterality Date    CARPAL TUNNEL RELEASE Bilateral     COLONOSCOPY N/A 2022    Procedure: COLONOSCOPY;  Surgeon: Kishore Monsalve MD;  Location: [facility] ENDO;  Service: Endoscopy;  Laterality: N/A;    EPIDURAL STEROID INJECTION INTO CERVICAL SPINE N/A 2022    Procedure: C7/T1 IL [facility];  Surgeon: Luis M Pereyra MD;  Location: [facility] PAIN MGT;  Service: Pain Management;  Laterality: N/A;    HIP SURGERY      HYSTERECTOMY      due to bleeding, pain from fibroids, retains one ovary    INJECTION OF ANESTHETIC AGENT AROUND MEDIAL BRANCH NERVES INNERVATING CERVICAL FACET JOINT Bilateral 10/31/2022    Procedure: Bilateral C5-7 MBB with RN IV sedation;  Surgeon: Luis M Pereyra MD;  Location: [facility] PAIN MGT;  Service: Pain Management;  Laterality: Bilateral;    INJECTION OF ANESTHETIC AGENT AROUND MEDIAL BRANCH NERVES INNERVATING CERVICAL FACET JOINT Bilateral 3/13/2023    Procedure: Bilateral C5-7 MBB with RN IV sedation;  Surgeon: Luis M Pereyra MD;  Location: [facility] PAIN MGT;  Service: Pain Management;  Laterality: Bilateral;    OOPHORECTOMY      1 ovary removed    TUBAL LIGATION          FHx:  Family History   Problem Relation Age of Onset    Rheum arthritis Mother     No Known Problems Father         Social:  Social History     Socioeconomic History    Marital status:    Tobacco Use    Smoking status: Former     Current packs/day: 0.00     Types: Cigarettes     Quit date: 3/17/2001     Years since quittin.5    Smokeless tobacco: Never   Substance and Sexual Activity    Alcohol use: Never    Drug use: Never    Sexual activity: Yes     Partners: Male     Birth control/protection: None        Allergies:  Review of patient's allergies indicates:  No Known Allergies     ROS:  Review of Systems   Constitutional:  Negative for activity change, appetite change, chills and fever.   HENT:  Negative for congestion, postnasal drip, rhinorrhea, sore throat and trouble  "swallowing.    Respiratory:  Negative for cough, shortness of breath and wheezing.    Cardiovascular:  Negative for chest pain and palpitations.   Gastrointestinal:  Negative for abdominal pain, constipation, diarrhea, nausea and vomiting.   Genitourinary:  Negative for difficulty urinating.   Musculoskeletal:  Positive for arthralgias. Negative for myalgias.   Skin:  Negative for color change and rash.   Neurological:  Negative for speech difficulty and headaches.   All other systems reviewed and are negative.         Objective      /82   Pulse 60   Temp 97.9 °F (36.6 °C)   Ht 5' 2" (1.575 m)   Wt 81.3 kg (179 lb 3.7 oz)   BMI 32.78 kg/m²   Ht Readings from Last 3 Encounters:   09/12/23 5' 2" (1.575 m)   07/03/23 5' 2" (1.575 m)   05/12/23 5' 2" (1.575 m)     Wt Readings from Last 3 Encounters:   09/12/23 81.3 kg (179 lb 3.7 oz)   07/03/23 83.2 kg (183 lb 6.8 oz)   05/12/23 85.5 kg (188 lb 7.9 oz)       PHYSICAL EXAM:  Physical Exam  Vitals and nursing note reviewed.   Constitutional:       General: She is not in acute distress.     Appearance: Normal appearance.   HENT:      Head: Normocephalic and atraumatic.      Right Ear: Tympanic membrane, ear canal and external ear normal.      Left Ear: Tympanic membrane, ear canal and external ear normal.      Nose: Nose normal. No congestion or rhinorrhea.      Mouth/Throat:      Mouth: Mucous membranes are moist.      Pharynx: Oropharynx is clear. No oropharyngeal exudate or posterior oropharyngeal erythema.   Eyes:      Extraocular Movements: Extraocular movements intact.      Conjunctiva/sclera: Conjunctivae normal.      Pupils: Pupils are equal, round, and reactive to light.   Cardiovascular:      Rate and Rhythm: Normal rate and regular rhythm.   Pulmonary:      Effort: Pulmonary effort is normal. No respiratory distress.      Breath sounds: No wheezing, rhonchi or rales.   Musculoskeletal:         General: Normal range of motion.      Cervical back: " Normal range of motion.   Lymphadenopathy:      Cervical: No cervical adenopathy.   Skin:     General: Skin is warm and dry.      Findings: No rash.   Neurological:      Mental Status: She is alert.              LABS / IMAGING:  Recent Results (from the past 4368 hour(s))   HEPATITIS B VIRAL DNA BY PCR, QUALITATIVE    Collection Time: 03/16/23  1:56 PM   Result Value Ref Range    HBV DNA, Qualitative PCR Not detected Not detected   CBC Auto Differential    Collection Time: 03/23/23  2:08 PM   Result Value Ref Range    WBC 6.28 3.90 - 12.70 K/uL    RBC 4.84 4.00 - 5.40 M/uL    Hemoglobin 13.3 12.0 - 16.0 g/dL    Hematocrit 43.0 37.0 - 48.5 %    MCV 89 82 - 98 fL    MCH 27.5 27.0 - 31.0 pg    MCHC 30.9 (L) 32.0 - 36.0 g/dL    RDW 14.5 11.5 - 14.5 %    Platelets 103 (L) 150 - 450 K/uL    MPV 11.6 9.2 - 12.9 fL    Immature Granulocytes 0.2 0.0 - 0.5 %    Gran # (ANC) 2.5 1.8 - 7.7 K/uL    Immature Grans (Abs) 0.01 0.00 - 0.04 K/uL    Lymph # 2.9 1.0 - 4.8 K/uL    Mono # 0.7 0.3 - 1.0 K/uL    Eos # 0.2 0.0 - 0.5 K/uL    Baso # 0.02 0.00 - 0.20 K/uL    nRBC 0 0 /100 WBC    Gran % 39.0 38.0 - 73.0 %    Lymph % 46.2 18.0 - 48.0 %    Mono % 10.8 4.0 - 15.0 %    Eosinophil % 3.5 0.0 - 8.0 %    Basophil % 0.3 0.0 - 1.9 %    Differential Method Automated    Comprehensive Metabolic Panel    Collection Time: 03/23/23  2:08 PM   Result Value Ref Range    Sodium 142 136 - 145 mmol/L    Potassium 3.9 3.5 - 5.1 mmol/L    Chloride 108 95 - 110 mmol/L    CO2 24 23 - 29 mmol/L    Glucose 80 70 - 110 mg/dL    BUN 10 6 - 20 mg/dL    Creatinine 0.8 0.5 - 1.4 mg/dL    Calcium 9.0 8.7 - 10.5 mg/dL    Total Protein 7.5 6.0 - 8.4 g/dL    Albumin 3.7 3.5 - 5.2 g/dL    Total Bilirubin 0.3 0.1 - 1.0 mg/dL    Alkaline Phosphatase 84 55 - 135 U/L    AST 24 10 - 40 U/L    ALT 19 10 - 44 U/L    Anion Gap 10 8 - 16 mmol/L    eGFR >60 >60 mL/min/1.73 m^2   Sedimentation rate    Collection Time: 03/23/23  2:08 PM   Result Value Ref Range    Sed Rate 12  0 - 20 mm/Hr   C-Reactive Protein    Collection Time: 03/23/23  2:08 PM   Result Value Ref Range    CRP 4.6 0.0 - 8.2 mg/L   Vitamin D    Collection Time: 03/23/23  2:08 PM   Result Value Ref Range    Vit D, 25-Hydroxy 16 (L) 30 - 96 ng/mL   CBC Auto Differential    Collection Time: 06/20/23  7:41 AM   Result Value Ref Range    WBC 6.70 3.90 - 12.70 K/uL    RBC 5.31 4.00 - 5.40 M/uL    Hemoglobin 14.2 12.0 - 16.0 g/dL    Hematocrit 46.4 37.0 - 48.5 %    MCV 87 82 - 98 fL    MCH 26.7 (L) 27.0 - 31.0 pg    MCHC 30.6 (L) 32.0 - 36.0 g/dL    RDW 14.4 11.5 - 14.5 %    Platelets 149 (L) 150 - 450 K/uL    MPV 12.2 9.2 - 12.9 fL    Immature Granulocytes 0.1 0.0 - 0.5 %    Gran # (ANC) 2.3 1.8 - 7.7 K/uL    Immature Grans (Abs) 0.01 0.00 - 0.04 K/uL    Lymph # 3.6 1.0 - 4.8 K/uL    Mono # 0.5 0.3 - 1.0 K/uL    Eos # 0.2 0.0 - 0.5 K/uL    Baso # 0.04 0.00 - 0.20 K/uL    nRBC 0 0 /100 WBC    Gran % 34.8 (L) 38.0 - 73.0 %    Lymph % 53.9 (H) 18.0 - 48.0 %    Mono % 7.0 4.0 - 15.0 %    Eosinophil % 3.6 0.0 - 8.0 %    Basophil % 0.6 0.0 - 1.9 %    Differential Method Automated    Comprehensive Metabolic Panel    Collection Time: 06/20/23  7:41 AM   Result Value Ref Range    Sodium 139 136 - 145 mmol/L    Potassium 4.0 3.5 - 5.1 mmol/L    Chloride 106 95 - 110 mmol/L    CO2 26 23 - 29 mmol/L    Glucose 93 70 - 110 mg/dL    BUN 15 6 - 20 mg/dL    Creatinine 0.9 0.5 - 1.4 mg/dL    Calcium 9.5 8.7 - 10.5 mg/dL    Total Protein 7.8 6.0 - 8.4 g/dL    Albumin 3.7 3.5 - 5.2 g/dL    Total Bilirubin 0.5 0.1 - 1.0 mg/dL    Alkaline Phosphatase 80 55 - 135 U/L    AST 15 10 - 40 U/L    ALT 8 (L) 10 - 44 U/L    eGFR >60 >60 mL/min/1.73 m^2    Anion Gap 7 (L) 8 - 16 mmol/L   Sedimentation rate    Collection Time: 06/20/23  7:41 AM   Result Value Ref Range    Sed Rate 9 0 - 20 mm/Hr   C-Reactive Protein    Collection Time: 06/20/23  7:41 AM   Result Value Ref Range    CRP 5.9 0.0 - 8.2 mg/L         Assessment    1. Type 2 diabetes mellitus with  other specified complication, without long-term current use of insulin    2. Rheumatoid arthritis, involving unspecified site, unspecified whether rheumatoid factor present    3. Cervical radiculopathy    4. Hyperlipidemia, unspecified hyperlipidemia type    5. Encounter for screening for HIV    6. Breast cancer screening by mammogram          Louis    Luciana was seen today for follow-up.    Diagnoses and all orders for this visit:    Type 2 diabetes mellitus with other specified complication, without long-term current use of insulin  -     HEMOGLOBIN A1C; Future  -     Ambulatory referral/consult to Podiatry; Future  -     Ambulatory referral/consult to Ophthalmology; Future  -     semaglutide (OZEMPIC) 2 mg/dose (8 mg/3 mL) PnIj; Inject 2 mg into the skin every 7 days.  -     metFORMIN (GLUCOPHAGE-XR) 500 MG ER 24hr tablet; Take 2 tablets (1,000 mg total) by mouth daily with breakfast.  -     Microalbumin/Creatinine Ratio, Urine; Future    Rheumatoid arthritis, involving unspecified site, unspecified whether rheumatoid factor present    Cervical radiculopathy    Hyperlipidemia, unspecified hyperlipidemia type  -     LIPID PANEL; Future  -     atorvastatin (LIPITOR) 80 MG tablet; Take 1 tablet (80 mg total) by mouth once daily.    Encounter for screening for HIV  -     HIV 1/2 Ag/Ab (4th Gen); Future    Breast cancer screening by mammogram  -     Mammo Digital Screening Bilat w/ [facility]; Future      Physically, everything looks pretty good today.    She has an appointment on November 6 to get labs done for Rheumatology.  Will add lipid profile, A1c, microalbumin to these orders.    Continue Ozempic.  Okay to increase dose to 2 mg weekly.  Continue metformin.  Check A1c in November.    Ran will be coming due in December.  Orders placed today.  We can schedule it anytime after December 5.    Continue to follow with specialists, as scheduled.    FOLLOW-UP:  Follow up in about 6 months (around 3/12/2024) for check up.    I  spent a total of 45 minutes face to face and non-face to face on the date of this visit.This includes time preparing to see the patient (eg, review of tests, notes), obtaining and/or reviewing additional history from an independent historian and/or outside medical records, documenting clinical information in the electronic health record, independently interpreting results and/or communicating results to the patient/family/caregiver, or care coordinator.    Signed by:  Cedric Huerta MD

## 2023-09-12 NOTE — PATIENT INSTRUCTIONS
Physically, everything looks pretty good today.    Would like to get some screening blood work done.  You have an appointment with the lab on November 6.  I am going to place some orders today and add them to that visit.  We will contact you with the results as soon as they are available.      Continue taking your metformin, as directed.  Since you are tolerating the Ozempic, let us go ahead and increase the dose to 2 mg weekly.  New prescription sent to pharmacy.  Keep me posted on how you are doing.  If it makes you nauseous or feel bad, we can always go back down to the 1 mg dose.      Referral to Podiatry, Ophthalmology placed today.  We should be able to schedule both of these on the same day, at the same location.    We will be due for screening mammogram after December 5.  Orders placed today.  We can do those here, at your convenience.    Continue to eat a healthy diet.  Be careful with portion sizes.  Includes lots of fresh fruits, vegetables, whole grains, lean proteins.  See info below.    Keep hydrated.  Be sure to drink at least 8-10, 8 oz, glasses of water every day.    Stay active.  Try to do some sort of physical activity every day.  Nothing outrageous, just try walking for 10-15 minutes each day.

## 2023-09-13 ENCOUNTER — OFFICE VISIT (OUTPATIENT)
Dept: OPHTHALMOLOGY | Facility: CLINIC | Age: 63
End: 2023-09-13
Payer: MEDICARE

## 2023-09-13 DIAGNOSIS — E11.36 DIABETIC CATARACT: ICD-10-CM

## 2023-09-13 DIAGNOSIS — H40.013 OAG (OPEN ANGLE GLAUCOMA) SUSPECT, LOW RISK, BILATERAL: ICD-10-CM

## 2023-09-13 DIAGNOSIS — E11.69 TYPE 2 DIABETES MELLITUS WITH OTHER SPECIFIED COMPLICATION, WITHOUT LONG-TERM CURRENT USE OF INSULIN: ICD-10-CM

## 2023-09-13 DIAGNOSIS — E11.9 DIABETES MELLITUS WITHOUT COMPLICATION: Primary | ICD-10-CM

## 2023-09-13 PROCEDURE — 3044F HG A1C LEVEL LT 7.0%: CPT | Mod: CPTII,S$GLB,, | Performed by: OPTOMETRIST

## 2023-09-13 PROCEDURE — 3044F PR MOST RECENT HEMOGLOBIN A1C LEVEL <7.0%: ICD-10-PCS | Mod: CPTII,S$GLB,, | Performed by: OPTOMETRIST

## 2023-09-13 PROCEDURE — 1159F MED LIST DOCD IN RCRD: CPT | Mod: CPTII,S$GLB,, | Performed by: OPTOMETRIST

## 2023-09-13 PROCEDURE — 1159F PR MEDICATION LIST DOCUMENTED IN MEDICAL RECORD: ICD-10-PCS | Mod: CPTII,S$GLB,, | Performed by: OPTOMETRIST

## 2023-09-13 PROCEDURE — 92004 COMPRE OPH EXAM NEW PT 1/>: CPT | Mod: S$GLB,,, | Performed by: OPTOMETRIST

## 2023-09-13 PROCEDURE — 99999 PR PBB SHADOW E&M-EST. PATIENT-LVL III: CPT | Mod: PBBFAC,,, | Performed by: OPTOMETRIST

## 2023-09-13 PROCEDURE — 2023F PR DILATED RETINAL EXAM W/O EVID OF RETINOPATHY: ICD-10-PCS | Mod: CPTII,S$GLB,, | Performed by: OPTOMETRIST

## 2023-09-13 PROCEDURE — 99999 PR PBB SHADOW E&M-EST. PATIENT-LVL III: ICD-10-PCS | Mod: PBBFAC,,, | Performed by: OPTOMETRIST

## 2023-09-13 PROCEDURE — 2023F DILAT RTA XM W/O RTNOPTHY: CPT | Mod: CPTII,S$GLB,, | Performed by: OPTOMETRIST

## 2023-09-13 PROCEDURE — 92004 PR EYE EXAM, NEW PATIENT,COMPREHESV: ICD-10-PCS | Mod: S$GLB,,, | Performed by: OPTOMETRIST

## 2023-09-13 NOTE — PROGRESS NOTES
HPI     Annual Exam            Comments: New patient to [facility].  Patient reports for routine eye exam.           Diabetic Eye Exam            Comments: Lab Results       Component                Value               Date                       HGBA1C                   6.5 (H)             02/13/2023                     Comments    Diabetic eye exam  Diagnosed with diabetes in 2020  Recent vision fluctuations No  PCP Dr. Huerta   Vision changes since last eye exam?: None      Any eye pain today: None    Other ocular symptoms: None    Interested in contact lens fitting today? No                Last edited by Ludy Tian MA on 9/13/2023  8:20 AM.            Assessment /Plan     For exam results, see Encounter Report.    Diabetes mellitus without complication  3 years, last A1c 6.5 There was no diabetic retinopathy present on either eye on examination today. Recommend good blood pressure control, strict blood glucose control, and good cholesterol control.  Continue close care with Dr. Huerta regarding diabetes.    Diabetic cataract  Cataracts are not visually significant and not affecting activities of daily living. Annual observation is recommended at this time. Patient to call or return to clinic with any significant change in vision prior to next visit.    OAG (open angle glaucoma) suspect, low risk, bilateral  The patient has the following Glaucoma risk factors: Optic Nerve Appearance, Borderline Intraocular Pressure    Continue to observe annually with gOCT and DFE.      RTC 1 yr for dilated eye exam with gOCT or sooner if any changes to vision.   Discussed above and answered questions.

## 2023-10-03 ENCOUNTER — OFFICE VISIT (OUTPATIENT)
Dept: PODIATRY | Facility: CLINIC | Age: 63
End: 2023-10-03
Payer: MEDICARE

## 2023-10-03 VITALS — HEIGHT: 62 IN | BODY MASS INDEX: 32.94 KG/M2 | WEIGHT: 179 LBS

## 2023-10-03 DIAGNOSIS — E11.69 TYPE 2 DIABETES MELLITUS WITH OTHER SPECIFIED COMPLICATION, WITHOUT LONG-TERM CURRENT USE OF INSULIN: ICD-10-CM

## 2023-10-03 DIAGNOSIS — E11.42 DM TYPE 2 WITH DIABETIC PERIPHERAL NEUROPATHY: Primary | ICD-10-CM

## 2023-10-03 PROCEDURE — 99999 PR PBB SHADOW E&M-EST. PATIENT-LVL III: CPT | Mod: PBBFAC,,, | Performed by: PODIATRIST

## 2023-10-03 PROCEDURE — 99203 OFFICE O/P NEW LOW 30 MIN: CPT | Mod: S$GLB,,, | Performed by: PODIATRIST

## 2023-10-03 PROCEDURE — 3066F NEPHROPATHY DOC TX: CPT | Mod: CPTII,S$GLB,, | Performed by: PODIATRIST

## 2023-10-03 PROCEDURE — 3061F PR NEG MICROALBUMINURIA RESULT DOCUMENTED/REVIEW: ICD-10-PCS | Mod: CPTII,S$GLB,, | Performed by: PODIATRIST

## 2023-10-03 PROCEDURE — 3066F PR DOCUMENTATION OF TREATMENT FOR NEPHROPATHY: ICD-10-PCS | Mod: CPTII,S$GLB,, | Performed by: PODIATRIST

## 2023-10-03 PROCEDURE — 99999 PR PBB SHADOW E&M-EST. PATIENT-LVL III: ICD-10-PCS | Mod: PBBFAC,,, | Performed by: PODIATRIST

## 2023-10-03 PROCEDURE — 3008F PR BODY MASS INDEX (BMI) DOCUMENTED: ICD-10-PCS | Mod: CPTII,S$GLB,, | Performed by: PODIATRIST

## 2023-10-03 PROCEDURE — 3008F BODY MASS INDEX DOCD: CPT | Mod: CPTII,S$GLB,, | Performed by: PODIATRIST

## 2023-10-03 PROCEDURE — 3061F NEG MICROALBUMINURIA REV: CPT | Mod: CPTII,S$GLB,, | Performed by: PODIATRIST

## 2023-10-03 PROCEDURE — 3044F HG A1C LEVEL LT 7.0%: CPT | Mod: CPTII,S$GLB,, | Performed by: PODIATRIST

## 2023-10-03 PROCEDURE — 99203 PR OFFICE/OUTPT VISIT, NEW, LEVL III, 30-44 MIN: ICD-10-PCS | Mod: S$GLB,,, | Performed by: PODIATRIST

## 2023-10-03 PROCEDURE — 3044F PR MOST RECENT HEMOGLOBIN A1C LEVEL <7.0%: ICD-10-PCS | Mod: CPTII,S$GLB,, | Performed by: PODIATRIST

## 2023-10-03 NOTE — PATIENT INSTRUCTIONS
Ms. Moraes,    Thank you for trusting us with your healthcare needs and using [facility].     Sincerely,     Dr. Jane Patino, DPM  Podiatric Surgery & Medicine     Your feedback is important to us.  If you should receive a survey in the next few days, please share your experience with us.    You can also rate your experience with Dr. Patino  By clicking on the link below:     [email or web address]    Patient Instructions   If you need help with MyChart and [facility], help is available:  online at [email or web address]  at the [email or web address] in the 1st floor lobby of [facility]  or by phone at [phone]  by email at  [email or web address]

## 2023-10-03 NOTE — PROGRESS NOTES
[facility]  PODIATRIC MEDICINE AND SURGERY      CHIEF COMPLAINT  Chief Complaint   Patient presents with    Diabetic Foot Exam     Diabetic routine exam, tingling and numbness, diabetic, wears sandals and socks, last seen PCP Dr. Huerta on 09/12/2023         HPI    SUBJECTIVE: Luciana Moraes is a 62 y.o. female who  has a past medical history of Diabetes mellitus, type 2, Hyperlipidemia, Osteoarthritis, Polymyositis, and Rheumatoid arthritis. Adinaspresents to clinic for high risk diabetic foot exam and care.  Luciana admits numbness, burning, and/or tingling sensations in their feet. Patient relates blood sugars normally run around 120. Pt has established care with primary care physician and would like to establish care with a podiatric physician. Patient has no other pedal complaints at this time      HgA1c:   Hemoglobin A1C   Date Value Ref Range Status   02/13/2023 6.5 (H) 4.0 - 5.6 % Final     Comment:     ADA Screening Guidelines:  5.7-6.4%  Consistent with prediabetes  >or=6.5%  Consistent with diabetes    High levels of fetal hemoglobin interfere with the HbA1C  assay. Heterozygous hemoglobin variants (HbS, HgC, etc)do  not significantly interfere with this assay.   However, presence of multiple variants may affect accuracy.     10/26/2022 6.8 (H) 4.0 - 5.6 % Final     Comment:     ADA Screening Guidelines:  5.7-6.4%  Consistent with prediabetes  >or=6.5%  Consistent with diabetes    High levels of fetal hemoglobin interfere with the HbA1C  assay. Heterozygous hemoglobin variants (HbS, HgC, etc)do  not significantly interfere with this assay.   However, presence of multiple variants may affect accuracy.     08/01/2022 7.4 (H) 4.0 - 5.6 % Final     Comment:     ADA Screening Guidelines:  5.7-6.4%  Consistent with prediabetes  >or=6.5%  Consistent with diabetes    High levels of fetal hemoglobin interfere with the HbA1C  assay. Heterozygous hemoglobin variants (HbS, HgC, etc)do  not  significantly interfere with this assay.   However, presence of multiple variants may affect accuracy.           PMH  Past Medical History:   Diagnosis Date    Diabetes mellitus, type 2     Hyperlipidemia     Osteoarthritis     Polymyositis     Rheumatoid arthritis        MEDS  Current Outpatient Medications on File Prior to Visit   Medication Sig Dispense Refill    aspirin 81 MG Chew Take 81 mg by mouth.      atorvastatin (LIPITOR) 80 MG tablet Take 1 tablet (80 mg total) by mouth once daily. 90 tablet 3    diclofenac sodium (VOLTAREN) 1 % Gel Apply 2 g topically 4 (four) times daily. 100 g 6    ergocalciferol (ERGOCALCIFEROL) 50,000 unit Cap Take 1 capsule (50,000 Units total) by mouth twice a week. 24 capsule 3    hydrOXYchloroQUINE (PLAQUENIL) 200 mg tablet Take 1 tablet (200 mg total) by mouth 2 (two) times daily. 180 tablet 1    ibuprofen (ADVIL,MOTRIN) 800 MG tablet Take 800 mg by mouth 3 (three) times daily.      metFORMIN (GLUCOPHAGE-XR) 500 MG ER 24hr tablet Take 2 tablets (1,000 mg total) by mouth daily with breakfast. 180 tablet 3    nabumetone (RELAFEN) 750 MG tablet Take 1 tablet (750 mg total) by mouth 2 (two) times daily as needed for Pain. 60 tablet 5    polyethylene glycol (GLYCOLAX) 17 gram PwPk Take 17 g by mouth once daily. 30 each 11    pregabalin (LYRICA) 75 MG capsule Take 1 capsule (75 mg total) by mouth 2 (two) times daily. 60 capsule 5    semaglutide (OZEMPIC) 2 mg/dose (8 mg/3 mL) PnIj Inject 2 mg into the skin every 7 days. 9 mL 3    tiZANidine (ZANAFLEX) 4 MG tablet Take 1 tablet (4 mg total) by mouth 2 (two) times daily as needed (pain). 60 tablet 5     No current facility-administered medications on file prior to visit.       PSH     Past Surgical History:   Procedure Laterality Date    CARPAL TUNNEL RELEASE Bilateral     COLONOSCOPY N/A 11/08/2022    Procedure: COLONOSCOPY;  Surgeon: Kishore Monsalve MD;  [facility]: [facility];  Service: Endoscopy;  Laterality: N/A;    EPIDURAL  "STEROID INJECTION INTO CERVICAL SPINE N/A 2022    Procedure: C7/T1 IL [facility];  Surgeon: Luis M Pereyra MD;  Location: [facility];  Service: Pain Management;  Laterality: N/A;    HIP SURGERY      HYSTERECTOMY      due to bleeding, pain from fibroids, retains one ovary    INJECTION OF ANESTHETIC AGENT AROUND MEDIAL BRANCH NERVES INNERVATING CERVICAL FACET JOINT Bilateral 10/31/2022    Procedure: Bilateral C5-7 MBB with RN IV sedation;  Surgeon: Luis M Pereyra MD;  Location: [facility];  Service: Pain Management;  Laterality: Bilateral;    INJECTION OF ANESTHETIC AGENT AROUND MEDIAL BRANCH NERVES INNERVATING CERVICAL FACET JOINT Bilateral 3/13/2023    Procedure: Bilateral C5-7 MBB with RN IV sedation;  Surgeon: Luis M Pereyra MD;  Location: [facility];  Service: Pain Management;  Laterality: Bilateral;    OOPHORECTOMY      1 ovary removed    TUBAL LIGATION          ALL  Review of patient's allergies indicates:  No Known Allergies    SOC     Social History     Tobacco Use    Smoking status: Former     Current packs/day: 0.00     Types: Cigarettes     Quit date: 3/17/2001     Years since quittin.5    Smokeless tobacco: Never   Substance Use Topics    Alcohol use: Never    Drug use: Never         Family HX    Family History   Problem Relation Age of Onset    Rheum arthritis Mother     No Known Problems Father             REVIEW OF SYSTEMS  General: Denies any fever or chills  Chest: Denies shortness of breath, wheezing, coughing, or sputum production  Heart: Denies chest pain.  As noted above and per history of current illness above, otherwise negative in the remainder of the 14 systems.     PHYSICAL EXAM  Vitals:    10/03/23 0906   Weight: 81.2 kg (179 lb)   Height: 5' 2" (1.575 m)   PainSc: 0-No pain       GEN:  This patient is well-developed, well-nourished and appears stated age, well-oriented to person, place and time, and cooperative and pleasant on today's visit.      LOWER " EXTREMITY PHYSICAL EXAMINATION   VASCULAR  DP pedal pulse 2/4 RIGHT, LEFT2/4   PT pedal pulse 2/4 RIGHT, LEFT2/4  Capillary refill time immediate to the toes.     DERMATOLOGIC  Skin moist with healthy texture and turgor.  There are no open ulcerations, lacerations, or fissures to bilateral foot and ankle regions. There are no signs of infection as there is no erythema, no proximal-extending lymphangiitis, no fluctuance, or crepitus noted on palpation to bilateral foot and ankle regions.   There is no interdigital maceration.     NEUROLOGIC  Protective sensation intact at 10/10 sites upon examination with [place] Weinsten 5.07 g monofilament.  Propioception intact at 1st MTPJ b/l.  Achilles and patellar deep tendon reflexes intact  Babinski reflex absent    ORTHOPEDIC/BIOMECHANICAL  No symptomatic structural abnormalities noted. Muscle strength is 5/5 for foot inverters, everters, plantarflexors, and dorsiflexors. Muscle tone is normal.  Inspection/palpation of bone, joints and muscles unremarkable.      ASSESSMENT  Encounter Diagnosis   Name Primary?    Type 2 diabetes mellitus with other specified complication, without long-term current use of insulin          Plan:  -Initial patient evaluation with H&P was performed  -Discuss presenting problems, etiology, pathologic processes and management options with patient today.   -I counseled the patient on their conditions, their implications and medical management. An in depth discussion on diabetic management, risk prevention, amputation prevention verbally and provided educational literature in written format.  - Shoe inspection. Diabetic Foot Education. Patient reminded of the importance of good nutrition and blood sugar control to help prevent podiatric complications of diabetes. Patient instructed on proper foot hygeine. We discussed wearing proper shoe gear, daily foot inspections, never walking without protective shoe gear, never putting sharp instruments to feet,  routine podiatric visits annually/semi annually or sooner if symptoms arise    Disclaimer: This note was partially prepared using a voice recognition system and is likely to have sound alike errors within the text.        Future Appointments   Date Time Provider Department Center   11/6/2023 10:00 AM [facility] BMD1: BONE DENSITY SCAN [facility] DEXABMD [facility]   11/6/2023 10:20 AM LABORATORY, [facility] LAB [facility]   11/7/2023  7:30 AM [place] Olmos PA-C [facility]   12/13/2023  9:00 AM [facility] MAMMO1 [facility] MAMMO Surya Garcia   3/12/2024  7:40 AM Cedric Huerta MD [facility] VIANNEY Garcia       Report Electronically Signed By:     Jane Patino DPM   Podiatry  [facility]   10/3/2023

## 2023-10-13 ENCOUNTER — PATIENT MESSAGE (OUTPATIENT)
Dept: RHEUMATOLOGY | Facility: CLINIC | Age: 63
End: 2023-10-13
Payer: MEDICARE

## 2023-10-14 NOTE — PROGRESS NOTES
RHEUMATOLOGY OUTPATIENT CLINIC NOTE    10/14/2023    Subjective:       Patient ID: Luciana Moraes is a 62 y.o. female.    Chief Complaint: No chief complaint on file.    HPI   Luciana Moraes is a 62 y.o. pleasant female here for rheumatology follow up for seropositive RA and positive RICHARD.     Occasional joint pain mainly in thumbs and long finger MCP. She is planning to do a  bike ride and is concerned about worsening pain. Still following with IPM for back pain. She has no trouble falling asleep but pain with staying asleep - had prior sleep study years ago which said she needed a CPAP but she had claustrophobia with the CPAP. Would be interested in another sleep study and would be amenable to new masks.     Has been taking hydroxychloroquine twice daily and high-dose vitamin-D once weekly.    No joint swelling, no warmth to joints, no morning stiffness >1hr.   No muscle weakness.  No synovitis.   No fever, lymphadenopathy, no unexpected weight loss, no fatigue  No rashes on palms, no vasculitis  No shortness of breath or pleuritic chest pain.       Initial HPI:  Previously seen by Dr. Camargo at [facility].   Previously found to have elevated CCP >300 and RICHARD 1:320.     She has previously been following with Rheum in [place]. Recently moved to [place] and is seeking out Rheum care in . She believes she is taking hcq 200 mg bid but not certain. She fills her pill box up each week and takes whatever is in there. She states she has pain all of the time. Mostly in her hips and low back. Occ with pain in her left thumb and bruce PIPs. She is right handed.     Recently started ozempic for weight loss and is down 5-7 lbs.       Family History:Mom RA, Great aunt and cousin with Lupus    Prior therapies:  Mtx HCQ    Physical Exam  No obvious synovitis or swelling bruce hands on video visits. Jazmyn quinteros.       Objective:   There were no vitals taken for this visit.  Immunization  History   Administered Date(s) Administered    COVID-19, MRNA, LN-S, PF (Pfizer) (Purple Cap) 11/13/2021, 12/04/2021        No results found for this or any previous visit (from the past 672 hour(s)).         Lab Results   Component Value Date    TBGOLDPLUS Negative 02/27/2023      Lab Results   Component Value Date    HEPBCAB Reactive (A) 02/27/2023    HEPCAB Non-reactive 02/27/2023        Xray Right hand 5/30/2019  There is a suggestion of an erosion at the 2nd metacarpal base. No other finding to suggest erosion. No fracture.     Xray Left hand 5/30/2019  There is a suggestion of an erosion at the 2nd metacarpal base. No other finding to suggest erosion. No fracture.     Xray lumbar spine 7/22/21  FINDINGS:   Alignment is unremarkable. Vertebral body heights are maintained. Intervertebral disc heights are within normal limits. Facet arthropathy at L5-S1, to a lesser extent L4-5. Pedicles are intact and symmetric.      Xray hip with pelvis 7/22/21  Bones: Sclerotic changes of the bilateral SI joints, worse on the right. Degenerative changes of the pubic bones. Overall decreased bone mineralization.   Joints: Hip joint spaces are similar to prior study. SI joints and pubic symphysis are intact.   Soft Tissues: Pelvic phleboliths present. No significant soft tissue abnormality.    Xray thoracic spine 4/18/22  FINDINGS:   Mild levoconvex curvature of the thoracolumbar spine. Vertebral bodies demonstrate normal height. Multilevel decreased disc space height. No acute fracture or acute subluxation.  MRI cspine 8/8/2022  Impression:     Multilevel degenerative changes as detailed above.     Minimal spinal canal stenosis at C4-C5 and C5-C6.     Varying degrees of bilateral neural foraminal stenosis as detailed above, most severe at the right C5-C6 level.    Vitamin D deficiency       HLAB27 negative 10/3/2019 (per chart review external labs)    Xray bruce feet 9/26/22  Right: There is no radiographic evidence of acute  osseous, articular, or soft tissue abnormality.  Joint spaces are preserved.     Left: There is no radiographic evidence of acute osseous, articular, or soft tissue abnormality.  Joint spaces are preserved.    Xray nikhil hands 9/26/22  FINDINGS:  Right hand: There is no radiographic evidence of acute osseous, articular, or soft tissue abnormality.  There is mild osteoarthritis seen scattered throughout the IP and MCP joints as well as the 1st CMC joint.  No erosive changes demonstrated     Left hand: There is no radiographic evidence of acute osseous, articular, or soft tissue abnormality.  There is mild osteoarthritis seen scattered throughout the IP and MCP joints as well as the 1st CMC joint.  No erosive changes demonstrated      DEXA 10/16/2023:   Spine-0.7, L4-1.5, FN - 0.7, TH 0.0 D    Assessment:       No diagnosis found.      Impression:     Seropositive Rheumatoid Arthriitis   Dx 2010 with elevated CCP (>300) and RICHARD 1:320  Prior on mtx, hcq.  Chronic pain in low back, hips. May be moreso from lumbar facetarthropathy  09/26/2022 RF 19, .4  Has been taking hydroxychloroquine twice daily.   Stable    Insomnia  Reports history of sleep study which recommended CPAP.   Issue of staying asleep not falling asleep.     OA and Facet arthropathy L4-L5, L5-S1 with nikhil sciatica  Prior completed PT and NSAIDs without help.   Nikhil hip injection 11/18   Troch bursa injection 3/19  On lyrica per IPM  Flexeril 10 mg at bedtime  Follows with pain mgmt for injections.     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   Prior had neg MRI. Encourage cancer screening.     Vit D def  Level 16 on 03/23/2023    Other specified counseling  Over 10 minutes spent regarding below topics:  - Nutrition and exercise counseling.  - Medication counseling provided.     Plan:     Rheumatoid arthritis:  Continue hcq 200 mg bid   Annual eye exams  Medrol dosepack as  needed for flares.  Repeat Xrays hand/foot 9/2024  In the future consider addition of another DMARD. At this time symptoms seem less inflammatory in nature.     Vit D def:  Vit D 50K units continue twice weekly  Get from [facility]    Osteoporosis screening  Repeat DEXA 10/2025   Weight-bearing activity as tolerated, caution to avoid falls   Adequate vitamin-D and calcium supplementation    OA/Facet arthropathy  Apply topical Voltaren/diclofenac to affected joint 3-4 times daily as needed  Continue following with pain management    Counseling:  Discussed taking medication regularly.       Insomnia:  Sleep study      RTC 4 months with reg4 (labs early in [place] or [place]) Chioma Olmos PA-C  Rheumatology Department   [facility]        Disclaimer: This note was prepared using voice recognition system and is likely to have sound alike errors and is not proof read.  Please call me with any questions     The patient location is: [place]  The chief complaint leading to consultation is: follow up    Visit type: Audiovisual    Face to Face time with patient: 30   35 minutes of total time spent on the encounter, which includes face to face time and non-face to face time preparing to see the patient (eg, review of tests), Obtaining and/or reviewing separately obtained history, Documenting clinical information in the electronic or other health record, Independently interpreting results (not separately reported) and communicating results to the patient/family/caregiver, or Care coordination (not separately reported).         Each patient to whom he or she provides medical services by telemedicine is:  (1) informed of the relationship between the physician and patient and the respective role of any other health care provider with respect to management of the patient; and (2) notified that he or she may decline to receive medical services by telemedicine and may withdraw from such  care at any time.

## 2023-10-16 ENCOUNTER — APPOINTMENT (OUTPATIENT)
Dept: RADIOLOGY | Facility: HOSPITAL | Age: 63
End: 2023-10-16
Payer: MEDICARE

## 2023-10-16 DIAGNOSIS — Z13.820 ENCOUNTER FOR OSTEOPOROSIS SCREENING IN ASYMPTOMATIC POSTMENOPAUSAL PATIENT: ICD-10-CM

## 2023-10-16 DIAGNOSIS — Z78.0 ENCOUNTER FOR OSTEOPOROSIS SCREENING IN ASYMPTOMATIC POSTMENOPAUSAL PATIENT: ICD-10-CM

## 2023-10-16 PROCEDURE — 77080 DXA BONE DENSITY AXIAL: CPT | Mod: TC

## 2023-10-16 PROCEDURE — 77080 DXA BONE DENSITY AXIAL: CPT | Mod: 26,,, | Performed by: RADIOLOGY

## 2023-10-16 PROCEDURE — 77080 DXA BONE DENSITY AXIAL SKELETON 1 OR MORE SITES: ICD-10-PCS | Mod: 26,,, | Performed by: RADIOLOGY

## 2023-10-17 ENCOUNTER — OFFICE VISIT (OUTPATIENT)
Dept: RHEUMATOLOGY | Facility: CLINIC | Age: 63
End: 2023-10-17
Payer: MEDICARE

## 2023-10-17 DIAGNOSIS — Z78.0 ENCOUNTER FOR OSTEOPOROSIS SCREENING IN ASYMPTOMATIC POSTMENOPAUSAL PATIENT: ICD-10-CM

## 2023-10-17 DIAGNOSIS — G47.39 OTHER SLEEP APNEA: ICD-10-CM

## 2023-10-17 DIAGNOSIS — E55.9 VITAMIN D DEFICIENCY: ICD-10-CM

## 2023-10-17 DIAGNOSIS — M06.9 RHEUMATOID ARTHRITIS, INVOLVING UNSPECIFIED SITE, UNSPECIFIED WHETHER RHEUMATOID FACTOR PRESENT: Primary | ICD-10-CM

## 2023-10-17 DIAGNOSIS — R76.8 POSITIVE ANA (ANTINUCLEAR ANTIBODY): ICD-10-CM

## 2023-10-17 DIAGNOSIS — E11.69 TYPE 2 DIABETES MELLITUS WITH OTHER SPECIFIED COMPLICATION, WITHOUT LONG-TERM CURRENT USE OF INSULIN: ICD-10-CM

## 2023-10-17 DIAGNOSIS — Z79.899 HIGH RISK MEDICATION USE: ICD-10-CM

## 2023-10-17 DIAGNOSIS — Z13.820 ENCOUNTER FOR OSTEOPOROSIS SCREENING IN ASYMPTOMATIC POSTMENOPAUSAL PATIENT: ICD-10-CM

## 2023-10-17 DIAGNOSIS — G47.00 INSOMNIA, UNSPECIFIED TYPE: ICD-10-CM

## 2023-10-17 DIAGNOSIS — Z71.89 COUNSELING ON HEALTH PROMOTION AND DISEASE PREVENTION: ICD-10-CM

## 2023-10-17 PROCEDURE — 3066F NEPHROPATHY DOC TX: CPT | Mod: CPTII,95,,

## 2023-10-17 PROCEDURE — 3061F NEG MICROALBUMINURIA REV: CPT | Mod: CPTII,95,,

## 2023-10-17 PROCEDURE — 1160F RVW MEDS BY RX/DR IN RCRD: CPT | Mod: CPTII,95,,

## 2023-10-17 PROCEDURE — 3061F PR NEG MICROALBUMINURIA RESULT DOCUMENTED/REVIEW: ICD-10-PCS | Mod: CPTII,95,,

## 2023-10-17 PROCEDURE — 99214 OFFICE O/P EST MOD 30 MIN: CPT | Mod: 95,,,

## 2023-10-17 PROCEDURE — 1159F MED LIST DOCD IN RCRD: CPT | Mod: CPTII,95,,

## 2023-10-17 PROCEDURE — 1160F PR REVIEW ALL MEDS BY PRESCRIBER/CLIN PHARMACIST DOCUMENTED: ICD-10-PCS | Mod: CPTII,95,,

## 2023-10-17 PROCEDURE — 3066F PR DOCUMENTATION OF TREATMENT FOR NEPHROPATHY: ICD-10-PCS | Mod: CPTII,95,,

## 2023-10-17 PROCEDURE — 1159F PR MEDICATION LIST DOCUMENTED IN MEDICAL RECORD: ICD-10-PCS | Mod: CPTII,95,,

## 2023-10-17 PROCEDURE — 3044F HG A1C LEVEL LT 7.0%: CPT | Mod: CPTII,95,,

## 2023-10-17 PROCEDURE — 3044F PR MOST RECENT HEMOGLOBIN A1C LEVEL <7.0%: ICD-10-PCS | Mod: CPTII,95,,

## 2023-10-17 PROCEDURE — 99214 PR OFFICE/OUTPT VISIT, EST, LEVL IV, 30-39 MIN: ICD-10-PCS | Mod: 95,,,

## 2023-10-17 RX ORDER — SEMAGLUTIDE 2.68 MG/ML
2 INJECTION, SOLUTION SUBCUTANEOUS
Qty: 9 ML | Refills: 3 | Status: SHIPPED | OUTPATIENT
Start: 2023-10-17 | End: 2024-03-12 | Stop reason: SDUPTHER

## 2023-10-17 NOTE — TELEPHONE ENCOUNTER
Patient is aware of her lab results and states that she is out of her ozempic and needs a refill sent to the pharmacy

## 2023-10-18 ENCOUNTER — TELEPHONE (OUTPATIENT)
Dept: RHEUMATOLOGY | Facility: CLINIC | Age: 63
End: 2023-10-18
Payer: MEDICARE

## 2023-10-18 NOTE — TELEPHONE ENCOUNTER
----- Message from Indiana Olmos PA-C sent at 10/17/2023  8:17 AM CDT -----  RTC 4 months with reg4 (labs early in [place] or [place]) Chioma

## 2023-10-19 RX ORDER — TRAZODONE HYDROCHLORIDE 50 MG/1
100 TABLET ORAL NIGHTLY PRN
Qty: 180 TABLET | Refills: 3 | Status: SHIPPED | OUTPATIENT
Start: 2023-10-19 | End: 2024-03-12 | Stop reason: SDUPTHER

## 2023-11-18 ENCOUNTER — HOSPITAL ENCOUNTER (OUTPATIENT)
Dept: SLEEP MEDICINE | Facility: HOSPITAL | Age: 63
Discharge: HOME | End: 2023-11-18
Payer: MEDICARE

## 2023-11-18 DIAGNOSIS — G47.33 OSA (OBSTRUCTIVE SLEEP APNEA): Primary | ICD-10-CM

## 2023-11-18 DIAGNOSIS — G47.34 NOCTURNAL HYPOXEMIA: ICD-10-CM

## 2023-11-18 DIAGNOSIS — G47.00 INSOMNIA, UNSPECIFIED TYPE: ICD-10-CM

## 2023-11-18 DIAGNOSIS — G47.39 OTHER SLEEP APNEA: ICD-10-CM

## 2023-11-18 PROCEDURE — 95810 POLYSOM 6/> YRS 4/> PARAM: CPT

## 2023-11-20 PROBLEM — G47.00 INSOMNIA: Status: ACTIVE | Noted: 2023-11-20

## 2023-11-22 PROCEDURE — 95810 POLYSOM 6/> YRS 4/> PARAM: CPT | Mod: 26,,, | Performed by: INTERNAL MEDICINE

## 2023-11-22 PROCEDURE — 95810 PR POLYSOMNOGRAPHY, 4 OR MORE: ICD-10-PCS | Mod: 26,,, | Performed by: INTERNAL MEDICINE

## 2023-11-22 NOTE — PROCEDURES
"Patient Name: Margarita Moraes Study Date: 11/18/2023   YOB: 1960 Study Type: PSG   Age: 63 year Patient #: [id number]   Sex: Female Billing ID: -   Height: 5' 2" Interpreting Physician: Alexander Roberson MD   Weight: 179.0 lbs Recording Tech: Vikash Rai   BMI: 32.9 Scoring Tech: Darya Tuttle   Emory: 22 Neck Circumference: 13     Indications for Polysomnography  The patient is a 63 year-old Female who is 5' 2" and weighs 179.0 lbs. Her BMI equals 32.9.  A full night polysomnogram was performed to evaluate for -.POPEYE    Referring Provider: Indiana Olmos PA-C Rheumatology    Medical History: Emory score 22  Bed time 08:30 pm, wake time 05:00 am, 1 minute to fall asleep  No RLS  No sleep attacks  No signs of narcolepsy  Endorsed Snoring, night sweats,       Medication   aspirin 81 MG Chew    atorvastatin (LIPITOR) 80 MG tablet    diclofenac sodium (VOLTAREN) 1 % Gel    ergocalciferol (ERGOCALCIFEROL) 50,000 unit Cap    hydrOXYchloroQUINE (PLAQUENIL) 200 mg tablet    ibuprofen (ADVIL,MOTRIN) 800 MG tablet    metFORMIN (GLUCOPHAGE-XR) 500 MG ER 24hr tablet    nabumetone (RELAFEN) 750 MG tablet    polyethylene glycol (GLYCOLAX) 17 gram PwPk    pregabalin (LYRICA) 75 MG capsule    semaglutide (OZEMPIC) 2 mg/dose (8 mg/3 mL) PnIj    tiZANidine (ZANAFLEX) 4 MG tablet    traZODone (DESYREL) 50 MG tablet      Test Description  Patient was connected to a full set of leads with a sleep technician in attendance.  Parameters used were EEG derivations (F4-A1, C4-A1, O2-A1), EOG derivations (LOC-A2, MIGUELINA-A2), EKG, EMG - extremity (leg) & chin, oxygen saturation, effort parameters + abdominal/thoracic (RIP), and airflow parameters - nasal pressure/thermal.  Body position was visually monitored and noted.  Audio & video monitoring was performed during study.    Scoring is done in accordance with the American Academy of Sleep Medicine Manual for the Scoring of Sleep and Associated Events version 2.6.  " Hypopneas are scored using the 4% desaturation rule.    Sleep Architecture  The total recording time of the polysomnogram was 443.4 minutes.  The total sleep time was 405.5 minutes.  The patient spent 7.0% of total sleep time in Stage N1, 56.5% in Stage N2, 14.3% in Stages N3, and 22.2% in REM.  Sleep latency was 1.2 minutes.  REM latency was 56.5 minutes.  Sleep Efficiency was 91.5%.  Wake after Sleep Onset time was 36.5 minutes.    Respiratory Events  The polysomnogram revealed a presence of 36 obstructive, 8 central, and 6 mixed apneas resulting in an Apnea index of 7.4 events per hour.  There were 104 hypopneas (?3% desat and/or Ar.) resulting in an Apnea\Hypopnea Index (AHI ?3% desat and/or Ar.) of 22.8 events per hour.  There were 103 hypopneas (?4% desat) resulting in an Apnea\Hypopnea Index (AHI ?4% desat) of 22.6 events per hour.  There were - Respiratory Effort Related Arousals resulting in a RERA index of - events per hour. The Respiratory Disturbance Index is 22.8 events per hour.     Mean oxygen saturation was 91.1%.  The lowest oxygen saturation during sleep was 78.0%.  Time spent ?88% oxygen saturation was 39.5 minutes (9.2%).    Limb Activity  There were - total limb movements recorded, of this total, - were classified as PLMs.  PLM index was - per hour and PLM associated with Arousals index was - per hour.    Cardiac Summary  The average pulse rate was 52.8 bpm.  The minimum pulse rate was 37.0 bpm while the maximum pulse rate was 155.0 bpm.  Cardiac rhythm was normal/ Sinus rhythm    Conclusion:   Overall AHI was 22.8/hr with 154 events: Moderate obstructive sleep apnea  Supine AHI 61.3/hr Severe Sleep apnea  8 central apnea events  Short REM latency  SpO2 was below 88% for 39.5 minutes.    Diagnosis: Obstructive Sleep Apnea / 327.23   Nocturnal/Sleep hypoxemia        Recommendations:     Please refer to sleep disorders for management  CPAP titration    Dear Indiana Olmos, PA-C  [id number]  "[facility] PETER [place] [id number]/Cedric Huerta MD         The sleep study that you ordered is complete.  You have ordered sleep LAB services to perform the sleep study for Luciana Moraes .      Please find Sleep Study result in  the "Media tab" of Chart Review menu.        You can look  for the report in the  Media by the document type "Sleep Study Documents". Alphabetizing  "Document type" column helps to find the SLEEP STUDY report  Faster.       As the ordering provider, you are responsible for reviewing the results and implementing a treatment plan with your patient.    If you need a Sleep Medicine provider to explain the sleep study findings and arrange treatment for the patient, please refer patient for consultation to our Sleep Clinic via [facility] with Ambulatory Consult Sleep.     To do that please place an order for an  "Ambulatory Consult Sleep" -  order , it will go to our clinic work queue for our staff  to contact the patient for an appointment.      For any questions, please contact our sleep lab  staff at [phone] to talk to clinical staff          Alexander Roberson MD   "

## 2023-11-27 DIAGNOSIS — M06.9 RHEUMATOID ARTHRITIS, INVOLVING UNSPECIFIED SITE, UNSPECIFIED WHETHER RHEUMATOID FACTOR PRESENT: Primary | ICD-10-CM

## 2023-11-28 DIAGNOSIS — G47.30 SLEEP APNEA, UNSPECIFIED TYPE: Primary | ICD-10-CM

## 2023-11-29 ENCOUNTER — PATIENT MESSAGE (OUTPATIENT)
Dept: PULMONOLOGY | Facility: CLINIC | Age: 63
End: 2023-11-29
Payer: MEDICARE

## 2023-12-13 ENCOUNTER — HOSPITAL ENCOUNTER (OUTPATIENT)
Dept: RADIOLOGY | Facility: HOSPITAL | Age: 63
Discharge: HOME | End: 2023-12-13
Attending: FAMILY MEDICINE
Payer: MEDICARE

## 2023-12-13 DIAGNOSIS — Z12.31 BREAST CANCER SCREENING BY MAMMOGRAM: ICD-10-CM

## 2023-12-13 PROCEDURE — 77067 SCR MAMMO BI INCL CAD: CPT | Mod: 26,,, | Performed by: RADIOLOGY

## 2023-12-13 PROCEDURE — 77067 SCR MAMMO BI INCL CAD: CPT | Mod: TC,PN

## 2023-12-13 PROCEDURE — 77063 MAMMO DIGITAL SCREENING BILAT WITH TOMO: ICD-10-PCS | Mod: 26,,, | Performed by: RADIOLOGY

## 2023-12-13 PROCEDURE — 77063 BREAST TOMOSYNTHESIS BI: CPT | Mod: 26,,, | Performed by: RADIOLOGY

## 2023-12-13 PROCEDURE — 77067 MAMMO DIGITAL SCREENING BILAT WITH TOMO: ICD-10-PCS | Mod: 26,,, | Performed by: RADIOLOGY

## 2024-02-16 ENCOUNTER — LAB VISIT (OUTPATIENT)
Dept: LAB | Facility: HOSPITAL | Age: 64
End: 2024-02-16
Attending: FAMILY MEDICINE
Payer: MEDICARE

## 2024-02-16 DIAGNOSIS — M06.9 RHEUMATOID ARTHRITIS, INVOLVING UNSPECIFIED SITE, UNSPECIFIED WHETHER RHEUMATOID FACTOR PRESENT: ICD-10-CM

## 2024-02-16 LAB
ALBUMIN SERPL BCP-MCNC: 3.8 G/DL (ref 3.5–5.2)
ALP SERPL-CCNC: 81 U/L (ref 55–135)
ALT SERPL W/O P-5'-P-CCNC: 9 U/L (ref 10–44)
ANION GAP SERPL CALC-SCNC: 7 MMOL/L (ref 8–16)
AST SERPL-CCNC: 17 U/L (ref 10–40)
BASOPHILS # BLD AUTO: 0.04 K/UL (ref 0–0.2)
BASOPHILS NFR BLD: 0.6 % (ref 0–1.9)
BILIRUB SERPL-MCNC: 0.5 MG/DL (ref 0.1–1)
BUN SERPL-MCNC: 16 MG/DL (ref 8–23)
CALCIUM SERPL-MCNC: 9.3 MG/DL (ref 8.7–10.5)
CHLORIDE SERPL-SCNC: 107 MMOL/L (ref 95–110)
CO2 SERPL-SCNC: 24 MMOL/L (ref 23–29)
CREAT SERPL-MCNC: 0.9 MG/DL (ref 0.5–1.4)
CRP SERPL-MCNC: 5.3 MG/L (ref 0–8.2)
DIFFERENTIAL METHOD BLD: ABNORMAL
EOSINOPHIL # BLD AUTO: 0.2 K/UL (ref 0–0.5)
EOSINOPHIL NFR BLD: 3.1 % (ref 0–8)
ERYTHROCYTE [DISTWIDTH] IN BLOOD BY AUTOMATED COUNT: 14.2 % (ref 11.5–14.5)
ERYTHROCYTE [SEDIMENTATION RATE] IN BLOOD BY WESTERGREN METHOD: 8 MM/HR (ref 0–20)
EST. GFR  (NO RACE VARIABLE): >60 ML/MIN/1.73 M^2
GLUCOSE SERPL-MCNC: 97 MG/DL (ref 70–110)
HCT VFR BLD AUTO: 43.9 % (ref 37–48.5)
HGB BLD-MCNC: 13.9 G/DL (ref 12–16)
IMM GRANULOCYTES # BLD AUTO: 0.01 K/UL (ref 0–0.04)
IMM GRANULOCYTES NFR BLD AUTO: 0.1 % (ref 0–0.5)
LYMPHOCYTES # BLD AUTO: 3.4 K/UL (ref 1–4.8)
LYMPHOCYTES NFR BLD: 47.9 % (ref 18–48)
MCH RBC QN AUTO: 27.7 PG (ref 27–31)
MCHC RBC AUTO-ENTMCNC: 31.7 G/DL (ref 32–36)
MCV RBC AUTO: 88 FL (ref 82–98)
MONOCYTES # BLD AUTO: 0.6 K/UL (ref 0.3–1)
MONOCYTES NFR BLD: 7.9 % (ref 4–15)
NEUTROPHILS # BLD AUTO: 2.9 K/UL (ref 1.8–7.7)
NEUTROPHILS NFR BLD: 40.4 % (ref 38–73)
NRBC BLD-RTO: 0 /100 WBC
PLATELET # BLD AUTO: 122 K/UL (ref 150–450)
PMV BLD AUTO: 12.7 FL (ref 9.2–12.9)
POTASSIUM SERPL-SCNC: 4.1 MMOL/L (ref 3.5–5.1)
PROT SERPL-MCNC: 7.8 G/DL (ref 6–8.4)
RBC # BLD AUTO: 5.01 M/UL (ref 4–5.4)
SODIUM SERPL-SCNC: 138 MMOL/L (ref 136–145)
WBC # BLD AUTO: 7.12 K/UL (ref 3.9–12.7)

## 2024-02-16 PROCEDURE — 86140 C-REACTIVE PROTEIN: CPT | Performed by: PHYSICIAN ASSISTANT

## 2024-02-16 PROCEDURE — 85025 COMPLETE CBC W/AUTO DIFF WBC: CPT | Performed by: PHYSICIAN ASSISTANT

## 2024-02-16 PROCEDURE — 80053 COMPREHEN METABOLIC PANEL: CPT | Performed by: PHYSICIAN ASSISTANT

## 2024-02-16 PROCEDURE — 36415 COLL VENOUS BLD VENIPUNCTURE: CPT | Mod: PN | Performed by: PHYSICIAN ASSISTANT

## 2024-02-16 PROCEDURE — 85651 RBC SED RATE NONAUTOMATED: CPT | Performed by: PHYSICIAN ASSISTANT

## 2024-02-19 ENCOUNTER — OFFICE VISIT (OUTPATIENT)
Dept: RHEUMATOLOGY | Facility: CLINIC | Age: 64
End: 2024-02-19
Payer: MEDICARE

## 2024-02-19 DIAGNOSIS — Z79.899 HIGH RISK MEDICATION USE: ICD-10-CM

## 2024-02-19 DIAGNOSIS — E55.9 VITAMIN D DEFICIENCY: ICD-10-CM

## 2024-02-19 DIAGNOSIS — Z51.81 MEDICATION MONITORING ENCOUNTER: ICD-10-CM

## 2024-02-19 DIAGNOSIS — R76.8 POSITIVE ANA (ANTINUCLEAR ANTIBODY): Primary | ICD-10-CM

## 2024-02-19 DIAGNOSIS — D84.9 IMMUNOCOMPROMISED PATIENT: ICD-10-CM

## 2024-02-19 DIAGNOSIS — M06.9 RHEUMATOID ARTHRITIS, INVOLVING UNSPECIFIED SITE, UNSPECIFIED WHETHER RHEUMATOID FACTOR PRESENT: ICD-10-CM

## 2024-02-19 PROCEDURE — 99214 OFFICE O/P EST MOD 30 MIN: CPT | Mod: 95,,, | Performed by: PHYSICIAN ASSISTANT

## 2024-02-19 RX ORDER — HYDROXYCHLOROQUINE SULFATE 200 MG/1
200 TABLET, FILM COATED ORAL 2 TIMES DAILY
Qty: 180 TABLET | Refills: 1 | Status: SHIPPED | OUTPATIENT
Start: 2024-02-19

## 2024-02-19 NOTE — Clinical Note
Please contact pt to schedule appt for follow up on low back pain.  She saw him in 2/2023 and has completed PT.  Now c/o N/T in both feet Meg Rodriguez PA-C [facility]

## 2024-02-19 NOTE — Clinical Note
Dr. Vásquez saw her in Sept.  Can you tell me if she had plaquenil monitoring ([employer])?  Thank you! Meg Rodriguez PA-C [facility]

## 2024-02-19 NOTE — PROGRESS NOTES
Subjective:      Patient ID: Luciana Moraes is a 63 y.o. female.    Chief Complaint: No chief complaint on file.    HPI   Luciana Moraes  is a 63 y.o. female seen today for follow-up rheumatoid arthritis with significantly positive RICHARD.  She has previously been followed by my colleagues.  This is her 1st appointment with me today.  She is on HC Q 200 mg b.i.d..  She has been out of it for about a month now.  She continues to complain of pain and discomfort mainly in the right thumb MCP joint.    Tolerating Plaquenil well.  Says she had a dilated eye exam with Dr. Thalia rojo in September 2023.  Recent labs show thrombocytopenia.  Remaining CTD labs not drawn w most recent labs (last done Oct 2022).  Pt c/o alopecia.  Also c/o continued rash in the chest.  Saw derm.  Has bx proven Lichen Simplex Chronicus.  Topicals haven't helped.  Hasn't seen derm since May 2023.      Main complaint today is low back pain.  Also endorses numbness and tingling in both feet.  She is diabetic.  Previously saw interventional pain management department for cervical radiculopathy as well as low back pain.  Did a course of physical therapy about a year ago.  The neck improved but unfortunately the back did not.  She has not yet followed up with them.    Patient denies fevers, chills, photosensitivity, eye pain, shortness of breath, chest pain, hematuria, blood in the stool, rash, sicca symptoms, raynauds, finger/oral/nasal ulcerations, LAD, paratotis, dysphagia.  Rheumatologic systems otherwise negative.    Serologies/Labs:  +RF 19  +.4  +RICHARD 1:>2560 speckled, neg profile  Current Treatment:   mg bid  Previous Treatment:   na      Current Outpatient Medications:     aspirin 81 MG Chew, Take 81 mg by mouth., Disp: , Rfl:     atorvastatin (LIPITOR) 80 MG tablet, Take 1 tablet (80 mg total) by mouth once daily., Disp: 90 tablet, Rfl: 3    diclofenac sodium (VOLTAREN) 1 % Gel, Apply 2 g topically 4 (four) times  daily., Disp: 100 g, Rfl: 6    ergocalciferol (ERGOCALCIFEROL) 50,000 unit Cap, Take 1 capsule (50,000 Units total) by mouth twice a week., Disp: 24 capsule, Rfl: 3    hydrOXYchloroQUINE (PLAQUENIL) 200 mg tablet, Take 1 tablet (200 mg total) by mouth 2 (two) times daily., Disp: 180 tablet, Rfl: 1    ibuprofen (ADVIL,MOTRIN) 800 MG tablet, Take 800 mg by mouth 3 (three) times daily., Disp: , Rfl:     metFORMIN (GLUCOPHAGE-XR) 500 MG ER 24hr tablet, Take 2 tablets (1,000 mg total) by mouth daily with breakfast., Disp: 180 tablet, Rfl: 3    nabumetone (RELAFEN) 750 MG tablet, Take 1 tablet (750 mg total) by mouth 2 (two) times daily as needed for Pain., Disp: 60 tablet, Rfl: 5    polyethylene glycol (GLYCOLAX) 17 gram PwPk, Take 17 g by mouth once daily., Disp: 30 each, Rfl: 11    pregabalin (LYRICA) 75 MG capsule, Take 1 capsule (75 mg total) by mouth 2 (two) times daily., Disp: 60 capsule, Rfl: 5    semaglutide (OZEMPIC) 2 mg/dose (8 mg/3 mL) PnIj, Inject 2 mg into the skin every 7 days., Disp: 9 mL, Rfl: 3    tiZANidine (ZANAFLEX) 4 MG tablet, Take 1 tablet (4 mg total) by mouth 2 (two) times daily as needed (pain)., Disp: 60 tablet, Rfl: 5    traZODone (DESYREL) 50 MG tablet, Take 2 tablets (100 mg total) by mouth nightly as needed for Insomnia., Disp: 180 tablet, Rfl: 3    Past Medical History:   Diagnosis Date    Diabetes mellitus, type 2     Hyperlipidemia     Osteoarthritis     Polymyositis     Rheumatoid arthritis      Family History   Problem Relation Age of Onset    Rheum arthritis Mother     No Known Problems Father      Social History     Socioeconomic History    Marital status:    Tobacco Use    Smoking status: Former     Current packs/day: 0.00     Types: Cigarettes     Quit date: 3/17/2001     Years since quittin.9    Smokeless tobacco: Never   Substance and Sexual Activity    Alcohol use: Never    Drug use: Never    Sexual activity: Yes     Partners: Male     Birth control/protection: None      Review of patient's allergies indicates:  No Known Allergies    Objective:   There were no vitals taken for this visit.  Immunization History   Administered Date(s) Administered    COVID-19, MRNA, LN-S, PF (Pfizer) (Purple Cap) 11/13/2021, 12/04/2021       Physical Exam   Constitutional: She is oriented to person, place, and time. No distress.   HENT:   Head: Normocephalic and atraumatic.   Pulmonary/Chest: Effort normal.   Musculoskeletal:      Cervical back: Normal range of motion.   Neurological: She is alert and oriented to person, place, and time.   Psychiatric: Mood normal.         Recent Results (from the past 672 hour(s))   Comprehensive Metabolic Panel    Collection Time: 02/16/24  1:58 PM   Result Value Ref Range    Sodium 138 136 - 145 mmol/L    Potassium 4.1 3.5 - 5.1 mmol/L    Chloride 107 95 - 110 mmol/L    CO2 24 23 - 29 mmol/L    Glucose 97 70 - 110 mg/dL    BUN 16 8 - 23 mg/dL    Creatinine 0.9 0.5 - 1.4 mg/dL    Calcium 9.3 8.7 - 10.5 mg/dL    Total Protein 7.8 6.0 - 8.4 g/dL    Albumin 3.8 3.5 - 5.2 g/dL    Total Bilirubin 0.5 0.1 - 1.0 mg/dL    Alkaline Phosphatase 81 55 - 135 U/L    AST 17 10 - 40 U/L    ALT 9 (L) 10 - 44 U/L    eGFR >60 >60 mL/min/1.73 m^2    Anion Gap 7 (L) 8 - 16 mmol/L   CBC Auto Differential    Collection Time: 02/16/24  1:58 PM   Result Value Ref Range    WBC 7.12 3.90 - 12.70 K/uL    RBC 5.01 4.00 - 5.40 M/uL    Hemoglobin 13.9 12.0 - 16.0 g/dL    Hematocrit 43.9 37.0 - 48.5 %    MCV 88 82 - 98 fL    MCH 27.7 27.0 - 31.0 pg    MCHC 31.7 (L) 32.0 - 36.0 g/dL    RDW 14.2 11.5 - 14.5 %    Platelets 122 (L) 150 - 450 K/uL    MPV 12.7 9.2 - 12.9 fL    Immature Granulocytes 0.1 0.0 - 0.5 %    Gran # (ANC) 2.9 1.8 - 7.7 K/uL    Immature Grans (Abs) 0.01 0.00 - 0.04 K/uL    Lymph # 3.4 1.0 - 4.8 K/uL    Mono # 0.6 0.3 - 1.0 K/uL    Eos # 0.2 0.0 - 0.5 K/uL    Baso # 0.04 0.00 - 0.20 K/uL    nRBC 0 0 /100 WBC    Gran % 40.4 38.0 - 73.0 %    Lymph % 47.9 18.0 - 48.0 %    Mono %  7.9 4.0 - 15.0 %    Eosinophil % 3.1 0.0 - 8.0 %    Basophil % 0.6 0.0 - 1.9 %    Differential Method Automated    Sedimentation rate    Collection Time: 02/16/24  1:58 PM   Result Value Ref Range    Sed Rate 8 0 - 20 mm/Hr   C-Reactive Protein    Collection Time: 02/16/24  1:58 PM   Result Value Ref Range    CRP 5.3 0.0 - 8.2 mg/L       Lab Results   Component Value Date    TBGOLDPLUS Negative 02/27/2023      Lab Results   Component Value Date    HEPBCAB Reactive (A) 02/27/2023    HEPCAB Non-reactive 02/27/2023        Imaging  I have personally reviewed images and reports as below.  I agree with the interpretation.  X-Ray Hand 3 View Bilateral  Order: [id number]  Status: Final result       Visible to patient: Yes (not seen)       Next appt: 03/12/2024 at 07:40 AM in [facility] Care (Cedric Huerta MD)       Dx: Positive RICHARD (antinuclear antibody); ...    1 Result Note       1 Patient Communication  Details    Reading Physician Reading Date Result Priority   Cory Hartley MD  [phone] 9/26/2022 Routine     Narrative & Impression  EXAMINATION:  XR HAND COMPLETE 3 VIEWS BILATERAL     CLINICAL HISTORY:  . Rheumatoid arthritis, unspecified     TECHNIQUE:  AP, lateral, and oblique views of both hands were performed.     COMPARISON:  None     FINDINGS:  Right hand: There is no radiographic evidence of acute osseous, articular, or soft tissue abnormality.  There is mild osteoarthritis seen scattered throughout the IP and MCP joints as well as the 1st CMC joint.  No erosive changes demonstrated     Left hand: There is no radiographic evidence of acute osseous, articular, or soft tissue abnormality.  There is mild osteoarthritis seen scattered throughout the IP and MCP joints as well as the 1st CMC joint.  No erosive changes demonstrated     Impression:     Mild degenerative findings as above        Electronically signed by: Cory Hartley MD  Date:                                            09/26/2022  Time:                                            09:49       X-Ray Lumbar Complete Including Flex And Ext  Order: [id number]  Status: Final result       Visible to patient: Yes (not seen)       Next appt: 03/12/2024 at 07:40 AM in Primary Care (Cedric Huerta MD)       Dx: Lumbar spondylosis; DDD (degenerative...    0 Result Notes  Details    Reading Physician Reading Date Result Priority   Keith Mann MD  [phone] 2/28/2023 Routine     Narrative & Impression  EXAMINATION:  XR LUMBAR SPINE 5 VIEW WITH FLEX AND EXT     CLINICAL HISTORY:  Spondylosis without myelopathy or radiculopathy, lumbar region     COMPARISON:  No comparison studies are available.     FINDINGS:  There are 5 weight bearing lumbar vertebra.  Mild convex right curvature of the lower lumbar spine.  Lower lumbar spine degenerative facet arthropathy.  Mild marginal spondylosis.  The vertebral body heights and intervertebral disc heights are   well-maintained. Negative for spondylolysis or spondylolisthesis. The sacral ala and sacroiliac joints are intact. The bowel gas pattern is normal.     Vascular calcifications are present without aneurysmal change.  There are phleboliths versus ureteroliths some overlying the pelvis.     Impression:     1.  Negative for acute process involving the lumbar spine.     2.  Degenerative facet arthropathy of the lower lumbar spine with mild convex-right curvature of the lumbar spine.     3.  Incidental findings as noted above.        Electronically signed by: Keith Mann MD  Date:                                            02/28/2023  Time:                                           15:32       Assessment:     1. Positive RICHARD (antinuclear antibody)    2. Rheumatoid arthritis, involving unspecified site, unspecified whether rheumatoid factor present    3. High risk medication use    4. Medication monitoring encounter    5. Vitamin D deficiency    6. Immunocompromised patient        Plan:     Diagnoses and all  orders for this visit:    Positive RICHARD (antinuclear antibody)  -     Anti-DNA Ab, Double-Stranded; Standing  -     Protein/Creatinine Ratio, Urine; Standing  -     C4 Complement; Standing  -     C3 Complement; Standing  -     Urinalysis Microscopic; Standing    Rheumatoid arthritis, involving unspecified site, unspecified whether rheumatoid factor present    High risk medication use    Medication monitoring encounter    Vitamin D deficiency  -     Vitamin D; Standing    Immunocompromised patient    Seropositive RA  Resume  mg bid  Pt aware of need for yearly eye exams  Had dilated eye exam w Dr. Vásquez 9/2023  Reg 4 reviewed as above and stable  +RICHARD w alopecia and thrombocytopenia  Suspicious for overlapping UCTD  Check SLE labs in 3 weeks  C/w hcq as above  LBP  Facet arthropathy on xray  Failed PT  Recommend follow up w Dr. ePreyra for further recs  Age related bone loss  DEXA personally reviewed from 10/2023  No signficant bone loss  Vit D 16 2/2023  C/w ergo 50 ku twice weekly  Recheck Vit D next labs  Drug therapy requiring intensive monitoring for toxicity  High Risk Medication Monitoring encounter  No current medication related issues, no evidence of toxicity  I ordered labs for toxicity monitoring, have personally reviewed the findings, and discussed them with the patient.  Pending labs will be sent via the portal  Compromised immune system secondary to autoimmune disease and/or use of immunosuppressive drugs.  Monitor carefully for infections.  Advised patient to get immediate medical care if any infection arises.  Also advised strict adherence age-appropriate vaccinations and cancer screenings with PCP.  Patient advised to hold DMARD and/or biologic therapy for signs of infection or for surgery. If you are unsure what to do please call our office for instruction.[facility] [phone]  Return to clinic: 6 mos w SLE labs prior    Follow up in about 6 months (around  8/19/2024).    The patient understands, chooses and consents to this plan and accepts all the risks which include but are not limited to the risks mentioned above.     Disclaimer: This note was prepared using a voice recognition system and is likely to have sound alike errors within the text.

## 2024-02-20 ENCOUNTER — TELEPHONE (OUTPATIENT)
Dept: PAIN MEDICINE | Facility: CLINIC | Age: 64
End: 2024-02-20
Payer: MEDICARE

## 2024-02-20 NOTE — TELEPHONE ENCOUNTER
----- Message from John Winston MA sent at 2/19/2024  1:34 PM CST -----    ----- Message -----  From: Meg Rodriguez PA-C  Sent: 2/19/2024  11:46 AM CST  To: Roddy Asencio Staff    Please contact pt to schedule appt for follow up on low back pain.  She saw him in 2/2023 and has completed PT.  Now c/o N/T in both feet  Meg Rodriguez PA-C  [facility]  [facility]

## 2024-03-01 ENCOUNTER — OFFICE VISIT (OUTPATIENT)
Dept: OPHTHALMOLOGY | Facility: CLINIC | Age: 64
End: 2024-03-01
Payer: MEDICARE

## 2024-03-01 DIAGNOSIS — E11.9 DIABETES MELLITUS WITHOUT COMPLICATION: ICD-10-CM

## 2024-03-01 DIAGNOSIS — Z79.899 ENCOUNTER FOR LONG-TERM (CURRENT) USE OF OTHER MEDICATIONS: Primary | ICD-10-CM

## 2024-03-01 DIAGNOSIS — H40.013 OAG (OPEN ANGLE GLAUCOMA) SUSPECT, LOW RISK, BILATERAL: ICD-10-CM

## 2024-03-01 DIAGNOSIS — E11.36 DIABETIC CATARACT: ICD-10-CM

## 2024-03-01 PROCEDURE — 99999 PR PBB SHADOW E&M-EST. PATIENT-LVL II: CPT | Mod: PBBFAC,,, | Performed by: OPTOMETRIST

## 2024-03-01 PROCEDURE — 92083 EXTENDED VISUAL FIELD XM: CPT | Mod: S$GLB,,, | Performed by: OPTOMETRIST

## 2024-03-01 PROCEDURE — 99214 OFFICE O/P EST MOD 30 MIN: CPT | Mod: S$GLB,,, | Performed by: OPTOMETRIST

## 2024-03-01 PROCEDURE — 92134 CPTRZ OPH DX IMG PST SGM RTA: CPT | Mod: S$GLB,,, | Performed by: OPTOMETRIST

## 2024-03-01 NOTE — PROGRESS NOTES
HPI     Follow-up            Comments: Follow up with [facility]          Comments    Diagnosed with diabetes in 2020  Lab Results       Component                Value               Date                       HGBA1C                   6.1 (H)             10/16/2023                1. Dm dx 2020  2. Low risk glaucoma suspect   -GCL 32/32     Vision changes since last eye exam?: Pt has complaints at this time for   any vision changes and or complications.      Any eye pain today: No.    Other ocular symptoms: No.                Last edited by Caro Talley on 3/1/2024  8:43 AM.            Assessment /Plan     For exam results, see Encounter Report.    Encounter for long-term (current) use of other medications  -     [facility]y Visual Field - OU - Extended - Both Eyes  -     OCT, Retina - OU - Both Eyes  Currently taking 200 mg 2 times per day for 2 years for Rheumatoid Arthritis . There was no maculopathy present in either eye. Recommend repeat testing in 1 year due to duration of taking medication.      Diabetes mellitus without complication  Stable, recommend good blood pressure control, strict blood glucose control, and good cholesterol control.  Continue close care with Dr. Huerta regarding diabetes.     Diabetic cataract  Not visually significant, observe.    OAG (open angle glaucoma) suspect, low risk, bilateral  IOP WNL, continue to monitor with annual gOCT and DFE.      RTC 8 months for diabetic eye exam with gOCT, sooner if changes to vision or worsening symptoms.  Discussed above and answered questions.

## 2024-03-12 ENCOUNTER — CLINICAL SUPPORT (OUTPATIENT)
Dept: REHABILITATION | Facility: HOSPITAL | Age: 64
End: 2024-03-12
Payer: MEDICARE

## 2024-03-12 ENCOUNTER — OFFICE VISIT (OUTPATIENT)
Dept: PAIN MEDICINE | Facility: CLINIC | Age: 64
End: 2024-03-12
Payer: MEDICARE

## 2024-03-12 ENCOUNTER — LAB VISIT (OUTPATIENT)
Dept: LAB | Facility: HOSPITAL | Age: 64
End: 2024-03-12
Attending: FAMILY MEDICINE
Payer: MEDICARE

## 2024-03-12 ENCOUNTER — OFFICE VISIT (OUTPATIENT)
Dept: PRIMARY CARE CLINIC | Facility: CLINIC | Age: 64
End: 2024-03-12
Payer: MEDICARE

## 2024-03-12 VITALS
HEART RATE: 58 BPM | HEIGHT: 62 IN | WEIGHT: 171.06 LBS | SYSTOLIC BLOOD PRESSURE: 104 MMHG | BODY MASS INDEX: 31.48 KG/M2 | DIASTOLIC BLOOD PRESSURE: 78 MMHG

## 2024-03-12 VITALS
HEIGHT: 62 IN | HEART RATE: 80 BPM | DIASTOLIC BLOOD PRESSURE: 80 MMHG | RESPIRATION RATE: 17 BRPM | WEIGHT: 171.5 LBS | SYSTOLIC BLOOD PRESSURE: 107 MMHG | BODY MASS INDEX: 31.56 KG/M2

## 2024-03-12 DIAGNOSIS — M25.552 BILATERAL HIP PAIN: ICD-10-CM

## 2024-03-12 DIAGNOSIS — M19.90 OSTEOARTHRITIS, UNSPECIFIED OSTEOARTHRITIS TYPE, UNSPECIFIED SITE: ICD-10-CM

## 2024-03-12 DIAGNOSIS — E55.9 VITAMIN D DEFICIENCY: ICD-10-CM

## 2024-03-12 DIAGNOSIS — Z74.09 IMPAIRED FUNCTIONAL MOBILITY, BALANCE, GAIT, AND ENDURANCE: ICD-10-CM

## 2024-03-12 DIAGNOSIS — G89.29 CHRONIC HIP PAIN, BILATERAL: ICD-10-CM

## 2024-03-12 DIAGNOSIS — M25.551 CHRONIC HIP PAIN, BILATERAL: ICD-10-CM

## 2024-03-12 DIAGNOSIS — M54.41 CHRONIC BILATERAL LOW BACK PAIN WITH BILATERAL SCIATICA: ICD-10-CM

## 2024-03-12 DIAGNOSIS — R29.898 WEAKNESS OF BOTH LOWER EXTREMITIES: ICD-10-CM

## 2024-03-12 DIAGNOSIS — M06.9 RHEUMATOID ARTHRITIS, INVOLVING UNSPECIFIED SITE, UNSPECIFIED WHETHER RHEUMATOID FACTOR PRESENT: ICD-10-CM

## 2024-03-12 DIAGNOSIS — M54.42 CHRONIC BILATERAL LOW BACK PAIN WITH BILATERAL SCIATICA: ICD-10-CM

## 2024-03-12 DIAGNOSIS — M51.36 DDD (DEGENERATIVE DISC DISEASE), LUMBAR: ICD-10-CM

## 2024-03-12 DIAGNOSIS — G89.29 CHRONIC BILATERAL LOW BACK PAIN WITH BILATERAL SCIATICA: ICD-10-CM

## 2024-03-12 DIAGNOSIS — E78.5 HYPERLIPIDEMIA, UNSPECIFIED HYPERLIPIDEMIA TYPE: ICD-10-CM

## 2024-03-12 DIAGNOSIS — E11.69 TYPE 2 DIABETES MELLITUS WITH OTHER SPECIFIED COMPLICATION, WITHOUT LONG-TERM CURRENT USE OF INSULIN: Primary | ICD-10-CM

## 2024-03-12 DIAGNOSIS — M25.551 BILATERAL HIP PAIN: ICD-10-CM

## 2024-03-12 DIAGNOSIS — M47.816 LUMBAR SPONDYLOSIS: ICD-10-CM

## 2024-03-12 DIAGNOSIS — E11.69 TYPE 2 DIABETES MELLITUS WITH OTHER SPECIFIED COMPLICATION, WITHOUT LONG-TERM CURRENT USE OF INSULIN: ICD-10-CM

## 2024-03-12 DIAGNOSIS — M54.12 CERVICAL RADICULOPATHY: ICD-10-CM

## 2024-03-12 DIAGNOSIS — M46.1 SACROILIITIS: Primary | ICD-10-CM

## 2024-03-12 DIAGNOSIS — R76.8 POSITIVE ANA (ANTINUCLEAR ANTIBODY): ICD-10-CM

## 2024-03-12 DIAGNOSIS — M25.552 CHRONIC HIP PAIN, BILATERAL: ICD-10-CM

## 2024-03-12 DIAGNOSIS — R29.898 UPPER EXTREMITY WEAKNESS: Primary | ICD-10-CM

## 2024-03-12 LAB
25(OH)D3+25(OH)D2 SERPL-MCNC: 15 NG/ML (ref 30–96)
ALBUMIN SERPL BCP-MCNC: 3.8 G/DL (ref 3.5–5.2)
ALP SERPL-CCNC: 80 U/L (ref 55–135)
ALT SERPL W/O P-5'-P-CCNC: 10 U/L (ref 10–44)
ANION GAP SERPL CALC-SCNC: 12 MMOL/L (ref 8–16)
AST SERPL-CCNC: 18 U/L (ref 10–40)
BACTERIA #/AREA URNS HPF: ABNORMAL /HPF
BASOPHILS # BLD AUTO: 0.03 K/UL (ref 0–0.2)
BASOPHILS NFR BLD: 0.4 % (ref 0–1.9)
BILIRUB SERPL-MCNC: 0.5 MG/DL (ref 0.1–1)
BUN SERPL-MCNC: 16 MG/DL (ref 8–23)
C3 SERPL-MCNC: 135 MG/DL (ref 50–180)
C4 SERPL-MCNC: 45 MG/DL (ref 11–44)
CALCIUM SERPL-MCNC: 9.2 MG/DL (ref 8.7–10.5)
CHLORIDE SERPL-SCNC: 107 MMOL/L (ref 95–110)
CO2 SERPL-SCNC: 21 MMOL/L (ref 23–29)
CREAT SERPL-MCNC: 0.9 MG/DL (ref 0.5–1.4)
CREAT UR-MCNC: 282.4 MG/DL (ref 15–325)
CRP SERPL-MCNC: 6.1 MG/L (ref 0–8.2)
DIFFERENTIAL METHOD BLD: ABNORMAL
EOSINOPHIL # BLD AUTO: 0.3 K/UL (ref 0–0.5)
EOSINOPHIL NFR BLD: 3.8 % (ref 0–8)
ERYTHROCYTE [DISTWIDTH] IN BLOOD BY AUTOMATED COUNT: 14.4 % (ref 11.5–14.5)
ERYTHROCYTE [SEDIMENTATION RATE] IN BLOOD BY WESTERGREN METHOD: 8 MM/HR (ref 0–20)
EST. GFR  (NO RACE VARIABLE): >60 ML/MIN/1.73 M^2
ESTIMATED AVG GLUCOSE: 131 MG/DL (ref 68–131)
GLUCOSE SERPL-MCNC: 91 MG/DL (ref 70–110)
HBA1C MFR BLD: 6.2 % (ref 4–5.6)
HCT VFR BLD AUTO: 44.3 % (ref 37–48.5)
HGB BLD-MCNC: 14 G/DL (ref 12–16)
HYALINE CASTS #/AREA URNS LPF: 1 /LPF
IMM GRANULOCYTES # BLD AUTO: 0.01 K/UL (ref 0–0.04)
IMM GRANULOCYTES NFR BLD AUTO: 0.1 % (ref 0–0.5)
LYMPHOCYTES # BLD AUTO: 3.4 K/UL (ref 1–4.8)
LYMPHOCYTES NFR BLD: 49.4 % (ref 18–48)
MCH RBC QN AUTO: 27.9 PG (ref 27–31)
MCHC RBC AUTO-ENTMCNC: 31.6 G/DL (ref 32–36)
MCV RBC AUTO: 88 FL (ref 82–98)
MICROSCOPIC COMMENT: ABNORMAL
MONOCYTES # BLD AUTO: 0.6 K/UL (ref 0.3–1)
MONOCYTES NFR BLD: 8.6 % (ref 4–15)
NEUTROPHILS # BLD AUTO: 2.6 K/UL (ref 1.8–7.7)
NEUTROPHILS NFR BLD: 37.7 % (ref 38–73)
NRBC BLD-RTO: 0 /100 WBC
PLATELET # BLD AUTO: 201 K/UL (ref 150–450)
PMV BLD AUTO: 11.3 FL (ref 9.2–12.9)
POTASSIUM SERPL-SCNC: 4 MMOL/L (ref 3.5–5.1)
PROT SERPL-MCNC: 7.7 G/DL (ref 6–8.4)
PROT UR-MCNC: 13 MG/DL (ref 0–15)
PROT/CREAT UR: 0.05 MG/G{CREAT} (ref 0–0.2)
RBC # BLD AUTO: 5.02 M/UL (ref 4–5.4)
RBC #/AREA URNS HPF: 2 /HPF (ref 0–4)
SODIUM SERPL-SCNC: 140 MMOL/L (ref 136–145)
SQUAMOUS #/AREA URNS HPF: 1 /HPF
WBC # BLD AUTO: 6.84 K/UL (ref 3.9–12.7)
WBC #/AREA URNS HPF: 28 /HPF (ref 0–5)
WBC CLUMPS URNS QL MICRO: ABNORMAL

## 2024-03-12 PROCEDURE — 97110 THERAPEUTIC EXERCISES: CPT | Mod: PN

## 2024-03-12 PROCEDURE — 86160 COMPLEMENT ANTIGEN: CPT | Performed by: PHYSICIAN ASSISTANT

## 2024-03-12 PROCEDURE — 83036 HEMOGLOBIN GLYCOSYLATED A1C: CPT | Performed by: FAMILY MEDICINE

## 2024-03-12 PROCEDURE — 86140 C-REACTIVE PROTEIN: CPT | Performed by: PHYSICIAN ASSISTANT

## 2024-03-12 PROCEDURE — 86160 COMPLEMENT ANTIGEN: CPT | Mod: 59 | Performed by: PHYSICIAN ASSISTANT

## 2024-03-12 PROCEDURE — 99999 PR PBB SHADOW E&M-EST. PATIENT-LVL IV: CPT | Mod: PBBFAC,,, | Performed by: ANESTHESIOLOGY

## 2024-03-12 PROCEDURE — 85025 COMPLETE CBC W/AUTO DIFF WBC: CPT | Performed by: PHYSICIAN ASSISTANT

## 2024-03-12 PROCEDURE — 99215 OFFICE O/P EST HI 40 MIN: CPT | Mod: S$GLB,,, | Performed by: FAMILY MEDICINE

## 2024-03-12 PROCEDURE — 99999 PR PBB SHADOW E&M-EST. PATIENT-LVL IV: CPT | Mod: PBBFAC,,, | Performed by: FAMILY MEDICINE

## 2024-03-12 PROCEDURE — G2211 COMPLEX E/M VISIT ADD ON: HCPCS | Mod: S$GLB,,, | Performed by: FAMILY MEDICINE

## 2024-03-12 PROCEDURE — 81000 URINALYSIS NONAUTO W/SCOPE: CPT | Performed by: PHYSICIAN ASSISTANT

## 2024-03-12 PROCEDURE — 84156 ASSAY OF PROTEIN URINE: CPT | Performed by: PHYSICIAN ASSISTANT

## 2024-03-12 PROCEDURE — 80053 COMPREHEN METABOLIC PANEL: CPT | Performed by: PHYSICIAN ASSISTANT

## 2024-03-12 PROCEDURE — 82306 VITAMIN D 25 HYDROXY: CPT | Performed by: PHYSICIAN ASSISTANT

## 2024-03-12 PROCEDURE — 85651 RBC SED RATE NONAUTOMATED: CPT | Performed by: PHYSICIAN ASSISTANT

## 2024-03-12 PROCEDURE — 86225 DNA ANTIBODY NATIVE: CPT | Performed by: PHYSICIAN ASSISTANT

## 2024-03-12 PROCEDURE — 97163 PT EVAL HIGH COMPLEX 45 MIN: CPT | Mod: PN

## 2024-03-12 PROCEDURE — 99213 OFFICE O/P EST LOW 20 MIN: CPT | Mod: S$GLB,,, | Performed by: ANESTHESIOLOGY

## 2024-03-12 RX ORDER — ATORVASTATIN CALCIUM 80 MG/1
80 TABLET, FILM COATED ORAL DAILY
Qty: 90 TABLET | Refills: 3 | Status: SHIPPED | OUTPATIENT
Start: 2024-03-12

## 2024-03-12 RX ORDER — SEMAGLUTIDE 2.68 MG/ML
2 INJECTION, SOLUTION SUBCUTANEOUS
Qty: 9 ML | Refills: 3 | Status: SHIPPED | OUTPATIENT
Start: 2024-03-12 | End: 2025-03-12

## 2024-03-12 RX ORDER — TRAZODONE HYDROCHLORIDE 50 MG/1
100 TABLET ORAL NIGHTLY PRN
Qty: 180 TABLET | Refills: 3 | Status: SHIPPED | OUTPATIENT
Start: 2024-03-12 | End: 2025-03-12

## 2024-03-12 NOTE — PATIENT INSTRUCTIONS
Overall, everything looks pretty good today.      Rheumatology is getting some blood work on you today, so I am going to add an A1c test.  We will contact you with the results as soon as they are available.      If the A1c stays below 7%, we may back off on the metformin by one tablet.  If it is 6% or lower, we will probably just stop it altogether.  I will let you know once I see the results.      Continue taking all of your other medications, as directed.      I was able to schedule you an appointment with Dr. Pereyra this morning at 10:00 a.m..  When we are finished, you can go to the lab to get the blood drawn, then check in to see him.      Referral placed for physical therapy at [facility] in [place].  Feel free to call them at [phone] to schedule an appointment.  They are located at:  [address], [address]  [place] [address]    [email or web address]    Continue to eat a healthy diet.  Be careful with portion sizes.  Includes lots of fresh fruits, vegetables, whole grains, lean proteins.  See info below.    Keep hydrated.  Be sure to drink at least 8-10, 8 oz, glasses of water every day.    Stay active.  Try to do some sort of physical activity every day.  Nothing outrageous, just try walking for 10-15 minutes each day.     If you go more than two days between bowel movements, try taking a double dose of MiraLax, then take a single dose daily for 3-5 days.  That should get you regular again.

## 2024-03-12 NOTE — PROGRESS NOTES
[facility]  Physical Therapy Initial Evaluation    Date: 3/12/2024   Name: Luciana Moraes  Clinic Number: [id number]    Therapy Diagnosis:   Encounter Diagnoses   Name Primary?    Cervical radiculopathy     Osteoarthritis, unspecified osteoarthritis type, unspecified site     Bilateral hip pain     Upper extremity weakness Yes    Chronic hip pain, bilateral     Chronic bilateral low back pain with bilateral sciatica     Weakness of both lower extremities     Impaired functional mobility, balance, gait, and endurance      Physician: Cedric Huerta MD    Physician Orders: PT Eval and Treat   Medical Diagnosis from Referral:    M54.12 (ICD-10-CM) - Cervical radiculopathy   M19.90 (ICD-10-CM) - Osteoarthritis, unspecified osteoarthritis type, unspecified site   M25.551,M25.552 (ICD-10-CM) - Bilateral hip pain  Evaluation Date: 3/12/2024  Authorization Period Expiration: 12/31/2024  Plan of Care Expiration: 6/12/2024  Visit # / Visits authorized: 1/ 1    PN DUE: 4/12/2024    Time In: 11:00 AM  Time Out: 11:45 AM  Total Appointment Time (timed & untimed codes): 45 minutes    Precautions: Standard, Diabetes, RA    Subjective   Date of onset: She reports that she has had this pain for 5+years.   History of current condition - Luciana reports: bilateral neck, bilateral hips and bilateral lower back pain. She reports that the pain radiates down bilateral lower extremities in the posterior aspect and reports numbness and tingling in bilateral lower extremities. She states increased pain in low back and hips with walking, prolonged sitting, lifting more than 10 pounds, and transitional movements. She reports that she wants/needs a lift chair to help her get off her chair at home. She reports increased neck pain with sleeping, raising her arms, bathing/dressing, holding purse, and lifting groceries. She reports that her pain is constant and all the time.     She states that her insurance is  going to cover going to see a chiropractor and is going to inquire about this following her appointment today. She also states that she would rather have home health physical therapy so that she can stay at home.      Medical History:   Past Medical History:   Diagnosis Date    Diabetes mellitus, type 2     Hyperlipidemia     Osteoarthritis     Polymyositis     Rheumatoid arthritis      Surgical History:   Luciana Moraes  has a past surgical history that includes Hysterectomy (2008); Carpal tunnel release (Bilateral); Hip surgery; Epidural steroid injection into cervical spine (N/A, 09/22/2022); Injection of anesthetic agent around medial branch nerves innervating cervical facet joint (Bilateral, 10/31/2022); Colonoscopy (N/A, 11/08/2022); Oophorectomy; Tubal ligation (1985); and Injection of anesthetic agent around medial branch nerves innervating cervical facet joint (Bilateral, 3/13/2023).    Medications:   Luciana has a current medication list which includes the following prescription(s): aspirin, atorvastatin, diclofenac sodium, ergocalciferol, hydroxychloroquine, ibuprofen, metformin, nabumetone, polyethylene glycol, pregabalin, ozempic, tizanidine, and trazodone.    Allergies:   Review of patient's allergies indicates:  No Known Allergies     Imaging:   X-Ray Cervical Spine 5 View With Flex And Ext(2/27/2023):  Mild moderate disc space narrowing and osteophytic lipping C5-C6.  Mild osteophytic lipping anteriorly at C4-C5.  Alignment satisfactory.  No spondylolisthesis with flexion extension.  Mild osseous foraminal narrowing C5-C6 on the right.  Odontoid intact.  X-Ray Lumbar Complete Including Flex And Ext X-Ray Lumbar Complete Including Flex And Ext (2/28/2023): Negative for acute process involving the lumbar spine. Degenerative facet arthropathy of the lower lumbar spine with mild convex-right curvature of the lumbar spine.    Prior Therapy: Yes  Social History:  lives alone  Occupation: Not  currently working, but looking for work   Prior Level of Function: Independent with functional mobility, recreational activities, ADLs, school activities, and house chores.   Current Level of Function: Pain is constant and debilitating in all daily activities and motions    Pain:  -Neck: Current 8/10, worst 10/10, best 6/10   -Back: Current: 10/10, worst 10/10, best 7/10  Location: (B) back/hips and (B) neck   Description:   - Neck: sharp and constant   - Back: sharp and constant    Aggravating Factors:   - sitting, standing, desk work, house chores, transitional movements, walking, sleeping   Easing Factors: stretch out, switch position, apply pressure (at neck)     Patients goals: decreased pain     Objective   Observation: pt pleasant and appropriate throughout evaluation; A&O x 3     Posture: Patient in static sitting has bilateral rounded shoulders and forwards head. She is unable to sit for longer than 15 minutes before having to stand due to pain.     Cervical Range of Motion:     Degrees Pain   Flexion (50) 50 Pain      Extension (75) 25 Pain down entire spine      Right Rotation (75) 35 Pain on right      Left Rotation (75) 50 Pain on left      Right Side Bending (45) 15 Pain on right side   Left Side Bending (45) 15 Pain on left side      Shoulder Range of Motion:   - Pt has full range of motion in bilateral shoulders. Pain with abduction motions.      Upper Extremity Strength  (R) UE   (L) UE     Shoulder flexion: 3+/5 Shoulder flexion: 3+/5   Shoulder Abduction: 3+/5 Shoulder abduction: 3+/5   Shoulder ER 3+/5 Shoulder ER 3+/5   Shoulder IR 3+/5 Shoulder IR 3+/5   Elbow flexion: 4+/5 Elbow flexion: 4+/5   Elbow extension: 4/5 Elbow extension: 4/5    4+/5 : 4+/5   Lower Trap 4-/5 Lower Trap 4-/5   Middle Trap 4-/5 Middle Trap 4-/5   Rhomboids 4-/5 Rhomboids 4-/5      Special Tests:  Distraction Slight relief of pain    Compression Increased pain    Spurlings Increased pain       Lumbar Range of  "Motion:     % of normal motion Pain   Flexion 75%    Pain         Extension 20%    Pain         Left Side Bending 20% Pain on right         Right Side Bending 20% Pain on right         Left rotation    20% Pain         Right Rotation    20% Pain               Lower Extremity Strength  Right LE   Left LE     Knee extension: 4/5 Knee extension: 4/5   Knee flexion: 4/5 Knee flexion: 4/5   Hip flexion: 4-/5; pain Hip flexion: 4-/5; pain   Hip extension:  4/5 Hip extension: 4/5   Hip abduction: 4-/5 Hip abduction: 4-/5   Hip adduction: 4-/5; pain Hip adduction 4-/5; pain   Ankle dorsiflexion: 4+/5 Ankle dorsiflexion: 4+/5   Ankle plantarflexion: 5/5 Ankle plantarflexion: 5/5      Joint Mobility: Pain with PA's and transverse glides of cervical spine. Pain with PA's to lumbar spine     Palpation: TTP over bilateral upper trap, (R) cervical paraspinals. TTP (L) QL, (L) paraspinals, (L) PSIS, and (L) piriformis      Sensation: bilateral upper and lower extremities intact to light touch.      Function: FOTO      TREATMENT   Treatment Time In: 11:25 AM  Treatment Time Out: 11:45 AM  Total Treatment time (time-based codes) separate from Evaluation: 20 minutes    Adinas received therapeutic exercises to develop strength, endurance, ROM, flexibility, posture, and core stabilization for 20 minutes including:  UE:   - Upper trap stretch 2 x 30"  - levator scap stretch 2 x 30"  - posterior shoulder rolls   - Scapular retraction     LE:   - F4 LTR  - Single Knee to chest   - Open Book   - 3 way forward flexion     Home Exercises and Patient Education Provided    Education provided:   - PT POC  - HEP  - PT prognosis and diagnosis  - all questions and concerns answered       Written Home Exercises Provided: yes.  Exercises were reviewed and Luciana was able to demonstrate them prior to the end of the session.  [place] demonstrated fair  understanding of the education provided.     See EMR under Patient Instructions for exercises " provided  3/12/2024 .    Assessment   Luciana is a 63 y.o. female referred to outpatient Physical Therapy with a medical diagnosis of M54.12 (ICD-10-CM) - Cervical radiculopathy M19.90 (ICD-10-CM) - Osteoarthritis, unspecified osteoarthritis type, unspecified site M25.551,M25.552 (ICD-10-CM) - Bilateral hip pain. The patient presents with impairments which include impairments list: ROM, strength, endurance, joint mobility, muscle length, balance, posture, gait mechanics, core strength and stability, and functional movement patterns.  These impairments are limiting patient's ability to perform daily tasks without being in constant pain. Pt prognosis is Fair due to personal factors and co-morbidities listed below. Pt will benefit from skilled outpatient Physical Therapy to address the deficits stated above and in the chart below, provide pt/family education, and to maximize pt's level of independence.     Plan of care discussed with patient: Yes  Patient's spiritual, cultural and educational needs considered and patient is agreeable to the plan of care and goals as stated below:     Anticipated Barriers for therapy: pain, compliance    Medical Necessity is demonstrated by the following:   History  Co-morbidities and personal factors that may impact the plan of care [] LOW: no personal factors / co-morbidities  [] MODERATE: 1-2 personal factors / co-morbidities  [x] HIGH: 3+ personal factors / co-morbidities    Moderate / High Support Documentation:   Co-morbidities affecting plan of care: See Above    Personal Factors:   age     Examination  Body Structures and Functions, activity limitations and participation restrictions that may impact the plan of care [] LOW: addressing 1-2 elements  [] MODERATE: 3+ elements  [x] HIGH: 4+ elements (please support below)    Moderate / High Support Documentation: (B) neck pain, impaired cervical range of motion, upper extremity weakness, poor posture, low back pain, bilateral hip  pain, numbness and tingling in bilateral lower extremities, impaired lumbar range of motion, lower extremity weakness     Clinical Presentation [] LOW: stable  [] MODERATE: Evolving  [x] HIGH: Unstable     Decision Making/ Complexity Score: high       Goals:  Short Term Goals: 5 weeks   Patient will demonstrate independence with HEP in order to progress toward functional independence  Pt will demonstrate improved pain by reports of less than or equal to 7/10 worst pain of cervical neck on the verbal rating scale in order to progress toward maximal functional ability and improve QOL.  Pt will be able to correct slouched posture in static sitting with minimal verbal cueing for improved QOL  Pt will improve cervical range of motion in all planes by 25% for improved QOL and increased ease with house hold chores   Patient will improve bilateral upper extremity strength by 1/2 MMT for improved strength needed to carry groceries  Pt will demonstrate improved pain by reports of less than or equal to 7/10 worst pain of low back/hips on the verbal rating scale in order to progress toward maximal functional ability and improve QOL.  Pt will improve lumbar range of motion by 25% for improved QOL and functional mobility   Patient will improve bilateral lower extremity strength by 1/2 MMT for improved ease with transitional movements.      Long Term Goals: 10 weeks   Patient will demonstrate improved function as indicated by a functional score of 54 on the FOTO.  Pt will demonstrate improved pain by reports of less than or equal to 5/10 worst pain of cervical neck on the verbal rating scale in order to progress toward maximal functional ability and improve QOL.  Pt will improve slouched posture in static sitting for improved QOL and increased ease with schooling activities.   Pt will improve cervical range of motion with within functional limits with less than 3/10 pain for improved mobility needed to perform all house hold  chores  Pt will improved bilateral upper extremity strength to greater than or equal to 4+/5 with less than 3/10 pain for improved functional upper extremity strength.   Pt will demonstrate improved pain by reports of less than or equal to 5/10 worst pain of low back on the verbal rating scale in order to progress toward maximal functional ability and improve QOL.  Pt will improve lumbar range of motion to within functional limits for improved functional mobility   Pt will improve bilateral lower extremity strength to greater than or equal to 4+/5 for improved functional lower extremity strength     Plan   Plan of care Certification: 3/12/2024 to 6/12/2024.    Outpatient Physical Therapy 2 times weekly for 10 weeks to include the following interventions: Cervical/Lumbar Traction, Electrical Stimulation  , Gait Training, Manual Therapy, Moist Heat/ Ice, Neuromuscular Re-ed, Orthotic Management and Training, Patient Education, Self Care, Therapeutic Activities, Therapeutic Exercise, and Ultrasound. And any other therapies and modalities as clinically indicated and appropriate, including but not limited to FDN and telehealth. Pt may be seen by PTA as a part of pt's care team.     Ana Alonzo, PT, DPT  3/12/2024

## 2024-03-12 NOTE — PLAN OF CARE
[facility]  Physical Therapy Initial Evaluation    Date: 3/12/2024   Name: Luciana Moraes  Clinic Number: [id number]    Therapy Diagnosis:   Encounter Diagnoses   Name Primary?    Cervical radiculopathy     Osteoarthritis, unspecified osteoarthritis type, unspecified site     Bilateral hip pain     Upper extremity weakness Yes    Chronic hip pain, bilateral     Chronic bilateral low back pain with bilateral sciatica     Weakness of both lower extremities     Impaired functional mobility, balance, gait, and endurance      Physician: Cedric Huerta MD    Physician Orders: PT Eval and Treat   Medical Diagnosis from Referral:    M54.12 (ICD-10-CM) - Cervical radiculopathy   M19.90 (ICD-10-CM) - Osteoarthritis, unspecified osteoarthritis type, unspecified site   M25.551,M25.552 (ICD-10-CM) - Bilateral hip pain  Evaluation Date: 3/12/2024  Authorization Period Expiration: 12/31/2024  Plan of Care Expiration: 6/12/2024  Visit # / Visits authorized: 1/ 1    PN DUE: 4/12/2024    Time In: 11:00 AM  Time Out: 11:45 AM  Total Appointment Time (timed & untimed codes): 45 minutes    Precautions: Standard, Diabetes, RA    Subjective   Date of onset: She reports that she has had this pain for 5+years.   History of current condition - Luciana reports: bilateral neck, bilateral hips and bilateral lower back pain. She reports that the pain radiates down bilateral lower extremities in the posterior aspect and reports numbness and tingling in bilateral lower extremities. She states increased pain in low back and hips with walking, prolonged sitting, lifting more than 10 pounds, and transitional movements. She reports that she wants/needs a lift chair to help her get off her chair at home. She reports increased neck pain with sleeping, raising her arms, bathing/dressing, holding purse, and lifting groceries. She reports that her pain is constant and all the time.     She states that her insurance is  going to cover going to see a chiropractor and is going to inquire about this following her appointment today. She also states that she would rather have home health physical therapy so that she can stay at home.      Medical History:   Past Medical History:   Diagnosis Date    Diabetes mellitus, type 2     Hyperlipidemia     Osteoarthritis     Polymyositis     Rheumatoid arthritis      Surgical History:   Luciana Moraes  has a past surgical history that includes Hysterectomy (2008); Carpal tunnel release (Bilateral); Hip surgery; Epidural steroid injection into cervical spine (N/A, 09/22/2022); Injection of anesthetic agent around medial branch nerves innervating cervical facet joint (Bilateral, 10/31/2022); Colonoscopy (N/A, 11/08/2022); Oophorectomy; Tubal ligation (1985); and Injection of anesthetic agent around medial branch nerves innervating cervical facet joint (Bilateral, 3/13/2023).    Medications:   Luciana has a current medication list which includes the following prescription(s): aspirin, atorvastatin, diclofenac sodium, ergocalciferol, hydroxychloroquine, ibuprofen, metformin, nabumetone, polyethylene glycol, pregabalin, ozempic, tizanidine, and trazodone.    Allergies:   Review of patient's allergies indicates:  No Known Allergies     Imaging:   X-Ray Cervical Spine 5 View With Flex And Ext(2/27/2023):  Mild moderate disc space narrowing and osteophytic lipping C5-C6.  Mild osteophytic lipping anteriorly at C4-C5.  Alignment satisfactory.  No spondylolisthesis with flexion extension.  Mild osseous foraminal narrowing C5-C6 on the right.  Odontoid intact.  X-Ray Lumbar Complete Including Flex And Ext X-Ray Lumbar Complete Including Flex And Ext (2/28/2023): Negative for acute process involving the lumbar spine. Degenerative facet arthropathy of the lower lumbar spine with mild convex-right curvature of the lumbar spine.    Prior Therapy: Yes  Social History:  lives alone  Occupation: Not  currently working, but looking for work   Prior Level of Function: Independent with functional mobility, recreational activities, ADLs, school activities, and house chores.   Current Level of Function: Pain is constant and debilitating in all daily activities and motions    Pain:  -Neck: Current 8/10, worst 10/10, best 6/10   -Back: Current: 10/10, worst 10/10, best 7/10  Location: (B) back/hips and (B) neck   Description:   - Neck: sharp and constant   - Back: sharp and constant    Aggravating Factors:   - sitting, standing, desk work, house chores, transitional movements, walking, sleeping   Easing Factors: stretch out, switch position, apply pressure (at neck)     Patients goals: decreased pain     Objective   Observation: pt pleasant and appropriate throughout evaluation; A&O x 3     Posture: Patient in static sitting has bilateral rounded shoulders and forwards head. She is unable to sit for longer than 15 minutes before having to stand due to pain.     Cervical Range of Motion:     Degrees Pain   Flexion (50) 50 Pain      Extension (75) 25 Pain down entire spine      Right Rotation (75) 35 Pain on right      Left Rotation (75) 50 Pain on left      Right Side Bending (45) 15 Pain on right side   Left Side Bending (45) 15 Pain on left side      Shoulder Range of Motion:   - Pt has full range of motion in bilateral shoulders. Pain with abduction motions.      Upper Extremity Strength  (R) UE   (L) UE     Shoulder flexion: 3+/5 Shoulder flexion: 3+/5   Shoulder Abduction: 3+/5 Shoulder abduction: 3+/5   Shoulder ER 3+/5 Shoulder ER 3+/5   Shoulder IR 3+/5 Shoulder IR 3+/5   Elbow flexion: 4+/5 Elbow flexion: 4+/5   Elbow extension: 4/5 Elbow extension: 4/5    4+/5 : 4+/5   Lower Trap 4-/5 Lower Trap 4-/5   Middle Trap 4-/5 Middle Trap 4-/5   Rhomboids 4-/5 Rhomboids 4-/5      Special Tests:  Distraction Slight relief of pain    Compression Increased pain    Spurlings Increased pain       Lumbar Range of  "Motion:     % of normal motion Pain   Flexion 75%    Pain         Extension 20%    Pain         Left Side Bending 20% Pain on right         Right Side Bending 20% Pain on right         Left rotation    20% Pain         Right Rotation    20% Pain               Lower Extremity Strength  Right LE   Left LE     Knee extension: 4/5 Knee extension: 4/5   Knee flexion: 4/5 Knee flexion: 4/5   Hip flexion: 4-/5; pain Hip flexion: 4-/5; pain   Hip extension:  4/5 Hip extension: 4/5   Hip abduction: 4-/5 Hip abduction: 4-/5   Hip adduction: 4-/5; pain Hip adduction 4-/5; pain   Ankle dorsiflexion: 4+/5 Ankle dorsiflexion: 4+/5   Ankle plantarflexion: 5/5 Ankle plantarflexion: 5/5      Joint Mobility: Pain with PA's and transverse glides of cervical spine. Pain with PA's to lumbar spine     Palpation: TTP over bilateral upper trap, (R) cervical paraspinals. TTP (L) QL, (L) paraspinals, (L) PSIS, and (L) piriformis      Sensation: bilateral upper and lower extremities intact to light touch.      Function: FOTO      TREATMENT   Treatment Time In: 11:25 AM  Treatment Time Out: 11:45 AM  Total Treatment time (time-based codes) separate from Evaluation: 20 minutes    Adinas received therapeutic exercises to develop strength, endurance, ROM, flexibility, posture, and core stabilization for 20 minutes including:  UE:   - Upper trap stretch 2 x 30"  - levator scap stretch 2 x 30"  - posterior shoulder rolls   - Scapular retraction     LE:   - F4 LTR  - Single Knee to chest   - Open Book   - 3 way forward flexion     Home Exercises and Patient Education Provided    Education provided:   - PT POC  - HEP  - PT prognosis and diagnosis  - all questions and concerns answered       Written Home Exercises Provided: yes.  Exercises were reviewed and Luciana was able to demonstrate them prior to the end of the session.  [place] demonstrated fair  understanding of the education provided.     See EMR under Patient Instructions for exercises " provided 3/12/2024.    Assessment   Luciana is a 63 y.o. female referred to outpatient Physical Therapy with a medical diagnosis of M54.12 (ICD-10-CM) - Cervical radiculopathy M19.90 (ICD-10-CM) - Osteoarthritis, unspecified osteoarthritis type, unspecified site M25.551,M25.552 (ICD-10-CM) - Bilateral hip pain. The patient presents with impairments which include impairments list: ROM, strength, endurance, joint mobility, muscle length, balance, posture, gait mechanics, core strength and stability, and functional movement patterns.  These impairments are limiting patient's ability to perform daily tasks without being in constant pain. Pt prognosis is Fair due to personal factors and co-morbidities listed below. Pt will benefit from skilled outpatient Physical Therapy to address the deficits stated above and in the chart below, provide pt/family education, and to maximize pt's level of independence.     Plan of care discussed with patient: Yes  Patient's spiritual, cultural and educational needs considered and patient is agreeable to the plan of care and goals as stated below:     Anticipated Barriers for therapy: pain, compliance    Medical Necessity is demonstrated by the following:   History  Co-morbidities and personal factors that may impact the plan of care [] LOW: no personal factors / co-morbidities  [] MODERATE: 1-2 personal factors / co-morbidities  [x] HIGH: 3+ personal factors / co-morbidities    Moderate / High Support Documentation:   Co-morbidities affecting plan of care: See Above    Personal Factors:   age     Examination  Body Structures and Functions, activity limitations and participation restrictions that may impact the plan of care [] LOW: addressing 1-2 elements  [] MODERATE: 3+ elements  [x] HIGH: 4+ elements (please support below)    Moderate / High Support Documentation: (B) neck pain, impaired cervical range of motion, upper extremity weakness, poor posture, low back pain, bilateral hip pain,  numbness and tingling in bilateral lower extremities, impaired lumbar range of motion, lower extremity weakness     Clinical Presentation [] LOW: stable  [] MODERATE: Evolving  [x] HIGH: Unstable     Decision Making/ Complexity Score: high       Goals:  Short Term Goals: 5 weeks   Patient will demonstrate independence with HEP in order to progress toward functional independence  Pt will demonstrate improved pain by reports of less than or equal to 7/10 worst pain of cervical neck on the verbal rating scale in order to progress toward maximal functional ability and improve QOL.  Pt will be able to correct slouched posture in static sitting with minimal verbal cueing for improved QOL  Pt will improve cervical range of motion in all planes by 25% for improved QOL and increased ease with house hold chores   Patient will improve bilateral upper extremity strength by 1/2 MMT for improved strength needed to carry groceries  Pt will demonstrate improved pain by reports of less than or equal to 7/10 worst pain of low back/hips on the verbal rating scale in order to progress toward maximal functional ability and improve QOL.  Pt will improve lumbar range of motion by 25% for improved QOL and functional mobility   Patient will improve bilateral lower extremity strength by 1/2 MMT for improved ease with transitional movements.      Long Term Goals: 10 weeks   Patient will demonstrate improved function as indicated by a functional score of 54 on the FOTO.  Pt will demonstrate improved pain by reports of less than or equal to 5/10 worst pain of cervical neck on the verbal rating scale in order to progress toward maximal functional ability and improve QOL.  Pt will improve slouched posture in static sitting for improved QOL and increased ease with schooling activities.   Pt will improve cervical range of motion with within functional limits with less than 3/10 pain for improved mobility needed to perform all house hold chores  Pt  will improved bilateral upper extremity strength to greater than or equal to 4+/5 with less than 3/10 pain for improved functional upper extremity strength.   Pt will demonstrate improved pain by reports of less than or equal to 5/10 worst pain of low back on the verbal rating scale in order to progress toward maximal functional ability and improve QOL.  Pt will improve lumbar range of motion to within functional limits for improved functional mobility   Pt will improve bilateral lower extremity strength to greater than or equal to 4+/5 for improved functional lower extremity strength     Plan   Plan of care Certification: 3/12/2024 to 6/12/2024.    Outpatient Physical Therapy 2 times weekly for 10 weeks to include the following interventions: Cervical/Lumbar Traction, Electrical Stimulation , Gait Training, Manual Therapy, Moist Heat/ Ice, Neuromuscular Re-ed, Orthotic Management and Training, Patient Education, Self Care, Therapeutic Activities, Therapeutic Exercise, and Ultrasound. And any other therapies and modalities as clinically indicated and appropriate, including but not limited to FDN and telehealth. Pt may be seen by PTA as a part of pt's care team.     Ana Alonzo, PT, DPT  3/12/2024

## 2024-03-12 NOTE — PROGRESS NOTES
"    [facility]       [address] [place] [address]      Phone: [phone]      Fax: [phone]    Cedric Huerta MD                Office Visit  2024        Subjective      HPI:  Luciana Moraes is a 63 y.o. female presents today in clinic for "Follow-up  ."     63-year-old female presents today to follow-up on diabetes, discuss other issues.    Overall, feels pretty good today.  No chest pain, shortness a breath.  No fever, chills.  No coughing, sneezing, no URI symptoms.  Appetite normal.  Bowel movements normal.  No urinary issues.    Has diabetes.  Still takes metformin XR 1000 mg daily.  Also taking Ozempic 2 mg daily.  Doing well with these, but will get occasional episodes of nausea, usually a day or so after doing the injection.  Has been able to lose over 20lb overall.      Still has multiple joint pains, osteoarthritis, rheumatoid arthritis.  Currently seeing sports Medicine/neurosurgery/pain management/Rheumatology for this.  Has had several procedures, injections.  Has thought about trying medical marijuana.  States that she was supposed to see Dr. SAGE, but no whenever set up the appointment.  Is interested in physical therapy.  Would like referral to [facility].  Found out her insurance would cover chiropractor.  Interested in a recliner that can lift assist.    PMH:  Dm, HLD, RA, OA, chronic pains.  PSH:  Left shoulder.  Left hip labrum.  Hysterectomy.  One ovary.  FNH:  DM, CVA  Allergies:  NKDA  Social:  On disability.  Previously worked for [employer].  Recently went back to school at [employer].  T:  Denies.  Quit   A:  Denies  D:  Denies    Exercise:  Does chair exercises/stretches.    Pap:  Had hysterectomy.  MM23    Colon:  2022.  Two polyps, tubular adenomas.  Repeat five years ()        The following were updated and reviewed by myself in the chart: medications, past medical history, past " surgical history, family history, social history, and allergies.     Medications:  Current Outpatient Medications on File Prior to Visit   Medication Sig Dispense Refill    aspirin 81 MG Chew Take 81 mg by mouth.      diclofenac sodium (VOLTAREN) 1 % Gel Apply 2 g topically 4 (four) times daily. 100 g 6    ergocalciferol (ERGOCALCIFEROL) 50,000 unit Cap Take 1 capsule (50,000 Units total) by mouth twice a week. 24 capsule 3    hydroxychloroquine (PLAQUENIL) 200 mg tablet Take 1 tablet (200 mg total) by mouth 2 (two) times daily. 180 tablet 1    ibuprofen (ADVIL,MOTRIN) 800 MG tablet Take 800 mg by mouth 3 (three) times daily.      metFORMIN (GLUCOPHAGE-XR) 500 MG ER 24hr tablet Take 2 tablets (1,000 mg total) by mouth daily with breakfast. 180 tablet 3    nabumetone (RELAFEN) 750 MG tablet Take 1 tablet (750 mg total) by mouth 2 (two) times daily as needed for Pain. 60 tablet 5    polyethylene glycol (GLYCOLAX) 17 gram PwPk Take 17 g by mouth once daily. 30 each 11    pregabalin (LYRICA) 75 MG capsule Take 1 capsule (75 mg total) by mouth 2 (two) times daily. 60 capsule 5    tiZANidine (ZANAFLEX) 4 MG tablet Take 1 tablet (4 mg total) by mouth 2 (two) times daily as needed (pain). 60 tablet 5    [DISCONTINUED] atorvastatin (LIPITOR) 80 MG tablet Take 1 tablet (80 mg total) by mouth once daily. 90 tablet 3    [DISCONTINUED] semaglutide (OZEMPIC) 2 mg/dose (8 mg/3 mL) PnIj Inject 2 mg into the skin every 7 days. 9 mL 3    [DISCONTINUED] traZODone (DESYREL) 50 MG tablet Take 2 tablets (100 mg total) by mouth nightly as needed for Insomnia. 180 tablet 3     No current facility-administered medications on file prior to visit.        PMHx:  Past Medical History:   Diagnosis Date    Diabetes mellitus, type 2     Hyperlipidemia     Osteoarthritis     Polymyositis     Rheumatoid arthritis       Patient Active Problem List    Diagnosis Date Noted    Insomnia 11/20/2023    Muscle hypertonicity 03/01/2023    Decreased range of  motion of lumbar spine 2023    Decreased range of motion of intervertebral discs of cervical spine 2023    Poor posture 2023    Colon cancer screening 2022        PSHx:  Past Surgical History:   Procedure Laterality Date    CARPAL TUNNEL RELEASE Bilateral     COLONOSCOPY N/A 2022    Procedure: COLONOSCOPY;  Surgeon: Kishore Monsalve MD;  Location: [facility] ENDO;  Service: Endoscopy;  Laterality: N/A;    EPIDURAL STEROID INJECTION INTO CERVICAL SPINE N/A 2022    Procedure: C7/T1 IL [facility];  Surgeon: Luis M Pereyra MD;  Location: [facility] PAIN MGT;  Service: Pain Management;  Laterality: N/A;    HIP SURGERY      HYSTERECTOMY      due to bleeding, pain from fibroids, retains one ovary    INJECTION OF ANESTHETIC AGENT AROUND MEDIAL BRANCH NERVES INNERVATING CERVICAL FACET JOINT Bilateral 10/31/2022    Procedure: Bilateral C5-7 MBB with RN IV sedation;  Surgeon: Luis M Pereyra MD;  Location: [facility] PAIN MGT;  Service: Pain Management;  Laterality: Bilateral;    INJECTION OF ANESTHETIC AGENT AROUND MEDIAL BRANCH NERVES INNERVATING CERVICAL FACET JOINT Bilateral 3/13/2023    Procedure: Bilateral C5-7 MBB with RN IV sedation;  Surgeon: Luis M Pereyra MD;  Location: [facility] PAIN MGT;  Service: Pain Management;  Laterality: Bilateral;    OOPHORECTOMY      1 ovary removed    TUBAL LIGATION          FHx:  Family History   Problem Relation Age of Onset    Rheum arthritis Mother     No Known Problems Father         Social:  Social History     Socioeconomic History    Marital status:    Tobacco Use    Smoking status: Former     Current packs/day: 0.00     Types: Cigarettes     Quit date: 3/17/2001     Years since quittin.0    Smokeless tobacco: Never   Substance and Sexual Activity    Alcohol use: Never    Drug use: Never    Sexual activity: Yes     Partners: Male     Birth control/protection: None        Allergies:  Review of patient's allergies indicates:  No Known Allergies  "    ROS:  Review of Systems   Constitutional:  Negative for activity change, appetite change, chills and fever.   HENT:  Negative for congestion, postnasal drip, rhinorrhea, sore throat and trouble swallowing.    Respiratory:  Negative for cough, shortness of breath and wheezing.    Cardiovascular:  Negative for chest pain and palpitations.   Gastrointestinal:  Positive for constipation. Negative for abdominal pain, diarrhea, nausea and vomiting.   Genitourinary:  Negative for difficulty urinating.   Musculoskeletal:  Positive for arthralgias and myalgias.   Skin:  Negative for color change and rash.   Neurological:  Negative for speech difficulty and headaches.   All other systems reviewed and are negative.         Objective      /78   Pulse (!) 58   Ht 5' 2" (1.575 m)   Wt 77.6 kg (171 lb 1.2 oz)   BMI 31.29 kg/m²   Ht Readings from Last 3 Encounters:   03/12/24 5' 2" (1.575 m)   10/03/23 5' 2" (1.575 m)   09/12/23 5' 2" (1.575 m)     Wt Readings from Last 3 Encounters:   03/12/24 77.6 kg (171 lb 1.2 oz)   10/03/23 81.2 kg (179 lb)   09/12/23 81.3 kg (179 lb 3.7 oz)       PHYSICAL EXAM:  Physical Exam  Vitals and nursing note reviewed.   Constitutional:       General: She is not in acute distress.     Appearance: Normal appearance.   HENT:      Head: Normocephalic and atraumatic.      Right Ear: Tympanic membrane, ear canal and external ear normal.      Left Ear: Tympanic membrane, ear canal and external ear normal.      Nose: Nose normal. No congestion or rhinorrhea.      Mouth/Throat:      Mouth: Mucous membranes are moist.      Pharynx: Oropharynx is clear. No oropharyngeal exudate or posterior oropharyngeal erythema.   Eyes:      Extraocular Movements: Extraocular movements intact.      Conjunctiva/sclera: Conjunctivae normal.      Pupils: Pupils are equal, round, and reactive to light.   Cardiovascular:      Rate and Rhythm: Normal rate and regular rhythm.   Pulmonary:      Effort: Pulmonary effort " is normal. No respiratory distress.      Breath sounds: No wheezing, rhonchi or rales.   Musculoskeletal:         General: Normal range of motion.      Cervical back: Normal range of motion.   Lymphadenopathy:      Cervical: No cervical adenopathy.   Skin:     General: Skin is warm and dry.      Findings: No rash.   Neurological:      Mental Status: She is alert.              LABS / IMAGING:  Recent Results (from the past 4368 hour(s))   CBC Auto Differential    Collection Time: 10/16/23  7:23 AM   Result Value Ref Range    WBC 7.02 3.90 - 12.70 K/uL    RBC 5.04 4.00 - 5.40 M/uL    Hemoglobin 13.7 12.0 - 16.0 g/dL    Hematocrit 43.6 37.0 - 48.5 %    MCV 87 82 - 98 fL    MCH 27.2 27.0 - 31.0 pg    MCHC 31.4 (L) 32.0 - 36.0 g/dL    RDW 14.9 (H) 11.5 - 14.5 %    Platelets 220 150 - 450 K/uL    MPV 11.1 9.2 - 12.9 fL    Immature Granulocytes 0.1 0.0 - 0.5 %    Gran # (ANC) 2.9 1.8 - 7.7 K/uL    Immature Grans (Abs) 0.01 0.00 - 0.04 K/uL    Lymph # 3.2 1.0 - 4.8 K/uL    Mono # 0.6 0.3 - 1.0 K/uL    Eos # 0.3 0.0 - 0.5 K/uL    Baso # 0.03 0.00 - 0.20 K/uL    nRBC 0 0 /100 WBC    Gran % 41.7 38.0 - 73.0 %    Lymph % 45.0 18.0 - 48.0 %    Mono % 9.0 4.0 - 15.0 %    Eosinophil % 3.8 0.0 - 8.0 %    Basophil % 0.4 0.0 - 1.9 %    Differential Method Automated    Comprehensive Metabolic Panel    Collection Time: 10/16/23  7:23 AM   Result Value Ref Range    Sodium 142 136 - 145 mmol/L    Potassium 4.3 3.5 - 5.1 mmol/L    Chloride 108 95 - 110 mmol/L    CO2 24 23 - 29 mmol/L    Glucose 90 70 - 110 mg/dL    BUN 12 6 - 20 mg/dL    Creatinine 0.8 0.5 - 1.4 mg/dL    Calcium 9.2 8.7 - 10.5 mg/dL    Total Protein 7.6 6.0 - 8.4 g/dL    Albumin 3.7 3.5 - 5.2 g/dL    Total Bilirubin 0.4 0.1 - 1.0 mg/dL    Alkaline Phosphatase 78 55 - 135 U/L    AST 13 10 - 40 U/L    ALT 10 10 - 44 U/L    eGFR >60 >60 mL/min/1.73 m^2    Anion Gap 10 8 - 16 mmol/L   Sedimentation rate    Collection Time: 10/16/23  7:23 AM   Result Value Ref Range    Sed  Rate 25 0 - 36 mm/Hr   C-Reactive Protein    Collection Time: 10/16/23  7:23 AM   Result Value Ref Range    CRP 5.5 0.0 - 8.2 mg/L   HEMOGLOBIN A1C    Collection Time: 10/16/23  7:23 AM   Result Value Ref Range    Hemoglobin A1C 6.1 (H) 4.0 - 5.6 %    Estimated Avg Glucose 128 68 - 131 mg/dL   LIPID PANEL    Collection Time: 10/16/23  7:23 AM   Result Value Ref Range    Cholesterol 211 (H) 120 - 199 mg/dL    Triglycerides 104 30 - 150 mg/dL    HDL 41 40 - 75 mg/dL    LDL Cholesterol 149.2 63.0 - 159.0 mg/dL    HDL/Cholesterol Ratio 19.4 (L) 20.0 - 50.0 %    Total Cholesterol/HDL Ratio 5.1 (H) 2.0 - 5.0    Non-HDL Cholesterol 170 mg/dL   HIV 1/2 Ag/Ab (4th Gen)    Collection Time: 10/16/23  7:23 AM   Result Value Ref Range    HIV 1/2 Ag/Ab Non-reactive Non-reactive   Microalbumin/Creatinine Ratio, Urine    Collection Time: 10/16/23  8:13 AM   Result Value Ref Range    Microalbumin, Urine <5.0 ug/mL    Creatinine, Urine 119.0 15.0 - 325.0 mg/dL    Microalb/Creat Ratio Unable to calculate 0.0 - 30.0 ug/mg   Comprehensive Metabolic Panel    Collection Time: 02/16/24  1:58 PM   Result Value Ref Range    Sodium 138 136 - 145 mmol/L    Potassium 4.1 3.5 - 5.1 mmol/L    Chloride 107 95 - 110 mmol/L    CO2 24 23 - 29 mmol/L    Glucose 97 70 - 110 mg/dL    BUN 16 8 - 23 mg/dL    Creatinine 0.9 0.5 - 1.4 mg/dL    Calcium 9.3 8.7 - 10.5 mg/dL    Total Protein 7.8 6.0 - 8.4 g/dL    Albumin 3.8 3.5 - 5.2 g/dL    Total Bilirubin 0.5 0.1 - 1.0 mg/dL    Alkaline Phosphatase 81 55 - 135 U/L    AST 17 10 - 40 U/L    ALT 9 (L) 10 - 44 U/L    eGFR >60 >60 mL/min/1.73 m^2    Anion Gap 7 (L) 8 - 16 mmol/L   CBC Auto Differential    Collection Time: 02/16/24  1:58 PM   Result Value Ref Range    WBC 7.12 3.90 - 12.70 K/uL    RBC 5.01 4.00 - 5.40 M/uL    Hemoglobin 13.9 12.0 - 16.0 g/dL    Hematocrit 43.9 37.0 - 48.5 %    MCV 88 82 - 98 fL    MCH 27.7 27.0 - 31.0 pg    MCHC 31.7 (L) 32.0 - 36.0 g/dL    RDW 14.2 11.5 - 14.5 %    Platelets 122  (L) 150 - 450 K/uL    MPV 12.7 9.2 - 12.9 fL    Immature Granulocytes 0.1 0.0 - 0.5 %    Gran # (ANC) 2.9 1.8 - 7.7 K/uL    Immature Grans (Abs) 0.01 0.00 - 0.04 K/uL    Lymph # 3.4 1.0 - 4.8 K/uL    Mono # 0.6 0.3 - 1.0 K/uL    Eos # 0.2 0.0 - 0.5 K/uL    Baso # 0.04 0.00 - 0.20 K/uL    nRBC 0 0 /100 WBC    Gran % 40.4 38.0 - 73.0 %    Lymph % 47.9 18.0 - 48.0 %    Mono % 7.9 4.0 - 15.0 %    Eosinophil % 3.1 0.0 - 8.0 %    Basophil % 0.6 0.0 - 1.9 %    Differential Method Automated    Sedimentation rate    Collection Time: 02/16/24  1:58 PM   Result Value Ref Range    Sed Rate 8 0 - 20 mm/Hr   C-Reactive Protein    Collection Time: 02/16/24  1:58 PM   Result Value Ref Range    CRP 5.3 0.0 - 8.2 mg/L         Assessment    1. Type 2 diabetes mellitus with other specified complication, without long-term current use of insulin    2. Hyperlipidemia, unspecified hyperlipidemia type    3. Rheumatoid arthritis, involving unspecified site, unspecified whether rheumatoid factor present    4. Osteoarthritis, unspecified osteoarthritis type, unspecified site    5. Cervical radiculopathy    6. Bilateral hip pain          Plan    Luciana was seen today for follow-up.    Diagnoses and all orders for this visit:    Type 2 diabetes mellitus with other specified complication, without long-term current use of insulin  -     HEMOGLOBIN A1C; Future  -     semaglutide (OZEMPIC) 2 mg/dose (8 mg/3 mL) PnIj; Inject 2 mg into the skin every 7 days.    Hyperlipidemia, unspecified hyperlipidemia type  -     atorvastatin (LIPITOR) 80 MG tablet; Take 1 tablet (80 mg total) by mouth once daily.    Rheumatoid arthritis, involving unspecified site, unspecified whether rheumatoid factor present    Osteoarthritis, unspecified osteoarthritis type, unspecified site  -     Ambulatory referral/consult to Physical/Occupational Therapy; Future    Cervical radiculopathy  -     Ambulatory referral/consult to Physical/Occupational Therapy; Future    Bilateral  hip pain  -     Ambulatory referral/consult to Physical/Occupational Therapy; Future    Other orders  -     traZODone (DESYREL) 50 MG tablet; Take 2 tablets (100 mg total) by mouth nightly as needed for Insomnia.    Overall, everything looks pretty good today.      Labs done back in November look fine.  Recheck A1c today.      If A1c stays below 7%, decrease metformin to one tablet.  If 6% or lower, we can discontinue it.  Continue Ozempic.    Was able to get her in with Dr. SAGE today.  She will see him after the lab appointment.      Referral to physical therapy placed today.      FOLLOW-UP:  Follow up in about 6 months (around 9/12/2024) for check up.    I spent a total of 45 minutes face to face and non-face to face on the date of this visit.This includes time preparing to see the patient (eg, review of tests, notes), obtaining and/or reviewing additional history from an independent historian and/or outside medical records, documenting clinical information in the electronic health record, independently interpreting results and/or communicating results to the patient/family/caregiver, or care coordinator.  Visit today included increased complexity associated with the care of the episodic problem addressed and managing the longitudinal care of the patient due to the serious and/or complex managed problem(s).    Signed by:  Cedric Huerta MD

## 2024-03-12 NOTE — PROGRESS NOTES
Interventional Pain Progress Note       Referring Physician: No ref. provider found    PCP: Cedric Huerat MD    Chief Complaint:   Lower Back Pain    Interval History (03/12/2024):  Patient returns to clinic for evaluation of lower back pain.  Lower back pain described as stabbing burning pain in her lower back and bilateral buttocks area.  Pain is typically worse on the left compared to right.  This pain will then radiate to her bilateral hips in her posterior thighs and aching pain.  Patient does report numbness in her bilateral feet, but reports that this is secondary to diabetic peripheral neuropathy.  Pain is typically worse with prolonged standing and sitting, better with lying supine.  Pain is currently rated as 7/10. Denies any fevers, chills, changes in gait, saddle anesthesia, or bowel and bladder incontinence        Interval History (5/12/2023): Luciana Moraes presents today for follow-up visit.  she underwent bilateral C5/7-7 MBB on 3/13/23.  The patient reports that she is/was better following the procedure.  she reports 80% pain relief for 8 hours following the procedure. She was originally scheduled for cervical RFAs, but reports with physical therapy she had considerable reduction in her neck pain and post poned the procedure. Patient reports pain as 8/10 today. Would like to consider more natural treatment options.      Interval History (02/28/2023):  Patient returns to clinic after procedure.  Patient reports 100% relief and neck pain for 2 days after bilateral C5-C7 medial branch block on 10/31/2022.  Since last being seen, patient reports having fall from standing 1 week ago.  Patient denies any loss consciousness with fall.  She reports increase in neck and lower back pain.  The pain is described as a throbbing aching pain diffusely across her neck, worse at the base of the neck.  Pain is worse with extension and rotation, better with flexion heat.  Low back pain described as  a stabbing aching pain that starts in the middle of the low back and radiates to the bilateral hips.  Pain is worse with standing and lifting, and better with sitting down and heat.  Pain is rated a 7/10. Denies any fevers, chills,  weakness, or bowel and bladder incontinence        Interval History (10/11/22):  Patient returns to clinic after procedure.  Patient reports minimal relief after C7-T1 interlaminar epidural steroid injection on 09/22/2022.  Patient reports continued right-sided pain that starts over the right neck and right trapezius area and then radiates down to her right hand.  Pain is worse at night and with lifting, better with rest and in the morning.  Pain is rated 8 out 10. Denies any fevers, chills, changes in gait, weakness, or bowel and bladder incontinence        Interval History (03/12/2024):  Patient returns to clinic for MRI review.  Patient reports continued neck pain primarily on her right side that then radiates to her right upper extremity to her fingertips and numbness and tingling pain.  She reports associated decreased strength in her right upper extremity with his pain.  Pain is worse with neck rotation and lifting objects, better with heat and rest.  Pain is rated a 7/10. Denies any fevers, chills, changes in gait, weakness, or bowel and bladder incontinence        SUBJECTIVE:    Luciana Moraes is a 63 y.o. female who presents to the clinic for the evaluation of neck and lower pain. The pain started over 10 years ago and symptoms have been worsening.  Neck pain is described as a throbbing pain that starts diffusely over her neck.  She reports that in the last 2 months she has had increased in right upper extremity radicular pain that goes to her fingertips.  She denies any traumas to her neck or any previous surgeries on her spine her major joints.  Pain is worse with neck rotation and lifting objects, better with flexion and heat.  Back pain is described as a throbbing  axial pain in a band across her lower back.  She reports minimal radiation to her bilateral lower extremities.  Pain is worse with extension, better with flexion.  The pain is described as numbing, sharp and tingling and is rated as  10 out of 10 .   The pain is rated with a score of  6/10 on the BEST day and a score of 10/10 on the WORST day.  Symptoms interfere with daily activity and work. The pain is exacerbated by Walking, Extension, Lifting and Getting out of bed/chair.  The pain is mitigated by nothing.     Patient denies night fever/night sweats, urinary incontinence, bowel incontinence, significant weight loss, significant motor weakness and loss of sensations.      Non-Pharmacologic Treatments:  Physical Therapy/Home Exercise: yes  Ice/Heat:yes  TENS: no  Acupuncture: no  Massage: yes  Chiropractic: no        Previous Pain Medications:  Tylenol, NSAIDs, gabapentin, muscle relaxers, opioids, topicals     report:  Reviewed and consistent with medication use as prescribed.    Pain Procedures:   -10/31/2022: Bilateral C5-C7 medial branch block, 100% relief for 2 days  9/22/22:  C7-T1 interlaminar epidural steroid injection, minimal relief      Imaging:         Lumbar x-ray 02/28/2023  FINDINGS:  There are 5 weight bearing lumbar vertebra.  Mild convex right curvature of the lower lumbar spine.  Lower lumbar spine degenerative facet arthropathy.  Mild marginal spondylosis.  The vertebral body heights and intervertebral disc heights are   well-maintained. Negative for spondylolysis or spondylolisthesis. The sacral ala and sacroiliac joints are intact. The bowel gas pattern is normal.     Vascular calcifications are present without aneurysmal change.  There are phleboliths versus ureteroliths some overlying the pelvis.     Impression:     1.  Negative for acute process involving the lumbar spine.     2.  Degenerative facet arthropathy of the lower lumbar spine with mild convex-right curvature of the lumbar  spine.     3.  Incidental findings as noted above.    Bilateral upper extremity EMG and nerve conduction study, 10/26/2022:  IMPRESSION  ABNORMAL study  2. There is electrodiagnostic evidence of a moderate demyelinating median neuropathy (Carpal tunnel syndrome) across bilateral wrists-slightly worse on the right.  There is slight evidence of a mild subacute irritation of the C7 nerve root.  3. There is clinical evidence of myofascial pain and rotator cuff weakness     MRI CERVICAL SPINE WITHOUT CONTRAST 8/2/22     CLINICAL HISTORY:  Neck pain, chronic, degenerative changes on xray;.  Radiculopathy, cervical region     TECHNIQUE:  Multiplanar, multisequence MR images of the cervical spine were acquired without the administration of contrast.     COMPARISON:  Cervical spine x-ray dated 08/02/2022     FINDINGS:  There are 7 non-rib-bearing cervical vertebrae.  Mild cervical straightening noted.  Alignment is otherwise unremarkable with no significant listhesis.  No acute fracture or compression deformity.  No aggressive focal signal abnormality.     Visualized posterior fossa is unremarkable.  Craniocervical junction is well maintained.  Visualized spinal cord is normal in size and signal.     C1-C2: Atlanto odontoid osteophytosis noted without significant dorsal pannus formation.  No significant spinal canal stenosis.     C2-C3: No significant spinal canal or neural foraminal stenosis.     C3-C4: Right greater than left uncovertebral hypertrophy and mild bilateral facet arthropathy.  No significant spinal canal or neural foraminal stenosis.     C4-C5: Bilateral uncovertebral hypertrophy/disc osteophyte complex and mild bilateral facet arthropathy.  Minimal spinal canal stenosis.  Mild-to-moderate right and mild left neural foraminal stenosis.     C5-C6: Right greater than left uncovertebral hypertrophy/disc osteophyte complex and mild bilateral facet arthropathy.  Minimal spinal canal stenosis with particular  narrowing of the right spinal canal.  Moderate to severe right and mild left neural foraminal stenosis.     C6-C7: Bilateral uncovertebral hypertrophy/disc osteophyte complex and mild bilateral facet arthropathy.  No significant spinal canal stenosis.  Mild right neural foraminal stenosis.  No significant left neural foraminal stenosis.     C7-T1: Mild bilateral facet arthropathy.  No significant spinal canal stenosis.  Mild bilateral neural foraminal stenosis.     Visualized soft tissues are unremarkable.     Impression:     Multilevel degenerative changes as detailed above.     Minimal spinal canal stenosis at C4-C5 and C5-C6.     Varying degrees of bilateral neural foraminal stenosis as detailed above, most severe at the right C5-C6 level.      MRI Lumbar Spine w/o Contast 7/8/19  There is lumbar facet arthropathy at L5-S1 and L4-L5. No stenosis or disc herniation is evident. Patient appears obese. Patient may be a candidate for an image guided steroid injection treatment trial.   .       Past Medical History:   Diagnosis Date    Diabetes mellitus, type 2     Hyperlipidemia     Osteoarthritis     Polymyositis     Rheumatoid arthritis      Past Surgical History:   Procedure Laterality Date    CARPAL TUNNEL RELEASE Bilateral     COLONOSCOPY N/A 11/08/2022    Procedure: COLONOSCOPY;  Surgeon: Kishore Monsalve MD;  Location: [facility] ENDO;  Service: Endoscopy;  Laterality: N/A;    EPIDURAL STEROID INJECTION INTO CERVICAL SPINE N/A 09/22/2022    Procedure: C7/T1 [facility];  Surgeon: Luis M Pereyra MD;  Location: [facility] PAIN MGT;  Service: Pain Management;  Laterality: N/A;    HIP SURGERY      HYSTERECTOMY  2008    due to bleeding, pain from fibroids, retains one ovary    INJECTION OF ANESTHETIC AGENT AROUND MEDIAL BRANCH NERVES INNERVATING CERVICAL FACET JOINT Bilateral 10/31/2022    Procedure: Bilateral C5-7 MBB with RN IV sedation;  Surgeon: Luis M Pereyra MD;  Location: [facility] PAIN MGT;  Service: Pain Management;   Laterality: Bilateral;    INJECTION OF ANESTHETIC AGENT AROUND MEDIAL BRANCH NERVES INNERVATING CERVICAL FACET JOINT Bilateral 3/13/2023    Procedure: Bilateral C5-7 MBB with RN IV sedation;  Surgeon: Luis M Pereyra MD;  Location: [facility] PAIN MGT;  Service: Pain Management;  Laterality: Bilateral;    OOPHORECTOMY      1 ovary removed    TUBAL LIGATION       Social History     Socioeconomic History    Marital status:    Tobacco Use    Smoking status: Former     Current packs/day: 0.00     Types: Cigarettes     Quit date: 3/17/2001     Years since quittin.0    Smokeless tobacco: Never   Substance and Sexual Activity    Alcohol use: Never    Drug use: Never    Sexual activity: Yes     Partners: Male     Birth control/protection: None     Family History   Problem Relation Age of Onset    Rheum arthritis Mother     No Known Problems Father        Review of patient's allergies indicates:  No Known Allergies    Current Outpatient Medications   Medication Sig    aspirin 81 MG Chew Take 81 mg by mouth.    atorvastatin (LIPITOR) 80 MG tablet Take 1 tablet (80 mg total) by mouth once daily.    diclofenac sodium (VOLTAREN) 1 % Gel Apply 2 g topically 4 (four) times daily.    ergocalciferol (ERGOCALCIFEROL) 50,000 unit Cap Take 1 capsule (50,000 Units total) by mouth twice a week.    hydroxychloroquine (PLAQUENIL) 200 mg tablet Take 1 tablet (200 mg total) by mouth 2 (two) times daily.    ibuprofen (ADVIL,MOTRIN) 800 MG tablet Take 800 mg by mouth 3 (three) times daily.    metFORMIN (GLUCOPHAGE-XR) 500 MG ER 24hr tablet Take 2 tablets (1,000 mg total) by mouth daily with breakfast.    nabumetone (RELAFEN) 750 MG tablet Take 1 tablet (750 mg total) by mouth 2 (two) times daily as needed for Pain.    polyethylene glycol (GLYCOLAX) 17 gram PwPk Take 17 g by mouth once daily.    pregabalin (LYRICA) 75 MG capsule Take 1 capsule (75 mg total) by mouth 2 (two) times daily.    semaglutide (OZEMPIC) 2 mg/dose (8 mg/3 mL)  "PnIj Inject 2 mg into the skin every 7 days.    tiZANidine (ZANAFLEX) 4 MG tablet Take 1 tablet (4 mg total) by mouth 2 (two) times daily as needed (pain).    traZODone (DESYREL) 50 MG tablet Take 2 tablets (100 mg total) by mouth nightly as needed for Insomnia.     No current facility-administered medications for this visit.         ROS  Review of Systems   Constitutional:  Negative for chills, diaphoresis, fatigue and fever.   Respiratory:  Negative for chest tightness, shortness of breath, wheezing and stridor.    Cardiovascular:  Negative for chest pain and leg swelling.   Gastrointestinal:  Negative for blood in stool, diarrhea, nausea and vomiting.   Endocrine: Negative for cold intolerance and heat intolerance.   Genitourinary:  Positive for urgency. Negative for dysuria and hematuria.   Musculoskeletal:  Positive for arthralgias, back pain, gait problem, joint swelling, myalgias, neck pain and neck stiffness.   Skin:  Negative for rash.   Neurological:  Positive for weakness and numbness. Negative for tremors, seizures, speech difficulty, light-headedness and headaches.   Hematological:  Does not bruise/bleed easily.   Psychiatric/Behavioral:  Negative for agitation, confusion and suicidal ideas. The patient is not nervous/anxious.             OBJECTIVE:  /80   Pulse 80   Resp 17   Ht 5' 2" (1.575 m)   Wt 77.8 kg (171 lb 8.3 oz)   BMI 31.37 kg/m²         Physical Exam  Constitutional:       General: She is not in acute distress.     Appearance: Normal appearance. She is not ill-appearing.   HENT:      Head: Normocephalic and atraumatic.      Nose: No congestion or rhinorrhea.   Eyes:      Extraocular Movements: Extraocular movements intact.      Pupils: Pupils are equal, round, and reactive to light.   Cardiovascular:      Pulses: Normal pulses.   Pulmonary:      Effort: Pulmonary effort is normal.   Abdominal:      General: Abdomen is flat.      Palpations: Abdomen is soft.   Musculoskeletal:     "  Comments: Positive TInsel and Phalen on right head   Skin:     General: Skin is warm and dry.      Capillary Refill: Capillary refill takes less than 2 seconds.   Neurological:      Mental Status: She is alert and oriented to person, place, and time.      Cranial Nerves: No cranial nerve deficit.      Sensory: No sensory deficit.      Motor: Weakness present. No abnormal muscle tone.      Gait: Gait abnormal.      Comments: Antalgic gait     Psychiatric:         Mood and Affect: Mood normal.         Behavior: Behavior normal.         Thought Content: Thought content normal.         Musculoskeletal:    Lumbar Exam  Incision: no  Pain with Flexion: yes  Pain with Extension: yes  ROM:  Decreased  Paraspinous TTP:  Present bilaterally  Facet TTP:  L4-5  Facet Loading:  Positive bilaterally  SLR:  Negative bilaterally  SIJ TTP:  Positive bilaterally  SAMM:  Positive bilaterally with positive compression and distraction            LABS:  Lab Results   Component Value Date    WBC 7.12 02/16/2024    HGB 13.9 02/16/2024    HCT 43.9 02/16/2024    MCV 88 02/16/2024     (L) 02/16/2024       CMP  Sodium   Date Value Ref Range Status   02/16/2024 138 136 - 145 mmol/L Final     Potassium   Date Value Ref Range Status   02/16/2024 4.1 3.5 - 5.1 mmol/L Final     Chloride   Date Value Ref Range Status   02/16/2024 107 95 - 110 mmol/L Final     CO2   Date Value Ref Range Status   02/16/2024 24 23 - 29 mmol/L Final     Glucose   Date Value Ref Range Status   02/16/2024 97 70 - 110 mg/dL Final     BUN   Date Value Ref Range Status   02/16/2024 16 8 - 23 mg/dL Final     Creatinine   Date Value Ref Range Status   02/16/2024 0.9 0.5 - 1.4 mg/dL Final     Calcium   Date Value Ref Range Status   02/16/2024 9.3 8.7 - 10.5 mg/dL Final     Total Protein   Date Value Ref Range Status   02/16/2024 7.8 6.0 - 8.4 g/dL Final     Albumin   Date Value Ref Range Status   02/16/2024 3.8 3.5 - 5.2 g/dL Final     Total Bilirubin   Date Value Ref  Range Status   02/16/2024 0.5 0.1 - 1.0 mg/dL Final     Comment:     For infants and newborns, interpretation of results should be based  on gestational age, weight and in agreement with clinical  observations.    Premature Infant recommended reference ranges:  Up to 24 hours.............<8.0 mg/dL  Up to 48 hours............<12.0 mg/dL  3-5 days..................<15.0 mg/dL  6-29 days.................<15.0 mg/dL       Alkaline Phosphatase   Date Value Ref Range Status   02/16/2024 81 55 - 135 U/L Final     AST   Date Value Ref Range Status   02/16/2024 17 10 - 40 U/L Final     ALT   Date Value Ref Range Status   02/16/2024 9 (L) 10 - 44 U/L Final     Anion Gap   Date Value Ref Range Status   02/16/2024 7 (L) 8 - 16 mmol/L Final     eGFR    Date Value Ref Range Status   10/06/2021 94 >89 mL/min Final       Lab Results   Component Value Date    HGBA1C 6.1 (H) 10/16/2023             ASSESSMENT:       63 y.o. year old female with neck pain, consistent with     1. Sacroiliitis  IR SI Joint Injection w/Imaging    IR SI Joint Injection w/Imaging    Case Request-RAD/Other Procedure Area: Bilateral SIJ Injection      2. Lumbar spondylosis        3. DDD (degenerative disc disease), lumbar        4. Rheumatoid arthritis, involving unspecified site, unspecified whether rheumatoid factor present            Sacroiliitis  -     IR SI Joint Injection w/Imaging; Future; Expected date: 03/12/2024  -     IR SI Joint Injection w/Imaging; Future; Expected date: 03/12/2024  -     Case Request-RAD/Other Procedure Area: Bilateral SIJ Injection    Lumbar spondylosis    DDD (degenerative disc disease), lumbar    Rheumatoid arthritis, involving unspecified site, unspecified whether rheumatoid factor present               PLAN:   - Interventions:   Schedule patient for bilateral sacroiliac joint injection for bilateral sacroiliitis.   -10/31/2022: Bilateral C5-C7 medial branch block, 100% relief for 2 days  -9/22/22:  C7-T1  interlaminar epidural steroid injection, minimal relief    - Anticoagulation use:   yes aspirin    - Medications:   Continue Lyrica 75 mg twice a day  Continue Relafen 750 mg twice a day as needed   Continue tizanidine 4 mg twice a day as needed   Continue Plaquenil as prescribed by rheumatology      - Therapy:    Continue formal physical therapy for neck and lower back pain after fall from standing.    - Imaging:   X-rays of lumbar spine reviewed findings discussed with patient.  Significant for degenerative facet arthropathy at L4-5 and L5-S1 with mild convex right curvature lumbar spine centered about L3   MRI of cervical spine reviewed.  Significant for eccentric right disc bulge at C5-C6 with associated severe right foraminal stenosis   X-rays of cervical spine obtained today reviewed.  Significant for spondylosis and anterior annular calcifications noted at C4-5, C5-6, and C6-C7   EMG and nerve conduction study of bilateral upper extremity shows bilateral carpal tunnel syndrome and C7 radiculopathy    - Consults:   Continue follow-up with Rheumatology    - Counseled patient regarding the importance of activity modification and physical therapy    - Patient Questions: Answered all of the patient's questions regarding diagnosis, therapy, and treatment    - Follow up visit:  Return to clinic 4 weeks after procedure      The above plan and management options were discussed at length with patient. Patient is in agreement with the above and verbalized understanding.    I discussed the goals of interventional chronic pain management with the patient on today's visit.  I explained the utility of injections for diagnostic and therapeutic purposes.  We discussed a multimodal approach to pain including treating the patient's given worst pain at any given time.  We will use a systematic approach to addressing pain.  We will also adopt a multimodal approach that includes injections, adjuvant medications, physical therapy,  at times psychiatry.  There may be a limited role for opioid use intermittently in the treatment of pain, more particularly for acute pain although no one approach can be used as a sole treatment modality.    I emphasized the importance of regular exercise, core strengthening and stretching, diet and weight loss as a cornerstone of long-term pain management.      Luis M Pereyra MD  Interventional Pain Management  [facility]    Disclaimer:  This note was prepared using voice recognition system and is likely to have sound alike errors that may have been overlooked even after proof reading.  Please call me with any questions

## 2024-03-13 DIAGNOSIS — Z79.899 LONG-TERM USE OF HIGH-RISK MEDICATION: ICD-10-CM

## 2024-03-13 DIAGNOSIS — E78.5 HYPERLIPIDEMIA, UNSPECIFIED HYPERLIPIDEMIA TYPE: ICD-10-CM

## 2024-03-13 DIAGNOSIS — M06.9 RHEUMATOID ARTHRITIS, INVOLVING UNSPECIFIED SITE, UNSPECIFIED WHETHER RHEUMATOID FACTOR PRESENT: ICD-10-CM

## 2024-03-13 DIAGNOSIS — E11.69 TYPE 2 DIABETES MELLITUS WITH OTHER SPECIFIED COMPLICATION, WITHOUT LONG-TERM CURRENT USE OF INSULIN: Primary | ICD-10-CM

## 2024-03-13 LAB — DSDNA AB SER-ACNC: NORMAL [IU]/ML

## 2024-03-13 NOTE — PROGRESS NOTES
A1c looks pretty good.  It did come up from 6.1% to 6.2%, but still well below our goal of 7%.      Let us take it slowly.  Keep doing the Ozempic 2 mg weekly, but only take one tablet of metformin each day.  We will recheck your A1c at our next visit.  If it stays 6.5% at that time, we may stop the other tablet.

## 2024-03-18 ENCOUNTER — CLINICAL SUPPORT (OUTPATIENT)
Dept: REHABILITATION | Facility: HOSPITAL | Age: 64
End: 2024-03-18
Payer: MEDICARE

## 2024-03-18 DIAGNOSIS — Z74.09 IMPAIRED FUNCTIONAL MOBILITY, BALANCE, GAIT, AND ENDURANCE: ICD-10-CM

## 2024-03-18 DIAGNOSIS — R29.3 POOR POSTURE: ICD-10-CM

## 2024-03-18 DIAGNOSIS — R29.898 UPPER EXTREMITY WEAKNESS: ICD-10-CM

## 2024-03-18 DIAGNOSIS — M54.42 CHRONIC BILATERAL LOW BACK PAIN WITH BILATERAL SCIATICA: ICD-10-CM

## 2024-03-18 DIAGNOSIS — R29.898 WEAKNESS OF BOTH LOWER EXTREMITIES: ICD-10-CM

## 2024-03-18 DIAGNOSIS — M25.552 CHRONIC HIP PAIN, BILATERAL: ICD-10-CM

## 2024-03-18 DIAGNOSIS — G89.29 CHRONIC HIP PAIN, BILATERAL: ICD-10-CM

## 2024-03-18 DIAGNOSIS — M53.86 DECREASED RANGE OF MOTION OF LUMBAR SPINE: ICD-10-CM

## 2024-03-18 DIAGNOSIS — M25.551 CHRONIC HIP PAIN, BILATERAL: ICD-10-CM

## 2024-03-18 DIAGNOSIS — M54.2 NECK PAIN: Primary | ICD-10-CM

## 2024-03-18 DIAGNOSIS — M54.41 CHRONIC BILATERAL LOW BACK PAIN WITH BILATERAL SCIATICA: ICD-10-CM

## 2024-03-18 DIAGNOSIS — G89.29 CHRONIC BILATERAL LOW BACK PAIN WITH BILATERAL SCIATICA: ICD-10-CM

## 2024-03-18 DIAGNOSIS — M53.82 DECREASED RANGE OF MOTION OF INTERVERTEBRAL DISCS OF CERVICAL SPINE: ICD-10-CM

## 2024-03-18 PROCEDURE — 97140 MANUAL THERAPY 1/> REGIONS: CPT | Mod: PN,CQ

## 2024-03-18 PROCEDURE — 97110 THERAPEUTIC EXERCISES: CPT | Mod: PN,CQ

## 2024-03-18 PROCEDURE — 97112 NEUROMUSCULAR REEDUCATION: CPT | Mod: PN,CQ

## 2024-03-18 NOTE — PROGRESS NOTES
[facility] [place] [facility]   Physical Therapy Treatment Note      Name: Luciana Moraes  Clinic Number: [id number]    Therapy Diagnosis:   Encounter Diagnoses   Name Primary?    Neck pain Yes    Decreased range of motion of lumbar spine     Decreased range of motion of intervertebral discs of cervical spine     Poor posture     Upper extremity weakness     Chronic hip pain, bilateral     Chronic bilateral low back pain with bilateral sciatica     Weakness of both lower extremities     Impaired functional mobility, balance, gait, and endurance      Physician: Cedric Huerta MD    Visit Date: 3/18/2024    Physician Orders: PT Eval and Treat   Medical Diagnosis from Referral:               M54.12 (ICD-10-CM) - Cervical radiculopathy   M19.90 (ICD-10-CM) - Osteoarthritis, unspecified osteoarthritis type, unspecified site   M25.551,M25.552 (ICD-10-CM) - Bilateral hip pain  Evaluation Date: 3/12/2024  Authorization Period Expiration: 12/31/2024  Plan of Care Expiration: 6/12/2024  Visit # / Visits authorized: 1/10 (+eval)     PN DUE: 4/12/2024     Time In: 8:30 AM  Time Out: 9:30 AM  Total Appointment Time (timed & untimed codes): 55 minutes     Precautions: Standard, Diabetes, RA    PTA Visit #: 1/5       Subjective     Patient reports: neck, low back, and hip pain.  She was compliant with home exercise program.  Response to previous treatment: first follow up  Functional change:     Pain:  -Neck: Current 8/10, worst 10/10, best 6/10   -Back: Current: 10/10, worst 10/10, best 7/10  Location: (B) back/hips and (B) neck   Description:   - Neck: sharp and constant   - Back: sharp and constant    Aggravating Factors:   - sitting, standing, desk work, house chores, transitional movements, walking, sleeping   Easing Factors: stretch out, switch position, apply pressure (at neck)     Objective      Objective Measures updated at progress report unless specified.     Treatment     Luciana received the  "treatments listed below:      therapeutic exercises to develop strength, endurance, ROM, flexibility, posture, and core stabilization for 20 minutes including:  UBE bike, 3/3'  Doorway pec stretch, 3x30"  (B) shoulder ER, YTB, 3x10  (B) shoulder W's, YTB, 3x10  Seated thoracic extension, x30  Seated thoracic rotation, x30         manual therapy techniques: Joint mobilizations, Manual traction, Myofacial release, Soft tissue Mobilization, and Friction Massage were applied for 10 minutes, including:  Manual Cervical Traction  SOR    neuromuscular re-education activities to improve: Balance, Coordination, Kinesthetic, Sense, Proprioception, and Posture for 25 minutes. The following activities were included:  Standing shoulder I's, Y's T's, YTB, 1x10  Overhead press w/ forward Tband bias, YTB, 1x10  Bent over I's and T's, 2x10  Bent over Rows, 5#, 3x10  SA free motion rows, 7#, 3x10  SA free motion extensions, 3#, 3x10    therapeutic activities to improve functional performance for 0  minutes, including:        Patient Education and Home Exercises       Education provided:   - rationale for treatment.    Written Home Exercises Provided: Patient instructed to cont prior HEP. Exercises were reviewed and Luciana was able to demonstrate them prior to the end of the session.  Margaritas demonstrated good  understanding of the education provided. See Electronic Medical Record under Patient Instructions for exercises provided during therapy sessions    Assessment     Patient reports to therapy for first follow up after evaluation.  She presents with pain in neck, low back, and hips.  Initiated POC with interventions targeting postural and periscapular strength and ROM.  She required coaxing to complete therapy exercises.  Encouraged continued participation in HEP.  Will inquire response to treatment next visit.     Luciana Is progressing well towards her goals.   Patient prognosis is Good.     Patient will continue to benefit from " skilled outpatient physical therapy to address the deficits listed in the problem list box on initial evaluation, provide pt/family education and to maximize pt's level of independence in the home and community environment.     Patient's spiritual, cultural and educational needs considered and pt agreeable to plan of care and goals.     Anticipated barriers to physical therapy: age    Goals: Short Term Goals: 5 weeks   Patient will demonstrate independence with HEP in order to progress toward functional independence  Pt will demonstrate improved pain by reports of less than or equal to 7/10 worst pain of cervical neck on the verbal rating scale in order to progress toward maximal functional ability and improve QOL.  Pt will be able to correct slouched posture in static sitting with minimal verbal cueing for improved QOL  Pt will improve cervical range of motion in all planes by 25% for improved QOL and increased ease with house hold chores   Patient will improve bilateral upper extremity strength by 1/2 MMT for improved strength needed to carry groceries  Pt will demonstrate improved pain by reports of less than or equal to 7/10 worst pain of low back/hips on the verbal rating scale in order to progress toward maximal functional ability and improve QOL.  Pt will improve lumbar range of motion by 25% for improved QOL and functional mobility   Patient will improve bilateral lower extremity strength by 1/2 MMT for improved ease with transitional movements.      Long Term Goals: 10 weeks   Patient will demonstrate improved function as indicated by a functional score of 54 on the FOTO.  Pt will demonstrate improved pain by reports of less than or equal to 5/10 worst pain of cervical neck on the verbal rating scale in order to progress toward maximal functional ability and improve QOL.  Pt will improve slouched posture in static sitting for improved QOL and increased ease with schooling activities.   Pt will improve  cervical range of motion with within functional limits with less than 3/10 pain for improved mobility needed to perform all house hold chores  Pt will improved bilateral upper extremity strength to greater than or equal to 4+/5 with less than 3/10 pain for improved functional upper extremity strength.   Pt will demonstrate improved pain by reports of less than or equal to 5/10 worst pain of low back on the verbal rating scale in order to progress toward maximal functional ability and improve QOL.  Pt will improve lumbar range of motion to within functional limits for improved functional mobility   Pt will improve bilateral lower extremity strength to greater than or equal to 4+/5 for improved functional lower extremity strength    Plan     Plan of care Certification: 3/12/2024 to 6/12/2024.     Outpatient Physical Therapy 2 times weekly for 10 weeks to include the following interventions: Cervical/Lumbar Traction, Electrical Stimulation , Gait Training, Manual Therapy, Moist Heat/ Ice, Neuromuscular Re-ed, Orthotic Management and Training, Patient Education, Self Care, Therapeutic Activities, Therapeutic Exercise, and Ultrasound. And any other therapies and modalities as clinically indicated and appropriate, including but not limited to FDN and telehealth. Pt may be seen by PTA as a part of pt's care team.     Ryan Cummings PTA

## 2024-03-19 ENCOUNTER — OFFICE VISIT (OUTPATIENT)
Dept: PODIATRY | Facility: CLINIC | Age: 64
End: 2024-03-19
Payer: MEDICARE

## 2024-03-19 VITALS — WEIGHT: 171.5 LBS | BODY MASS INDEX: 31.56 KG/M2 | HEIGHT: 62 IN

## 2024-03-19 DIAGNOSIS — E11.42 DM TYPE 2 WITH DIABETIC PERIPHERAL NEUROPATHY: Primary | ICD-10-CM

## 2024-03-19 DIAGNOSIS — E11.69 TYPE 2 DIABETES MELLITUS WITH OTHER SPECIFIED COMPLICATION, WITHOUT LONG-TERM CURRENT USE OF INSULIN: ICD-10-CM

## 2024-03-19 PROCEDURE — 99213 OFFICE O/P EST LOW 20 MIN: CPT | Mod: S$GLB,,, | Performed by: PODIATRIST

## 2024-03-19 PROCEDURE — 99999 PR PBB SHADOW E&M-EST. PATIENT-LVL III: CPT | Mod: PBBFAC,,, | Performed by: PODIATRIST

## 2024-03-19 NOTE — PROGRESS NOTES
[facility]  PODIATRIC MEDICINE AND SURGERY      CHIEF COMPLAINT  Chief Complaint   Patient presents with    Diabetic Foot Exam     DFE, pt rates 0/10, but sometimes have cramping at the bottom of her feet, pt is diabetic, pt last seen pcp Cedric Huerta MD 3/12/24         HPI    SUBJECTIVE: Luciana Moraes is a 63 y.o. female who  has a past medical history of Diabetes mellitus, type 2, Hyperlipidemia, Osteoarthritis, Polymyositis, and Rheumatoid arthritis. Adinaspresents to clinic for high risk diabetic foot exam and care.  Luciana admits numbness, burning, and/or tingling sensations in their feet. Patient relates blood sugars normally run around 120. Pt has established care with primary care physician and would like to establish care with a podiatric physician. Patient has no other pedal complaints at this time      HgA1c:   Hemoglobin A1C   Date Value Ref Range Status   03/12/2024 6.2 (H) 4.0 - 5.6 % Final     Comment:     ADA Screening Guidelines:  5.7-6.4%  Consistent with prediabetes  >or=6.5%  Consistent with diabetes    High levels of fetal hemoglobin interfere with the HbA1C  assay. Heterozygous hemoglobin variants (HbS, HgC, etc)do  not significantly interfere with this assay.   However, presence of multiple variants may affect accuracy.     10/16/2023 6.1 (H) 4.0 - 5.6 % Final     Comment:     ADA Screening Guidelines:  5.7-6.4%  Consistent with prediabetes  >or=6.5%  Consistent with diabetes    High levels of fetal hemoglobin interfere with the HbA1C  assay. Heterozygous hemoglobin variants (HbS, HgC, etc)do  not significantly interfere with this assay.   However, presence of multiple variants may affect accuracy.     02/13/2023 6.5 (H) 4.0 - 5.6 % Final     Comment:     ADA Screening Guidelines:  5.7-6.4%  Consistent with prediabetes  >or=6.5%  Consistent with diabetes    High levels of fetal hemoglobin interfere with the HbA1C  assay. Heterozygous hemoglobin variants (HbS,  HgC, etc)do  not significantly interfere with this assay.   However, presence of multiple variants may affect accuracy.           PMH  Past Medical History:   Diagnosis Date    Diabetes mellitus, type 2     Hyperlipidemia     Osteoarthritis     Polymyositis     Rheumatoid arthritis        MEDS  Current Outpatient Medications on File Prior to Visit   Medication Sig Dispense Refill    aspirin 81 MG Chew Take 81 mg by mouth.      atorvastatin (LIPITOR) 80 MG tablet Take 1 tablet (80 mg total) by mouth once daily. 90 tablet 3    diclofenac sodium (VOLTAREN) 1 % Gel Apply 2 g topically 4 (four) times daily. 100 g 6    ergocalciferol (ERGOCALCIFEROL) 50,000 unit Cap Take 1 capsule (50,000 Units total) by mouth twice a week. 24 capsule 3    hydroxychloroquine (PLAQUENIL) 200 mg tablet Take 1 tablet (200 mg total) by mouth 2 (two) times daily. 180 tablet 1    ibuprofen (ADVIL,MOTRIN) 800 MG tablet Take 800 mg by mouth 3 (three) times daily.      metFORMIN (GLUCOPHAGE-XR) 500 MG ER 24hr tablet Take 2 tablets (1,000 mg total) by mouth daily with breakfast. 180 tablet 3    nabumetone (RELAFEN) 750 MG tablet Take 1 tablet (750 mg total) by mouth 2 (two) times daily as needed for Pain. 60 tablet 5    polyethylene glycol (GLYCOLAX) 17 gram PwPk Take 17 g by mouth once daily. 30 each 11    pregabalin (LYRICA) 75 MG capsule Take 1 capsule (75 mg total) by mouth 2 (two) times daily. 60 capsule 5    semaglutide (OZEMPIC) 2 mg/dose (8 mg/3 mL) PnIj Inject 2 mg into the skin every 7 days. 9 mL 3    tiZANidine (ZANAFLEX) 4 MG tablet Take 1 tablet (4 mg total) by mouth 2 (two) times daily as needed (pain). 60 tablet 5    traZODone (DESYREL) 50 MG tablet Take 2 tablets (100 mg total) by mouth nightly as needed for Insomnia. 180 tablet 3     No current facility-administered medications on file prior to visit.       PSH     Past Surgical History:   Procedure Laterality Date    CARPAL TUNNEL RELEASE Bilateral     COLONOSCOPY N/A 11/08/2022  "   Procedure: COLONOSCOPY;  Surgeon: Kishore Monsalve MD;  Location: [facility] ENDO;  Service: Endoscopy;  Laterality: N/A;    EPIDURAL STEROID INJECTION INTO CERVICAL SPINE N/A 2022    Procedure: C7/T1 IL [facility];  Surgeon: Luis M Pereyra MD;  Location: [facility] PAIN MGT;  Service: Pain Management;  Laterality: N/A;    HIP SURGERY      HYSTERECTOMY      due to bleeding, pain from fibroids, retains one ovary    INJECTION OF ANESTHETIC AGENT AROUND MEDIAL BRANCH NERVES INNERVATING CERVICAL FACET JOINT Bilateral 10/31/2022    Procedure: Bilateral C5-7 MBB with RN IV sedation;  Surgeon: Luis M Pereyra MD;  Location: [facility] PAIN MGT;  Service: Pain Management;  Laterality: Bilateral;    INJECTION OF ANESTHETIC AGENT AROUND MEDIAL BRANCH NERVES INNERVATING CERVICAL FACET JOINT Bilateral 3/13/2023    Procedure: Bilateral C5-7 MBB with RN IV sedation;  Surgeon: Luis M Pereyra MD;  Location: [facility] PAIN MGT;  Service: Pain Management;  Laterality: Bilateral;    OOPHORECTOMY      1 ovary removed    TUBAL LIGATION  1985        ALL  Review of patient's allergies indicates:  No Known Allergies    SOC     Social History     Tobacco Use    Smoking status: Former     Current packs/day: 0.00     Types: Cigarettes     Quit date: 3/17/2001     Years since quittin.0    Smokeless tobacco: Never   Substance Use Topics    Alcohol use: Never    Drug use: Never         Family HX    Family History   Problem Relation Age of Onset    Rheum arthritis Mother     No Known Problems Father             REVIEW OF SYSTEMS  General: Denies any fever or chills  Chest: Denies shortness of breath, wheezing, coughing, or sputum production  Heart: Denies chest pain.  As noted above and per history of current illness above, otherwise negative in the remainder of the 14 systems.     PHYSICAL EXAM  Vitals:    24 1204   Weight: 77.8 kg (171 lb 8.3 oz)   Height: 5' 2" (1.575 m)   PainSc: 0-No pain       GEN:  This patient is well-developed, " well-nourished and appears stated age, well-oriented to person, place and time, and cooperative and pleasant on today's visit.      LOWER EXTREMITY PHYSICAL EXAMINATION   VASCULAR  DP pedal pulse 2/4 RIGHT, LEFT2/4   PT pedal pulse 2/4 RIGHT, LEFT2/4  Capillary refill time immediate to the toes.     DERMATOLOGIC  Skin moist with healthy texture and turgor.  There are no open ulcerations, lacerations, or fissures to bilateral foot and ankle regions. There are no signs of infection as there is no erythema, no proximal-extending lymphangiitis, no fluctuance, or crepitus noted on palpation to bilateral foot and ankle regions.   There is no interdigital maceration.     NEUROLOGIC  Protective sensation intact at 10/10 sites upon examination with Camillus Weinsten 5.07 g monofilament.  Propioception intact at 1st MTPJ b/l.  Achilles and patellar deep tendon reflexes intact  Babinski reflex absent    ORTHOPEDIC/BIOMECHANICAL  No symptomatic structural abnormalities noted. Muscle strength is 5/5 for foot inverters, everters, plantarflexors, and dorsiflexors. Muscle tone is normal.  Inspection/palpation of bone, joints and muscles unremarkable.      ASSESSMENT  No diagnosis found.        Plan:  -Initial patient evaluation with H&P was performed  -Discuss presenting problems, etiology, pathologic processes and management options with patient today.   -I counseled the patient on their conditions, their implications and medical management. An in depth discussion on diabetic management, risk prevention, amputation prevention verbally and provided educational literature in written format.  - Shoe inspection. Diabetic Foot Education. Patient reminded of the importance of good nutrition and blood sugar control to help prevent podiatric complications of diabetes. Patient instructed on proper foot hygeine. We discussed wearing proper shoe gear, daily foot inspections, never walking without protective shoe gear, never putting sharp  instruments to feet, routine podiatric visits annually/semi annually or sooner if symptoms arise    Disclaimer: This note was partially prepared using a voice recognition system and is likely to have sound alike errors within the text.        Future Appointments   Date Time Provider Department Center   3/21/2024  8:00 AM Ryan Cummings, PTA [facility]   3/25/2024  7:30 AM Ana Alonzo, PT [facility]   3/28/2024  7:30 AM Ana Alonzo, PT [facility]   4/1/2024  7:30 AM Ana Alonzo, PT [facility]   4/4/2024  7:30 AM Ana Alonzo, PT [facility]   4/8/2024  7:30 AM Ana Alonzo, PT [facility]   4/11/2024  7:30 AM Ana Alonzo, PT [facility]   5/10/2024  9:00 AM Luis M Pereyra MD [facility]   8/20/2024  9:30 AM Meg Rodriguez PA-C [facility] [place]   9/6/2024  8:15 AM Vinny Vásquez OD [facility] [place]   9/12/2024  7:40 AM [facility]edric [facility]erta [facility]       Report Electronically Signed By:     Jane Patino DPM   Podiatry  [facility]  3/19/2024

## 2024-03-19 NOTE — PATIENT INSTRUCTIONS
Your Proactive Measures   You play a vital role in reducing complications. Follow these guidelines and contact your foot and ankle surgeon if you notice any problems:     1. Inspect your feet daily. If your eyesight is poor, have someone else do it for you. Inspect for:   Skin or nail problems: Look for cuts, scrapes, redness, drainage, swelling, bad odor, rash, discoloration, loss of hair on toes, injuries, or nail changes (deformed, striped, yellowed or discolored, thickened, or not growing).   Signs of fracture: If your foot is swollen, red, hot, or has changed in size, shape, or direction, see your foot and ankle surgeon immediately.   2. Dont ignore leg pain. Pain in the leg that occurs at night or with a little activity could mean you have a blocked artery. Seek care immediately.   3. Nail cutting. If you have any nail problems, hard nails, or reduced feeling in your feet, your toenails should be properly trimmed.   4. No bathroom surgery. Never trim calluses or corns yourself, and dont use over-the-counter medicated pads.   5. Keep floors free of sharp objects. Make sure there are no needles, insulin syringes, or other sharp objects on the floor.   6. Dont go barefoot. Wear shoes, indoors and outdoors.   7. Check shoes and socks. Shake out your shoes before putting them on. Make sure your socks arent bunched up.   8. Have your circulation and sense of feeling tested. Your foot and ankle surgeon will perform tests to see if youve lost any feeling or circulation.       Diabetic Peripheral Neuropathy   What is Diabetic Peripheral Neuropathy?  Diabetic neuropathy is nerve damage caused by diabetes. When it affects the arms, hands, legs and feet it is known as diabetic peripheral neuropathy. Diabetic peripheral neuropathy is different from peripheral arterial disease (poor circulation), which affects the blood vessels rather than the nerves.   Three different groups of nerves can be affected by diabetic  neuropathy:   Sensory nerves, which enable people to feel pain, temperature, and other sensations   Motor nerves, which control the muscles and give them their strength and tone   Autonomic nerves, which allow the body to perform certain involuntary functions, such as sweating.   Diabetic peripheral neuropathy doesnt emerge overnight. Instead, it usually develops slowly and worsens over time. Some patients have this condition long before they are diagnosed with diabetes. Having diabetes for several years may increase the likelihood of having diabetic neuropathy.   The loss of sensation and other problems associated with nerve damage make a patient prone to developing skin ulcers (open sores) that can become infected and may not heal. This serious complication of diabetes can lead to loss of a foot, a leg, or even a life.     Causes  The nerve damage that characterizes diabetic peripheral neuropathy is more common in patients with poorly managed diabetes. However, even diabetic patients who have excellent blood sugar (glucose) control can develop diabetic neuropathy. There are several theories as to why this occurs, including the possibilities that high blood glucose or constricted blood vessels produce damage to the nerves.   As diabetic peripheral neuropathy progresses, various nerves are affected. These damaged nerves can cause problems that encourage development of ulcers. For example:   Motor Neuropathy (Deformity)    +    Ill-fitting shoes    +    Sensory Neuropathy (numbness)    =    Ulcers (sores)    Deformities (such as bunions or hammertoes) resulting from motor neuropathy may cause shoes to rub against toes, creating a sore. The numbness caused by sensory neuropathy can make the patient unaware that this is happening.   Because of numbness, a patient may not realize that he or she has stepped on a small object and cut the skin.   Cracked skin caused by autonomic neuropathy, combined with sensory  neuropathys numbness and problems associated with motor neuropathy can lead to developing a sore.     Symptoms  Depending on the type(s) of nerves involved, one or more symptoms may be present in diabetic peripheral neuropathy.     For sensory neuropathy:   Numbness or tingling in the feet   Pain or discomfort in the feet or legs, including prickly, sharp pain or burning feet     For motor neuropathy:   Muscle weakness and loss of muscle tone in the feet and lower legs   Loss of balance   Changes in foot shape that can lead to areas of increased pressure     For autonomic neuropathy:   Dry feet   Cracked skin     Diagnosis  To diagnose diabetic peripheral neuropathy, the foot and ankle surgeon will obtain the patients history of symptoms and will perform simple in-office tests on the feet and legs. This evaluation may include assessment of the patients reflexes, ability to feel light touch, and ability to feel vibration. In some cases, additional neurologic tests may be ordered.     Treatment  First and foremost, treatment of diabetic peripheral neuropathy centers on control of the patients blood sugar level. In addition, various options are used to treat the painful symptoms.   Medications are available to help relieve specific symptoms, such as tingling or burning. Sometimes a combination of different medications is used.   In some cases, the patient may also undergo physical therapy to help reduce balance problems or other symptoms .    Prevention  The patient plays a vital role in minimizing the risk of developing diabetic peripheral neuropathy and in preventing its possible consequences. Some important preventive measures include:   Keep blood sugar levels under control.   Wear well-fitting shoes to avoid getting sores.   Inspect your feet every day. If you notice any cuts, redness, blisters, or swelling, see your foot and ankle surgeon right away. This can prevent problems from becoming worse.   Visit your  foot and ankle surgeon on a regular basis for an examination to help prevent the foot complications of diabetes.   Have periodic visits with your primary care physician or endocrinologist. The foot and ankle surgeon works together with these and other providers to prevent and treat complications from diabetes.

## 2024-03-19 NOTE — PROGRESS NOTES
[facility]  PODIATRIC MEDICINE AND SURGERY      CHIEF COMPLAINT  Chief Complaint   Patient presents with    Diabetic Foot Exam     DFE, pt rates 0/10, but sometimes have cramping at the bottom of her feet, pt is diabetic, pt last seen pcp Cedric Huerta MD 3/12/24         HPI    SUBJECTIVE: Luciana Moraes is a 63 y.o. female who  has a past medical history of Diabetes mellitus, type 2, Hyperlipidemia, Osteoarthritis, Polymyositis, and Rheumatoid arthritis. Adinaspresents to clinic for high risk diabetic foot exam and care.  Luciana admits numbness, burning, and/or tingling sensations in their feet. Patient relates blood sugars normally run around 120. Pt has established care with primary care physician and would like to establish care with a podiatric physician. Patient has no other pedal complaints at this time      HgA1c:   Hemoglobin A1C   Date Value Ref Range Status   03/12/2024 6.2 (H) 4.0 - 5.6 % Final     Comment:     ADA Screening Guidelines:  5.7-6.4%  Consistent with prediabetes  >or=6.5%  Consistent with diabetes    High levels of fetal hemoglobin interfere with the HbA1C  assay. Heterozygous hemoglobin variants (HbS, HgC, etc)do  not significantly interfere with this assay.   However, presence of multiple variants may affect accuracy.     10/16/2023 6.1 (H) 4.0 - 5.6 % Final     Comment:     ADA Screening Guidelines:  5.7-6.4%  Consistent with prediabetes  >or=6.5%  Consistent with diabetes    High levels of fetal hemoglobin interfere with the HbA1C  assay. Heterozygous hemoglobin variants (HbS, HgC, etc)do  not significantly interfere with this assay.   However, presence of multiple variants may affect accuracy.     02/13/2023 6.5 (H) 4.0 - 5.6 % Final     Comment:     ADA Screening Guidelines:  5.7-6.4%  Consistent with prediabetes  >or=6.5%  Consistent with diabetes    High levels of fetal hemoglobin interfere with the HbA1C  assay. Heterozygous hemoglobin variants (HbS,  HgC, etc)do  not significantly interfere with this assay.   However, presence of multiple variants may affect accuracy.           PMH  Past Medical History:   Diagnosis Date    Diabetes mellitus, type 2     Hyperlipidemia     Osteoarthritis     Polymyositis     Rheumatoid arthritis        MEDS  Current Outpatient Medications on File Prior to Visit   Medication Sig Dispense Refill    aspirin 81 MG Chew Take 81 mg by mouth.      atorvastatin (LIPITOR) 80 MG tablet Take 1 tablet (80 mg total) by mouth once daily. 90 tablet 3    diclofenac sodium (VOLTAREN) 1 % Gel Apply 2 g topically 4 (four) times daily. 100 g 6    ergocalciferol (ERGOCALCIFEROL) 50,000 unit Cap Take 1 capsule (50,000 Units total) by mouth twice a week. 24 capsule 3    hydroxychloroquine (PLAQUENIL) 200 mg tablet Take 1 tablet (200 mg total) by mouth 2 (two) times daily. 180 tablet 1    ibuprofen (ADVIL,MOTRIN) 800 MG tablet Take 800 mg by mouth 3 (three) times daily.      metFORMIN (GLUCOPHAGE-XR) 500 MG ER 24hr tablet Take 2 tablets (1,000 mg total) by mouth daily with breakfast. 180 tablet 3    nabumetone (RELAFEN) 750 MG tablet Take 1 tablet (750 mg total) by mouth 2 (two) times daily as needed for Pain. 60 tablet 5    polyethylene glycol (GLYCOLAX) 17 gram PwPk Take 17 g by mouth once daily. 30 each 11    pregabalin (LYRICA) 75 MG capsule Take 1 capsule (75 mg total) by mouth 2 (two) times daily. 60 capsule 5    semaglutide (OZEMPIC) 2 mg/dose (8 mg/3 mL) PnIj Inject 2 mg into the skin every 7 days. 9 mL 3    tiZANidine (ZANAFLEX) 4 MG tablet Take 1 tablet (4 mg total) by mouth 2 (two) times daily as needed (pain). 60 tablet 5    traZODone (DESYREL) 50 MG tablet Take 2 tablets (100 mg total) by mouth nightly as needed for Insomnia. 180 tablet 3     No current facility-administered medications on file prior to visit.       PSH     Past Surgical History:   Procedure Laterality Date    CARPAL TUNNEL RELEASE Bilateral     COLONOSCOPY N/A 11/08/2022  "   Procedure: COLONOSCOPY;  Surgeon: Ksihore Monsalve MD;  Location: [facility] ENDO;  Service: Endoscopy;  Laterality: N/A;    EPIDURAL STEROID INJECTION INTO CERVICAL SPINE N/A 2022    Procedure: C7/T1 [facility];  Surgeon: Luis M Pereyra MD;  Location: [facility] PAIN MGT;  Service: Pain Management;  Laterality: N/A;    HIP SURGERY      HYSTERECTOMY      due to bleeding, pain from fibroids, retains one ovary    INJECTION OF ANESTHETIC AGENT AROUND MEDIAL BRANCH NERVES INNERVATING CERVICAL FACET JOINT Bilateral 10/31/2022    Procedure: Bilateral C5-7 MBB with RN IV sedation;  Surgeon: Luis M Pereyra MD;  Location: [facility] PAIN MGT;  Service: Pain Management;  Laterality: Bilateral;    INJECTION OF ANESTHETIC AGENT AROUND MEDIAL BRANCH NERVES INNERVATING CERVICAL FACET JOINT Bilateral 3/13/2023    Procedure: Bilateral C5-7 MBB with RN IV sedation;  Surgeon: Luis M Pereyra MD;  Location: [facility] PAIN MGT;  Service: Pain Management;  Laterality: Bilateral;    OOPHORECTOMY      1 ovary removed    TUBAL LIGATION  1985        ALL  Review of patient's allergies indicates:  No Known Allergies    SOC     Social History     Tobacco Use    Smoking status: Former     Current packs/day: 0.00     Types: Cigarettes     Quit date: 3/17/2001     Years since quittin.0    Smokeless tobacco: Never   Substance Use Topics    Alcohol use: Never    Drug use: Never         Family HX    Family History   Problem Relation Age of Onset    Rheum arthritis Mother     No Known Problems Father             REVIEW OF SYSTEMS  General: Denies any fever or chills  Chest: Denies shortness of breath, wheezing, coughing, or sputum production  Heart: Denies chest pain.  As noted above and per history of current illness above, otherwise negative in the remainder of the 14 systems.     PHYSICAL EXAM  Vitals:    24 1204   Weight: 77.8 kg (171 lb 8.3 oz)   Height: 5' 2" (1.575 m)   PainSc: 0-No pain       GEN:  This patient is well-developed, " well-nourished and appears stated age, well-oriented to person, place and time, and cooperative and pleasant on today's visit.      LOWER EXTREMITY PHYSICAL EXAMINATION   VASCULAR  DP pedal pulse 2/4 RIGHT, LEFT2/4   PT pedal pulse 2/4 RIGHT, LEFT2/4  Capillary refill time immediate to the toes.     DERMATOLOGIC  Skin moist with healthy texture and turgor.  There are no open ulcerations, lacerations, or fissures to bilateral foot and ankle regions. There are no signs of infection as there is no erythema, no proximal-extending lymphangiitis, no fluctuance, or crepitus noted on palpation to bilateral foot and ankle regions.   There is no interdigital maceration.     NEUROLOGIC  Protective sensation intact at 10/10 sites upon examination with McAndrews Weinsten 5.07 g monofilament.  Propioception intact at 1st MTPJ b/l.  Achilles and patellar deep tendon reflexes intact  Babinski reflex absent    ORTHOPEDIC/BIOMECHANICAL  No symptomatic structural abnormalities noted. Muscle strength is 5/5 for foot inverters, everters, plantarflexors, and dorsiflexors. Muscle tone is normal.  Inspection/palpation of bone, joints and muscles unremarkable.      ASSESSMENT  Encounter Diagnoses   Name Primary?    DM type 2 with diabetic peripheral neuropathy Yes    Type 2 diabetes mellitus with other specified complication, without long-term current use of insulin            Plan:  -Initial patient evaluation with H&P was performed  -Discuss presenting problems, etiology, pathologic processes and management options with patient today.   -I counseled the patient on their conditions, their implications and medical management. An in depth discussion on diabetic management, risk prevention, amputation prevention verbally and provided educational literature in written format.  - Shoe inspection. Diabetic Foot Education. Patient reminded of the importance of good nutrition and blood sugar control to help prevent podiatric complications of  diabetes. Patient instructed on proper foot hygeine. We discussed wearing proper shoe gear, daily foot inspections, never walking without protective shoe gear, never putting sharp instruments to feet, routine podiatric visits annually/semi annually or sooner if symptoms arise    Disclaimer: This note was partially prepared using a voice recognition system and is likely to have sound alike errors within the text.        Future Appointments   Date Time Provider Department Center   3/25/2024  7:30 AM Ana Alonzo PT [facility]   3/28/2024  7:30 AM Ana Alonzo PT [facility]   4/1/2024  7:30 AM Ana Alonzo, PT [facility]   4/4/2024  7:30 AM Ana Alonzo PT [facility]   4/8/2024  7:30 AM Ana Alonzo PT [facility]   4/11/2024  7:30 AM Ana Alonzo PT [facility] [place]   5/10/2024  9:00 AM Luis M Pereyra [place] [facility]   8/20/2024  9:30 AM Meg Rodriguez PA-C [facility] [place]   9/6/2024  8:15 AM Vinny Vásquez OD [facility]   9/12/2024  7:40 AM [facility]edric [facility]erta [facility]       Report Electronically Signed By:     Jane Patino DPM   Podiatry  [facility]  3/21/2024

## 2024-03-21 ENCOUNTER — CLINICAL SUPPORT (OUTPATIENT)
Dept: REHABILITATION | Facility: HOSPITAL | Age: 64
End: 2024-03-21
Payer: MEDICARE

## 2024-03-21 DIAGNOSIS — M54.42 CHRONIC BILATERAL LOW BACK PAIN WITH BILATERAL SCIATICA: ICD-10-CM

## 2024-03-21 DIAGNOSIS — G89.29 CHRONIC BILATERAL LOW BACK PAIN WITH BILATERAL SCIATICA: ICD-10-CM

## 2024-03-21 DIAGNOSIS — M25.552 CHRONIC HIP PAIN, BILATERAL: ICD-10-CM

## 2024-03-21 DIAGNOSIS — R29.898 UPPER EXTREMITY WEAKNESS: ICD-10-CM

## 2024-03-21 DIAGNOSIS — G89.29 CHRONIC HIP PAIN, BILATERAL: ICD-10-CM

## 2024-03-21 DIAGNOSIS — R29.3 POOR POSTURE: ICD-10-CM

## 2024-03-21 DIAGNOSIS — M54.41 CHRONIC BILATERAL LOW BACK PAIN WITH BILATERAL SCIATICA: ICD-10-CM

## 2024-03-21 DIAGNOSIS — M53.82 DECREASED RANGE OF MOTION OF INTERVERTEBRAL DISCS OF CERVICAL SPINE: ICD-10-CM

## 2024-03-21 DIAGNOSIS — R29.898 WEAKNESS OF BOTH LOWER EXTREMITIES: ICD-10-CM

## 2024-03-21 DIAGNOSIS — Z74.09 IMPAIRED FUNCTIONAL MOBILITY, BALANCE, GAIT, AND ENDURANCE: ICD-10-CM

## 2024-03-21 DIAGNOSIS — M53.86 DECREASED RANGE OF MOTION OF LUMBAR SPINE: ICD-10-CM

## 2024-03-21 DIAGNOSIS — M54.2 NECK PAIN: Primary | ICD-10-CM

## 2024-03-21 DIAGNOSIS — M25.551 CHRONIC HIP PAIN, BILATERAL: ICD-10-CM

## 2024-03-21 PROCEDURE — 97110 THERAPEUTIC EXERCISES: CPT | Mod: PN,CQ

## 2024-03-21 PROCEDURE — 97140 MANUAL THERAPY 1/> REGIONS: CPT | Mod: PN,CQ

## 2024-03-21 PROCEDURE — 97112 NEUROMUSCULAR REEDUCATION: CPT | Mod: PN,CQ

## 2024-03-21 NOTE — PROGRESS NOTES
[facility]   Physical Therapy Treatment Note      Name: Luciana Moraes  Clinic Number: [id number]    Therapy Diagnosis:   Encounter Diagnoses   Name Primary?    Neck pain Yes    Decreased range of motion of lumbar spine     Decreased range of motion of intervertebral discs of cervical spine     Poor posture     Upper extremity weakness     Chronic hip pain, bilateral     Chronic bilateral low back pain with bilateral sciatica     Weakness of both lower extremities     Impaired functional mobility, balance, gait, and endurance      Physician: Cedric Huerta MD    Visit Date: 3/21/2024    Physician Orders: PT Eval and Treat   Medical Diagnosis from Referral:               M54.12 (ICD-10-CM) - Cervical radiculopathy   M19.90 (ICD-10-CM) - Osteoarthritis, unspecified osteoarthritis type, unspecified site   M25.551,M25.552 (ICD-10-CM) - Bilateral hip pain  Evaluation Date: 3/12/2024  Authorization Period Expiration: 12/31/2024  Plan of Care Expiration: 6/12/2024  Visit # / Visits authorized: 2/10 (+eval)     PN DUE: 4/12/2024     Time In: 8:00 AM  Time Out: 9:00 AM  Total Appointment Time (timed & untimed codes): 60 minutes     Precautions: Standard, Diabetes, RA    PTA Visit #: 1/5       Subjective     Patient reports: continued pain.  She was compliant with home exercise program.  Response to previous treatment: first follow up  Functional change:     Pain:  -Neck: Current 8/10, worst 10/10, best 6/10   -Back: Current: 10/10, worst 10/10, best 7/10  Location: (B) back/hips and (B) neck   Description:   - Neck: sharp and constant   - Back: sharp and constant    Aggravating Factors:   - sitting, standing, desk work, house chores, transitional movements, walking, sleeping   Easing Factors: stretch out, switch position, apply pressure (at neck)     Objective      Objective Measures updated at progress report unless specified.     Treatment     Luciana received the treatments listed  "below:      therapeutic exercises to develop strength, endurance, ROM, flexibility, posture, and core stabilization for 20 minutes including:  UBE bike, 3/3'  Doorway pec stretch, 3x30"  (B) shoulder ER, YTB, 3x10  (B) shoulder W's, YTB, 3x10  Seated thoracic extension, x30  Seated thoracic rotation, x30         manual therapy techniques: Joint mobilizations, Manual traction, Myofacial release, Soft tissue Mobilization, and Friction Massage were applied for 10 minutes, including:  Manual Cervical Traction  SOR    neuromuscular re-education activities to improve: Balance, Coordination, Kinesthetic, Sense, Proprioception, and Posture for 25 minutes. The following activities were included:  Standing shoulder I's, Y's T's, YTB, 1x10  Overhead press w/ forward Tband bias, YTB, 1x10  Bent over I's and T's, 2x10  Bent over Rows, 5#, 3x10  SA free motion rows, 10#, 3x10  SA free motion extensions, 7#, 3x10  SA FM shoulder adduction, 7#, 3x10    therapeutic activities to improve functional performance for 0  minutes, including:        Patient Education and Home Exercises       Education provided:   - rationale for treatment.    Written Home Exercises Provided: Patient instructed to cont prior HEP. Exercises were reviewed and Luciana was able to demonstrate them prior to the end of the session.  Luciana demonstrated good  understanding of the education provided. See Electronic Medical Record under Patient Instructions for exercises provided during therapy sessions    Assessment     Patient reports to therapy without any new complaints. She presents with pain in neck, low back, and hips.  Continued POC with interventions targeting postural and periscapular strength and ROM. Encouraged continued participation in HEP.  Will inquire response to treatment next visit.     Luciana Is progressing well towards her goals.   Patient prognosis is Good.     Patient will continue to benefit from skilled outpatient physical therapy to address " the deficits listed in the problem list box on initial evaluation, provide pt/family education and to maximize pt's level of independence in the home and community environment.     Patient's spiritual, cultural and educational needs considered and pt agreeable to plan of care and goals.     Anticipated barriers to physical therapy: age    Goals: Short Term Goals: 5 weeks   Patient will demonstrate independence with HEP in order to progress toward functional independence  Pt will demonstrate improved pain by reports of less than or equal to 7/10 worst pain of cervical neck on the verbal rating scale in order to progress toward maximal functional ability and improve QOL.  Pt will be able to correct slouched posture in static sitting with minimal verbal cueing for improved QOL  Pt will improve cervical range of motion in all planes by 25% for improved QOL and increased ease with house hold chores   Patient will improve bilateral upper extremity strength by 1/2 MMT for improved strength needed to carry groceries  Pt will demonstrate improved pain by reports of less than or equal to 7/10 worst pain of low back/hips on the verbal rating scale in order to progress toward maximal functional ability and improve QOL.  Pt will improve lumbar range of motion by 25% for improved QOL and functional mobility   Patient will improve bilateral lower extremity strength by 1/2 MMT for improved ease with transitional movements.      Long Term Goals: 10 weeks   Patient will demonstrate improved function as indicated by a functional score of 54 on the FOTO.  Pt will demonstrate improved pain by reports of less than or equal to 5/10 worst pain of cervical neck on the verbal rating scale in order to progress toward maximal functional ability and improve QOL.  Pt will improve slouched posture in static sitting for improved QOL and increased ease with schooling activities.   Pt will improve cervical range of motion with within functional  limits with less than 3/10 pain for improved mobility needed to perform all house hold chores  Pt will improved bilateral upper extremity strength to greater than or equal to 4+/5 with less than 3/10 pain for improved functional upper extremity strength.   Pt will demonstrate improved pain by reports of less than or equal to 5/10 worst pain of low back on the verbal rating scale in order to progress toward maximal functional ability and improve QOL.  Pt will improve lumbar range of motion to within functional limits for improved functional mobility   Pt will improve bilateral lower extremity strength to greater than or equal to 4+/5 for improved functional lower extremity strength    Plan     Plan of care Certification: 3/12/2024 to 6/12/2024.     Outpatient Physical Therapy 2 times weekly for 10 weeks to include the following interventions: Cervical/Lumbar Traction, Electrical Stimulation , Gait Training, Manual Therapy, Moist Heat/ Ice, Neuromuscular Re-ed, Orthotic Management and Training, Patient Education, Self Care, Therapeutic Activities, Therapeutic Exercise, and Ultrasound. And any other therapies and modalities as clinically indicated and appropriate, including but not limited to FDN and telehealth. Pt may be seen by PTA as a part of pt's care team.     Ryan Cummings PTA

## 2024-03-22 NOTE — PRE-PROCEDURE INSTRUCTIONS
Spoke with patient regarding procedure scheduled on 4.8    Arrival time 0615     Has patient been sick with fever or on antibiotics within the last 7 days? No     Does the patient have any open wounds, sores or rashes? No     Does the patient have any recent fractures? no     Has patient received a vaccination within the last 7 days? No     Received the COVID vaccination? yes     Has the patient stopped all medications as directed? na     Does patient have a pacemaker, defibrillator, or implantable stimulator? No     Does the patient have a ride to and from procedure and someone reliable to remain with patient?  steven      Is the patient diabetic? yes     Does the patient have sleep apnea? Or use O2 at home? no     Is the patient receiving sedation? yes     Is the patient instructed to remain NPO beginning at midnight the night before their procedure? yes     Procedure location confirmed with patient? Yes     Covid- Denies signs/symptoms. Instructed to notify PAT/MD if any changes.

## 2024-03-25 ENCOUNTER — CLINICAL SUPPORT (OUTPATIENT)
Dept: REHABILITATION | Facility: HOSPITAL | Age: 64
End: 2024-03-25
Payer: MEDICARE

## 2024-03-25 DIAGNOSIS — G89.29 CHRONIC BILATERAL LOW BACK PAIN WITH BILATERAL SCIATICA: ICD-10-CM

## 2024-03-25 DIAGNOSIS — M54.2 NECK PAIN: Primary | ICD-10-CM

## 2024-03-25 DIAGNOSIS — M25.552 CHRONIC HIP PAIN, BILATERAL: ICD-10-CM

## 2024-03-25 DIAGNOSIS — M25.551 CHRONIC HIP PAIN, BILATERAL: ICD-10-CM

## 2024-03-25 DIAGNOSIS — R29.898 UPPER EXTREMITY WEAKNESS: ICD-10-CM

## 2024-03-25 DIAGNOSIS — M53.86 DECREASED RANGE OF MOTION OF LUMBAR SPINE: ICD-10-CM

## 2024-03-25 DIAGNOSIS — R29.898 WEAKNESS OF BOTH LOWER EXTREMITIES: ICD-10-CM

## 2024-03-25 DIAGNOSIS — R29.3 POOR POSTURE: ICD-10-CM

## 2024-03-25 DIAGNOSIS — Z74.09 IMPAIRED FUNCTIONAL MOBILITY, BALANCE, GAIT, AND ENDURANCE: ICD-10-CM

## 2024-03-25 DIAGNOSIS — G89.29 CHRONIC HIP PAIN, BILATERAL: ICD-10-CM

## 2024-03-25 DIAGNOSIS — M53.82 DECREASED RANGE OF MOTION OF INTERVERTEBRAL DISCS OF CERVICAL SPINE: ICD-10-CM

## 2024-03-25 DIAGNOSIS — M54.42 CHRONIC BILATERAL LOW BACK PAIN WITH BILATERAL SCIATICA: ICD-10-CM

## 2024-03-25 DIAGNOSIS — M54.41 CHRONIC BILATERAL LOW BACK PAIN WITH BILATERAL SCIATICA: ICD-10-CM

## 2024-03-25 PROCEDURE — 97110 THERAPEUTIC EXERCISES: CPT | Mod: PN

## 2024-03-25 PROCEDURE — 97112 NEUROMUSCULAR REEDUCATION: CPT | Mod: PN

## 2024-03-25 NOTE — PROGRESS NOTES
[facility]   Physical Therapy Treatment Note      Name: Luciana Moraes  Clinic Number: [id number]    Therapy Diagnosis:   Encounter Diagnoses   Name Primary?    Neck pain Yes    Decreased range of motion of lumbar spine     Decreased range of motion of intervertebral discs of cervical spine     Poor posture     Upper extremity weakness     Chronic hip pain, bilateral     Chronic bilateral low back pain with bilateral sciatica     Weakness of both lower extremities     Impaired functional mobility, balance, gait, and endurance      Physician: Cedric Huerta MD    Visit Date: 3/25/2024    Physician Orders: PT Eval and Treat   Medical Diagnosis from Referral:               M54.12 (ICD-10-CM) - Cervical radiculopathy   M19.90 (ICD-10-CM) - Osteoarthritis, unspecified osteoarthritis type, unspecified site   M25.551,M25.552 (ICD-10-CM) - Bilateral hip pain  Evaluation Date: 3/12/2024  Authorization Period Expiration: 12/31/2024  Plan of Care Expiration: 6/12/2024  Visit # / Visits authorized: 3/10 (+eval)     PN DUE: 4/12/2024     Time In: 7:30 AM  Time Out: 8:30 AM  Total Appointment Time (timed & untimed codes): 60 minutes (billed 30 min 1:1)      Precautions: Standard, Diabetes, RA    PTA Visit #: 0/5       Subjective     Patient reports: continued pain.  She was compliant with home exercise program.  Response to previous treatment: No pain or soreness  Functional change: none reported     Pain:  -Neck: Current 8/10, worst 10/10, best 6/10   -Back: Current: 10/10, worst 10/10, best 7/10  Location: (B) back/hips and (B) neck   Description:   - Neck: sharp and constant   - Back: sharp and constant    Aggravating Factors:   - sitting, standing, desk work, house chores, transitional movements, walking, sleeping   Easing Factors: stretch out, switch position, apply pressure (at neck)     Objective      Objective Measures updated at progress report unless specified.     Treatment  "    Luciana received the treatments listed below:    (exercises performed today are bolded)    therapeutic exercises to develop strength, endurance, ROM, flexibility, posture, and core stabilization for 20 minutes including:  UBE bike, 3/3'  Bike for increased LE ROM, strength, and endurance x5 min    UE:   Doorway pec stretch, 3x30"  (B) shoulder ER, YTB, 3x10  (B) shoulder W's, YTB, 3x10  Seated thoracic extension, x30  Seated thoracic rotation, x30     LE:   3-way forward flexion x10   F4 LTR x15ea  Single knee to chest 5 x 5  Bridges 2x10  Hip abduction 2x10 GTB  SLR 2x10  Side-lying clams 2x10   Piriformis stretch 3x15"  Hamstring stretch 3x15"    manual therapy techniques: Joint mobilizations, Manual traction, Myofacial release, Soft tissue Mobilization, and Friction Massage were applied for 10 minutes, including:  Manual Cervical Traction  SOR  Sciatic nerve flossing  Manual BLE stretching    neuromuscular re-education activities to improve: Balance, Coordination, Kinesthetic, Sense, Proprioception, and Posture for 25 minutes. The following activities were included:  UE:   Standing shoulder I's, Y's T's, YTB, 1x10  Overhead press w/ forward Tband bias, YTB, 1x10  Bent over I's and T's, 2x10  Bent over Rows, 5#, 3x10  SA free motion rows, 10#, 3x10  SA free motion extensions, 7#, 3x10  SA FM shoulder adduction, 7#, 3x10    LE:   PPT 10 x 10"  PPT with marches 2x10  Hip adduction squeeze x30  Open book x15  Tandem stance 2x30"  Single leg stance 2 x 30"  Tandem gait x 3laps     therapeutic activities to improve functional performance for 0  minutes, including:  UE:     LE:   Repeated sit to stands 2x10  Hip 3-way x10   Palloff press 2x10 7#  Deadbugs (feet down) 2x10  Lateral walking x3 laps RTB    Patient Education and Home Exercises       Education provided:   - rationale for treatment.    Written Home Exercises Provided: Patient instructed to cont prior HEP. Exercises were reviewed and Luciana was able to " demonstrate them prior to the end of the session.  Luciana demonstrated good  understanding of the education provided. See Electronic Medical Record under Patient Instructions for exercises provided during therapy sessions    Assessment   Patient tolerated therapy session good today. She continues with pain in neck, low back, and hips. Interventions focused on thoracic and lumbar mobility, posterior chain strengthening, and functional strengthening to target core, hips, and BLE. She has much weakness in bilateral hips. Good amounts of fatigue noted throughout session. Plan to focus on postural and periscapular strength and ROM at next session. Encouraged continued participation in HEP.  Will inquire response to treatment next visit.     Luciana Is progressing well towards her goals.   Patient prognosis is Good.     Patient will continue to benefit from skilled outpatient physical therapy to address the deficits listed in the problem list box on initial evaluation, provide pt/family education and to maximize pt's level of independence in the home and community environment.     Patient's spiritual, cultural and educational needs considered and pt agreeable to plan of care and goals.     Anticipated barriers to physical therapy: age    Goals: Short Term Goals: 5 weeks   Patient will demonstrate independence with HEP in order to progress toward functional independence  Pt will demonstrate improved pain by reports of less than or equal to 7/10 worst pain of cervical neck on the verbal rating scale in order to progress toward maximal functional ability and improve QOL.  Pt will be able to correct slouched posture in static sitting with minimal verbal cueing for improved QOL  Pt will improve cervical range of motion in all planes by 25% for improved QOL and increased ease with house hold chores   Patient will improve bilateral upper extremity strength by 1/2 MMT for improved strength needed to carry groceries  Pt will  demonstrate improved pain by reports of less than or equal to 7/10 worst pain of low back/hips on the verbal rating scale in order to progress toward maximal functional ability and improve QOL.  Pt will improve lumbar range of motion by 25% for improved QOL and functional mobility   Patient will improve bilateral lower extremity strength by 1/2 MMT for improved ease with transitional movements.      Long Term Goals: 10 weeks   Patient will demonstrate improved function as indicated by a functional score of 54 on the FOTO.  Pt will demonstrate improved pain by reports of less than or equal to 5/10 worst pain of cervical neck on the verbal rating scale in order to progress toward maximal functional ability and improve QOL.  Pt will improve slouched posture in static sitting for improved QOL and increased ease with schooling activities.   Pt will improve cervical range of motion with within functional limits with less than 3/10 pain for improved mobility needed to perform all house hold chores  Pt will improved bilateral upper extremity strength to greater than or equal to 4+/5 with less than 3/10 pain for improved functional upper extremity strength.   Pt will demonstrate improved pain by reports of less than or equal to 5/10 worst pain of low back on the verbal rating scale in order to progress toward maximal functional ability and improve QOL.  Pt will improve lumbar range of motion to within functional limits for improved functional mobility   Pt will improve bilateral lower extremity strength to greater than or equal to 4+/5 for improved functional lower extremity strength    Plan     Plan of care Certification: 3/12/2024 to 6/12/2024.     Outpatient Physical Therapy 2 times weekly for 10 weeks to include the following interventions: Cervical/Lumbar Traction, Electrical Stimulation , Gait Training, Manual Therapy, Moist Heat/ Ice, Neuromuscular Re-ed, Orthotic Management and Training, Patient Education, Self Care,  Therapeutic Activities, Therapeutic Exercise, and Ultrasound. And any other therapies and modalities as clinically indicated and appropriate, including but not limited to FDN and telehealth. Pt may be seen by PTA as a part of pt's care team.     Ana Alonzo, PT,DPT  3/25/2024

## 2024-04-01 ENCOUNTER — CLINICAL SUPPORT (OUTPATIENT)
Dept: REHABILITATION | Facility: HOSPITAL | Age: 64
End: 2024-04-01
Payer: MEDICARE

## 2024-04-01 DIAGNOSIS — Z74.09 IMPAIRED FUNCTIONAL MOBILITY, BALANCE, GAIT, AND ENDURANCE: ICD-10-CM

## 2024-04-01 DIAGNOSIS — M53.82 DECREASED RANGE OF MOTION OF INTERVERTEBRAL DISCS OF CERVICAL SPINE: ICD-10-CM

## 2024-04-01 DIAGNOSIS — R29.3 POOR POSTURE: ICD-10-CM

## 2024-04-01 DIAGNOSIS — M25.551 CHRONIC HIP PAIN, BILATERAL: ICD-10-CM

## 2024-04-01 DIAGNOSIS — M53.86 DECREASED RANGE OF MOTION OF LUMBAR SPINE: ICD-10-CM

## 2024-04-01 DIAGNOSIS — R29.898 WEAKNESS OF BOTH LOWER EXTREMITIES: ICD-10-CM

## 2024-04-01 DIAGNOSIS — M54.2 NECK PAIN: Primary | ICD-10-CM

## 2024-04-01 DIAGNOSIS — M25.552 CHRONIC HIP PAIN, BILATERAL: ICD-10-CM

## 2024-04-01 DIAGNOSIS — R29.898 UPPER EXTREMITY WEAKNESS: ICD-10-CM

## 2024-04-01 DIAGNOSIS — M54.42 CHRONIC BILATERAL LOW BACK PAIN WITH BILATERAL SCIATICA: ICD-10-CM

## 2024-04-01 DIAGNOSIS — G89.29 CHRONIC BILATERAL LOW BACK PAIN WITH BILATERAL SCIATICA: ICD-10-CM

## 2024-04-01 DIAGNOSIS — M54.41 CHRONIC BILATERAL LOW BACK PAIN WITH BILATERAL SCIATICA: ICD-10-CM

## 2024-04-01 DIAGNOSIS — G89.29 CHRONIC HIP PAIN, BILATERAL: ICD-10-CM

## 2024-04-01 PROCEDURE — 97530 THERAPEUTIC ACTIVITIES: CPT | Mod: PN

## 2024-04-01 PROCEDURE — 97110 THERAPEUTIC EXERCISES: CPT | Mod: PN

## 2024-04-01 NOTE — PROGRESS NOTES
[facility]   Physical Therapy Treatment Note      Name: Luciana Moraes  Clinic Number: [id number]    Therapy Diagnosis:   Encounter Diagnoses   Name Primary?    Neck pain Yes    Decreased range of motion of lumbar spine     Decreased range of motion of intervertebral discs of cervical spine     Poor posture     Upper extremity weakness     Chronic hip pain, bilateral     Chronic bilateral low back pain with bilateral sciatica     Weakness of both lower extremities     Impaired functional mobility, balance, gait, and endurance      Physician: Cedric Huerta MD    Visit Date: 4/1/2024    Physician Orders: PT Eval and Treat   Medical Diagnosis from Referral:               M54.12 (ICD-10-CM) - Cervical radiculopathy   M19.90 (ICD-10-CM) - Osteoarthritis, unspecified osteoarthritis type, unspecified site   M25.551,M25.552 (ICD-10-CM) - Bilateral hip pain  Evaluation Date: 3/12/2024  Authorization Period Expiration: 12/31/2024  Plan of Care Expiration: 6/12/2024  Visit # / Visits authorized: 4/10 (+eval)     PN DUE: 4/12/2024     Time In: 7:30 AM  Time Out: 8:30 AM  Total Appointment Time (timed & untimed codes): 60 minutes (billed 30 min 1:1)      Precautions: Standard, Diabetes, RA    PTA Visit #: 0/5       Subjective     Patient reports: continued pain, mostly in her back today.   She was compliant with home exercise program.  Response to previous treatment: No pain or soreness  Functional change: none reported     Pain:  -Neck: Current 8/10, worst 10/10, best 6/10   -Back: Current: 10/10, worst 10/10, best 7/10  Location: (B) back/hips and (B) neck   Description:   - Neck: sharp and constant   - Back: sharp and constant    Aggravating Factors:   - sitting, standing, desk work, house chores, transitional movements, walking, sleeping   Easing Factors: stretch out, switch position, apply pressure (at neck)     Objective      Objective Measures updated at progress report unless  "specified.     Treatment     Adinas received the treatments listed below:    (exercises performed today are bolded)    therapeutic exercises to develop strength, endurance, ROM, flexibility, posture, and core stabilization for 20 minutes including:  UBE bike, 3/3'  Bike for increased LE ROM, strength, and endurance x5 min    UE:   Doorway pec stretch, 3x30"  (B) shoulder ER, YTB, 3x10  (B) shoulder W's, YTB, 3x10  Seated thoracic extension, x30  Seated thoracic rotation, x30     LE:   3-way forward flexion x10   F4 LTR x15ea  Double knee to chest with SB 5"x10  Bridges over SB 3x10  Hip abduction 2x10 GTB  SLR 3x10  Side-lying clams 2x10   Piriformis stretch 3x15"  Hamstring stretch 3x15"    manual therapy techniques: Joint mobilizations, Manual traction, Myofacial release, Soft tissue Mobilization, and Friction Massage were applied for 10 minutes, including:  Manual Cervical Traction  SOR  Sciatic nerve flossing  Manual BLE stretching    neuromuscular re-education activities to improve: Balance, Coordination, Kinesthetic, Sense, Proprioception, and Posture for 10 minutes. The following activities were included:  UE:   Standing shoulder I's, Y's T's, YTB, 1x10  Overhead press w/ forward Tband bias, YTB, 1x10  Bent over I's and T's, 2x10  Bent over Rows, 5#, 3x10  SA free motion rows, 10#, 3x10  SA free motion extensions, 7#, 3x10  SA FM shoulder adduction, 7#, 3x10    LE:   PPT 10 x 10"  PPT with marches 2x10  Hip adduction squeeze x30  Open book x15  Tandem stance 2x30"  Single leg stance 2 x 30"  Tandem gait x 3laps     therapeutic activities to improve functional performance for 20  minutes, including:  UE:     LE:   Repeated sit to stands 3x10  Hip 3-way x10   Palloff press 2x10 7#  Deadbugs (feet down) 2x10  Lateral walking x3 laps RTB    Patient Education and Home Exercises       Education provided:   - rationale for treatment.    Written Home Exercises Provided: Patient instructed to cont prior HEP. Exercises " were reviewed and Luciana was able to demonstrate them prior to the end of the session.  Luciana demonstrated good  understanding of the education provided. See Electronic Medical Record under Patient Instructions for exercises provided during therapy sessions    Assessment   Patient tolerated therapy session good today. She continues with pain in neck, low back, and hips. Interventions focused on thoracic and lumbar mobility, posterior chain strengthening, and functional strengthening to target core, hips, and BLE. She has much weakness in bilateral hips. Plan to focus on postural and periscapular strength and ROM at next session. Encouraged continued participation in HEP.  Will inquire response to treatment next visit.     Luciaan Is progressing well towards her goals.   Patient prognosis is Good.     Patient will continue to benefit from skilled outpatient physical therapy to address the deficits listed in the problem list box on initial evaluation, provide pt/family education and to maximize pt's level of independence in the home and community environment.     Patient's spiritual, cultural and educational needs considered and pt agreeable to plan of care and goals.     Anticipated barriers to physical therapy: age    Goals: Short Term Goals: 5 weeks   Patient will demonstrate independence with HEP in order to progress toward functional independence  Pt will demonstrate improved pain by reports of less than or equal to 7/10 worst pain of cervical neck on the verbal rating scale in order to progress toward maximal functional ability and improve QOL.  Pt will be able to correct slouched posture in static sitting with minimal verbal cueing for improved QOL  Pt will improve cervical range of motion in all planes by 25% for improved QOL and increased ease with house hold chores   Patient will improve bilateral upper extremity strength by 1/2 MMT for improved strength needed to carry groceries  Pt will demonstrate  improved pain by reports of less than or equal to 7/10 worst pain of low back/hips on the verbal rating scale in order to progress toward maximal functional ability and improve QOL.  Pt will improve lumbar range of motion by 25% for improved QOL and functional mobility   Patient will improve bilateral lower extremity strength by 1/2 MMT for improved ease with transitional movements.      Long Term Goals: 10 weeks   Patient will demonstrate improved function as indicated by a functional score of 54 on the FOTO.  Pt will demonstrate improved pain by reports of less than or equal to 5/10 worst pain of cervical neck on the verbal rating scale in order to progress toward maximal functional ability and improve QOL.  Pt will improve slouched posture in static sitting for improved QOL and increased ease with schooling activities.   Pt will improve cervical range of motion with within functional limits with less than 3/10 pain for improved mobility needed to perform all house hold chores  Pt will improved bilateral upper extremity strength to greater than or equal to 4+/5 with less than 3/10 pain for improved functional upper extremity strength.   Pt will demonstrate improved pain by reports of less than or equal to 5/10 worst pain of low back on the verbal rating scale in order to progress toward maximal functional ability and improve QOL.  Pt will improve lumbar range of motion to within functional limits for improved functional mobility   Pt will improve bilateral lower extremity strength to greater than or equal to 4+/5 for improved functional lower extremity strength    Plan     Plan of care Certification: 3/12/2024 to 6/12/2024.     Outpatient Physical Therapy 2 times weekly for 10 weeks to include the following interventions: Cervical/Lumbar Traction, Electrical Stimulation , Gait Training, Manual Therapy, Moist Heat/ Ice, Neuromuscular Re-ed, Orthotic Management and Training, Patient Education, Self Care, Therapeutic  Activities, Therapeutic Exercise, and Ultrasound. And any other therapies and modalities as clinically indicated and appropriate, including but not limited to FDN and telehealth. Pt may be seen by PTA as a part of pt's care team.     Ana Alonzo, PT,DPT  4/1/2024

## 2024-04-08 ENCOUNTER — HOSPITAL ENCOUNTER (OUTPATIENT)
Facility: HOSPITAL | Age: 64
Discharge: HOME | End: 2024-04-08
Attending: ANESTHESIOLOGY | Admitting: ANESTHESIOLOGY
Payer: MEDICARE

## 2024-04-08 VITALS
HEART RATE: 54 BPM | RESPIRATION RATE: 15 BRPM | HEIGHT: 62 IN | BODY MASS INDEX: 31.2 KG/M2 | SYSTOLIC BLOOD PRESSURE: 111 MMHG | TEMPERATURE: 97 F | DIASTOLIC BLOOD PRESSURE: 62 MMHG | WEIGHT: 169.56 LBS | OXYGEN SATURATION: 95 %

## 2024-04-08 DIAGNOSIS — M46.1 SACROILIITIS: Primary | ICD-10-CM

## 2024-04-08 LAB — POCT GLUCOSE: 90 MG/DL (ref 70–110)

## 2024-04-08 PROCEDURE — 25000003 PHARM REV CODE 250: Performed by: ANESTHESIOLOGY

## 2024-04-08 PROCEDURE — 27096 INJECT SACROILIAC JOINT: CPT | Mod: 50,,, | Performed by: ANESTHESIOLOGY

## 2024-04-08 PROCEDURE — 27096 INJECT SACROILIAC JOINT: CPT | Mod: 50 | Performed by: ANESTHESIOLOGY

## 2024-04-08 PROCEDURE — 25500020 PHARM REV CODE 255: Performed by: ANESTHESIOLOGY

## 2024-04-08 PROCEDURE — 63600175 PHARM REV CODE 636 W HCPCS: Performed by: ANESTHESIOLOGY

## 2024-04-08 PROCEDURE — 82962 GLUCOSE BLOOD TEST: CPT | Performed by: ANESTHESIOLOGY

## 2024-04-08 RX ORDER — LIDOCAINE HYDROCHLORIDE 10 MG/ML
INJECTION, SOLUTION EPIDURAL; INFILTRATION; INTRACAUDAL; PERINEURAL
Status: DISCONTINUED | OUTPATIENT
Start: 2024-04-08 | End: 2024-04-08 | Stop reason: HOSPADM

## 2024-04-08 RX ORDER — ONDANSETRON HYDROCHLORIDE 2 MG/ML
4 INJECTION, SOLUTION INTRAVENOUS ONCE AS NEEDED
Status: DISCONTINUED | OUTPATIENT
Start: 2024-04-08 | End: 2024-04-08 | Stop reason: HOSPADM

## 2024-04-08 RX ORDER — FENTANYL CITRATE 50 UG/ML
INJECTION, SOLUTION INTRAMUSCULAR; INTRAVENOUS
Status: DISCONTINUED | OUTPATIENT
Start: 2024-04-08 | End: 2024-04-08 | Stop reason: HOSPADM

## 2024-04-08 RX ORDER — MIDAZOLAM HYDROCHLORIDE 1 MG/ML
INJECTION, SOLUTION INTRAMUSCULAR; INTRAVENOUS
Status: DISCONTINUED | OUTPATIENT
Start: 2024-04-08 | End: 2024-04-08 | Stop reason: HOSPADM

## 2024-04-08 RX ORDER — METHYLPREDNISOLONE ACETATE 40 MG/ML
INJECTION, SUSPENSION INTRA-ARTICULAR; INTRALESIONAL; INTRAMUSCULAR; SOFT TISSUE
Status: DISCONTINUED | OUTPATIENT
Start: 2024-04-08 | End: 2024-04-08 | Stop reason: HOSPADM

## 2024-04-08 NOTE — H&P
HPI  Patient presenting for Procedure(s) (LRB):  Bilateral SIJ Injection (Bilateral)     Patient on Anti-coagulation Yes, ASA, may continue    No health changes since previous encounter    Past Medical History:   Diagnosis Date    Diabetes mellitus, type 2     Hyperlipidemia     Osteoarthritis     Polymyositis     Rheumatoid arthritis      Past Surgical History:   Procedure Laterality Date    CARPAL TUNNEL RELEASE Bilateral     COLONOSCOPY N/A 11/08/2022    Procedure: COLONOSCOPY;  Surgeon: Kishore Monsalve MD;  Location: [facility] END[facility];  Service: Endoscopy;  Laterality: N/A;    EPIDURAL STEROID INJECTION INTO CERVICAL SPINE N/A 09/22/2022    Procedure: C7/T1 IL [facility];  Surgeon: Luis M Pereyra MD;  Location: [facility];  Service: Pain Management;  Laterality: N/A;    HIP SURGERY      HYSTERECTOMY  2008    due to bleeding, pain from fibroids, retains one ovary    INJECTION OF ANESTHETIC AGENT AROUND MEDIAL BRANCH NERVES INNERVATING CERVICAL FACET JOINT Bilateral 10/31/2022    Procedure: Bilateral C5-7 MBB with RN IV sedation;  Surgeon: Luis M Pereyra MD;  Location: [facility];  Service: Pain Management;  Laterality: Bilateral;    INJECTION OF ANESTHETIC AGENT AROUND MEDIAL BRANCH NERVES INNERVATING CERVICAL FACET JOINT Bilateral 3/13/2023    Procedure: Bilateral C5-7 MBB with RN IV sedation;  Surgeon: Luis M Pereyra MD;  Location: [facility];  Service: Pain Management;  Laterality: Bilateral;    OOPHORECTOMY      1 ovary removed    TUBAL LIGATION  1985     Review of patient's allergies indicates:  No Known Allergies     No current facility-administered medications on file prior to encounter.     Current Outpatient Medications on File Prior to Encounter   Medication Sig Dispense Refill    aspirin 81 MG Chew Take 81 mg by mouth.      atorvastatin (LIPITOR) 80 MG tablet Take 1 tablet (80 mg total) by mouth once daily. 90 tablet 3    ergocalciferol (ERGOCALCIFEROL) 50,000 unit Cap Take 1 capsule (50,000  "Units total) by mouth twice a week. 24 capsule 3    hydroxychloroquine (PLAQUENIL) 200 mg tablet Take 1 tablet (200 mg total) by mouth 2 (two) times daily. 180 tablet 1    ibuprofen (ADVIL,MOTRIN) 800 MG tablet Take 800 mg by mouth 3 (three) times daily.      metFORMIN (GLUCOPHAGE-XR) 500 MG ER 24hr tablet Take 2 tablets (1,000 mg total) by mouth daily with breakfast. 180 tablet 3    nabumetone (RELAFEN) 750 MG tablet Take 1 tablet (750 mg total) by mouth 2 (two) times daily as needed for Pain. 60 tablet 5    pregabalin (LYRICA) 75 MG capsule Take 1 capsule (75 mg total) by mouth 2 (two) times daily. 60 capsule 5    semaglutide (OZEMPIC) 2 mg/dose (8 mg/3 mL) PnIj Inject 2 mg into the skin every 7 days. 9 mL 3    tiZANidine (ZANAFLEX) 4 MG tablet Take 1 tablet (4 mg total) by mouth 2 (two) times daily as needed (pain). 60 tablet 5    traZODone (DESYREL) 50 MG tablet Take 2 tablets (100 mg total) by mouth nightly as needed for Insomnia. 180 tablet 3    diclofenac sodium (VOLTAREN) 1 % Gel Apply 2 g topically 4 (four) times daily. 100 g 6    polyethylene glycol (GLYCOLAX) 17 gram PwPk Take 17 g by mouth once daily. 30 each 11        PMHx, PSHx, Allergies, Medications reviewed in epic    ROS negative except pain complaints in HPI    OBJECTIVE:    /67 (BP Location: Right arm, Patient Position: Sitting)   Pulse (!) 59   Temp 96.9 °F (36.1 °C) (Temporal)   Resp 16   Ht 5' 2" (1.575 m)   Wt 76.9 kg (169 lb 8.5 oz)   SpO2 99%   Breastfeeding No   BMI 31.01 kg/m²     PHYSICAL EXAMINATION:    GENERAL: Well appearing, in no acute distress, alert and oriented x3.  PSYCH:  Mood and affect appropriate.  SKIN: Skin color, texture, turgor normal, no rashes or lesions which will impact the procedure.  CV: RRR with palpation of the radial artery.  PULM: No evidence of respiratory difficulty, symmetric chest rise. Clear to auscultation.  NEURO: Cranial nerves grossly intact.    Plan:    Proceed with procedure as planned " Procedure(s) (LRB):  Bilateral SIJ Injection (Bilateral)    Luis M Pereyra MD  04/08/2024         [facility]

## 2024-04-08 NOTE — DISCHARGE SUMMARY
Discharge Note  Short Stay      SUMMARY     Admit Date: 4/8/2024    Attending Physician: Luis M Pereyra MD        Discharge Physician: Luis M Pereyra MD        Discharge Date: 4/8/2024 7:57 AM    Procedure(s) (LRB):  Bilateral SIJ Injection (Bilateral)    Final Diagnosis: Sacroiliitis [M46.1]    Disposition: Home or self care    Patient Instructions:   Current Discharge Medication List        CONTINUE these medications which have NOT CHANGED    Details   aspirin 81 MG Chew Take 81 mg by mouth.      atorvastatin (LIPITOR) 80 MG tablet Take 1 tablet (80 mg total) by mouth once daily.  Qty: 90 tablet, Refills: 3    Associated Diagnoses: Hyperlipidemia, unspecified hyperlipidemia type      ergocalciferol (ERGOCALCIFEROL) 50,000 unit Cap Take 1 capsule (50,000 Units total) by mouth twice a week.  Qty: 24 capsule, Refills: 3    Associated Diagnoses: Rheumatoid arthritis, involving unspecified site, unspecified whether rheumatoid factor present; Positive RICHARD (antinuclear antibody); Vitamin D deficiency      hydroxychloroquine (PLAQUENIL) 200 mg tablet Take 1 tablet (200 mg total) by mouth 2 (two) times daily.  Qty: 180 tablet, Refills: 1    Associated Diagnoses: Positive RICHARD (antinuclear antibody); Rheumatoid arthritis, involving unspecified site, unspecified whether rheumatoid factor present      ibuprofen (ADVIL,MOTRIN) 800 MG tablet Take 800 mg by mouth 3 (three) times daily.      metFORMIN (GLUCOPHAGE-XR) 500 MG ER 24hr tablet Take 2 tablets (1,000 mg total) by mouth daily with breakfast.  Qty: 180 tablet, Refills: 3    Associated Diagnoses: Type 2 diabetes mellitus with other specified complication, without long-term current use of insulin      nabumetone (RELAFEN) 750 MG tablet Take 1 tablet (750 mg total) by mouth 2 (two) times daily as needed for Pain.  Qty: 60 tablet, Refills: 5    Associated Diagnoses: Cervical radiculopathy; DDD (degenerative disc disease), cervical; Cervical spondylosis      pregabalin  (LYRICA) 75 MG capsule Take 1 capsule (75 mg total) by mouth 2 (two) times daily.  Qty: 60 capsule, Refills: 5    Associated Diagnoses: Cervical radiculopathy; Carpal tunnel syndrome of right wrist      semaglutide (OZEMPIC) 2 mg/dose (8 mg/3 mL) PnIj Inject 2 mg into the skin every 7 days.  Qty: 9 mL, Refills: 3    Comments: 90-day Rx. Ok to sub 28-day Rx (1 pen with 11 refills) if preferred by patient's insurance.  Associated Diagnoses: Type 2 diabetes mellitus with other specified complication, without long-term current use of insulin      tiZANidine (ZANAFLEX) 4 MG tablet Take 1 tablet (4 mg total) by mouth 2 (two) times daily as needed (pain).  Qty: 60 tablet, Refills: 5    Associated Diagnoses: Cervical radiculopathy; DDD (degenerative disc disease), cervical; Cervical spondylosis      traZODone (DESYREL) 50 MG tablet Take 2 tablets (100 mg total) by mouth nightly as needed for Insomnia.  Qty: 180 tablet, Refills: 3      diclofenac sodium (VOLTAREN) 1 % Gel Apply 2 g topically 4 (four) times daily.  Qty: 100 g, Refills: 6    Associated Diagnoses: Rheumatoid arthritis, involving unspecified site, unspecified whether rheumatoid factor present      polyethylene glycol (GLYCOLAX) 17 gram PwPk Take 17 g by mouth once daily.  Qty: 30 each, Refills: 11    Associated Diagnoses: Constipation, unspecified constipation type; Pelvic pain in female                 Discharge Diagnosis: Sacroiliitis [M46.1]  Condition on Discharge: Stable with no complications to procedure   Diet on Discharge: Same as before.  Activity: as per instruction sheet.  Discharge to: Home with a responsible adult.  Follow up: 2-4 weeks       Please call the office at [phone] if you experience any weakness or loss of sensation, fever > 101.5, pain uncontrolled with oral medications, persistent nausea/vomiting/or diarrhea, redness or drainage from the incisions, or any other worrisome concerns. If physician on call was not reached or could not  communicate with our office for any reason please go to the nearest emergency department

## 2024-04-08 NOTE — OP NOTE
Sacroiliac Joint Injection under Fluoroscopy      INFORMED CONSENT: The procedure, risks, benefits and options were discussed with patient. There are no contraindications to the procedure. The patient expressed understanding and agreed to proceed. The personnel performing the procedure was discussed.    Date of procedure 04/08/2024    Time-out taken to identify patient and procedure side prior to starting the procedure.                     PROCEDURE:  Bilateral sacroiliac joint injection under fluoroscopy.    1) Right  sacroiliac joint injection under fluoroscopy.    2) Left  sacroiliac joint injection under fluoroscopy.    Pre Procedure diagnosis:    Sacroiliitis [M46.1]  1. Sacroiliitis        Post-Procedure diagnosis:   same    PHYSICIAN: Luis M Pereyra MD  ASSISTANTS: None    MEDICATIONS INJECTED: 1ml  Depo-Medrol 40mg and 3 mL Lidocaine PF 1% into each joint    LOCAL ANESTHETIC USED: 3ml Lidocaine 1%    ESTIMATED BLOOD LOSS:  None.    COMPLICATIONS:  None.    Sedation: Conscious sedation provided by M.D    SEDATION MEDICATIONS: local/IV sedation: Versed 2 mg and fentanyl 75 mcg IV.  Conscious sedation ordered by MD.  Patient reevaluated and sedation administered by MD and monitored by RN.  Total sedation time was less than 10 min.       Total sedation time was <10 min      TECHNIQUE:   Laying in the prone position, the patient was prepped and draped in the usual sterile fashion using ChloraPrep and fenestrated drape.  The area was determined under fluoroscopy.  Local Xylocaine was injected by raising a wheel and going down to the periosteum using a 27-gauge hypodermic needle.  The 3.5 inch 22-gauge spinal needle was introduce into the bilateral sacroiliac joints.  Negative pressure applied to confirm no intravascular placement.  Omnipaque was injected to confirm placement and to confirm that there was no vascular runoff.  The medication was then injected slowly.  The patient tolerated the procedure  well.        The patient was monitored for approximately 30 minutes after the procedure.  Patient was given post procedure and discharge instructions to follow at home.  The patient was discharged in a stable condition

## 2024-04-08 NOTE — DISCHARGE INSTRUCTIONS

## 2024-04-08 NOTE — PLAN OF CARE
Dr Zen Alvarez ordered a CPAP [employer] [facility] on: 10/13/2022    Dr Lizette Tesfaye was shadowing Dr Zen Alvarez that day      Pt wants to know if this was handled yet Pt discharged home, awake, alert, oriented x's 4,  denies any pain, no apparent distress noted. All questions and concerns addressed and answered, pt verbalizes understanding of discharge process, pt meets discharge criteria and is being discharged to car via wheelchair.

## 2024-05-22 ENCOUNTER — OFFICE VISIT (OUTPATIENT)
Dept: URGENT CARE | Facility: CLINIC | Age: 64
End: 2024-05-22
Payer: MEDICARE

## 2024-05-22 ENCOUNTER — HOSPITAL ENCOUNTER (OUTPATIENT)
Dept: RADIOLOGY | Facility: CLINIC | Age: 64
Discharge: HOME | End: 2024-05-22
Attending: PHYSICIAN ASSISTANT
Payer: MEDICARE

## 2024-05-22 VITALS
TEMPERATURE: 99 F | HEIGHT: 62 IN | SYSTOLIC BLOOD PRESSURE: 111 MMHG | DIASTOLIC BLOOD PRESSURE: 53 MMHG | WEIGHT: 160 LBS | RESPIRATION RATE: 14 BRPM | BODY MASS INDEX: 29.44 KG/M2 | HEART RATE: 56 BPM | OXYGEN SATURATION: 100 %

## 2024-05-22 DIAGNOSIS — M79.671 RIGHT FOOT PAIN: ICD-10-CM

## 2024-05-22 DIAGNOSIS — M10.9 ACUTE GOUT INVOLVING TOE OF RIGHT FOOT, UNSPECIFIED CAUSE: Primary | ICD-10-CM

## 2024-05-22 PROCEDURE — 99214 OFFICE O/P EST MOD 30 MIN: CPT | Mod: S$GLB,,, | Performed by: PHYSICIAN ASSISTANT

## 2024-05-22 PROCEDURE — 73630 X-RAY EXAM OF FOOT: CPT | Mod: RT,S$GLB,, | Performed by: RADIOLOGY

## 2024-05-22 RX ORDER — COLCHICINE 0.6 MG/1
TABLET ORAL
Qty: 13 TABLET | Refills: 0 | Status: SHIPPED | OUTPATIENT
Start: 2024-05-22

## 2024-05-22 NOTE — PATIENT INSTRUCTIONS
Day 1: Oral: 1.2 mg (2 tablets) at the first sign of flare, followed by 0.6 mg (1 tablet) after 1 hour.    Day 2 and thereafter: Oral: 0.6 mg(1 tablet) twice daily until flare resolves. If symptoms do not improve after 2 to 3 days, consider switching to other anti-inflammatory agents.

## 2024-05-22 NOTE — PROGRESS NOTES
"Subjective:      Patient ID: Luciana Moraes is a 63 y.o. female.    Vitals:  height is 5' 2" (1.575 m) and weight is 72.6 kg (160 lb). Her oral temperature is 98.5 °F (36.9 °C). Her blood pressure is 111/53 (abnormal) and her pulse is 56 (abnormal). Her respiration is 14 and oxygen saturation is 100%.     Chief Complaint: Foot Injury    63 year old patient presents here today with c/o of  RT foot pain x 4 days.  Pt stated she took OTC tylenol.  Pt denies any n/v/d/fever/cp/sob. Pt stated she doesn't recall any injury to foot.       Foot Injury   The incident occurred 3 to 5 days ago. The incident occurred at home. There was no injury mechanism. The pain is present in the right foot. The pain is at a severity of 10/10. The pain is severe. The pain has been Constant since onset. Associated symptoms include an inability to bear weight and a loss of motion. Pertinent negatives include no loss of sensation, muscle weakness, numbness or tingling. She reports no foreign bodies present. The symptoms are aggravated by movement and weight bearing. She has tried acetaminophen for the symptoms. The treatment provided no relief.       Musculoskeletal:  Positive for pain (right foot pain).   Neurological:  Negative for numbness.      Objective:     Physical Exam   Constitutional: She is oriented to person, place, and time. She appears well-developed.   HENT:   Head: Normocephalic and atraumatic.   Ears:   Right Ear: External ear normal.   Left Ear: External ear normal.   Nose: Nose normal.   Mouth/Throat: Oropharynx is clear and moist.   Eyes: Conjunctivae, EOM and lids are normal. Pupils are equal, round, and reactive to light.   Neck: Trachea normal and phonation normal. Neck supple.   Cardiovascular: Normal rate.   Pulmonary/Chest: Effort normal.   Musculoskeletal: Normal range of motion.         General: Normal range of motion.      Right foot: Right great toe: Exhibits swelling and tenderness (redness, swelling, and " TTP noted to MTP joint).   Neurological: She is alert and oriented to person, place, and time.   Skin: Skin is warm, dry and intact.   Psychiatric: Her speech is normal and behavior is normal. Judgment and thought content normal.   Nursing note and vitals reviewed.      Assessment:     1. Acute gout involving toe of right foot, unspecified cause    2. Right foot pain      Xray result:  No acute abnormality. Similar calcaneal enthesophyte changes.   Plan:   VSS. Patient non-toxic appearing. Discussed medication being prescribed.  Advised patient to follow up with Podiatry.  She is currently under the care of a podiatrist for diabetes.  Patient verbalized understanding, agrees with the plan, and is comfortable with discharge.      Acute gout involving toe of right foot, unspecified cause  -     colchicine (COLCRYS) 0.6 mg tablet; Take 2 tab by mouth now and 1 tab in 1 hour. Then twice daily for 5 days.  Dispense: 13 tablet; Refill: 0    Right foot pain  -     X-Ray Foot Complete Right      Medical Decision Making:   Clinical Tests:   Radiological Study: Ordered and Reviewed

## 2024-06-04 ENCOUNTER — OFFICE VISIT (OUTPATIENT)
Dept: PODIATRY | Facility: CLINIC | Age: 64
End: 2024-06-04
Payer: MEDICARE

## 2024-06-04 ENCOUNTER — LAB VISIT (OUTPATIENT)
Dept: LAB | Facility: HOSPITAL | Age: 64
End: 2024-06-04
Attending: PODIATRIST
Payer: MEDICARE

## 2024-06-04 VITALS — HEIGHT: 62 IN | BODY MASS INDEX: 29.45 KG/M2 | WEIGHT: 160.06 LBS

## 2024-06-04 DIAGNOSIS — M79.674 GREAT TOE PAIN, RIGHT: Primary | ICD-10-CM

## 2024-06-04 DIAGNOSIS — M79.674 GREAT TOE PAIN, RIGHT: ICD-10-CM

## 2024-06-04 LAB — URATE SERPL-MCNC: 6.5 MG/DL (ref 2.4–5.7)

## 2024-06-04 PROCEDURE — 1159F MED LIST DOCD IN RCRD: CPT | Mod: CPTII,S$GLB,, | Performed by: PODIATRIST

## 2024-06-04 PROCEDURE — 99999 PR PBB SHADOW E&M-EST. PATIENT-LVL IV: CPT | Mod: PBBFAC,,, | Performed by: PODIATRIST

## 2024-06-04 PROCEDURE — 84550 ASSAY OF BLOOD/URIC ACID: CPT | Performed by: PODIATRIST

## 2024-06-04 PROCEDURE — 3008F BODY MASS INDEX DOCD: CPT | Mod: CPTII,S$GLB,, | Performed by: PODIATRIST

## 2024-06-04 PROCEDURE — 36415 COLL VENOUS BLD VENIPUNCTURE: CPT | Mod: PO | Performed by: PODIATRIST

## 2024-06-04 PROCEDURE — 99214 OFFICE O/P EST MOD 30 MIN: CPT | Mod: S$GLB,,, | Performed by: PODIATRIST

## 2024-06-04 PROCEDURE — 3044F HG A1C LEVEL LT 7.0%: CPT | Mod: CPTII,S$GLB,, | Performed by: PODIATRIST

## 2024-06-04 NOTE — PROGRESS NOTES
Podiatry Department    Patient ID: Luciana Moraes is a 63 y.o. female.    Chief Complaint: Foot Pain (C/o right foot, great toe pain, rates pain 5/10, possible gout attack pt went to urgent care on 05/22, diabetic, wears sandals)    History of Present Illness    CHIEF COMPLAINT:  Patient presents today for follow-up on a recent gout attack.    GOUT:  Patient reports a severe gout attack in her right great toe joint that began approximately two weeks ago. The pain has since decreased but remains present. This is the first instance of a gout attack the patient has experienced. She was prescribed colchicine for the pain, which resulted in cramping. Consequently, she has discontinued the use of colchicine.    MEDICATIONS:  Patient reports that she is continuing with her regular medications.    ROS:  Musculoskeletal: +joint pain            Physical Exam                Diagnoses:  1. Great toe pain, right  -     Uric acid; Future; Expected date: 06/04/2024        Assessment & Plan    GOUT:  - Prescribed an anti-inflammatory medication distinct from the previously administered colchicine to alleviate the patient's residual pain.  - Provided a gout diet plan to mitigate the risk of future gout attacks.  - Educated the patient about the recurrent nature of gout and the crucial role of diet in its management.  - Reassured the patient that symptoms were indicative of gout, despite the absence of tests during her initial diagnosis.  BLOOD SUGAR MONITORING:  - Ordered labs to further investigate the patient's condition and monitor her blood sugar.                This note was generated with the assistance of ambient listening technology. Verbal consent was obtained by the patient and accompanying visitor(s) for the recording of patient appointment to facilitate this note. I attest to having reviewed and edited the generated note for accuracy, though some syntax or spelling errors may persist. Please contact the  author of this note for any clarification.      Report Electronically Signed By:     Jane Patino DPM   Podiatry  [facility]  6/8/2024

## 2024-06-04 NOTE — PATIENT INSTRUCTIONS
GOUT      What Is Gout?  Gout is a disorder that results from the build-up of uric acid in the tissues or a joint. It most often affects the joint of the big toe.  Causes  Gout attacks are caused by deposits of crystallized uric acid in the joint. Uric acid is present in the blood and eliminated in the urine, but in people who have gout, uric acid accumulates and crystallizes in the joints. Uric acid is the result of the breakdown of purines, chemicals that are found naturally in our bodies and in food. Some people develop gout because their kidneys have difficulty eliminating normal amounts of uric acid, while others produce too much uric acid.  Gout occurs most commonly in the big toe because uric acid is sensitive to temperature changes. At cooler temperatures, uric acid turns into crystals. Since the toe is the part of the body that is farthest from the heart, its also the coolest part of the body - and, thus, the most likely target of gout. However, gout can affect any joint in the body.  The tendency to accumulate uric acid is often inherited. Other factors that put a person at risk for developing gout include: high blood pressure, diabetes, obesity, surgery, chemotherapy, stress, and certain medications and vitamins. For example, the bodys ability to remove uric acid can be negatively affected by taking aspirin, some diuretic medications (water pills), and the vitamin niacin (also called nicotinic acid). While gout is more common in men aged 40 to 60 years, it can occur in younger men as well as in women.  Consuming foods and beverages that contain high levels of purines can trigger an attack of gout. Some foods contain more purines than others and have been associated with an increase of uric acid, which leads to gout. You may be able to reduce your chances of getting a gout attack by limiting or avoiding shellfish, organ meats (kidney, liver, etc.), red wine, beer, and red meat.  Symptoms  An attack of  gout can be miserable, marked by the following symptoms:  Intense pain that comes on suddenly - often in the middle of the night or upon arising   Signs of inflammation such as redness, swelling, and warmth over the joint.   Diagnosis  To diagnose gout, the foot and ankle surgeon will ask questions about your personal and family medical history, followed by an examination of the affected joint. Laboratory tests and x-rays are sometimes ordered to determine if the inflammation is caused by something other than gout.  Treatment  Initial treatment of an attack of gout typically includes the following:  Medications. Prescription medications or injections are used to treat the pain, swelling, and inflammation.   Dietary restrictions. Foods and beverages that are high in purines should be avoided, since purines are converted in the body to uric acid.   Fluids. Drink plenty of water and other fluids each day, while also avoiding alcoholic beverages, which cause dehydration.   Immobilize and elevate the foot. Avoid standing and walking to give your foot a rest. Also, elevate your foot (level with or slightly above the heart) to help reduce swelling.   The symptoms of gout and the inflammatory process usually resolve in three to ten days with treatment. If gout symptoms continue despite the initial treatment, or if repeated attacks occur, see your primary care physician for maintenance treatment that may involve daily medication. In cases of repeated episodes, the underlying problem must be addressed, as the build-up of uric acid over time can cause arthritic damage to the joint.  Once diagnosed with gout, your doctor will recommend that you begin following a uric acid diet. Consuming a well-balanced diet, containing moderate amounts of all the main food groups, means you wont experience high uric acid levels, unless your kidneys are incapable of filtering the blood properly.  Receiving around 600 to 1000 milligrams of  puric acid from foods is considered a healthy amount for most people. However, overconsumption of certain foods may produce hyperuricemia without you being aware of it.  Foods high in puric acid that you should not include in a low purine diet are:     Anchovies, bakers and wards yeast, animal organs, sardines, Boletus mushrooms and gravies--more than 800 milligrams per three ounces  Foods containing up to 200 milligrams of puric acid per 3.5 ounces:  Turkey   Ham   Chicken   [place]   Oatmeal   Spinach   Asparagus   Cauliflower   [place] and kidney beans   Pork   [place]   Oysters   Sunflower seeds   Brown shrimp   Sausage   Those who do not experience problems metabolizing uric acid or with kidney filtering functioning generally do not have to worry about the amount of high-purine foods they consume (up to a point, of course). However, those afflicted with extreme hyperuricemia need to adhere to a strict uric acid diet.  Foods containing amino acids (glycine, leucine, alanine, aspartic acid and glutamic acid) facilitate excretion of uric acid through the kidneys. Dairy foods and molasses, both part of a low purine diet, contain aspartic acid. Glutamic acid, produced by the body, assists in potassium transportation across the blood-brain barrier as well as to other parts of the body.  Low purine diet foods provide chemicals necessary in manufacturing glutamic acid within the body. Beans, nuts and whole wheat, all foods found in low purine foods, contain leucine, another essential amino acid contributing to decreased attacks of gouts by enhancing bone and muscle mass.  Low Purine Diet for Gout  All low-fat dairy products--although recent research discovered that including larger than normal amounts of low-fat dairy products in a diet decreases instances of gout by more than half, scientists are not certain why these foods produce this effect.  A list of other low-fat, low purine diet foods include:  Pudding   Peanut  butter   Low-fiber breads   Rice   Pasta   Low-fat cheese and ice cream   Soups containing no broth or meat extract   Sweet items in limited amounts   Fruits and vegetables   Red and white cabbage   Luncheon meat   Green olives   Sauerkraut   Tofu   [place] and hazelnuts   Figs   Be aware that breads, cereals and crackers advertising as being 100% wheat, stone-ground or multi-grain are generally not whole grain foods and may contain ingredients such as yeast, which increases uric acid levels and promotes formation of urate crystals.  Purchase only lean cuts of meat containing as little fillers or fat as possible when creating your low purine diet meal plan. In addition, poultry should be cooked and eaten without the skin, since the fattiest part of chicken or turkey is the skin. Instead of frying meat, bake, poach, broil or grill meat. If you prefer, meat substitutes are acceptable to include in a gout diet, such as those made with soybeans, egg substitutes, vegetable-protein tofu or legumes.    Importance of Cherries in a Low Purine Diet     Cherry juice and tart cherries (the [place] and [place] kind that are grown in [place]), contain potassium, an essential mineral necessary for optimal kidney functioning and regulating blood pressure. Because potassium is essential, this indicates that the body is incapable of chemically altering other substances to create potassium. Therefore, humans must then obtain potassium from other sources, such as cherries, to maintain good health.  Tart cherries and cherry juice keep urine pH at a slightly alkaline level, which inhibits accumulation of uric acid in the blood and consequential formation of uric acid crystals in joints. Cherries should definitely be a part of a low purine diet.  In addition, drinking cherry juice for gout reduces uric acid levels due to the presence of anthocyanins in the cherries. Extracted from berries and grapes, anthocyanin is an  antioxidant and bioflavonoid that contains antimicrobial, anti-inflammatory properties enhancing blood vessel and platelet functioning.   Gout sufferers should eat eight ounces or more of tart cherries each day to relieve pain and swelling of gout. Drinking the same amount of cherry juice instead of eating cherries will also significantly reduce inflammation.  Mulberries     Mulberries should also be included in a low purine diet because they contain healthy amounts of Srivastava, a flavonoid attributed to relieving pain from inflammation. Although mulberries do not directly reduce the high levels of uric acid in a gout sufferers blood stream, they do alleviate pain as incidences of gout flare-ups are being treated.  In addition, mulberries also enhance capillary strength, which is often compromised when joints are inflamed. According to the book Vitamins and Minerals Demystified by Dr. Giuseppe Fish since ascorbic acid slightly increases capillary fragility, this action of the bioflavonoids offsets this tendency. Consuming mulberries while on a low purine diet will counteract this issue precipitated by fruits being such an integral part of gout diet.  Other Flavonoid-Rich Foods  Gout sufferers who are sensitive to over the counter pain relievers such as ibuprofen or aspirin may find natural relief from flavonoid-rich, low purine diet foods such as:  Blueberries   Blackberries   Linda and limes   Dark-colored beans   Cranberries   Green and red vegetables   Red and green onions   Effects of Coffee and Tea on Gout     Drinking coffee but not tea seems to correlate with a subsequent reduction of blood uric acid according to a report published in the June 2007 issue of Journal of Arthritis Care and Research. Researchers interviewed over 14,000 subjects regarding coffee and tea drinking habits and later evaluated their uric acid levels according to whether they were predominantly coffee or tea drinkers, or drank neither  one.  Those who drank four to five cups of coffee each day as part of a low purine diet indicated a decrease of 0.26 mg/dl serum uric acid in contrast to those who did not drink coffee at all.   Those who drank six cups of coffee per day experienced uric acid level reductions of 0.43 mg/dl.   Interestingly, researchers also investigated the effects of caffeine contained in beverages other than coffee and found drinks such as soda did not influence uric acid levels one way or the other. This may indicate that coffee contains a unique ingredient or property responsible for decreasing uric acid and alleviating gout symptoms.  Fish Oil as Part of a Low Purine Diet       A substantial amount of research has been accomplished regarding the strong anti-inflammatory properties of fish oil, or omega 3 fatty acids. By facilitating the production of prostanglandins, hormones responsible for reducing inflammation and regulating calcium dispersion, fish oil is a necessary part of a successful low purine diet intended to combat the pain of gout.  In addition, prostanglandins inhibit particular pro-inflammatory hormones that are part of the prostanglandin family. While fish oil wont relieve pain as quickly as NSAIDs (non-steroidal anti-inflammatory drugs such as aspirin or ibuprofen), they effectively work to reduce the presence of prostanglandins over longer periods of time, eliminating the need to take NSAIDs which often cause side effects.  Frequently, insulin resistance (metabolic syndrome), heart disease and kidney stones are co-morbidly occurring diseases with gout. Fish oil supplements taken in conjunction with a low purine diet can benefit these diseases as well by decreasing bad fats (triglycerides) and preventing platelet aggregation, which is a common cause of blocked vessels and heart attacks.   A few foods other than fish contain omega 3, which are also foods that can be included in a gout diet. Walnuts, flaxseeds,  flaxseed oil and eggs are all sources of omega-3 that do not contribute to uric acid levels in the body.   Folic Acid   Folic acid effectively neutralizes an enzyme called xanthine oxidase which is directly responsible for producing uric acid. By also regulating any uric acid already present in the blood, gout attacks are usually shorter and less painful. However, the recommended daily amounts of folic acid fall short of the 80 milligrams a day that is required to decrease blood uric acid. In addition, epileptics who take medication may find that folic acid interferes with the efficacy of that medication. Little research has been done regarding the effect of high doses of folic acid so including folic acid supplements in a low purine diet should be implemented under the supervision of a doctor.  When following a low purine diet for gout, remember to increase liquids to at least ten, eight ounce servings of water or juice daily. Liquids facilitate the elimination of uric acid by assisting the kidneys in the process of filtering and disposing of wastes.  Suggested Low Purine Diet Menus for Gout Diet  Diets to reduce uric acid are not as restrictive as diabetic or even weight management diets. However, if you are accustomed to eating high purine foods or drinking alcohol on a regular basis, this diet may represent a drastic change in your eating habits. However, to avoid painful attacks of gout that could eventually result in irreversible damage to joints and lifelong impairment of movement, reducing uric acid levels should be a priority in your life.  Low Purine Diet for Gout  This type of diet should include daily servings of:  Grain products--six to ten servings   Fruits--two to four servings   Vegetables--three servings   Low-fat dairy products--two servings   Protein-rich foods-one egg, one ounce of cheese, three ounces of cooked, low-purine meat and two tablespoons of peanut butter   When consuming foods included  in a uric acid diet you may eat as much as you like, as long as this does not contribute to weight gain. This poses a problem since many low purine foods such as white breads, pasta and cereals contain high levels of sugar and carbohydrates. A good meal plan to follow to reduce uric acid in the body would be:   Breakfast:  Bowl of cereal such as cornflakes or crisp rice   White bread toast, buttered with olive oil spread   Glass of skim milk   Cup of tea or coffee or small glass of cherry juice   Snack:   Low-fat cheese and saltines or grapes   Lunch:   Sliced meat sandwich (ham, chicken or turkey) on white bread or   Peanut butter sandwich on white bread   Fruit salad   Coffee, tea, water or cherry juice   Small slice of white cake or two peanut butter or sugar cookies   Dinner:   Grilled chicken breast   Pasta or rice   Carrots, cauliflower or asparagus   Water or cherry juice   Pudding made with low-fat milk   Snack:   Fruit chunks   Fresh vegetable mixture   Fresh berries   Many variations are possible with this uric acid diet. However, even though gout symptoms decrease, you need to follow a low purine diet as closely as possible to maintain successful control over uric acid levels.  How Much Fat in a Low Purine Diet?  While eating moderate to excessive amounts of saturated fat is detrimental to everyones health, it is especially damaging to gout sufferers because fat is directly correlated with weight gain, heart disease and exacerbated gout symptoms. However, small quantities of fat are necessary for optimal functioning of the nervous system as myelin, a form of fat, is essential for optimal signal transduction in the brain enhancing cognition abilities.  For this reason, gout sufferers should have one serving of one of the following per day:  2 tablespoons of half and half cream   1 tablespoon of cream cheese   1 teaspoon of butter   1 tablespoon of reduced fat mayonnaise   1 tablespoon of whole sesame  seeds   1 slice of veras   1 tablespoon of sunflower or pumpkin seeds   1 teaspoon of trans fat-free margarine   Benefits of Dark Chocolate and Cocoa Powder  Research has discovered that chocolate and cocoa powder, especially dark chocolate, contains high amounts of flavonoids when compared to other foods rich in antioxidants. For this reason, an occasional small piece or two of dark chocolate or cup of cocoa should be included in a low purine diet. In fact, cocoa powder possesses the highest amount as a member of the chocolate family, containing ten times the amount of flavonoids found in cranberries and strawberries.  Chocolate is low in purines as well, but dont eat it excessively as it is high in empty calories and promotes weight gain unless you engage in regular exercise.   Prescription Treatments for Gout and Uric Acid Conditions  While a low purine diet for gout is one of the best defensive measures which those suffering from gout can pursue, physicians provide drugs to assist in alleviating gout symptoms. The principle medication used in treating gout is colchicine, a recently FDA approved medication naturally found in plants belong to the Colchicum family.  However, colchicine is known to produce side effects such as gastrointestinal disturbances and neutropenia, which is a reduction in a vital type of white blood cell. Doses must be closely monitored, as too high of a dose can profoundly weaken bone marrow and induce anemia. Colchicine toxicity is similar to arsenic poisoning, resulting in damage and failure of internal organs within 72 hours of ingestion.  Prognosis and Summary  A gout attack or flare-up generally diminishes within five to seven days without treatment, depending on severity of the attack and amount of uric acid in the blood. More than half of these will suffer another attack within twelve months of experiencing the first attack, unless treatment is pursued and a low purine diet is  adopted.  It is also important to note that developing gout also puts you at risk for diabetes mellitus, metabolic syndrome, hypertension, renal failure and cardiovascular disease. While some of these disorders may partly result from obesity or insulin resistance issues, the majority are a direct consequence of untreated hyperuricemia.  Destruction of joints is another consequence of elevated uric acid levels and continued accumulation of urate acid crystals within joints. Implementing gout prevention measures as quickly as possible is vital to eliminating uric acid from the blood and reducing the pain and swelling of gout.

## 2024-06-07 NOTE — PROGRESS NOTES
Please inform patient that lab results confirm gout.  Take medication as prescribed.     Dr. Patino

## 2024-06-08 RX ORDER — INDOMETHACIN 50 MG/1
50 CAPSULE ORAL 3 TIMES DAILY
Qty: 21 CAPSULE | Refills: 0 | Status: SHIPPED | OUTPATIENT
Start: 2024-06-08 | End: 2024-06-15

## 2024-09-12 ENCOUNTER — OFFICE VISIT (OUTPATIENT)
Dept: PRIMARY CARE CLINIC | Facility: CLINIC | Age: 64
End: 2024-09-12
Payer: MEDICARE

## 2024-09-12 DIAGNOSIS — E78.5 HYPERLIPIDEMIA, UNSPECIFIED HYPERLIPIDEMIA TYPE: ICD-10-CM

## 2024-09-12 DIAGNOSIS — E11.69 TYPE 2 DIABETES MELLITUS WITH OTHER SPECIFIED COMPLICATION, WITHOUT LONG-TERM CURRENT USE OF INSULIN: Primary | ICD-10-CM

## 2024-09-12 DIAGNOSIS — E55.9 VITAMIN D DEFICIENCY: ICD-10-CM

## 2024-09-12 DIAGNOSIS — R76.8 POSITIVE ANA (ANTINUCLEAR ANTIBODY): ICD-10-CM

## 2024-09-12 DIAGNOSIS — M06.9 RHEUMATOID ARTHRITIS, INVOLVING UNSPECIFIED SITE, UNSPECIFIED WHETHER RHEUMATOID FACTOR PRESENT: ICD-10-CM

## 2024-09-12 DIAGNOSIS — G47.00 INSOMNIA, UNSPECIFIED TYPE: ICD-10-CM

## 2024-09-12 DIAGNOSIS — Z12.31 BREAST CANCER SCREENING BY MAMMOGRAM: ICD-10-CM

## 2024-09-12 PROCEDURE — G2211 COMPLEX E/M VISIT ADD ON: HCPCS | Mod: 95,,, | Performed by: FAMILY MEDICINE

## 2024-09-12 PROCEDURE — 3044F HG A1C LEVEL LT 7.0%: CPT | Mod: CPTII,95,, | Performed by: FAMILY MEDICINE

## 2024-09-12 PROCEDURE — 99214 OFFICE O/P EST MOD 30 MIN: CPT | Mod: 95,,, | Performed by: FAMILY MEDICINE

## 2024-09-12 RX ORDER — ATORVASTATIN CALCIUM 80 MG/1
80 TABLET, FILM COATED ORAL DAILY
Qty: 90 TABLET | Refills: 3 | Status: SHIPPED | OUTPATIENT
Start: 2024-09-12

## 2024-09-12 RX ORDER — SEMAGLUTIDE 2.68 MG/ML
2 INJECTION, SOLUTION SUBCUTANEOUS
Qty: 9 ML | Refills: 3 | Status: SHIPPED | OUTPATIENT
Start: 2024-09-12 | End: 2025-09-12

## 2024-09-12 RX ORDER — TRAZODONE HYDROCHLORIDE 50 MG/1
100 TABLET ORAL NIGHTLY PRN
Qty: 180 TABLET | Refills: 3 | Status: SHIPPED | OUTPATIENT
Start: 2024-09-12 | End: 2025-09-12

## 2024-09-12 NOTE — PROGRESS NOTES
"    [facility]       [address] [place] [address]      Phone: [phone]      Fax: [phone]    Cedric Huerta MD                Video Visit  2024        Subjective      HPI:  Luciana Moraes is a 63 y.o. female presents today via video for "No chief complaint on file.  ."     63-year-old female presents today via video visit to follow-up on diabetes, discuss other issues.    Overall, feels pretty good today.  No chest pain, shortness a breath.  No fever, chills.  No coughing, sneezing, no URI symptoms.  Appetite normal.  Bowel movements normal.  No urinary issues.    Has diabetes.  Currently takes Ozempic 2 mg daily.  Tolerating it well.  Controlling blood sugar.  Has also been able to lose some weight.  Last visit here, she was 171 lb.  She states that the last time she got on a scale at home she was 168 lb.  Also taking metformin  mg daily.    Also has multiple joint pains, osteoarthritis, rheumatoid arthritis, vitamin-D deficiency.  Currently seeing sports Medicine/neurosurgery/pain management/Rheumatology for this.  Has had several procedures, injections.      PMH:  Dm, HLD, RA, OA, chronic pains.  Vitamin-D deficiency.  PSH:  Left shoulder.  Left hip labrum.  Hysterectomy.  One ovary.  FNH:  DM, CVA  Allergies:  NKDA  Social:  On disability.  Previously worked for [employer].  Recently went back to school at [employer].  T:  Denies.  Quit   A:  Denies  D:  Denies    Exercise:  Does chair exercises/stretches.    Pap:  Had hysterectomy.  MM23    Colon:  2022.  Two polyps, tubular adenomas.  Repeat five years ()        The following were updated and reviewed by myself in the chart: medications, past medical history, past surgical history, family history, social history, and allergies.     Medications:  Current Outpatient Medications on File Prior to Visit   Medication Sig Dispense Refill    aspirin 81 MG Chew " Take 81 mg by mouth.      colchicine (COLCRYS) 0.6 mg tablet Take 2 tab by mouth now and 1 tab in 1 hour. Then twice daily for 5 days. 13 tablet 0    diclofenac sodium (VOLTAREN) 1 % Gel Apply 2 g topically 4 (four) times daily. 100 g 6    ergocalciferol (ERGOCALCIFEROL) 50,000 unit Cap Take 1 capsule (50,000 Units total) by mouth twice a week. 24 capsule 3    hydroxychloroquine (PLAQUENIL) 200 mg tablet Take 1 tablet (200 mg total) by mouth 2 (two) times daily. 180 tablet 1    ibuprofen (ADVIL,MOTRIN) 800 MG tablet Take 800 mg by mouth 3 (three) times daily.      metFORMIN (GLUCOPHAGE-XR) 500 MG ER 24hr tablet Take 2 tablets (1,000 mg total) by mouth daily with breakfast. 180 tablet 3    nabumetone (RELAFEN) 750 MG tablet Take 1 tablet (750 mg total) by mouth 2 (two) times daily as needed for Pain. 60 tablet 5    polyethylene glycol (GLYCOLAX) 17 gram PwPk Take 17 g by mouth once daily. 30 each 11    pregabalin (LYRICA) 75 MG capsule Take 1 capsule (75 mg total) by mouth 2 (two) times daily. 60 capsule 5    tiZANidine (ZANAFLEX) 4 MG tablet Take 1 tablet (4 mg total) by mouth 2 (two) times daily as needed (pain). 60 tablet 5    [DISCONTINUED] atorvastatin (LIPITOR) 80 MG tablet Take 1 tablet (80 mg total) by mouth once daily. 90 tablet 3    [DISCONTINUED] semaglutide (OZEMPIC) 2 mg/dose (8 mg/3 mL) PnIj Inject 2 mg into the skin every 7 days. 9 mL 3    [DISCONTINUED] traZODone (DESYREL) 50 MG tablet Take 2 tablets (100 mg total) by mouth nightly as needed for Insomnia. 180 tablet 3     No current facility-administered medications on file prior to visit.        PMHx:  Past Medical History:   Diagnosis Date    Diabetes mellitus, type 2     Hyperlipidemia     Osteoarthritis     Polymyositis     Rheumatoid arthritis       Patient Active Problem List    Diagnosis Date Noted    Upper extremity weakness 03/12/2024    Chronic hip pain, bilateral 03/12/2024    Chronic bilateral low back pain with bilateral sciatica  03/12/2024    Weakness of both lower extremities 03/12/2024    Impaired functional mobility, balance, gait, and endurance 03/12/2024    Insomnia 11/20/2023    Muscle hypertonicity 03/01/2023    Decreased range of motion of lumbar spine 03/01/2023    Decreased range of motion of intervertebral discs of cervical spine 03/01/2023    Poor posture 03/01/2023    Colon cancer screening 11/08/2022    Neck pain 08/03/2022        PSHx:  Past Surgical History:   Procedure Laterality Date    CARPAL TUNNEL RELEASE Bilateral     COLONOSCOPY N/A 11/08/2022    Procedure: COLONOSCOPY;  Surgeon: Kishore Monsalve MD;  Location: [facility] ENDO;  Service: Endoscopy;  Laterality: N/A;    EPIDURAL STEROID INJECTION INTO CERVICAL SPINE N/A 09/22/2022    Procedure: C7/T1 IL [facility];  Surgeon: Luis M Pereyra MD;  Location: [facility] PAIN [facility]GT;  Service: Pain Management;  Laterality: N/A;    HIP SURGERY      HYSTERECTOMY  2008    due to bleeding, pain from fibroids, retains one ovary    INJECTION OF ANESTHETIC AGENT AROUND MEDIAL BRANCH NERVES INNERVATING CERVICAL FACET JOINT Bilateral 10/31/2022    Procedure: Bilateral C5-7 MBB with RN IV sedation;  Surgeon: Luis M Pereyra MD;  Location: [facility]GT;  Service: Pain Management;  Laterality: Bilateral;    INJECTION OF ANESTHETIC AGENT AROUND MEDIAL BRANCH NERVES INNERVATING CERVICAL FACET JOINT Bilateral 3/13/2023    Procedure: Bilateral C5-7 MBB with RN IV sedation;  Surgeon: Luis M Pereyra MD;  Location: [facility]GT;  Service: Pain Management;  Laterality: Bilateral;    INJECTION OF ANESTHETIC AGENT INTO SACROILIAC JOINT Bilateral 4/8/2024    Procedure: Bilateral SIJ Injection;  Surgeon: Luis M Pereyra MD;  Location: [facility]GT;  Service: Pain Management;  Laterality: Bilateral;    OOPHORECTOMY      1 ovary removed    TUBAL LIGATION  1985        FHx:  Family History   Problem Relation Name Age of Onset    Rheum arthritis Mother      No Known Problems Father          Social:  Social  "History     Socioeconomic History    Marital status:    Tobacco Use    Smoking status: Former     Current packs/day: 0.00     Types: Cigarettes     Quit date: 3/17/2001     Years since quittin.5     Passive exposure: Never    Smokeless tobacco: Never   Substance and Sexual Activity    Alcohol use: Never    Drug use: Never    Sexual activity: Yes     Partners: Male     Birth control/protection: None        Allergies:  Review of patient's allergies indicates:  No Known Allergies     ROS:  Review of Systems   Constitutional:  Negative for activity change and unexpected weight change.   HENT:  Negative for hearing loss, rhinorrhea and trouble swallowing.    Eyes:  Negative for discharge and visual disturbance.   Respiratory:  Negative for chest tightness and wheezing.    Cardiovascular:  Negative for chest pain and palpitations.   Gastrointestinal:  Negative for blood in stool, constipation, diarrhea and vomiting.   Endocrine: Negative for polydipsia and polyuria.   Genitourinary:  Negative for difficulty urinating, dysuria, hematuria and menstrual problem.   Musculoskeletal:  Positive for arthralgias and joint swelling. Negative for neck pain.   Neurological:  Negative for weakness and headaches.   Psychiatric/Behavioral:  Negative for confusion and dysphoric mood.           Objective      There were no vitals taken for this visit.  Ht Readings from Last 3 Encounters:   24 5' 2" (1.575 m)   24 5' 2" (1.575 m)   24 5' 2" (1.575 m)     Wt Readings from Last 3 Encounters:   24 72.6 kg (160 lb 0.9 oz)   24 72.6 kg (160 lb)   24 76.9 kg (169 lb 8.5 oz)       PHYSICAL EXAM:  Physical Exam  Constitutional:       General: She is not in acute distress.     Appearance: Normal appearance. She is not ill-appearing.   HENT:      Head: Normocephalic and atraumatic.   Pulmonary:      Effort: Pulmonary effort is normal. No respiratory distress.   Neurological:      Mental Status: She is " alert.   Psychiatric:         Mood and Affect: Mood normal.         Behavior: Behavior normal.         Thought Content: Thought content normal.              LABS / IMAGING:  Recent Results (from the past 4368 hour(s))   POCT glucose    Collection Time: 04/08/24  6:34 AM   Result Value Ref Range    POCT Glucose 90 70 - 110 mg/dL   Uric acid    Collection Time: 06/04/24 11:31 AM   Result Value Ref Range    Uric Acid 6.5 (H) 2.4 - 5.7 mg/dL         Assessment    1. Type 2 diabetes mellitus with other specified complication, without long-term current use of insulin    2. Hyperlipidemia, unspecified hyperlipidemia type    3. Rheumatoid arthritis, involving unspecified site, unspecified whether rheumatoid factor present    4. Positive RICHARD (antinuclear antibody)    5. Vitamin D deficiency    6. Insomnia, unspecified type    7. Breast cancer screening by mammogram          Plan    Diagnoses and all orders for this visit:    Type 2 diabetes mellitus with other specified complication, without long-term current use of insulin  -     semaglutide (OZEMPIC) 2 mg/dose (8 mg/3 mL) PnIj; Inject 2 mg into the skin every 7 days.    Hyperlipidemia, unspecified hyperlipidemia type  -     atorvastatin (LIPITOR) 80 MG tablet; Take 1 tablet (80 mg total) by mouth once daily.    Rheumatoid arthritis, involving unspecified site, unspecified whether rheumatoid factor present    Positive RICHARD (antinuclear antibody)    Vitamin D deficiency    Insomnia, unspecified type  -     traZODone (DESYREL) 50 MG tablet; Take 2 tablets (100 mg total) by mouth nightly as needed for Insomnia.    Breast cancer screening by mammogram  -     Mammo Digital Screening Bilat w/ [facility]; Future    On screen, everything looks fine today.      She is asking about increasing the dose of Ozempic, but she is already at the max dose.  Continue this until next visit.  At that point, we can consider switching to Mounjaro.  Orders for screening blood work already placed.  We  would like her to stop by the clinic to get blood drawn.  If A1c stays below 7%, we can discontinue the metformin completely.    Other refills, as above.    Due for screening mammogram anytime after December.  Order placed.    FOLLOW-UP:  Follow up in about 6 months (around 3/12/2025) for check up.    The patient location is:  [place]  The chief complaint leading to consultation is:  Diabetes, HLD, sleep, screening    Visit type: audiovisual    Face to Face time with patient: 15 minutes    30 minutes of total time spent on the encounter, which includes face to face time and non-face to face time preparing to see the patient (eg, review of tests), Obtaining and/or reviewing separately obtained history, Documenting clinical information in the electronic or other health record, Independently interpreting results (not separately reported) and communicating results to the patient/family/caregiver, or Care coordination (not separately reported). Visit today included increased complexity associated with the care of the episodic problem addressed and managing the longitudinal care of the patient due to the serious and/or complex managed problem(s).    Each patient to whom he or she provides medical services by telemedicine is:  (1) informed of the relationship between the physician and patient and the respective role of any other health care provider with respect to management of the patient; and (2) notified that he or she may decline to receive medical services by telemedicine and may withdraw from such care at any time.    Signed by:  Cedric Huerta MD

## 2024-09-12 NOTE — PATIENT INSTRUCTIONS
On screen, everything looks really good today.      Orders have already been placed to collect blood and urine samples to check things on the inside.  Please stop by the clinic one day next week, fasting, to get the blood drawn.  We will contact you with results as soon as available.    If A1c continues to stay below 7%, we may consider getting rid of the other tablet of metformin.    Let us stay on your current dose of Ozempic, for now.  At our next visit, we can discuss switching that to Mounjaro instead.    Refills of your other medicines have been sent to the pharmacy.  Please continue taking them, as directed.      Continue to follow with Rheumatology, pain management, as scheduled.      Continue to eat a healthy diet.  Be careful with portion sizes.  Includes lots of fresh fruits, vegetables, whole grains, lean proteins.  See info below.    Keep hydrated.  Be sure to drink at least 8-10, 8 oz, glasses of water every day.    Stay active.  Try to do some sort of physical activity every day.  Nothing outrageous, just try walking for 10-15 minutes each day.

## 2024-09-13 ENCOUNTER — TELEPHONE (OUTPATIENT)
Dept: PRIMARY CARE CLINIC | Facility: CLINIC | Age: 64
End: 2024-09-13
Payer: MEDICARE

## 2024-09-13 NOTE — TELEPHONE ENCOUNTER
Spoke with pt and she advised me that she is out of town and she will give me a call when she return to schedule her labwork.

## 2024-09-13 NOTE — TELEPHONE ENCOUNTER
----- Message from Soraya Winston sent at 9/13/2024  1:25 PM CDT -----  Contact: [employer]  .Type: Orders Request    What orders/ testing are being requested? Lab ( [facility])     Is there a future appointment scheduled for the patient with PCP?no     When?    Would you prefer a response via [employer]? Call back     Comments: Summer states pt is needing lab done, pt call back at .[phone]

## 2024-10-14 ENCOUNTER — LAB VISIT (OUTPATIENT)
Dept: LAB | Facility: HOSPITAL | Age: 64
End: 2024-10-14
Attending: FAMILY MEDICINE
Payer: MEDICARE

## 2024-10-14 DIAGNOSIS — E11.69 TYPE 2 DIABETES MELLITUS WITH OTHER SPECIFIED COMPLICATION, WITHOUT LONG-TERM CURRENT USE OF INSULIN: ICD-10-CM

## 2024-10-14 DIAGNOSIS — Z79.899 LONG-TERM USE OF HIGH-RISK MEDICATION: ICD-10-CM

## 2024-10-14 LAB
ALBUMIN/CREAT UR: 5.3 UG/MG (ref 0–30)
CREAT UR-MCNC: 170 MG/DL (ref 15–325)
MICROALBUMIN UR DL<=1MG/L-MCNC: 9 UG/ML

## 2024-10-14 PROCEDURE — 82043 UR ALBUMIN QUANTITATIVE: CPT | Performed by: FAMILY MEDICINE

## 2024-10-14 PROCEDURE — 82570 ASSAY OF URINE CREATININE: CPT | Performed by: FAMILY MEDICINE

## 2024-11-19 ENCOUNTER — PATIENT MESSAGE (OUTPATIENT)
Dept: NEUROLOGY | Facility: CLINIC | Age: 64
End: 2024-11-19
Payer: MEDICARE

## 2024-11-20 ENCOUNTER — OFFICE VISIT (OUTPATIENT)
Dept: OPHTHALMOLOGY | Facility: CLINIC | Age: 64
End: 2024-11-20
Payer: MEDICARE

## 2024-11-20 DIAGNOSIS — H52.4 REGULAR ASTIGMATISM OF BOTH EYES WITH PRESBYOPIA: ICD-10-CM

## 2024-11-20 DIAGNOSIS — E11.36 DIABETIC CATARACT: ICD-10-CM

## 2024-11-20 DIAGNOSIS — Z79.899 ENCOUNTER FOR LONG-TERM (CURRENT) USE OF MEDICATIONS: Primary | ICD-10-CM

## 2024-11-20 DIAGNOSIS — E11.9 DIABETES MELLITUS WITHOUT COMPLICATION: ICD-10-CM

## 2024-11-20 DIAGNOSIS — H25.813 COMBINED FORM OF AGE-RELATED CATARACT, BOTH EYES: ICD-10-CM

## 2024-11-20 DIAGNOSIS — H40.013 OAG (OPEN ANGLE GLAUCOMA) SUSPECT, LOW RISK, BILATERAL: ICD-10-CM

## 2024-11-20 DIAGNOSIS — H52.223 REGULAR ASTIGMATISM OF BOTH EYES WITH PRESBYOPIA: ICD-10-CM

## 2024-11-20 PROCEDURE — 92015 DETERMINE REFRACTIVE STATE: CPT | Mod: S$GLB,,, | Performed by: OPTOMETRIST

## 2024-11-20 PROCEDURE — 3044F HG A1C LEVEL LT 7.0%: CPT | Mod: CPTII,S$GLB,, | Performed by: OPTOMETRIST

## 2024-11-20 PROCEDURE — 99214 OFFICE O/P EST MOD 30 MIN: CPT | Mod: S$GLB,,, | Performed by: OPTOMETRIST

## 2024-11-20 PROCEDURE — 99999 PR PBB SHADOW E&M-EST. PATIENT-LVL II: CPT | Mod: PBBFAC,,, | Performed by: OPTOMETRIST

## 2024-11-20 PROCEDURE — 1160F RVW MEDS BY RX/DR IN RCRD: CPT | Mod: CPTII,S$GLB,, | Performed by: OPTOMETRIST

## 2024-11-20 PROCEDURE — 2023F DILAT RTA XM W/O RTNOPTHY: CPT | Mod: CPTII,S$GLB,, | Performed by: OPTOMETRIST

## 2024-11-20 PROCEDURE — 3061F NEG MICROALBUMINURIA REV: CPT | Mod: CPTII,S$GLB,, | Performed by: OPTOMETRIST

## 2024-11-20 PROCEDURE — 1159F MED LIST DOCD IN RCRD: CPT | Mod: CPTII,S$GLB,, | Performed by: OPTOMETRIST

## 2024-11-20 PROCEDURE — 3066F NEPHROPATHY DOC TX: CPT | Mod: CPTII,S$GLB,, | Performed by: OPTOMETRIST

## 2024-11-20 NOTE — PROGRESS NOTES
HPI     Diabetic Eye Exam            Comments: Patient here today for yearly eye exam          Comments    Diagnosed with diabetes in 2015  Lab Results       Component                Value               Date                       HGBA1C                   6.4 (H)             10/14/2024            Vision changes since last eye exam?: None noticed  Wears OTC readers     Any eye pain today: No    Other ocular symptoms: No    Interested in contact lens fitting today? No      1. Dm dx 2020  2. Low risk glaucoma suspect   -GCL 32/32  3. Started Plaquenil in 2022 for RA                   Last edited by Nya Mukherjee, PCT on 11/20/2024  9:32 AM.            Assessment /Plan     For exam results, see Encounter Report.    1. Encounter for long-term (current) use of medications  Currently taking 200 mg 2 times per day for 2 years for Rheumatoid Arthritis . There was no maculopathy present in either eye on OCT testing. Recommend RTC next available for [facility] 10-2 & color [facility].    2. OAG (open angle glaucoma) suspect, low risk, bilateral  The patient has the following Glaucoma risk factors: Optic Nerve Asymmetry     Recommend close observation off drops at this time with annual gOCT for changes.      3. Diabetes mellitus without complication  4 years, last A1c 6.4 There was no diabetic retinopathy present on either eye on examination today. Recommend good blood pressure control, strict blood glucose control, and good cholesterol control.  Continue close care with Dr. Huerta regarding diabetes.    4. Diabetic cataract  Combined form of age-related cataract, both eyes  Cataracts are not visually significant and not affecting activities of daily living. Annual observation is recommended at this time. Patient to call or return to clinic with any significant change in vision prior to next visit.    5. Regular astigmatism of both eyes with presbyopia  Eyeglass Final Rx       Eyeglass Final Rx         Sphere Cylinder Axis Add     Right +1.00 +0.50 180 +2.50    Left +0.75 +0.25 004 +2.50      Type: PAL    Expiration Date: 11/20/2025                    RTC next available for [facility] 10-2 & color [facility].  Discussed above and answered questions.

## 2024-12-27 ENCOUNTER — PATIENT MESSAGE (OUTPATIENT)
Dept: OPTOMETRY | Facility: CLINIC | Age: 64
End: 2024-12-27
Payer: MEDICARE

## 2025-02-15 DIAGNOSIS — E11.69 TYPE 2 DIABETES MELLITUS WITH OTHER SPECIFIED COMPLICATION, WITHOUT LONG-TERM CURRENT USE OF INSULIN: ICD-10-CM

## 2025-02-15 RX ORDER — METFORMIN HYDROCHLORIDE 500 MG/1
TABLET, EXTENDED RELEASE ORAL
Qty: 180 TABLET | Refills: 0 | Status: SHIPPED | OUTPATIENT
Start: 2025-02-15

## 2025-02-15 NOTE — TELEPHONE ENCOUNTER
Care Due:                  Date            Visit Type   Department     Provider  --------------------------------------------------------------------------------                                ESTABLISHED                              PATIENT -    [facility]  Last Visit: 09-      VIRTUAL      CARE           Cedric Huerta  Next Visit: None Scheduled  None         None Found                                                            Last  Test          Frequency    Reason                     Performed    Due Date  --------------------------------------------------------------------------------    HBA1C.......  6 months...  metFORMIN, semaglutide...  10-   04-    [facility] Embedded Care Due Messages. Reference number: [id number].   2/15/2025 5:14:37 AM CST

## 2025-02-15 NOTE — TELEPHONE ENCOUNTER
Provider Staff:  Action required for this patient    Requires labs      Please see care gap opportunities below in Care Due Message.    Thanks!  [facility]     Appointments      Date Provider   Last Visit   9/12/2024 Cedric Huerta MD   Next Visit   Visit date not found Cedric Huerta MD     Refill Decision Note   Luciana Moraes  is requesting a refill authorization.  Brief Assessment and Rationale for Refill:  Approve     Medication Therapy Plan:         Comments:     Note composed:3:22 PM 02/15/2025

## 2025-05-21 DIAGNOSIS — E11.9 TYPE 2 DIABETES MELLITUS WITHOUT COMPLICATION: ICD-10-CM

## 2025-06-05 ENCOUNTER — OFFICE VISIT (OUTPATIENT)
Dept: PRIMARY CARE CLINIC | Facility: CLINIC | Age: 65
End: 2025-06-05
Payer: MEDICARE

## 2025-06-05 ENCOUNTER — LAB VISIT (OUTPATIENT)
Dept: LAB | Facility: HOSPITAL | Age: 65
End: 2025-06-05
Attending: PHYSICIAN ASSISTANT
Payer: MEDICARE

## 2025-06-05 VITALS
TEMPERATURE: 98 F | DIASTOLIC BLOOD PRESSURE: 80 MMHG | HEIGHT: 62 IN | OXYGEN SATURATION: 98 % | WEIGHT: 192.69 LBS | SYSTOLIC BLOOD PRESSURE: 138 MMHG | HEART RATE: 60 BPM | BODY MASS INDEX: 35.46 KG/M2 | RESPIRATION RATE: 16 BRPM

## 2025-06-05 DIAGNOSIS — G47.00 INSOMNIA, UNSPECIFIED TYPE: ICD-10-CM

## 2025-06-05 DIAGNOSIS — E55.9 VITAMIN D DEFICIENCY: ICD-10-CM

## 2025-06-05 DIAGNOSIS — R76.8 POSITIVE ANA (ANTINUCLEAR ANTIBODY): ICD-10-CM

## 2025-06-05 DIAGNOSIS — E11.69 TYPE 2 DIABETES MELLITUS WITH OTHER SPECIFIED COMPLICATION, WITHOUT LONG-TERM CURRENT USE OF INSULIN: ICD-10-CM

## 2025-06-05 DIAGNOSIS — M50.30 DDD (DEGENERATIVE DISC DISEASE), CERVICAL: ICD-10-CM

## 2025-06-05 DIAGNOSIS — K59.00 CONSTIPATION, UNSPECIFIED CONSTIPATION TYPE: ICD-10-CM

## 2025-06-05 DIAGNOSIS — M54.12 CERVICAL RADICULOPATHY: ICD-10-CM

## 2025-06-05 DIAGNOSIS — R10.2 PELVIC PAIN IN FEMALE: ICD-10-CM

## 2025-06-05 DIAGNOSIS — M47.812 CERVICAL SPONDYLOSIS: ICD-10-CM

## 2025-06-05 DIAGNOSIS — E78.5 HYPERLIPIDEMIA, UNSPECIFIED HYPERLIPIDEMIA TYPE: ICD-10-CM

## 2025-06-05 DIAGNOSIS — M06.9 RHEUMATOID ARTHRITIS, INVOLVING UNSPECIFIED SITE, UNSPECIFIED WHETHER RHEUMATOID FACTOR PRESENT: ICD-10-CM

## 2025-06-05 DIAGNOSIS — M10.9 ACUTE GOUT INVOLVING TOE OF RIGHT FOOT, UNSPECIFIED CAUSE: ICD-10-CM

## 2025-06-05 LAB
ABSOLUTE EOSINOPHIL (OHS): 0.25 K/UL
ABSOLUTE MONOCYTE (OHS): 0.91 K/UL (ref 0.3–1)
ABSOLUTE NEUTROPHIL COUNT (OHS): 2.81 K/UL (ref 1.8–7.7)
ALBUMIN SERPL BCP-MCNC: 3.8 G/DL (ref 3.5–5.2)
ALBUMIN/CREAT UR: 4.4 UG/MG
ALP SERPL-CCNC: 82 UNIT/L (ref 40–150)
ALT SERPL W/O P-5'-P-CCNC: 11 UNIT/L (ref 10–44)
ANION GAP (OHS): 7 MMOL/L (ref 8–16)
AST SERPL-CCNC: 17 UNIT/L (ref 11–45)
BASOPHILS # BLD AUTO: 0.03 K/UL
BASOPHILS NFR BLD AUTO: 0.4 %
BILIRUB SERPL-MCNC: 0.6 MG/DL (ref 0.1–1)
BUN SERPL-MCNC: 16 MG/DL (ref 8–23)
CALCIUM SERPL-MCNC: 9.3 MG/DL (ref 8.7–10.5)
CHLORIDE SERPL-SCNC: 106 MMOL/L (ref 95–110)
CHOLEST SERPL-MCNC: 237 MG/DL (ref 120–199)
CHOLEST/HDLC SERPL: 5.8 {RATIO} (ref 2–5)
CO2 SERPL-SCNC: 25 MMOL/L (ref 23–29)
CREAT SERPL-MCNC: 0.9 MG/DL (ref 0.5–1.4)
CREAT UR-MCNC: 228 MG/DL (ref 15–325)
EAG (OHS): 157 MG/DL (ref 68–131)
ERYTHROCYTE [DISTWIDTH] IN BLOOD BY AUTOMATED COUNT: 14.4 % (ref 11.5–14.5)
GFR SERPLBLD CREATININE-BSD FMLA CKD-EPI: >60 ML/MIN/1.73/M2
GLUCOSE SERPL-MCNC: 105 MG/DL (ref 70–110)
HBA1C MFR BLD: 7.1 % (ref 4–5.6)
HCT VFR BLD AUTO: 45.7 % (ref 37–48.5)
HDLC SERPL-MCNC: 41 MG/DL (ref 40–75)
HDLC SERPL: 17.3 % (ref 20–50)
HGB BLD-MCNC: 13.9 GM/DL (ref 12–16)
IMM GRANULOCYTES # BLD AUTO: 0.01 K/UL (ref 0–0.04)
IMM GRANULOCYTES NFR BLD AUTO: 0.1 % (ref 0–0.5)
LDLC SERPL CALC-MCNC: 166.4 MG/DL (ref 63–159)
LYMPHOCYTES # BLD AUTO: 3.69 K/UL (ref 1–4.8)
MCH RBC QN AUTO: 26.8 PG (ref 27–31)
MCHC RBC AUTO-ENTMCNC: 30.4 G/DL (ref 32–36)
MCV RBC AUTO: 88 FL (ref 82–98)
MICROALBUMIN UR-MCNC: 10 UG/ML (ref ?–5000)
NONHDLC SERPL-MCNC: 196 MG/DL
NUCLEATED RBC (/100WBC) (OHS): 0 /100 WBC
PLATELET # BLD AUTO: 175 K/UL (ref 150–450)
PMV BLD AUTO: 12.6 FL (ref 9.2–12.9)
POTASSIUM SERPL-SCNC: 4.7 MMOL/L (ref 3.5–5.1)
PROT SERPL-MCNC: 7.4 GM/DL (ref 6–8.4)
RBC # BLD AUTO: 5.19 M/UL (ref 4–5.4)
RELATIVE EOSINOPHIL (OHS): 3.2 %
RELATIVE LYMPHOCYTE (OHS): 47.9 % (ref 18–48)
RELATIVE MONOCYTE (OHS): 11.8 % (ref 4–15)
RELATIVE NEUTROPHIL (OHS): 36.6 % (ref 38–73)
SODIUM SERPL-SCNC: 138 MMOL/L (ref 136–145)
TRIGL SERPL-MCNC: 148 MG/DL (ref 30–150)
TSH SERPL-ACNC: 0.77 UIU/ML (ref 0.4–4)
WBC # BLD AUTO: 7.7 K/UL (ref 3.9–12.7)

## 2025-06-05 PROCEDURE — 99999 PR PBB SHADOW E&M-EST. PATIENT-LVL III: CPT | Mod: PBBFAC,,, | Performed by: PHYSICIAN ASSISTANT

## 2025-06-05 PROCEDURE — 85025 COMPLETE CBC W/AUTO DIFF WBC: CPT

## 2025-06-05 PROCEDURE — 82570 ASSAY OF URINE CREATININE: CPT

## 2025-06-05 PROCEDURE — G2211 COMPLEX E/M VISIT ADD ON: HCPCS | Mod: S$GLB,,, | Performed by: PHYSICIAN ASSISTANT

## 2025-06-05 PROCEDURE — 3072F LOW RISK FOR RETINOPATHY: CPT | Mod: CPTII,S$GLB,, | Performed by: PHYSICIAN ASSISTANT

## 2025-06-05 PROCEDURE — 83036 HEMOGLOBIN GLYCOSYLATED A1C: CPT

## 2025-06-05 PROCEDURE — 36415 COLL VENOUS BLD VENIPUNCTURE: CPT | Mod: PN

## 2025-06-05 PROCEDURE — 3061F NEG MICROALBUMINURIA REV: CPT | Mod: CPTII,S$GLB,, | Performed by: PHYSICIAN ASSISTANT

## 2025-06-05 PROCEDURE — 99214 OFFICE O/P EST MOD 30 MIN: CPT | Mod: S$GLB,,, | Performed by: PHYSICIAN ASSISTANT

## 2025-06-05 PROCEDURE — 3066F NEPHROPATHY DOC TX: CPT | Mod: CPTII,S$GLB,, | Performed by: PHYSICIAN ASSISTANT

## 2025-06-05 PROCEDURE — 80053 COMPREHEN METABOLIC PANEL: CPT

## 2025-06-05 PROCEDURE — 3079F DIAST BP 80-89 MM HG: CPT | Mod: CPTII,S$GLB,, | Performed by: PHYSICIAN ASSISTANT

## 2025-06-05 PROCEDURE — 84443 ASSAY THYROID STIM HORMONE: CPT

## 2025-06-05 PROCEDURE — 3008F BODY MASS INDEX DOCD: CPT | Mod: CPTII,S$GLB,, | Performed by: PHYSICIAN ASSISTANT

## 2025-06-05 PROCEDURE — 3051F HG A1C>EQUAL 7.0%<8.0%: CPT | Mod: CPTII,S$GLB,, | Performed by: PHYSICIAN ASSISTANT

## 2025-06-05 PROCEDURE — 80061 LIPID PANEL: CPT

## 2025-06-05 PROCEDURE — 3075F SYST BP GE 130 - 139MM HG: CPT | Mod: CPTII,S$GLB,, | Performed by: PHYSICIAN ASSISTANT

## 2025-06-05 PROCEDURE — 1159F MED LIST DOCD IN RCRD: CPT | Mod: CPTII,S$GLB,, | Performed by: PHYSICIAN ASSISTANT

## 2025-06-05 RX ORDER — NABUMENTONE 750 MG/1
750 TABLET ORAL 2 TIMES DAILY PRN
Qty: 60 TABLET | Refills: 5 | Status: SHIPPED | OUTPATIENT
Start: 2025-06-05

## 2025-06-05 RX ORDER — ERGOCALCIFEROL 1.25 MG/1
50000 CAPSULE ORAL
Qty: 12 CAPSULE | Refills: 3 | Status: SHIPPED | OUTPATIENT
Start: 2025-06-05

## 2025-06-05 RX ORDER — TIZANIDINE 4 MG/1
4 TABLET ORAL 2 TIMES DAILY PRN
Qty: 60 TABLET | Refills: 5 | Status: SHIPPED | OUTPATIENT
Start: 2025-06-05

## 2025-06-05 RX ORDER — IBUPROFEN 800 MG/1
800 TABLET, FILM COATED ORAL EVERY 6 HOURS PRN
Qty: 45 TABLET | Refills: 3 | Status: SHIPPED | OUTPATIENT
Start: 2025-06-05

## 2025-06-05 RX ORDER — ATORVASTATIN CALCIUM 80 MG/1
80 TABLET, FILM COATED ORAL DAILY
Qty: 90 TABLET | Refills: 3 | Status: SHIPPED | OUTPATIENT
Start: 2025-06-05

## 2025-06-05 RX ORDER — POLYETHYLENE GLYCOL 3350 17 G/17G
17 POWDER, FOR SOLUTION ORAL DAILY
Qty: 30 EACH | Refills: 11 | Status: SHIPPED | OUTPATIENT
Start: 2025-06-05

## 2025-06-05 RX ORDER — SEMAGLUTIDE 2.68 MG/ML
2 INJECTION, SOLUTION SUBCUTANEOUS
Qty: 9 ML | Refills: 3 | Status: SHIPPED | OUTPATIENT
Start: 2025-06-05 | End: 2025-06-05 | Stop reason: ALTCHOICE

## 2025-06-05 RX ORDER — COLCHICINE 0.6 MG/1
TABLET ORAL
Qty: 13 TABLET | Refills: 0 | Status: SHIPPED | OUTPATIENT
Start: 2025-06-05

## 2025-06-05 RX ORDER — TRAZODONE HYDROCHLORIDE 50 MG/1
100 TABLET ORAL NIGHTLY PRN
Qty: 180 TABLET | Refills: 3 | Status: SHIPPED | OUTPATIENT
Start: 2025-06-05 | End: 2026-06-05

## 2025-06-05 RX ORDER — ERGOCALCIFEROL 1.25 MG/1
50000 CAPSULE ORAL
Qty: 24 CAPSULE | Refills: 3 | Status: SHIPPED | OUTPATIENT
Start: 2025-06-05 | End: 2025-06-05 | Stop reason: SDUPTHER

## 2025-06-05 NOTE — PROGRESS NOTES
"    [facility]       [address] [place] [address]      Phone: [phone]      Fax: [phone]    Teresa Howell PA-C                Office Visit  06/10/2025        Subjective      HPI:  Luciana Moraes is a 64 y.o. female presents today in clinic for "Annual Exam  ."     CHIEF COMPLAINT:  Patient presents for a follow-up visit after discontinuing diabetes medication for 2-3 months and experiencing weight gain.    HPI:  Patient reports discontinuing Ozempic, her diabetes medication, for about 2-3 months. She has gained 32 lbs since her last visit, when she weighed 160 lbs. She has swelling in her feet, legs, and joints. 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 She also reports tingling sensations, potentially related to diabetic neuropathy.    She has muscle spasms and cramps, possibly associated with arthritis in her hips. She has rheumatoid arthritis and another type of arthritis. She is evaluated by a rheumatologist, with an upcoming appointment in July.    For pain management, she takes medication only when it becomes severe. She uses ibuprofen 800mg every 4-6 hours as needed when anticipating severe pain. For less severe pain, she uses nabumetone (Relafen).    She reports her first gout flare, describing it as starting with mild pain that rapidly intensified. She was prescribed medication for this, which she keeps on hand for future flares.    She denies any calf pain.    MEDICATIONS:  Patient is on Vitamin D once weekly. She takes Ibuprofen 800 mg as needed for severe pain and Nabumetone (Relafen) for less severe pain. She is also on Miralax and Trazodone for sleep. Tizanidine (Zanaflex) is taken as needed for muscle relaxation and pain. Patient is on Atorvastatin (Lipitor) for cholesterol management. Ozempic was discontinued 2-3 months ago due to running out of medication. Prior to " discontinuation, her Ozempic dosage had been 2 mg.     Also has multiple joint pains, osteoarthritis, rheumatoid arthritis, vitamin-D deficiency.  Currently seeing sports Medicine/neurosurgery/pain management/Rheumatology for this.  Has had several procedures, injections.       PMH:  Dm, HLD, RA, OA, chronic pains.  Vitamin-D deficiency.  PSH:  Left shoulder.  Left hip labrum.  Hysterectomy.  One ovary.  FNH:  DM, CVA  Allergies:  NKDA  Social:  On disability.  Previously worked for [employer].  Recently went back to school at [employer].  T:  Denies.  Quit   A:  Denies  D:  Denies     Exercise:  Does chair exercises/stretches.     Pap:  Had hysterectomy.  MM23     Colon:  2022.  Two polyps, tubular adenomas.  Repeat five years ()                The following were updated and reviewed by myself in the chart: medications, past medical history, past surgical history, family history, social history, and allergies.     Medications:  Medications Ordered Prior to Encounter[1]     PMHx:  Past Medical History:   Diagnosis Date    Diabetes mellitus, type 2     Hyperlipidemia     Osteoarthritis     Polymyositis     Rheumatoid arthritis       Patient Active Problem List    Diagnosis Date Noted    Upper extremity weakness 2024    Chronic hip pain, bilateral 2024    Chronic bilateral low back pain with bilateral sciatica 2024    Weakness of both lower extremities 2024    Impaired functional mobility, balance, gait, and endurance 2024    Insomnia 2023    Muscle hypertonicity 2023    Decreased range of motion of lumbar spine 2023    Decreased range of motion of intervertebral discs of cervical spine 2023    Poor posture 2023    Colon cancer screening 2022    Neck pain 2022        PSHx:  Past Surgical History:   Procedure Laterality Date    CARPAL TUNNEL RELEASE Bilateral     COLONOSCOPY N/A 2022    Procedure: COLONOSCOPY;  Surgeon:  Kishore Monsalve MD;  Location: [facility] ENDO;  Service: Endoscopy;  Laterality: N/A;    EPIDURAL STEROID INJECTION INTO CERVICAL SPINE N/A 2022    Procedure: C7/T1 IL MERCY;  Surgeon: Luis M Pereyra MD;  Location: [facility] PAIN MGT;  Service: Pain Management;  Laterality: N/A;    HIP SURGERY      HYSTERECTOMY      due to bleeding, pain from fibroids, retains one ovary    INJECTION OF ANESTHETIC AGENT AROUND MEDIAL BRANCH NERVES INNERVATING CERVICAL FACET JOINT Bilateral 10/31/2022    Procedure: Bilateral C5-7 MBB with RN IV sedation;  Surgeon: Luis M Pereyra MD;  Location: [facility] PAIN MGT;  Service: Pain Management;  Laterality: Bilateral;    INJECTION OF ANESTHETIC AGENT AROUND MEDIAL BRANCH NERVES INNERVATING CERVICAL FACET JOINT Bilateral 3/13/2023    Procedure: Bilateral C5-7 MBB with RN IV sedation;  Surgeon: Luis M Pereyra MD;  Location: [facility] PAIN MGT;  Service: Pain Management;  Laterality: Bilateral;    INJECTION OF ANESTHETIC AGENT INTO SACROILIAC JOINT Bilateral 2024    Procedure: Bilateral SIJ Injection;  Surgeon: Luis M Pereyra MD;  Location: [facility] PAIN MGT;  Service: Pain Management;  Laterality: Bilateral;    OOPHORECTOMY      1 ovary removed    TUBAL LIGATION          FHx:  Family History   Problem Relation Name Age of Onset    Rheum arthritis Mother      No Known Problems Father          Social:  Social History     Socioeconomic History    Marital status:    Tobacco Use    Smoking status: Former     Current packs/day: 0.00     Types: Cigarettes     Quit date: 3/17/2001     Years since quittin.2     Passive exposure: Never    Smokeless tobacco: Never   Substance and Sexual Activity    Alcohol use: Never    Drug use: Never    Sexual activity: Yes     Partners: Male     Birth control/protection: None     Social Drivers of Health     Food Insecurity: High Risk (2024)    Received from [facility] SDOH Screening     In the past 2 months, did you or  "others you live with eat smaller meals or skip meals because you didn't have money for food?: Yes        Allergies:  Review of patient's allergies indicates:  No Known Allergies     ROS:  Review of Systems   Constitutional:  Negative for activity change, appetite change and unexpected weight change.   HENT:  Negative for trouble swallowing and voice change.    Eyes:  Negative for visual disturbance.   Respiratory:  Negative for shortness of breath.    Cardiovascular:  Negative for chest pain.   Gastrointestinal:  Negative for abdominal pain, constipation and diarrhea.   Endocrine: Negative for polydipsia, polyphagia and polyuria.   Genitourinary:  Negative for decreased urine volume and difficulty urinating.   Musculoskeletal:  Negative for arthralgias and myalgias.   Skin:  Negative for rash.   Neurological:  Negative for dizziness, light-headedness and headaches.   Psychiatric/Behavioral:  Negative for dysphoric mood. The patient is not nervous/anxious.           Objective      /80 (BP Location: Right arm, Patient Position: Sitting)   Pulse 60   Temp 97.6 °F (36.4 °C) (Tympanic)   Resp 16   Ht 5' 2" (1.575 m)   Wt 87.4 kg (192 lb 10.9 oz)   SpO2 98%   BMI 35.24 kg/m²   Ht Readings from Last 3 Encounters:   06/05/25 5' 2" (1.575 m)   06/04/24 5' 2" (1.575 m)   05/22/24 5' 2" (1.575 m)     Wt Readings from Last 3 Encounters:   06/05/25 87.4 kg (192 lb 10.9 oz)   06/04/24 72.6 kg (160 lb 0.9 oz)   05/22/24 72.6 kg (160 lb)       PHYSICAL EXAM:  Physical Exam  Vitals and nursing note reviewed.   Constitutional:       General: She is not in acute distress.     Appearance: Normal appearance. She is not ill-appearing.   HENT:      Head: Normocephalic and atraumatic.      Nose: Nose normal.      Mouth/Throat:      Mouth: Mucous membranes are moist.      Pharynx: Oropharynx is clear.   Eyes:      Extraocular Movements: Extraocular movements intact.      Pupils: Pupils are equal, round, and reactive to light. "   Cardiovascular:      Rate and Rhythm: Normal rate and regular rhythm.      Pulses: Normal pulses.      Heart sounds: Normal heart sounds.   Pulmonary:      Effort: Pulmonary effort is normal. No respiratory distress.      Breath sounds: Normal breath sounds.   Abdominal:      General: Abdomen is flat. Bowel sounds are normal.      Tenderness: There is no abdominal tenderness.   Musculoskeletal:         General: No deformity or signs of injury. Normal range of motion.      Cervical back: Normal range of motion.      Right lower leg: No edema.      Left lower leg: No edema.   Skin:     General: Skin is warm and dry.      Findings: No rash.   Neurological:      General: No focal deficit present.      Mental Status: She is alert.   Psychiatric:         Mood and Affect: Mood normal.            Assessment    1. Rheumatoid arthritis, involving unspecified site, unspecified whether rheumatoid factor present    2. Positive RICHARD (antinuclear antibody)    3. Vitamin D deficiency    4. Type 2 diabetes mellitus with other specified complication, without long-term current use of insulin    5. Constipation, unspecified constipation type    6. Pelvic pain in female    7. Cervical radiculopathy    8. DDD (degenerative disc disease), cervical    9. Cervical spondylosis    10. Insomnia, unspecified type    11. Hyperlipidemia, unspecified hyperlipidemia type    12. Acute gout involving toe of right foot, unspecified cause          Louis Chowdhury was seen today for annual exam.    Diagnoses and all orders for this visit:    Rheumatoid arthritis, involving unspecified site, unspecified whether rheumatoid factor present  -     Discontinue: ergocalciferol (ERGOCALCIFEROL) 50,000 unit Cap; Take 1 capsule (50,000 Units total) by mouth twice a week.  -     ergocalciferol (ERGOCALCIFEROL) 50,000 unit Cap; Take 1 capsule (50,000 Units total) by mouth every 7 days.    Positive RICHARD (antinuclear antibody)  -     Discontinue: ergocalciferol  (ERGOCALCIFEROL) 50,000 unit Cap; Take 1 capsule (50,000 Units total) by mouth twice a week.  -     ergocalciferol (ERGOCALCIFEROL) 50,000 unit Cap; Take 1 capsule (50,000 Units total) by mouth every 7 days.    Vitamin D deficiency  -     Discontinue: ergocalciferol (ERGOCALCIFEROL) 50,000 unit Cap; Take 1 capsule (50,000 Units total) by mouth twice a week.  -     ergocalciferol (ERGOCALCIFEROL) 50,000 unit Cap; Take 1 capsule (50,000 Units total) by mouth every 7 days.    Type 2 diabetes mellitus with other specified complication, without long-term current use of insulin  -     Discontinue: semaglutide (OZEMPIC) 2 mg/dose (8 mg/3 mL) PnIj; Inject 2 mg into the skin every 7 days.  -     CBC Auto Differential; Future  -     Comprehensive Metabolic Panel; Future  -     TSH; Future  -     Hemoglobin A1C; Future  -     Microalbumin/Creatinine Ratio, Urine; Future  -     semaglutide (OZEMPIC) 2 mg/dose (8 mg/3 mL) PnIj; Inject 2 mg into the skin every 7 days.    Constipation, unspecified constipation type  -     polyethylene glycol (GLYCOLAX) 17 gram PwPk; Take 17 g by mouth once daily.    Pelvic pain in female  -     polyethylene glycol (GLYCOLAX) 17 gram PwPk; Take 17 g by mouth once daily.    Cervical radiculopathy  -     nabumetone (RELAFEN) 750 MG tablet; Take 1 tablet (750 mg total) by mouth 2 (two) times daily as needed for Pain.  -     tiZANidine (ZANAFLEX) 4 MG tablet; Take 1 tablet (4 mg total) by mouth 2 (two) times daily as needed (pain).    DDD (degenerative disc disease), cervical  -     nabumetone (RELAFEN) 750 MG tablet; Take 1 tablet (750 mg total) by mouth 2 (two) times daily as needed for Pain.  -     tiZANidine (ZANAFLEX) 4 MG tablet; Take 1 tablet (4 mg total) by mouth 2 (two) times daily as needed (pain).    Cervical spondylosis  -     nabumetone (RELAFEN) 750 MG tablet; Take 1 tablet (750 mg total) by mouth 2 (two) times daily as needed for Pain.  -     tiZANidine (ZANAFLEX) 4 MG tablet; Take 1  tablet (4 mg total) by mouth 2 (two) times daily as needed (pain).    Insomnia, unspecified type  -     traZODone (DESYREL) 50 MG tablet; Take 2 tablets (100 mg total) by mouth nightly as needed for Insomnia.    Hyperlipidemia, unspecified hyperlipidemia type  -     atorvastatin (LIPITOR) 80 MG tablet; Take 1 tablet (80 mg total) by mouth once daily.  -     Lipid Panel; Future    Acute gout involving toe of right foot, unspecified cause  -     colchicine (COLCRYS) 0.6 mg tablet; Take 2 tab by mouth now and 1 tab in 1 hour. Then twice daily for 5 days.    Other orders  -     ibuprofen (ADVIL,MOTRIN) 800 MG tablet; Take 1 tablet (800 mg total) by mouth every 6 (six) hours as needed for Pain.    DIABETIC CATARACT:  - Patient has been off Ozempic for 2-3 months, resulting in 32-pound weight gain after previously reaching 160-something lbs.  - Reports tingling sensation, likely due to diabetic neuropathy.  - Diabetes is assessed as uncontrolled due to weight gain and medication non-adherence.  - Plan is to restart Ozempic at 0.5 mg with gradual dose escalation.  - Medications refilled and Ozempic prescription sent to pharmacy.  - Follow-up scheduled in 3 months to reassess diabetes control.    RHEUMATOID ARTHRITIS, UNSPECIFIED:  - Patient is under rheumatologist care with an upcoming appointment in July.  - For pain management, will continue ibuprofen 800 mg for significant pain episodes and nabumetone (Relafen) for less significant pain, with instructions not to take these medications concurrently.  - Patient reports experiencing muscle spasms and cramps, possibly associated with arthritis.    OSTEOARTHRITIS:  - Continue tizanidine (Zanaflex) for muscle spasms and cramps associated with arthritis.    GOUT:  - Refilled colchicine to have on hand for potential gout flares.  - Advised patient to keep medication readily available for acute exacerbations.    EDEMA:  - Noted dependent edema in legs, likely due to prolonged  sitting and possibly related to sugar intake from energy drinks.  - Explained causes to patient and recommended management strategies including: elevating legs above heart level, using compression stockings, and moving frequently throughout the day to avoid prolonged sitting.    HYPERLIPIDEMIA:  - Continue atorvastatin (Lipitor) for cholesterol management.    WEIGHT MANAGEMENT:  - Noted patient has history of weight gain after quitting smoking in 2001.  - Restarted Ozempic at 0.25 mg dose due to extended period without medication.    DIABETES MANAGEMENT:  - Ordered labs to assess current diabetes control and overall health status.  - Discussed importance of regular follow-ups every 6 months.    OTHER MEDICATIONS:  - Continue Miralax and Trazodone for sleep.       FOLLOW-UP:  Follow up in about 3 months (around 9/5/2025) for With me.    I spent a total of 30 minutes face to face and non-face to face on the date of this visit.This includes time preparing to see the patient (eg, review of tests, notes), obtaining and/or reviewing additional history from an independent historian and/or outside medical records, documenting clinical information in the electronic health record, independently interpreting results and/or communicating results to the patient/family/caregiver, or care coordinator.  Visit today included increased complexity associated with the care of the episodic problem addressed and managing the longitudinal care of the patient due to the serious and/or complex managed problem(s).      Signed by:        Teresa Howell PA-C      This note was generated with the assistance of ambient listening technology. Verbal consent was obtained by the patient and accompanying visitor(s) for the recording of patient appointment to facilitate this note. I attest to having reviewed and edited the generated note for accuracy, though some syntax or spelling errors may persist. Please contact the author of this note for any  clarification.         [1]   Current Outpatient Medications on File Prior to Visit   Medication Sig Dispense Refill    aspirin 81 MG Chew Take 81 mg by mouth.      pregabalin (LYRICA) 75 MG capsule Take 1 capsule (75 mg total) by mouth 2 (two) times daily. 60 capsule 5    diclofenac sodium (VOLTAREN) 1 % Gel Apply 2 g topically 4 (four) times daily. (Patient not taking: Reported on 6/5/2025) 100 g 6    hydroxychloroquine (PLAQUENIL) 200 mg tablet Take 1 tablet (200 mg total) by mouth 2 (two) times daily. (Patient not taking: Reported on 6/5/2025) 180 tablet 1     No current facility-administered medications on file prior to visit.

## 2025-06-10 ENCOUNTER — RESULTS FOLLOW-UP (OUTPATIENT)
Dept: PRIMARY CARE CLINIC | Facility: CLINIC | Age: 65
End: 2025-06-10

## 2025-07-30 ENCOUNTER — OFFICE VISIT (OUTPATIENT)
Dept: OPHTHALMOLOGY | Facility: CLINIC | Age: 65
End: 2025-07-30
Payer: MEDICARE

## 2025-07-30 DIAGNOSIS — H52.223 REGULAR ASTIGMATISM OF BOTH EYES WITH PRESBYOPIA: ICD-10-CM

## 2025-07-30 DIAGNOSIS — H52.4 REGULAR ASTIGMATISM OF BOTH EYES WITH PRESBYOPIA: ICD-10-CM

## 2025-07-30 DIAGNOSIS — H40.013 OAG (OPEN ANGLE GLAUCOMA) SUSPECT, LOW RISK, BILATERAL: ICD-10-CM

## 2025-07-30 DIAGNOSIS — H25.813 COMBINED FORM OF AGE-RELATED CATARACT, BOTH EYES: ICD-10-CM

## 2025-07-30 DIAGNOSIS — Z79.899 ENCOUNTER FOR LONG-TERM (CURRENT) USE OF MEDICATIONS: Primary | ICD-10-CM

## 2025-07-30 DIAGNOSIS — E11.9 DIABETES MELLITUS WITHOUT COMPLICATION: ICD-10-CM

## 2025-07-30 PROCEDURE — 99999 PR PBB SHADOW E&M-EST. PATIENT-LVL II: CPT | Mod: PBBFAC,,, | Performed by: OPTOMETRIST

## 2025-07-30 NOTE — PROGRESS NOTES
HPI     Follow-up            Comments: RTC next available for HVF 10-2 & color [facility].  Patient states vision is stable   No pain  No flashes/floaters           Comments    1. Dm dx 2020  2. Low risk glaucoma suspect   -GCL 32/32  3. Started Plaquenil in 2022 for RA             Last edited by Fauzia Byrnes on 7/30/2025 10:35 AM.            Assessment /Plan     For exam results, see Encounter Report.    1. Encounter for long-term (current) use of medications  -     OCT, Retina - OU - Both Eyes  -     [facility]y Visual Field - OU - Extended - Both Eyes  Currently taking 200 mg 2 times per day for 2 years for Rheumatoid Arthritis. There was no maculopathy present in either eye. Recommend repeat testing in 1 year due to duration of risk factors    2. OAG (open angle glaucoma) suspect, low risk, bilateral  The patient has the following Glaucoma risk factors: Optic Nerve Asymmetry      Recommend close observation off drops at this time with annual gOCT for changes.    3. Diabetes mellitus without complication  Next DFE 11/2025.    4. Combined form of age-related cataract, both eyes  Observe.    5. Regular astigmatism of both eyes with presbyopia  Continue wearing current Rx.     RTC in 5 months for DFE, sooner if changes to vision or worsening symptoms.  Discussed above and answered questions.

## 2025-08-13 ENCOUNTER — PATIENT MESSAGE (OUTPATIENT)
Dept: ADMINISTRATIVE | Facility: HOSPITAL | Age: 65
End: 2025-08-13
Payer: MEDICARE

## 2025-09-05 ENCOUNTER — HOSPITAL ENCOUNTER (OUTPATIENT)
Dept: RADIOLOGY | Facility: HOSPITAL | Age: 65
Discharge: HOME | End: 2025-09-05
Attending: FAMILY MEDICINE
Payer: MEDICARE

## 2025-09-05 VITALS — WEIGHT: 192.69 LBS | BODY MASS INDEX: 35.46 KG/M2 | HEIGHT: 62 IN

## 2025-09-05 DIAGNOSIS — Z12.31 BREAST CANCER SCREENING BY MAMMOGRAM: ICD-10-CM

## 2025-09-05 PROCEDURE — 77067 SCR MAMMO BI INCL CAD: CPT | Mod: TC,PN
